# Patient Record
Sex: MALE | Race: WHITE | Employment: OTHER | URBAN - METROPOLITAN AREA
[De-identification: names, ages, dates, MRNs, and addresses within clinical notes are randomized per-mention and may not be internally consistent; named-entity substitution may affect disease eponyms.]

---

## 2017-01-01 ENCOUNTER — HOSPITAL ENCOUNTER (INPATIENT)
Age: 72
LOS: 1 days | DRG: 291 | End: 2017-05-01
Attending: INTERNAL MEDICINE | Admitting: INTERNAL MEDICINE
Payer: OTHER MISCELLANEOUS

## 2017-01-01 ENCOUNTER — OFFICE VISIT (OUTPATIENT)
Dept: INTERNAL MEDICINE CLINIC | Age: 72
End: 2017-01-01

## 2017-01-01 ENCOUNTER — APPOINTMENT (OUTPATIENT)
Dept: ULTRASOUND IMAGING | Age: 72
DRG: 189 | End: 2017-01-01
Attending: HOSPITALIST
Payer: COMMERCIAL

## 2017-01-01 ENCOUNTER — HOME CARE VISIT (OUTPATIENT)
Dept: HOSPICE | Facility: HOSPICE | Age: 72
End: 2017-01-01
Payer: COMMERCIAL

## 2017-01-01 ENCOUNTER — APPOINTMENT (OUTPATIENT)
Dept: GENERAL RADIOLOGY | Age: 72
DRG: 243 | End: 2017-01-01
Attending: EMERGENCY MEDICINE
Payer: COMMERCIAL

## 2017-01-01 ENCOUNTER — HOME CARE VISIT (OUTPATIENT)
Dept: SCHEDULING | Facility: HOME HEALTH | Age: 72
End: 2017-01-01
Payer: COMMERCIAL

## 2017-01-01 ENCOUNTER — HOME CARE VISIT (OUTPATIENT)
Dept: HOME HEALTH SERVICES | Facility: HOME HEALTH | Age: 72
End: 2017-01-01
Payer: COMMERCIAL

## 2017-01-01 ENCOUNTER — TELEPHONE (OUTPATIENT)
Dept: INTERNAL MEDICINE CLINIC | Age: 72
End: 2017-01-01

## 2017-01-01 ENCOUNTER — APPOINTMENT (OUTPATIENT)
Dept: ULTRASOUND IMAGING | Age: 72
DRG: 189 | End: 2017-01-01
Attending: INTERNAL MEDICINE
Payer: COMMERCIAL

## 2017-01-01 ENCOUNTER — PATIENT OUTREACH (OUTPATIENT)
Dept: INTERNAL MEDICINE CLINIC | Age: 72
End: 2017-01-01

## 2017-01-01 ENCOUNTER — HOME CARE VISIT (OUTPATIENT)
Dept: HOME HEALTH SERVICES | Facility: HOME HEALTH | Age: 72
End: 2017-01-01

## 2017-01-01 ENCOUNTER — APPOINTMENT (OUTPATIENT)
Dept: NUCLEAR MEDICINE | Age: 72
DRG: 191 | End: 2017-01-01
Attending: INTERNAL MEDICINE
Payer: COMMERCIAL

## 2017-01-01 ENCOUNTER — HOSPICE ADMISSION (OUTPATIENT)
Dept: HOSPICE | Facility: HOSPICE | Age: 72
End: 2017-01-01

## 2017-01-01 ENCOUNTER — HOME HEALTH ADMISSION (OUTPATIENT)
Dept: HOME HEALTH SERVICES | Facility: HOME HEALTH | Age: 72
End: 2017-01-01
Payer: COMMERCIAL

## 2017-01-01 ENCOUNTER — HOSPITAL ENCOUNTER (INPATIENT)
Age: 72
LOS: 3 days | Discharge: HOME HEALTH | DRG: 191 | End: 2017-03-31
Attending: EMERGENCY MEDICINE | Admitting: INTERNAL MEDICINE
Payer: COMMERCIAL

## 2017-01-01 ENCOUNTER — DOCUMENTATION ONLY (OUTPATIENT)
Dept: INTERNAL MEDICINE CLINIC | Age: 72
End: 2017-01-01

## 2017-01-01 ENCOUNTER — APPOINTMENT (OUTPATIENT)
Dept: GENERAL RADIOLOGY | Age: 72
DRG: 243 | End: 2017-01-01
Attending: INTERNAL MEDICINE
Payer: COMMERCIAL

## 2017-01-01 ENCOUNTER — HOSPITAL ENCOUNTER (INPATIENT)
Age: 72
LOS: 3 days | Discharge: HOME HEALTH | DRG: 191 | End: 2017-02-12
Attending: EMERGENCY MEDICINE | Admitting: HOSPITALIST
Payer: COMMERCIAL

## 2017-01-01 ENCOUNTER — APPOINTMENT (OUTPATIENT)
Dept: GENERAL RADIOLOGY | Age: 72
DRG: 191 | End: 2017-01-01
Attending: EMERGENCY MEDICINE
Payer: COMMERCIAL

## 2017-01-01 ENCOUNTER — HOSPICE ADMISSION (OUTPATIENT)
Dept: HOSPICE | Facility: HOSPICE | Age: 72
End: 2017-01-01
Payer: COMMERCIAL

## 2017-01-01 ENCOUNTER — APPOINTMENT (OUTPATIENT)
Dept: CT IMAGING | Age: 72
DRG: 191 | End: 2017-01-01
Attending: EMERGENCY MEDICINE
Payer: COMMERCIAL

## 2017-01-01 ENCOUNTER — APPOINTMENT (OUTPATIENT)
Dept: GENERAL RADIOLOGY | Age: 72
DRG: 189 | End: 2017-01-01
Attending: EMERGENCY MEDICINE
Payer: COMMERCIAL

## 2017-01-01 ENCOUNTER — HOSPITAL ENCOUNTER (INPATIENT)
Age: 72
LOS: 2 days | Discharge: HOSPICE | DRG: 189 | End: 2017-04-30
Attending: EMERGENCY MEDICINE | Admitting: HOSPITALIST
Payer: COMMERCIAL

## 2017-01-01 ENCOUNTER — HOSPITAL ENCOUNTER (INPATIENT)
Age: 72
LOS: 5 days | Discharge: NURSING, REHAB OR LONG-TERM CARE | DRG: 243 | End: 2017-04-22
Attending: EMERGENCY MEDICINE | Admitting: INTERNAL MEDICINE
Payer: COMMERCIAL

## 2017-01-01 ENCOUNTER — NURSE NAVIGATOR (OUTPATIENT)
Dept: INTERNAL MEDICINE CLINIC | Age: 72
End: 2017-01-01

## 2017-01-01 ENCOUNTER — PATIENT MESSAGE (OUTPATIENT)
Dept: INTERNAL MEDICINE CLINIC | Age: 72
End: 2017-01-01

## 2017-01-01 ENCOUNTER — APPOINTMENT (OUTPATIENT)
Dept: CT IMAGING | Age: 72
DRG: 189 | End: 2017-01-01
Attending: HOSPITALIST
Payer: COMMERCIAL

## 2017-01-01 VITALS
OXYGEN SATURATION: 97 % | TEMPERATURE: 98.1 F | DIASTOLIC BLOOD PRESSURE: 62 MMHG | SYSTOLIC BLOOD PRESSURE: 120 MMHG | HEART RATE: 80 BPM

## 2017-01-01 VITALS
HEART RATE: 89 BPM | SYSTOLIC BLOOD PRESSURE: 130 MMHG | TEMPERATURE: 98.6 F | OXYGEN SATURATION: 94 % | DIASTOLIC BLOOD PRESSURE: 70 MMHG | RESPIRATION RATE: 15 BRPM

## 2017-01-01 VITALS
BODY MASS INDEX: 19.04 KG/M2 | DIASTOLIC BLOOD PRESSURE: 62 MMHG | WEIGHT: 136 LBS | OXYGEN SATURATION: 97 % | SYSTOLIC BLOOD PRESSURE: 102 MMHG | RESPIRATION RATE: 18 BRPM | HEIGHT: 71 IN | TEMPERATURE: 97.6 F | HEART RATE: 81 BPM

## 2017-01-01 VITALS
TEMPERATURE: 97.6 F | DIASTOLIC BLOOD PRESSURE: 64 MMHG | SYSTOLIC BLOOD PRESSURE: 112 MMHG | HEART RATE: 73 BPM | WEIGHT: 132.5 LBS | BODY MASS INDEX: 18.49 KG/M2 | RESPIRATION RATE: 73 BRPM | OXYGEN SATURATION: 97 %

## 2017-01-01 VITALS
DIASTOLIC BLOOD PRESSURE: 90 MMHG | TEMPERATURE: 98.1 F | SYSTOLIC BLOOD PRESSURE: 178 MMHG | RESPIRATION RATE: 30 BRPM | OXYGEN SATURATION: 98 % | HEART RATE: 75 BPM | BODY MASS INDEX: 19.33 KG/M2 | HEIGHT: 70 IN | WEIGHT: 135 LBS

## 2017-01-01 VITALS
WEIGHT: 131.84 LBS | TEMPERATURE: 98.4 F | RESPIRATION RATE: 20 BRPM | HEIGHT: 71 IN | DIASTOLIC BLOOD PRESSURE: 61 MMHG | OXYGEN SATURATION: 96 % | SYSTOLIC BLOOD PRESSURE: 110 MMHG | HEART RATE: 67 BPM | BODY MASS INDEX: 18.46 KG/M2

## 2017-01-01 VITALS
WEIGHT: 135.2 LBS | SYSTOLIC BLOOD PRESSURE: 140 MMHG | OXYGEN SATURATION: 97 % | DIASTOLIC BLOOD PRESSURE: 71 MMHG | HEIGHT: 70 IN | RESPIRATION RATE: 18 BRPM | HEART RATE: 75 BPM | TEMPERATURE: 98.3 F | BODY MASS INDEX: 19.36 KG/M2

## 2017-01-01 VITALS
DIASTOLIC BLOOD PRESSURE: 64 MMHG | RESPIRATION RATE: 20 BRPM | SYSTOLIC BLOOD PRESSURE: 118 MMHG | OXYGEN SATURATION: 98 % | BODY MASS INDEX: 18.49 KG/M2 | WEIGHT: 132.5 LBS | HEART RATE: 82 BPM

## 2017-01-01 VITALS
TEMPERATURE: 98.7 F | BODY MASS INDEX: 19.17 KG/M2 | OXYGEN SATURATION: 95 % | HEART RATE: 86 BPM | WEIGHT: 137.4 LBS | DIASTOLIC BLOOD PRESSURE: 74 MMHG | RESPIRATION RATE: 16 BRPM | SYSTOLIC BLOOD PRESSURE: 118 MMHG

## 2017-01-01 VITALS
DIASTOLIC BLOOD PRESSURE: 68 MMHG | OXYGEN SATURATION: 98 % | HEART RATE: 79 BPM | RESPIRATION RATE: 20 BRPM | SYSTOLIC BLOOD PRESSURE: 130 MMHG | BODY MASS INDEX: 18.49 KG/M2 | WEIGHT: 132.5 LBS | TEMPERATURE: 97.9 F

## 2017-01-01 VITALS — DIASTOLIC BLOOD PRESSURE: 80 MMHG | SYSTOLIC BLOOD PRESSURE: 138 MMHG | OXYGEN SATURATION: 98 % | HEART RATE: 80 BPM

## 2017-01-01 VITALS
RESPIRATION RATE: 18 BRPM | HEART RATE: 76 BPM | OXYGEN SATURATION: 98 % | SYSTOLIC BLOOD PRESSURE: 140 MMHG | TEMPERATURE: 97.9 F | DIASTOLIC BLOOD PRESSURE: 60 MMHG

## 2017-01-01 VITALS
WEIGHT: 138 LBS | HEART RATE: 70 BPM | BODY MASS INDEX: 19.32 KG/M2 | DIASTOLIC BLOOD PRESSURE: 39 MMHG | OXYGEN SATURATION: 97 % | RESPIRATION RATE: 18 BRPM | SYSTOLIC BLOOD PRESSURE: 94 MMHG | HEIGHT: 71 IN | TEMPERATURE: 97.8 F

## 2017-01-01 VITALS
DIASTOLIC BLOOD PRESSURE: 78 MMHG | OXYGEN SATURATION: 96 % | TEMPERATURE: 97.9 F | HEART RATE: 88 BPM | SYSTOLIC BLOOD PRESSURE: 130 MMHG

## 2017-01-01 VITALS
OXYGEN SATURATION: 96 % | SYSTOLIC BLOOD PRESSURE: 140 MMHG | DIASTOLIC BLOOD PRESSURE: 82 MMHG | TEMPERATURE: 97.9 F | HEART RATE: 82 BPM

## 2017-01-01 VITALS
TEMPERATURE: 97.5 F | DIASTOLIC BLOOD PRESSURE: 62 MMHG | WEIGHT: 132 LBS | BODY MASS INDEX: 18.48 KG/M2 | RESPIRATION RATE: 16 BRPM | SYSTOLIC BLOOD PRESSURE: 120 MMHG | HEIGHT: 71 IN | HEART RATE: 80 BPM | OXYGEN SATURATION: 97 %

## 2017-01-01 VITALS
HEART RATE: 73 BPM | DIASTOLIC BLOOD PRESSURE: 66 MMHG | RESPIRATION RATE: 16 BRPM | SYSTOLIC BLOOD PRESSURE: 116 MMHG | TEMPERATURE: 97.8 F | OXYGEN SATURATION: 97 %

## 2017-01-01 VITALS
OXYGEN SATURATION: 96 % | HEART RATE: 77 BPM | TEMPERATURE: 97.9 F | WEIGHT: 132 LBS | SYSTOLIC BLOOD PRESSURE: 130 MMHG | BODY MASS INDEX: 18.42 KG/M2 | DIASTOLIC BLOOD PRESSURE: 70 MMHG | RESPIRATION RATE: 18 BRPM

## 2017-01-01 VITALS
RESPIRATION RATE: 16 BRPM | SYSTOLIC BLOOD PRESSURE: 126 MMHG | DIASTOLIC BLOOD PRESSURE: 62 MMHG | OXYGEN SATURATION: 96 % | HEART RATE: 85 BPM

## 2017-01-01 VITALS
RESPIRATION RATE: 24 BRPM | DIASTOLIC BLOOD PRESSURE: 78 MMHG | HEART RATE: 77 BPM | WEIGHT: 131 LBS | SYSTOLIC BLOOD PRESSURE: 144 MMHG | BODY MASS INDEX: 18.28 KG/M2 | OXYGEN SATURATION: 97 % | TEMPERATURE: 97.6 F

## 2017-01-01 DIAGNOSIS — J18.9 PNEUMONIA OF BOTH LUNGS DUE TO INFECTIOUS ORGANISM, UNSPECIFIED PART OF LUNG: ICD-10-CM

## 2017-01-01 DIAGNOSIS — I48.0 PAROXYSMAL ATRIAL FIBRILLATION (HCC): Primary | ICD-10-CM

## 2017-01-01 DIAGNOSIS — A15.0: Chronic | ICD-10-CM

## 2017-01-01 DIAGNOSIS — I25.9 ISCHEMIC HEART DISEASE, CHRONIC: Chronic | ICD-10-CM

## 2017-01-01 DIAGNOSIS — J96.21 ACUTE ON CHRONIC RESPIRATORY FAILURE WITH HYPOXIA (HCC): ICD-10-CM

## 2017-01-01 DIAGNOSIS — D64.9 CHRONIC ANEMIA: ICD-10-CM

## 2017-01-01 DIAGNOSIS — J96.01 ACUTE RESPIRATORY FAILURE WITH HYPOXIA (HCC): Primary | ICD-10-CM

## 2017-01-01 DIAGNOSIS — F51.01 PRIMARY INSOMNIA: Chronic | ICD-10-CM

## 2017-01-01 DIAGNOSIS — I10 ESSENTIAL HYPERTENSION: Chronic | ICD-10-CM

## 2017-01-01 DIAGNOSIS — F43.23 ADJUSTMENT DISORDER WITH MIXED ANXIETY AND DEPRESSED MOOD: ICD-10-CM

## 2017-01-01 DIAGNOSIS — I10 ESSENTIAL HYPERTENSION, BENIGN: Chronic | ICD-10-CM

## 2017-01-01 DIAGNOSIS — J84.10 PULMONARY EMPHYSEMA WITH FIBROSIS OF LUNG (HCC): ICD-10-CM

## 2017-01-01 DIAGNOSIS — K59.09 CHRONIC CONSTIPATION: ICD-10-CM

## 2017-01-01 DIAGNOSIS — J96.11 HYPOXEMIC RESPIRATORY FAILURE, CHRONIC (HCC): Primary | Chronic | ICD-10-CM

## 2017-01-01 DIAGNOSIS — J90 PLEURAL EFFUSION, LEFT: ICD-10-CM

## 2017-01-01 DIAGNOSIS — J44.1 COPD EXACERBATION (HCC): ICD-10-CM

## 2017-01-01 DIAGNOSIS — R07.9 ACUTE CHEST PAIN: Primary | ICD-10-CM

## 2017-01-01 DIAGNOSIS — J84.10 PULMONARY EMPHYSEMA WITH FIBROSIS OF LUNG (HCC): Chronic | ICD-10-CM

## 2017-01-01 DIAGNOSIS — J44.1 CHRONIC OBSTRUCTIVE PULMONARY DISEASE WITH ACUTE EXACERBATION (HCC): Primary | ICD-10-CM

## 2017-01-01 DIAGNOSIS — I50.22 CHRONIC SYSTOLIC CONGESTIVE HEART FAILURE (HCC): Chronic | ICD-10-CM

## 2017-01-01 DIAGNOSIS — J43.9 PULMONARY EMPHYSEMA WITH FIBROSIS OF LUNG (HCC): ICD-10-CM

## 2017-01-01 DIAGNOSIS — J43.9 PULMONARY EMPHYSEMA WITH FIBROSIS OF LUNG (HCC): Chronic | ICD-10-CM

## 2017-01-01 DIAGNOSIS — E03.4 HYPOTHYROIDISM DUE TO ACQUIRED ATROPHY OF THYROID: Chronic | ICD-10-CM

## 2017-01-01 LAB
ALBUMIN SERPL BCP-MCNC: 2.2 G/DL (ref 3.5–5)
ALBUMIN SERPL BCP-MCNC: 2.3 G/DL (ref 3.5–5)
ALBUMIN SERPL BCP-MCNC: 2.4 G/DL (ref 3.5–5)
ALBUMIN SERPL BCP-MCNC: 2.5 G/DL (ref 3.5–5)
ALBUMIN SERPL BCP-MCNC: 2.6 G/DL (ref 3.5–5)
ALBUMIN SERPL BCP-MCNC: 2.9 G/DL (ref 3.5–5)
ALBUMIN SERPL BCP-MCNC: 2.9 G/DL (ref 3.5–5)
ALBUMIN SERPL BCP-MCNC: 3 G/DL (ref 3.5–5)
ALBUMIN SERPL BCP-MCNC: 3.4 G/DL (ref 3.5–5)
ALBUMIN/GLOB SERPL: 0.6 {RATIO} (ref 1.1–2.2)
ALBUMIN/GLOB SERPL: 0.6 {RATIO} (ref 1.1–2.2)
ALBUMIN/GLOB SERPL: 0.7 {RATIO} (ref 1.1–2.2)
ALBUMIN/GLOB SERPL: 0.7 {RATIO} (ref 1.1–2.2)
ALBUMIN/GLOB SERPL: 0.8 {RATIO} (ref 1.1–2.2)
ALP SERPL-CCNC: 46 U/L (ref 45–117)
ALP SERPL-CCNC: 47 U/L (ref 45–117)
ALP SERPL-CCNC: 50 U/L (ref 45–117)
ALP SERPL-CCNC: 51 U/L (ref 45–117)
ALP SERPL-CCNC: 51 U/L (ref 45–117)
ALP SERPL-CCNC: 52 U/L (ref 45–117)
ALP SERPL-CCNC: 54 U/L (ref 45–117)
ALP SERPL-CCNC: 59 U/L (ref 45–117)
ALP SERPL-CCNC: 67 U/L (ref 45–117)
ALT SERPL-CCNC: 117 U/L (ref 12–78)
ALT SERPL-CCNC: 120 U/L (ref 12–78)
ALT SERPL-CCNC: 17 U/L (ref 12–78)
ALT SERPL-CCNC: 172 U/L (ref 12–78)
ALT SERPL-CCNC: 21 U/L (ref 12–78)
ALT SERPL-CCNC: 28 U/L (ref 12–78)
ALT SERPL-CCNC: 29 U/L (ref 12–78)
ALT SERPL-CCNC: 30 U/L (ref 12–78)
ALT SERPL-CCNC: 89 U/L (ref 12–78)
ANION GAP BLD CALC-SCNC: 4 MMOL/L (ref 5–15)
ANION GAP BLD CALC-SCNC: 5 MMOL/L (ref 5–15)
ANION GAP BLD CALC-SCNC: 6 MMOL/L (ref 5–15)
ANION GAP BLD CALC-SCNC: 7 MMOL/L (ref 5–15)
ANION GAP BLD CALC-SCNC: 8 MMOL/L (ref 5–15)
ANION GAP BLD CALC-SCNC: 9 MMOL/L (ref 5–15)
ANION GAP BLD CALC-SCNC: 9 MMOL/L (ref 5–15)
APPEARANCE UR: ABNORMAL
APPEARANCE UR: CLEAR
APPEARANCE UR: CLEAR
ARTERIAL PATENCY WRIST A: ABNORMAL
ARTERIAL PATENCY WRIST A: YES
ARTERIAL PATENCY WRIST A: YES
AST SERPL W P-5'-P-CCNC: 12 U/L (ref 15–37)
AST SERPL W P-5'-P-CCNC: 14 U/L (ref 15–37)
AST SERPL W P-5'-P-CCNC: 168 U/L (ref 15–37)
AST SERPL W P-5'-P-CCNC: 18 U/L (ref 15–37)
AST SERPL W P-5'-P-CCNC: 19 U/L (ref 15–37)
AST SERPL W P-5'-P-CCNC: 26 U/L (ref 15–37)
AST SERPL W P-5'-P-CCNC: 27 U/L (ref 15–37)
AST SERPL W P-5'-P-CCNC: 55 U/L (ref 15–37)
AST SERPL W P-5'-P-CCNC: 91 U/L (ref 15–37)
ATRIAL RATE: 102 BPM
ATRIAL RATE: 104 BPM
ATRIAL RATE: 187 BPM
ATRIAL RATE: 72 BPM
ATRIAL RATE: 79 BPM
ATRIAL RATE: 95 BPM
ATRIAL RATE: 96 BPM
ATTENDING PHYSICIAN, CST07: NORMAL
BACTERIA SPEC CULT: NORMAL
BACTERIA URNS QL MICRO: ABNORMAL /HPF
BACTERIA URNS QL MICRO: NEGATIVE /HPF
BACTERIA URNS QL MICRO: NEGATIVE /HPF
BASE EXCESS BLDA CALC-SCNC: 14.9 MMOL/L
BASE EXCESS BLDA CALC-SCNC: 8.3 MMOL/L
BASE EXCESS BLDA CALC-SCNC: 8.9 MMOL/L
BASOPHILS # BLD AUTO: 0 K/UL (ref 0–0.1)
BASOPHILS # BLD AUTO: 0.1 K/UL (ref 0–0.1)
BASOPHILS # BLD: 0 % (ref 0–1)
BASOPHILS # BLD: 1 % (ref 0–1)
BDY SITE: ABNORMAL
BILIRUB DIRECT SERPL-MCNC: 0.1 MG/DL (ref 0–0.2)
BILIRUB SERPL-MCNC: 0.3 MG/DL (ref 0.2–1)
BILIRUB SERPL-MCNC: 0.3 MG/DL (ref 0.2–1)
BILIRUB SERPL-MCNC: 0.4 MG/DL (ref 0.2–1)
BILIRUB SERPL-MCNC: 0.5 MG/DL (ref 0.2–1)
BILIRUB SERPL-MCNC: 0.5 MG/DL (ref 0.2–1)
BILIRUB SERPL-MCNC: 0.7 MG/DL (ref 0.2–1)
BILIRUB UR QL: NEGATIVE
BNP SERPL-MCNC: 2399 PG/ML (ref 0–125)
BNP SERPL-MCNC: 3971 PG/ML (ref 0–125)
BNP SERPL-MCNC: 822 PG/ML (ref 0–100)
BNP SERPL-MCNC: ABNORMAL PG/ML (ref 0–125)
BNP SERPL-MCNC: ABNORMAL PG/ML (ref 0–125)
BREATHS.SPONTANEOUS ON VENT: 18
BREATHS.SPONTANEOUS ON VENT: 25
BUN SERPL-MCNC: 15 MG/DL (ref 6–20)
BUN SERPL-MCNC: 16 MG/DL (ref 6–20)
BUN SERPL-MCNC: 17 MG/DL (ref 6–20)
BUN SERPL-MCNC: 18 MG/DL (ref 6–20)
BUN SERPL-MCNC: 19 MG/DL (ref 6–20)
BUN SERPL-MCNC: 20 MG/DL (ref 6–20)
BUN SERPL-MCNC: 20 MG/DL (ref 6–20)
BUN SERPL-MCNC: 22 MG/DL (ref 6–20)
BUN SERPL-MCNC: 22 MG/DL (ref 6–20)
BUN SERPL-MCNC: 24 MG/DL (ref 6–20)
BUN SERPL-MCNC: 25 MG/DL (ref 6–20)
BUN SERPL-MCNC: 28 MG/DL (ref 6–20)
BUN SERPL-MCNC: 29 MG/DL (ref 6–20)
BUN SERPL-MCNC: 30 MG/DL (ref 6–20)
BUN SERPL-MCNC: 35 MG/DL (ref 6–20)
BUN SERPL-MCNC: 37 MG/DL (ref 6–20)
BUN/CREAT SERPL: 19 (ref 12–20)
BUN/CREAT SERPL: 23 (ref 12–20)
BUN/CREAT SERPL: 23 (ref 12–20)
BUN/CREAT SERPL: 24 (ref 12–20)
BUN/CREAT SERPL: 25 (ref 12–20)
BUN/CREAT SERPL: 27 (ref 12–20)
BUN/CREAT SERPL: 28 (ref 12–20)
BUN/CREAT SERPL: 33 (ref 12–20)
BUN/CREAT SERPL: 39 (ref 12–20)
BUN/CREAT SERPL: 41 (ref 12–20)
CALCIUM SERPL-MCNC: 7.8 MG/DL (ref 8.5–10.1)
CALCIUM SERPL-MCNC: 7.8 MG/DL (ref 8.5–10.1)
CALCIUM SERPL-MCNC: 8 MG/DL (ref 8.5–10.1)
CALCIUM SERPL-MCNC: 8.1 MG/DL (ref 8.5–10.1)
CALCIUM SERPL-MCNC: 8.1 MG/DL (ref 8.5–10.1)
CALCIUM SERPL-MCNC: 8.2 MG/DL (ref 8.5–10.1)
CALCIUM SERPL-MCNC: 8.2 MG/DL (ref 8.5–10.1)
CALCIUM SERPL-MCNC: 8.3 MG/DL (ref 8.5–10.1)
CALCIUM SERPL-MCNC: 8.4 MG/DL (ref 8.5–10.1)
CALCIUM SERPL-MCNC: 8.4 MG/DL (ref 8.5–10.1)
CALCIUM SERPL-MCNC: 8.6 MG/DL (ref 8.5–10.1)
CALCULATED P AXIS, ECG09: 85 DEGREES
CALCULATED P AXIS, ECG09: 89 DEGREES
CALCULATED P AXIS, ECG09: 90 DEGREES
CALCULATED P AXIS, ECG09: 96 DEGREES
CALCULATED R AXIS, ECG10: -109 DEGREES
CALCULATED R AXIS, ECG10: 100 DEGREES
CALCULATED R AXIS, ECG10: 103 DEGREES
CALCULATED R AXIS, ECG10: 104 DEGREES
CALCULATED R AXIS, ECG10: 104 DEGREES
CALCULATED R AXIS, ECG10: 108 DEGREES
CALCULATED R AXIS, ECG10: 86 DEGREES
CALCULATED T AXIS, ECG11: 68 DEGREES
CALCULATED T AXIS, ECG11: 69 DEGREES
CALCULATED T AXIS, ECG11: 80 DEGREES
CALCULATED T AXIS, ECG11: 86 DEGREES
CALCULATED T AXIS, ECG11: 87 DEGREES
CALCULATED T AXIS, ECG11: 87 DEGREES
CALCULATED T AXIS, ECG11: 91 DEGREES
CC UR VC: NORMAL
CHLORIDE SERPL-SCNC: 100 MMOL/L (ref 97–108)
CHLORIDE SERPL-SCNC: 101 MMOL/L (ref 97–108)
CHLORIDE SERPL-SCNC: 102 MMOL/L (ref 97–108)
CHLORIDE SERPL-SCNC: 102 MMOL/L (ref 97–108)
CHLORIDE SERPL-SCNC: 104 MMOL/L (ref 97–108)
CHLORIDE SERPL-SCNC: 94 MMOL/L (ref 97–108)
CHLORIDE SERPL-SCNC: 94 MMOL/L (ref 97–108)
CHLORIDE SERPL-SCNC: 96 MMOL/L (ref 97–108)
CHLORIDE SERPL-SCNC: 96 MMOL/L (ref 97–108)
CHLORIDE SERPL-SCNC: 98 MMOL/L (ref 97–108)
CHLORIDE SERPL-SCNC: 99 MMOL/L (ref 97–108)
CK MB CFR SERPL CALC: NORMAL % (ref 0–2.5)
CK MB SERPL-MCNC: <1 NG/ML (ref 5–25)
CK SERPL-CCNC: 19 U/L (ref 39–308)
CK SERPL-CCNC: 61 U/L (ref 39–308)
CK SERPL-CCNC: 79 U/L (ref 39–308)
CK SERPL-CCNC: 89 U/L (ref 39–308)
CO2 SERPL-SCNC: 31 MMOL/L (ref 21–32)
CO2 SERPL-SCNC: 32 MMOL/L (ref 21–32)
CO2 SERPL-SCNC: 34 MMOL/L (ref 21–32)
CO2 SERPL-SCNC: 34 MMOL/L (ref 21–32)
CO2 SERPL-SCNC: 35 MMOL/L (ref 21–32)
CO2 SERPL-SCNC: 36 MMOL/L (ref 21–32)
CO2 SERPL-SCNC: 38 MMOL/L (ref 21–32)
CO2 SERPL-SCNC: 39 MMOL/L (ref 21–32)
CO2 SERPL-SCNC: 40 MMOL/L (ref 21–32)
CO2 SERPL-SCNC: 40 MMOL/L (ref 21–32)
COLOR UR: ABNORMAL
COLOR UR: ABNORMAL
COLOR UR: NORMAL
CREAT SERPL-MCNC: 0.7 MG/DL (ref 0.7–1.3)
CREAT SERPL-MCNC: 0.76 MG/DL (ref 0.7–1.3)
CREAT SERPL-MCNC: 0.79 MG/DL (ref 0.7–1.3)
CREAT SERPL-MCNC: 0.8 MG/DL (ref 0.7–1.3)
CREAT SERPL-MCNC: 0.83 MG/DL (ref 0.7–1.3)
CREAT SERPL-MCNC: 0.83 MG/DL (ref 0.7–1.3)
CREAT SERPL-MCNC: 0.85 MG/DL (ref 0.7–1.3)
CREAT SERPL-MCNC: 0.88 MG/DL (ref 0.7–1.3)
CREAT SERPL-MCNC: 0.88 MG/DL (ref 0.7–1.3)
CREAT SERPL-MCNC: 0.89 MG/DL (ref 0.7–1.3)
CREAT SERPL-MCNC: 0.9 MG/DL (ref 0.7–1.3)
CREAT SERPL-MCNC: 0.93 MG/DL (ref 0.7–1.3)
CREAT SERPL-MCNC: 0.96 MG/DL (ref 0.7–1.3)
CREAT SERPL-MCNC: 0.97 MG/DL (ref 0.7–1.3)
CREAT SERPL-MCNC: 1.08 MG/DL (ref 0.7–1.3)
CREAT SERPL-MCNC: 1.3 MG/DL (ref 0.7–1.3)
D DIMER PPP FEU-MCNC: 0.27 MG/L FEU (ref 0–0.65)
DIAGNOSIS, 93000: NORMAL
DIFFERENTIAL METHOD BLD: ABNORMAL
DUKE TM SCORE RESULT, CST14: NORMAL
DUKE TREADMILL SCORE, CST13: 5456
EBV VCA IGM SER-ACNC: <36 U/ML (ref 0–35.9)
ECG INTERP BEFORE EX, CST11: NORMAL
ECG INTERP DURING EX, CST12: NORMAL
EOSINOPHIL # BLD: 0 K/UL (ref 0–0.4)
EOSINOPHIL # BLD: 0.3 K/UL (ref 0–0.4)
EOSINOPHIL # BLD: 0.8 K/UL (ref 0–0.4)
EOSINOPHIL NFR BLD: 0 % (ref 0–7)
EOSINOPHIL NFR BLD: 10 % (ref 0–7)
EOSINOPHIL NFR BLD: 2 % (ref 0–7)
EPITH CASTS URNS QL MICRO: ABNORMAL /LPF
EPITH CASTS URNS QL MICRO: ABNORMAL /LPF
EPITH CASTS URNS QL MICRO: NORMAL /LPF
ERYTHROCYTE [DISTWIDTH] IN BLOOD BY AUTOMATED COUNT: 15.5 % (ref 11.5–14.5)
ERYTHROCYTE [DISTWIDTH] IN BLOOD BY AUTOMATED COUNT: 15.5 % (ref 11.5–14.5)
ERYTHROCYTE [DISTWIDTH] IN BLOOD BY AUTOMATED COUNT: 15.6 % (ref 11.5–14.5)
ERYTHROCYTE [DISTWIDTH] IN BLOOD BY AUTOMATED COUNT: 16.6 % (ref 11.5–14.5)
ERYTHROCYTE [DISTWIDTH] IN BLOOD BY AUTOMATED COUNT: 16.8 % (ref 11.5–14.5)
ERYTHROCYTE [DISTWIDTH] IN BLOOD BY AUTOMATED COUNT: 16.8 % (ref 11.5–14.5)
ERYTHROCYTE [DISTWIDTH] IN BLOOD BY AUTOMATED COUNT: 17.4 % (ref 11.5–14.5)
ERYTHROCYTE [DISTWIDTH] IN BLOOD BY AUTOMATED COUNT: 17.4 % (ref 11.5–14.5)
ERYTHROCYTE [DISTWIDTH] IN BLOOD BY AUTOMATED COUNT: 17.6 % (ref 11.5–14.5)
ERYTHROCYTE [DISTWIDTH] IN BLOOD BY AUTOMATED COUNT: 17.6 % (ref 11.5–14.5)
ERYTHROCYTE [DISTWIDTH] IN BLOOD BY AUTOMATED COUNT: 17.8 % (ref 11.5–14.5)
ERYTHROCYTE [DISTWIDTH] IN BLOOD BY AUTOMATED COUNT: 18 % (ref 11.5–14.5)
ERYTHROCYTE [DISTWIDTH] IN BLOOD BY AUTOMATED COUNT: 18.2 % (ref 11.5–14.5)
ERYTHROCYTE [DISTWIDTH] IN BLOOD BY AUTOMATED COUNT: 18.3 % (ref 11.5–14.5)
ERYTHROCYTE [DISTWIDTH] IN BLOOD BY AUTOMATED COUNT: 18.5 % (ref 11.5–14.5)
ERYTHROCYTE [DISTWIDTH] IN BLOOD BY AUTOMATED COUNT: 18.8 % (ref 11.5–14.5)
FERRITIN SERPL-MCNC: 6 NG/ML (ref 26–388)
FIO2 ON VENT: 100 %
FIO2 ON VENT: 36 %
FUNCTIONAL CAPACITY, CST17: NORMAL
GAS FLOW.O2 O2 DELIVERY SYS: 4 L/MIN
GAS FLOW.O2 O2 DELIVERY SYS: 4.5 L/MIN
GLOBULIN SER CALC-MCNC: 3.2 G/DL (ref 2–4)
GLOBULIN SER CALC-MCNC: 3.2 G/DL (ref 2–4)
GLOBULIN SER CALC-MCNC: 3.4 G/DL (ref 2–4)
GLOBULIN SER CALC-MCNC: 3.7 G/DL (ref 2–4)
GLOBULIN SER CALC-MCNC: 3.8 G/DL (ref 2–4)
GLOBULIN SER CALC-MCNC: 3.9 G/DL (ref 2–4)
GLOBULIN SER CALC-MCNC: 4 G/DL (ref 2–4)
GLOBULIN SER CALC-MCNC: 4.4 G/DL (ref 2–4)
GLOBULIN SER CALC-MCNC: 4.6 G/DL (ref 2–4)
GLUCOSE BLD STRIP.AUTO-MCNC: 107 MG/DL (ref 65–100)
GLUCOSE BLD STRIP.AUTO-MCNC: 113 MG/DL (ref 65–100)
GLUCOSE BLD STRIP.AUTO-MCNC: 129 MG/DL (ref 65–100)
GLUCOSE BLD STRIP.AUTO-MCNC: 134 MG/DL (ref 65–100)
GLUCOSE BLD STRIP.AUTO-MCNC: 135 MG/DL (ref 65–100)
GLUCOSE BLD STRIP.AUTO-MCNC: 137 MG/DL (ref 65–100)
GLUCOSE BLD STRIP.AUTO-MCNC: 148 MG/DL (ref 65–100)
GLUCOSE SERPL-MCNC: 101 MG/DL (ref 65–100)
GLUCOSE SERPL-MCNC: 102 MG/DL (ref 65–100)
GLUCOSE SERPL-MCNC: 103 MG/DL (ref 65–100)
GLUCOSE SERPL-MCNC: 103 MG/DL (ref 65–100)
GLUCOSE SERPL-MCNC: 104 MG/DL (ref 65–100)
GLUCOSE SERPL-MCNC: 112 MG/DL (ref 65–100)
GLUCOSE SERPL-MCNC: 113 MG/DL (ref 65–100)
GLUCOSE SERPL-MCNC: 116 MG/DL (ref 65–100)
GLUCOSE SERPL-MCNC: 118 MG/DL (ref 65–100)
GLUCOSE SERPL-MCNC: 127 MG/DL (ref 65–100)
GLUCOSE SERPL-MCNC: 144 MG/DL (ref 65–100)
GLUCOSE SERPL-MCNC: 167 MG/DL (ref 65–100)
GLUCOSE SERPL-MCNC: 79 MG/DL (ref 65–100)
GLUCOSE SERPL-MCNC: 95 MG/DL (ref 65–100)
GLUCOSE UR STRIP.AUTO-MCNC: NEGATIVE MG/DL
HAV IGM SERPL QL IA: NONREACTIVE
HBV CORE IGM SER QL: NONREACTIVE
HBV SURFACE AG SER QL: 0.3 INDEX
HBV SURFACE AG SER QL: NEGATIVE
HCO3 BLDA-SCNC: 34 MMOL/L (ref 22–26)
HCO3 BLDA-SCNC: 36 MMOL/L (ref 22–26)
HCO3 BLDA-SCNC: 43 MMOL/L (ref 22–26)
HCT VFR BLD AUTO: 27.3 % (ref 36.6–50.3)
HCT VFR BLD AUTO: 28.6 % (ref 36.6–50.3)
HCT VFR BLD AUTO: 28.8 % (ref 36.6–50.3)
HCT VFR BLD AUTO: 29.5 % (ref 36.6–50.3)
HCT VFR BLD AUTO: 30.4 % (ref 36.6–50.3)
HCT VFR BLD AUTO: 30.7 % (ref 36.6–50.3)
HCT VFR BLD AUTO: 30.7 % (ref 36.6–50.3)
HCT VFR BLD AUTO: 31.9 % (ref 36.6–50.3)
HCT VFR BLD AUTO: 32.1 % (ref 36.6–50.3)
HCT VFR BLD AUTO: 32.7 % (ref 36.6–50.3)
HCT VFR BLD AUTO: 32.7 % (ref 36.6–50.3)
HCT VFR BLD AUTO: 33 % (ref 36.6–50.3)
HCT VFR BLD AUTO: 33.3 % (ref 36.6–50.3)
HCT VFR BLD AUTO: 33.5 % (ref 36.6–50.3)
HCT VFR BLD AUTO: 35.2 % (ref 36.6–50.3)
HCT VFR BLD AUTO: 38.6 % (ref 36.6–50.3)
HCV AB SER IA-ACNC: >11.9 INDEX
HCV AB SERPL QL IA: REACTIVE
HCV COMMENT,HCGAC: ABNORMAL
HGB BLD-MCNC: 10 G/DL (ref 12.1–17)
HGB BLD-MCNC: 10 G/DL (ref 12.1–17)
HGB BLD-MCNC: 10.3 G/DL (ref 12.1–17)
HGB BLD-MCNC: 10.5 G/DL (ref 12.1–17)
HGB BLD-MCNC: 11.2 G/DL (ref 12.1–17)
HGB BLD-MCNC: 8.1 G/DL (ref 12.1–17)
HGB BLD-MCNC: 8.5 G/DL (ref 12.1–17)
HGB BLD-MCNC: 8.6 G/DL (ref 12.1–17)
HGB BLD-MCNC: 8.7 G/DL (ref 12.1–17)
HGB BLD-MCNC: 8.8 G/DL (ref 12.1–17)
HGB BLD-MCNC: 8.9 G/DL (ref 12.1–17)
HGB BLD-MCNC: 9 G/DL (ref 12.1–17)
HGB BLD-MCNC: 9.7 G/DL (ref 12.1–17)
HGB BLD-MCNC: 9.7 G/DL (ref 12.1–17)
HGB BLD-MCNC: 9.8 G/DL (ref 12.1–17)
HGB BLD-MCNC: 9.9 G/DL (ref 12.1–17)
HGB UR QL STRIP: ABNORMAL
HGB UR QL STRIP: ABNORMAL
HGB UR QL STRIP: NEGATIVE
HYALINE CASTS URNS QL MICRO: ABNORMAL /LPF (ref 0–5)
HYALINE CASTS URNS QL MICRO: NORMAL /LPF (ref 0–5)
INR BLD: 1.2 (ref 0.9–1.2)
IRON SATN MFR SERPL: 4 % (ref 20–50)
IRON SERPL-MCNC: 18 UG/DL (ref 35–150)
KETONES UR QL STRIP.AUTO: NEGATIVE MG/DL
KNOWN CARDIAC CONDITION, CST08: NORMAL
LACTATE SERPL-SCNC: 0.9 MMOL/L (ref 0.4–2)
LACTATE SERPL-SCNC: 0.9 MMOL/L (ref 0.4–2)
LACTATE SERPL-SCNC: 1.6 MMOL/L (ref 0.4–2)
LEUKOCYTE ESTERASE UR QL STRIP.AUTO: NEGATIVE
LYMPHOCYTES # BLD AUTO: 10 % (ref 12–49)
LYMPHOCYTES # BLD AUTO: 14 % (ref 12–49)
LYMPHOCYTES # BLD AUTO: 2 % (ref 12–49)
LYMPHOCYTES # BLD AUTO: 3 % (ref 12–49)
LYMPHOCYTES # BLD AUTO: 4 % (ref 12–49)
LYMPHOCYTES # BLD AUTO: 5 % (ref 12–49)
LYMPHOCYTES # BLD AUTO: 6 % (ref 12–49)
LYMPHOCYTES # BLD AUTO: 6 % (ref 12–49)
LYMPHOCYTES # BLD: 0.2 K/UL (ref 0.8–3.5)
LYMPHOCYTES # BLD: 0.3 K/UL (ref 0.8–3.5)
LYMPHOCYTES # BLD: 0.3 K/UL (ref 0.8–3.5)
LYMPHOCYTES # BLD: 0.8 K/UL (ref 0.8–3.5)
LYMPHOCYTES # BLD: 0.8 K/UL (ref 0.8–3.5)
LYMPHOCYTES # BLD: 1.1 K/UL (ref 0.8–3.5)
LYMPHOCYTES # BLD: 1.1 K/UL (ref 0.8–3.5)
LYMPHOCYTES # BLD: 1.2 K/UL (ref 0.8–3.5)
MAGNESIUM SERPL-MCNC: 1.9 MG/DL (ref 1.6–2.4)
MAGNESIUM SERPL-MCNC: 2.1 MG/DL (ref 1.6–2.4)
MAGNESIUM SERPL-MCNC: 2.4 MG/DL (ref 1.6–2.4)
MAGNESIUM SERPL-MCNC: 2.5 MG/DL (ref 1.6–2.4)
MAX. DIASTOLIC BP, CST04: 80 MMHG
MAX. HEART RATE, CST05: 139 BPM
MAX. SYSTOLIC BP, CST03: 200 MMHG
MCH RBC QN AUTO: 22.5 PG (ref 26–34)
MCH RBC QN AUTO: 22.7 PG (ref 26–34)
MCH RBC QN AUTO: 22.8 PG (ref 26–34)
MCH RBC QN AUTO: 25.3 PG (ref 26–34)
MCH RBC QN AUTO: 25.4 PG (ref 26–34)
MCH RBC QN AUTO: 25.5 PG (ref 26–34)
MCH RBC QN AUTO: 25.5 PG (ref 26–34)
MCH RBC QN AUTO: 25.7 PG (ref 26–34)
MCH RBC QN AUTO: 26 PG (ref 26–34)
MCH RBC QN AUTO: 26.1 PG (ref 26–34)
MCH RBC QN AUTO: 26.2 PG (ref 26–34)
MCH RBC QN AUTO: 26.5 PG (ref 26–34)
MCH RBC QN AUTO: 26.6 PG (ref 26–34)
MCH RBC QN AUTO: 26.7 PG (ref 26–34)
MCH RBC QN AUTO: 26.9 PG (ref 26–34)
MCH RBC QN AUTO: 27.2 PG (ref 26–34)
MCHC RBC AUTO-ENTMCNC: 28.7 G/DL (ref 30–36.5)
MCHC RBC AUTO-ENTMCNC: 29 G/DL (ref 30–36.5)
MCHC RBC AUTO-ENTMCNC: 29.3 G/DL (ref 30–36.5)
MCHC RBC AUTO-ENTMCNC: 29.4 G/DL (ref 30–36.5)
MCHC RBC AUTO-ENTMCNC: 29.4 G/DL (ref 30–36.5)
MCHC RBC AUTO-ENTMCNC: 29.5 G/DL (ref 30–36.5)
MCHC RBC AUTO-ENTMCNC: 29.7 G/DL (ref 30–36.5)
MCHC RBC AUTO-ENTMCNC: 29.9 G/DL (ref 30–36.5)
MCHC RBC AUTO-ENTMCNC: 30.3 G/DL (ref 30–36.5)
MCHC RBC AUTO-ENTMCNC: 31.2 G/DL (ref 30–36.5)
MCHC RBC AUTO-ENTMCNC: 31.3 G/DL (ref 30–36.5)
MCHC RBC AUTO-ENTMCNC: 31.3 G/DL (ref 30–36.5)
MCV RBC AUTO: 76.7 FL (ref 80–99)
MCV RBC AUTO: 76.8 FL (ref 80–99)
MCV RBC AUTO: 79.1 FL (ref 80–99)
MCV RBC AUTO: 84.8 FL (ref 80–99)
MCV RBC AUTO: 85.7 FL (ref 80–99)
MCV RBC AUTO: 85.8 FL (ref 80–99)
MCV RBC AUTO: 85.8 FL (ref 80–99)
MCV RBC AUTO: 86.1 FL (ref 80–99)
MCV RBC AUTO: 86.9 FL (ref 80–99)
MCV RBC AUTO: 87 FL (ref 80–99)
MCV RBC AUTO: 87.1 FL (ref 80–99)
MCV RBC AUTO: 87.2 FL (ref 80–99)
MCV RBC AUTO: 88.3 FL (ref 80–99)
MCV RBC AUTO: 88.5 FL (ref 80–99)
MCV RBC AUTO: 89.9 FL (ref 80–99)
MCV RBC AUTO: 91.1 FL (ref 80–99)
MONOCYTES # BLD: 0.4 K/UL (ref 0–1)
MONOCYTES # BLD: 0.5 K/UL (ref 0–1)
MONOCYTES # BLD: 1 K/UL (ref 0–1)
MONOCYTES # BLD: 1.1 K/UL (ref 0–1)
MONOCYTES # BLD: 1.4 K/UL (ref 0–1)
MONOCYTES # BLD: 1.5 K/UL (ref 0–1)
MONOCYTES # BLD: 1.5 K/UL (ref 0–1)
MONOCYTES # BLD: 2 K/UL (ref 0–1)
MONOCYTES NFR BLD AUTO: 11 % (ref 5–13)
MONOCYTES NFR BLD AUTO: 11 % (ref 5–13)
MONOCYTES NFR BLD AUTO: 12 % (ref 5–13)
MONOCYTES NFR BLD AUTO: 12 % (ref 5–13)
MONOCYTES NFR BLD AUTO: 19 % (ref 5–13)
MONOCYTES NFR BLD AUTO: 3 % (ref 5–13)
MONOCYTES NFR BLD AUTO: 6 % (ref 5–13)
MONOCYTES NFR BLD AUTO: 9 % (ref 5–13)
NEUTS SEG # BLD: 10.6 K/UL (ref 1.8–8)
NEUTS SEG # BLD: 12.2 K/UL (ref 1.8–8)
NEUTS SEG # BLD: 15.3 K/UL (ref 1.8–8)
NEUTS SEG # BLD: 23.2 K/UL (ref 1.8–8)
NEUTS SEG # BLD: 4.3 K/UL (ref 1.8–8)
NEUTS SEG # BLD: 5 K/UL (ref 1.8–8)
NEUTS SEG # BLD: 5 K/UL (ref 1.8–8)
NEUTS SEG # BLD: 9.7 K/UL (ref 1.8–8)
NEUTS SEG NFR BLD AUTO: 63 % (ref 32–75)
NEUTS SEG NFR BLD AUTO: 76 % (ref 32–75)
NEUTS SEG NFR BLD AUTO: 76 % (ref 32–75)
NEUTS SEG NFR BLD AUTO: 83 % (ref 32–75)
NEUTS SEG NFR BLD AUTO: 83 % (ref 32–75)
NEUTS SEG NFR BLD AUTO: 87 % (ref 32–75)
NEUTS SEG NFR BLD AUTO: 91 % (ref 32–75)
NEUTS SEG NFR BLD AUTO: 95 % (ref 32–75)
NITRITE UR QL STRIP.AUTO: NEGATIVE
OVERALL BP RESPONSE TO EXERCISE, CST16: NORMAL
OVERALL HR RESPONSE TO EXERCISE, CST15: NORMAL
P-R INTERVAL, ECG05: 168 MS
P-R INTERVAL, ECG05: 170 MS
P-R INTERVAL, ECG05: 172 MS
P-R INTERVAL, ECG05: 180 MS
P-R INTERVAL, ECG05: 180 MS
PCO2 BLDA: 50 MMHG (ref 35–45)
PCO2 BLDA: 57 MMHG (ref 35–45)
PCO2 BLDA: 68 MMHG (ref 35–45)
PEAK EX METS, CST10: 1 METS
PH BLDA: 7.42 [PH] (ref 7.35–7.45)
PH BLDA: 7.42 [PH] (ref 7.35–7.45)
PH BLDA: 7.45 [PH] (ref 7.35–7.45)
PH UR STRIP: 5.5 [PH] (ref 5–8)
PH UR STRIP: 5.5 [PH] (ref 5–8)
PH UR STRIP: 6 [PH] (ref 5–8)
PLATELET # BLD AUTO: 117 K/UL (ref 150–400)
PLATELET # BLD AUTO: 124 K/UL (ref 150–400)
PLATELET # BLD AUTO: 136 K/UL (ref 150–400)
PLATELET # BLD AUTO: 208 K/UL (ref 150–400)
PLATELET # BLD AUTO: 244 K/UL (ref 150–400)
PLATELET # BLD AUTO: 247 K/UL (ref 150–400)
PLATELET # BLD AUTO: 266 K/UL (ref 150–400)
PLATELET # BLD AUTO: 266 K/UL (ref 150–400)
PLATELET # BLD AUTO: 269 K/UL (ref 150–400)
PLATELET # BLD AUTO: 275 K/UL (ref 150–400)
PLATELET # BLD AUTO: 281 K/UL (ref 150–400)
PLATELET # BLD AUTO: 288 K/UL (ref 150–400)
PLATELET # BLD AUTO: 300 K/UL (ref 150–400)
PLATELET # BLD AUTO: 302 K/UL (ref 150–400)
PLATELET # BLD AUTO: 315 K/UL (ref 150–400)
PLATELET # BLD AUTO: 354 K/UL (ref 150–400)
PLATELET COMMENTS,PCOM: ABNORMAL
PO2 BLDA: 106 MMHG (ref 80–100)
PO2 BLDA: 64 MMHG (ref 80–100)
PO2 BLDA: 87 MMHG (ref 80–100)
POTASSIUM SERPL-SCNC: 3.3 MMOL/L (ref 3.5–5.1)
POTASSIUM SERPL-SCNC: 3.3 MMOL/L (ref 3.5–5.1)
POTASSIUM SERPL-SCNC: 3.6 MMOL/L (ref 3.5–5.1)
POTASSIUM SERPL-SCNC: 3.6 MMOL/L (ref 3.5–5.1)
POTASSIUM SERPL-SCNC: 3.7 MMOL/L (ref 3.5–5.1)
POTASSIUM SERPL-SCNC: 3.8 MMOL/L (ref 3.5–5.1)
POTASSIUM SERPL-SCNC: 3.8 MMOL/L (ref 3.5–5.1)
POTASSIUM SERPL-SCNC: 3.9 MMOL/L (ref 3.5–5.1)
POTASSIUM SERPL-SCNC: 3.9 MMOL/L (ref 3.5–5.1)
POTASSIUM SERPL-SCNC: 4 MMOL/L (ref 3.5–5.1)
POTASSIUM SERPL-SCNC: 4 MMOL/L (ref 3.5–5.1)
POTASSIUM SERPL-SCNC: 4.2 MMOL/L (ref 3.5–5.1)
POTASSIUM SERPL-SCNC: 4.3 MMOL/L (ref 3.5–5.1)
POTASSIUM SERPL-SCNC: 4.7 MMOL/L (ref 3.5–5.1)
PROT SERPL-MCNC: 5.6 G/DL (ref 6.4–8.2)
PROT SERPL-MCNC: 5.7 G/DL (ref 6.4–8.2)
PROT SERPL-MCNC: 6 G/DL (ref 6.4–8.2)
PROT SERPL-MCNC: 6.1 G/DL (ref 6.4–8.2)
PROT SERPL-MCNC: 6.2 G/DL (ref 6.4–8.2)
PROT SERPL-MCNC: 6.6 G/DL (ref 6.4–8.2)
PROT SERPL-MCNC: 6.9 G/DL (ref 6.4–8.2)
PROT SERPL-MCNC: 7.3 G/DL (ref 6.4–8.2)
PROT SERPL-MCNC: 8 G/DL (ref 6.4–8.2)
PROT UR STRIP-MCNC: 30 MG/DL
PROT UR STRIP-MCNC: NEGATIVE MG/DL
PROT UR STRIP-MCNC: NEGATIVE MG/DL
PROTOCOL NAME, CST01: NORMAL
Q-T INTERVAL, ECG07: 308 MS
Q-T INTERVAL, ECG07: 346 MS
Q-T INTERVAL, ECG07: 350 MS
Q-T INTERVAL, ECG07: 366 MS
Q-T INTERVAL, ECG07: 382 MS
Q-T INTERVAL, ECG07: 414 MS
Q-T INTERVAL, ECG07: 522 MS
QRS DURATION, ECG06: 100 MS
QRS DURATION, ECG06: 102 MS
QRS DURATION, ECG06: 108 MS
QRS DURATION, ECG06: 150 MS
QRS DURATION, ECG06: 88 MS
QRS DURATION, ECG06: 92 MS
QRS DURATION, ECG06: 98 MS
QTC CALCULATION (BEZET), ECG08: 450 MS
QTC CALCULATION (BEZET), ECG08: 459 MS
QTC CALCULATION (BEZET), ECG08: 460 MS
QTC CALCULATION (BEZET), ECG08: 471 MS
QTC CALCULATION (BEZET), ECG08: 482 MS
QTC CALCULATION (BEZET), ECG08: 517 MS
QTC CALCULATION (BEZET), ECG08: 582 MS
RBC # BLD AUTO: 3.16 M/UL (ref 4.1–5.7)
RBC # BLD AUTO: 3.24 M/UL (ref 4.1–5.7)
RBC # BLD AUTO: 3.28 M/UL (ref 4.1–5.7)
RBC # BLD AUTO: 3.53 M/UL (ref 4.1–5.7)
RBC # BLD AUTO: 3.56 M/UL (ref 4.1–5.7)
RBC # BLD AUTO: 3.73 M/UL (ref 4.1–5.7)
RBC # BLD AUTO: 3.74 M/UL (ref 4.1–5.7)
RBC # BLD AUTO: 3.76 M/UL (ref 4.1–5.7)
RBC # BLD AUTO: 3.8 M/UL (ref 4.1–5.7)
RBC # BLD AUTO: 3.81 M/UL (ref 4.1–5.7)
RBC # BLD AUTO: 3.82 M/UL (ref 4.1–5.7)
RBC # BLD AUTO: 3.88 M/UL (ref 4.1–5.7)
RBC # BLD AUTO: 3.91 M/UL (ref 4.1–5.7)
RBC # BLD AUTO: 3.96 M/UL (ref 4.1–5.7)
RBC # BLD AUTO: 4.05 M/UL (ref 4.1–5.7)
RBC # BLD AUTO: 4.43 M/UL (ref 4.1–5.7)
RBC #/AREA URNS HPF: ABNORMAL /HPF (ref 0–5)
RBC #/AREA URNS HPF: ABNORMAL /HPF (ref 0–5)
RBC #/AREA URNS HPF: NORMAL /HPF (ref 0–5)
RBC MORPH BLD: ABNORMAL
SAO2 % BLD: 93 % (ref 92–97)
SAO2 % BLD: 97 % (ref 92–97)
SAO2 % BLD: 98 % (ref 92–97)
SAO2% DEVICE SAO2% SENSOR NAME: ABNORMAL
SERVICE CMNT-IMP: ABNORMAL
SERVICE CMNT-IMP: NORMAL
SERVICE CMNT-IMP: NORMAL
SODIUM SERPL-SCNC: 138 MMOL/L (ref 136–145)
SODIUM SERPL-SCNC: 139 MMOL/L (ref 136–145)
SODIUM SERPL-SCNC: 140 MMOL/L (ref 136–145)
SODIUM SERPL-SCNC: 141 MMOL/L (ref 136–145)
SODIUM SERPL-SCNC: 142 MMOL/L (ref 136–145)
SODIUM SERPL-SCNC: 143 MMOL/L (ref 136–145)
SP GR UR REFRACTOMETRY: 1.01 (ref 1–1.03)
SP GR UR REFRACTOMETRY: 1.02 (ref 1–1.03)
SP GR UR REFRACTOMETRY: 1.02 (ref 1–1.03)
SP1: ABNORMAL
SP2: ABNORMAL
SP3: ABNORMAL
SPECIMEN SITE: ABNORMAL
T4 FREE SERPL-MCNC: 0.8 NG/DL (ref 0.8–1.5)
TEST INDICATION, CST09: NORMAL
TIBC SERPL-MCNC: 437 UG/DL (ref 250–450)
TROPONIN I SERPL-MCNC: 0.07 NG/ML
TROPONIN I SERPL-MCNC: 0.08 NG/ML
TROPONIN I SERPL-MCNC: <0.04 NG/ML
TSH SERPL DL<=0.05 MIU/L-ACNC: 1.25 UIU/ML (ref 0.36–3.74)
UA: UC IF INDICATED,UAUC: ABNORMAL
URATE CRY URNS QL MICRO: ABNORMAL
UROBILINOGEN UR QL STRIP.AUTO: 0.2 EU/DL (ref 0.2–1)
UROBILINOGEN UR QL STRIP.AUTO: 0.2 EU/DL (ref 0.2–1)
UROBILINOGEN UR QL STRIP.AUTO: 1 EU/DL (ref 0.2–1)
VENTRICULAR RATE, ECG03: 102 BPM
VENTRICULAR RATE, ECG03: 104 BPM
VENTRICULAR RATE, ECG03: 141 BPM
VENTRICULAR RATE, ECG03: 75 BPM
VENTRICULAR RATE, ECG03: 94 BPM
VENTRICULAR RATE, ECG03: 95 BPM
VENTRICULAR RATE, ECG03: 96 BPM
VIT B12 SERPL-MCNC: 467 PG/ML (ref 211–911)
WBC # BLD AUTO: 10.2 K/UL (ref 4.1–11.1)
WBC # BLD AUTO: 10.7 K/UL (ref 4.1–11.1)
WBC # BLD AUTO: 10.7 K/UL (ref 4.1–11.1)
WBC # BLD AUTO: 12.7 K/UL (ref 4.1–11.1)
WBC # BLD AUTO: 12.7 K/UL (ref 4.1–11.1)
WBC # BLD AUTO: 12.9 K/UL (ref 4.1–11.1)
WBC # BLD AUTO: 12.9 K/UL (ref 4.1–11.1)
WBC # BLD AUTO: 18.4 K/UL (ref 4.1–11.1)
WBC # BLD AUTO: 25.5 K/UL (ref 4.1–11.1)
WBC # BLD AUTO: 5.7 K/UL (ref 4.1–11.1)
WBC # BLD AUTO: 5.7 K/UL (ref 4.1–11.1)
WBC # BLD AUTO: 7.5 K/UL (ref 4.1–11.1)
WBC # BLD AUTO: 8 K/UL (ref 4.1–11.1)
WBC # BLD AUTO: 8.1 K/UL (ref 4.1–11.1)
WBC # BLD AUTO: 8.6 K/UL (ref 4.1–11.1)
WBC # BLD AUTO: 9 K/UL (ref 4.1–11.1)
WBC MORPH BLD: ABNORMAL
WBC URNS QL MICRO: ABNORMAL /HPF (ref 0–4)
WBC URNS QL MICRO: ABNORMAL /HPF (ref 0–4)
WBC URNS QL MICRO: NORMAL /HPF (ref 0–4)

## 2017-01-01 PROCEDURE — 74011250637 HC RX REV CODE- 250/637: Performed by: INTERNAL MEDICINE

## 2017-01-01 PROCEDURE — 94640 AIRWAY INHALATION TREATMENT: CPT

## 2017-01-01 PROCEDURE — G0299 HHS/HOSPICE OF RN EA 15 MIN: HCPCS

## 2017-01-01 PROCEDURE — 77030018729 HC ELECTRD DEFIB PAD CARD -B

## 2017-01-01 PROCEDURE — 74011636637 HC RX REV CODE- 636/637: Performed by: INTERNAL MEDICINE

## 2017-01-01 PROCEDURE — 74011250637 HC RX REV CODE- 250/637: Performed by: HOSPITALIST

## 2017-01-01 PROCEDURE — 82962 GLUCOSE BLOOD TEST: CPT

## 2017-01-01 PROCEDURE — 77010033678 HC OXYGEN DAILY

## 2017-01-01 PROCEDURE — 97165 OT EVAL LOW COMPLEX 30 MIN: CPT

## 2017-01-01 PROCEDURE — 85027 COMPLETE CBC AUTOMATED: CPT | Performed by: HOSPITALIST

## 2017-01-01 PROCEDURE — 76705 ECHO EXAM OF ABDOMEN: CPT

## 2017-01-01 PROCEDURE — 83605 ASSAY OF LACTIC ACID: CPT | Performed by: EMERGENCY MEDICINE

## 2017-01-01 PROCEDURE — 36415 COLL VENOUS BLD VENIPUNCTURE: CPT | Performed by: INTERNAL MEDICINE

## 2017-01-01 PROCEDURE — 83735 ASSAY OF MAGNESIUM: CPT | Performed by: INTERNAL MEDICINE

## 2017-01-01 PROCEDURE — 93005 ELECTROCARDIOGRAM TRACING: CPT

## 2017-01-01 PROCEDURE — 65660000000 HC RM CCU STEPDOWN

## 2017-01-01 PROCEDURE — G8987 SELF CARE CURRENT STATUS: HCPCS | Performed by: OCCUPATIONAL THERAPIST

## 2017-01-01 PROCEDURE — 3336500001 HSPC ELECTION

## 2017-01-01 PROCEDURE — 400013 HH SOC

## 2017-01-01 PROCEDURE — 65270000015 HC RM PRIVATE ONCOLOGY

## 2017-01-01 PROCEDURE — 74011250636 HC RX REV CODE- 250/636: Performed by: EMERGENCY MEDICINE

## 2017-01-01 PROCEDURE — G0151 HHCP-SERV OF PT,EA 15 MIN: HCPCS

## 2017-01-01 PROCEDURE — 74011250636 HC RX REV CODE- 250/636: Performed by: HOSPITALIST

## 2017-01-01 PROCEDURE — 02K83ZZ MAP CONDUCTION MECHANISM, PERCUTANEOUS APPROACH: ICD-10-PCS | Performed by: INTERNAL MEDICINE

## 2017-01-01 PROCEDURE — 96375 TX/PRO/DX INJ NEW DRUG ADDON: CPT

## 2017-01-01 PROCEDURE — 77030029684 HC NEB SM VOL KT MONA -A

## 2017-01-01 PROCEDURE — 71010 XR CHEST PORT: CPT

## 2017-01-01 PROCEDURE — 36415 COLL VENOUS BLD VENIPUNCTURE: CPT | Performed by: EMERGENCY MEDICINE

## 2017-01-01 PROCEDURE — 83550 IRON BINDING TEST: CPT | Performed by: HOSPITALIST

## 2017-01-01 PROCEDURE — 74174 CTA ABD&PLVS W/CONTRAST: CPT

## 2017-01-01 PROCEDURE — 74011000250 HC RX REV CODE- 250: Performed by: HOSPITALIST

## 2017-01-01 PROCEDURE — 80048 BASIC METABOLIC PNL TOTAL CA: CPT | Performed by: HOSPITALIST

## 2017-01-01 PROCEDURE — 99285 EMERGENCY DEPT VISIT HI MDM: CPT

## 2017-01-01 PROCEDURE — 83880 ASSAY OF NATRIURETIC PEPTIDE: CPT | Performed by: EMERGENCY MEDICINE

## 2017-01-01 PROCEDURE — 51798 US URINE CAPACITY MEASURE: CPT

## 2017-01-01 PROCEDURE — 80048 BASIC METABOLIC PNL TOTAL CA: CPT | Performed by: INTERNAL MEDICINE

## 2017-01-01 PROCEDURE — 74011250636 HC RX REV CODE- 250/636: Performed by: INTERNAL MEDICINE

## 2017-01-01 PROCEDURE — 97535 SELF CARE MNGMENT TRAINING: CPT

## 2017-01-01 PROCEDURE — 96374 THER/PROPH/DIAG INJ IV PUSH: CPT

## 2017-01-01 PROCEDURE — 80053 COMPREHEN METABOLIC PANEL: CPT | Performed by: INTERNAL MEDICINE

## 2017-01-01 PROCEDURE — 97530 THERAPEUTIC ACTIVITIES: CPT

## 2017-01-01 PROCEDURE — 74011636320 HC RX REV CODE- 636/320: Performed by: INTERNAL MEDICINE

## 2017-01-01 PROCEDURE — 97116 GAIT TRAINING THERAPY: CPT

## 2017-01-01 PROCEDURE — C1892 INTRO/SHEATH,FIXED,PEEL-AWAY: HCPCS

## 2017-01-01 PROCEDURE — 74011000258 HC RX REV CODE- 258: Performed by: INTERNAL MEDICINE

## 2017-01-01 PROCEDURE — G0157 HHC PT ASSISTANT EA 15: HCPCS

## 2017-01-01 PROCEDURE — 77030010545

## 2017-01-01 PROCEDURE — 85027 COMPLETE CBC AUTOMATED: CPT | Performed by: INTERNAL MEDICINE

## 2017-01-01 PROCEDURE — 82803 BLOOD GASES ANY COMBINATION: CPT | Performed by: EMERGENCY MEDICINE

## 2017-01-01 PROCEDURE — 84484 ASSAY OF TROPONIN QUANT: CPT | Performed by: EMERGENCY MEDICINE

## 2017-01-01 PROCEDURE — 77030011640 HC PAD GRND REM COVD -A

## 2017-01-01 PROCEDURE — 80053 COMPREHEN METABOLIC PANEL: CPT | Performed by: EMERGENCY MEDICINE

## 2017-01-01 PROCEDURE — 85379 FIBRIN DEGRADATION QUANT: CPT | Performed by: EMERGENCY MEDICINE

## 2017-01-01 PROCEDURE — 87077 CULTURE AEROBIC IDENTIFY: CPT | Performed by: INTERNAL MEDICINE

## 2017-01-01 PROCEDURE — 74011000250 HC RX REV CODE- 250: Performed by: INTERNAL MEDICINE

## 2017-01-01 PROCEDURE — 82728 ASSAY OF FERRITIN: CPT | Performed by: HOSPITALIST

## 2017-01-01 PROCEDURE — G0152 HHCP-SERV OF OT,EA 15 MIN: HCPCS

## 2017-01-01 PROCEDURE — 0651 HSPC ROUTINE HOME CARE

## 2017-01-01 PROCEDURE — 74011000250 HC RX REV CODE- 250: Performed by: EMERGENCY MEDICINE

## 2017-01-01 PROCEDURE — 97530 THERAPEUTIC ACTIVITIES: CPT | Performed by: OCCUPATIONAL THERAPIST

## 2017-01-01 PROCEDURE — 74011250636 HC RX REV CODE- 250/636: Performed by: NURSE PRACTITIONER

## 2017-01-01 PROCEDURE — 77030031139 HC SUT VCRL2 J&J -A

## 2017-01-01 PROCEDURE — 33207 INSERT HEART PM VENTRICULAR: CPT

## 2017-01-01 PROCEDURE — 74011250637 HC RX REV CODE- 250/637: Performed by: NURSE PRACTITIONER

## 2017-01-01 PROCEDURE — 96365 THER/PROPH/DIAG IV INF INIT: CPT

## 2017-01-01 PROCEDURE — 84439 ASSAY OF FREE THYROXINE: CPT | Performed by: INTERNAL MEDICINE

## 2017-01-01 PROCEDURE — 77030032490 HC SLV COMPR SCD KNE COVD -B

## 2017-01-01 PROCEDURE — 77030018836 HC SOL IRR NACL ICUM -A

## 2017-01-01 PROCEDURE — 36600 WITHDRAWAL OF ARTERIAL BLOOD: CPT | Performed by: HOSPITALIST

## 2017-01-01 PROCEDURE — 74011000250 HC RX REV CODE- 250: Performed by: NURSE PRACTITIONER

## 2017-01-01 PROCEDURE — 85610 PROTHROMBIN TIME: CPT

## 2017-01-01 PROCEDURE — 87186 SC STD MICRODIL/AGAR DIL: CPT | Performed by: INTERNAL MEDICINE

## 2017-01-01 PROCEDURE — 74011250637 HC RX REV CODE- 250/637: Performed by: EMERGENCY MEDICINE

## 2017-01-01 PROCEDURE — 93306 TTE W/DOPPLER COMPLETE: CPT

## 2017-01-01 PROCEDURE — A9500 TC99M SESTAMIBI: HCPCS

## 2017-01-01 PROCEDURE — 97530 THERAPEUTIC ACTIVITIES: CPT | Performed by: PHYSICAL THERAPIST

## 2017-01-01 PROCEDURE — 82550 ASSAY OF CK (CPK): CPT | Performed by: EMERGENCY MEDICINE

## 2017-01-01 PROCEDURE — 36415 COLL VENOUS BLD VENIPUNCTURE: CPT | Performed by: HOSPITALIST

## 2017-01-01 PROCEDURE — 85025 COMPLETE CBC W/AUTO DIFF WBC: CPT | Performed by: INTERNAL MEDICINE

## 2017-01-01 PROCEDURE — 97165 OT EVAL LOW COMPLEX 30 MIN: CPT | Performed by: OCCUPATIONAL THERAPIST

## 2017-01-01 PROCEDURE — 36600 WITHDRAWAL OF ARTERIAL BLOOD: CPT | Performed by: EMERGENCY MEDICINE

## 2017-01-01 PROCEDURE — 74011000258 HC RX REV CODE- 258: Performed by: EMERGENCY MEDICINE

## 2017-01-01 PROCEDURE — 85025 COMPLETE CBC W/AUTO DIFF WBC: CPT | Performed by: EMERGENCY MEDICINE

## 2017-01-01 PROCEDURE — 51702 INSERT TEMP BLADDER CATH: CPT

## 2017-01-01 PROCEDURE — C1894 INTRO/SHEATH, NON-LASER: HCPCS

## 2017-01-01 PROCEDURE — 87070 CULTURE OTHR SPECIMN AEROBIC: CPT | Performed by: INTERNAL MEDICINE

## 2017-01-01 PROCEDURE — 97535 SELF CARE MNGMENT TRAINING: CPT | Performed by: OCCUPATIONAL THERAPIST

## 2017-01-01 PROCEDURE — G8988 SELF CARE GOAL STATUS: HCPCS | Performed by: OCCUPATIONAL THERAPIST

## 2017-01-01 PROCEDURE — 87086 URINE CULTURE/COLONY COUNT: CPT | Performed by: INTERNAL MEDICINE

## 2017-01-01 PROCEDURE — 81001 URINALYSIS AUTO W/SCOPE: CPT | Performed by: EMERGENCY MEDICINE

## 2017-01-01 PROCEDURE — C1786 PMKR, SINGLE, RATE-RESP: HCPCS

## 2017-01-01 PROCEDURE — 83880 ASSAY OF NATRIURETIC PEPTIDE: CPT | Performed by: HOSPITALIST

## 2017-01-01 PROCEDURE — 02HK3JZ INSERTION OF PACEMAKER LEAD INTO RIGHT VENTRICLE, PERCUTANEOUS APPROACH: ICD-10-PCS | Performed by: INTERNAL MEDICINE

## 2017-01-01 PROCEDURE — 74011636320 HC RX REV CODE- 636/320: Performed by: EMERGENCY MEDICINE

## 2017-01-01 PROCEDURE — 71250 CT THORAX DX C-: CPT

## 2017-01-01 PROCEDURE — C1733 CATH, EP, OTHR THAN COOL-TIP: HCPCS

## 2017-01-01 PROCEDURE — 81001 URINALYSIS AUTO W/SCOPE: CPT | Performed by: INTERNAL MEDICINE

## 2017-01-01 PROCEDURE — 82803 BLOOD GASES ANY COMBINATION: CPT | Performed by: HOSPITALIST

## 2017-01-01 PROCEDURE — 83605 ASSAY OF LACTIC ACID: CPT | Performed by: INTERNAL MEDICINE

## 2017-01-01 PROCEDURE — C1769 GUIDE WIRE: HCPCS

## 2017-01-01 PROCEDURE — 84484 ASSAY OF TROPONIN QUANT: CPT | Performed by: INTERNAL MEDICINE

## 2017-01-01 PROCEDURE — 96376 TX/PRO/DX INJ SAME DRUG ADON: CPT

## 2017-01-01 PROCEDURE — A6213 FOAM DRG >16<=48 SQ IN W/BDR: HCPCS

## 2017-01-01 PROCEDURE — 83735 ASSAY OF MAGNESIUM: CPT | Performed by: EMERGENCY MEDICINE

## 2017-01-01 PROCEDURE — 84443 ASSAY THYROID STIM HORMONE: CPT | Performed by: EMERGENCY MEDICINE

## 2017-01-01 PROCEDURE — 02583ZZ DESTRUCTION OF CONDUCTION MECHANISM, PERCUTANEOUS APPROACH: ICD-10-PCS | Performed by: INTERNAL MEDICINE

## 2017-01-01 PROCEDURE — 82550 ASSAY OF CK (CPK): CPT | Performed by: INTERNAL MEDICINE

## 2017-01-01 PROCEDURE — C1898 LEAD, PMKR, OTHER THAN TRANS: HCPCS

## 2017-01-01 PROCEDURE — 97161 PT EVAL LOW COMPLEX 20 MIN: CPT

## 2017-01-01 PROCEDURE — 77030002996 HC SUT SLK J&J -A

## 2017-01-01 PROCEDURE — 82607 VITAMIN B-12: CPT | Performed by: HOSPITALIST

## 2017-01-01 PROCEDURE — 77030005514 HC CATH URETH FOL14 BARD -A

## 2017-01-01 PROCEDURE — 74011636637 HC RX REV CODE- 636/637: Performed by: HOSPITALIST

## 2017-01-01 PROCEDURE — 99284 EMERGENCY DEPT VISIT MOD MDM: CPT

## 2017-01-01 PROCEDURE — 93017 CV STRESS TEST TRACING ONLY: CPT

## 2017-01-01 PROCEDURE — 71275 CT ANGIOGRAPHY CHEST: CPT

## 2017-01-01 PROCEDURE — 71020 XR CHEST PA LAT: CPT

## 2017-01-01 PROCEDURE — 0JH604Z INSERTION OF PACEMAKER, SINGLE CHAMBER INTO CHEST SUBCUTANEOUS TISSUE AND FASCIA, OPEN APPROACH: ICD-10-PCS | Performed by: INTERNAL MEDICINE

## 2017-01-01 PROCEDURE — 86665 EPSTEIN-BARR CAPSID VCA: CPT | Performed by: INTERNAL MEDICINE

## 2017-01-01 PROCEDURE — 87040 BLOOD CULTURE FOR BACTERIA: CPT | Performed by: EMERGENCY MEDICINE

## 2017-01-01 PROCEDURE — 77030004964 HC CBL EP ABLAT BSC -C

## 2017-01-01 PROCEDURE — 80076 HEPATIC FUNCTION PANEL: CPT | Performed by: INTERNAL MEDICINE

## 2017-01-01 PROCEDURE — 76604 US EXAM CHEST: CPT

## 2017-01-01 PROCEDURE — 97162 PT EVAL MOD COMPLEX 30 MIN: CPT

## 2017-01-01 PROCEDURE — 77030016894 HC CBL EP DX CATH3 STJU -B

## 2017-01-01 PROCEDURE — 77030033263 HC DRSG MEPILEX 16-48IN BORD MOLN -B

## 2017-01-01 PROCEDURE — 80074 ACUTE HEPATITIS PANEL: CPT | Performed by: INTERNAL MEDICINE

## 2017-01-01 PROCEDURE — 97161 PT EVAL LOW COMPLEX 20 MIN: CPT | Performed by: PHYSICAL THERAPIST

## 2017-01-01 PROCEDURE — C1730 CATH, EP, 19 OR FEW ELECT: HCPCS

## 2017-01-01 RX ORDER — SODIUM CHLORIDE 0.9 % (FLUSH) 0.9 %
5-10 SYRINGE (ML) INJECTION AS NEEDED
Status: DISCONTINUED | OUTPATIENT
Start: 2017-01-01 | End: 2017-01-01 | Stop reason: HOSPADM

## 2017-01-01 RX ORDER — LISINOPRIL 20 MG/1
20 TABLET ORAL DAILY
Status: DISCONTINUED | OUTPATIENT
Start: 2017-01-01 | End: 2017-01-01 | Stop reason: HOSPADM

## 2017-01-01 RX ORDER — TRAZODONE HYDROCHLORIDE 100 MG/1
100 TABLET ORAL
Status: DISCONTINUED | OUTPATIENT
Start: 2017-01-01 | End: 2017-01-01 | Stop reason: HOSPADM

## 2017-01-01 RX ORDER — NITROGLYCERIN 0.4 MG/1
0.4 TABLET SUBLINGUAL AS NEEDED
Status: DISCONTINUED | OUTPATIENT
Start: 2017-01-01 | End: 2017-01-01 | Stop reason: HOSPADM

## 2017-01-01 RX ORDER — LEVALBUTEROL TARTRATE 45 UG/1
2 AEROSOL, METERED ORAL
Status: DISCONTINUED | OUTPATIENT
Start: 2017-01-01 | End: 2017-01-01 | Stop reason: HOSPADM

## 2017-01-01 RX ORDER — ACETYLCYSTEINE 200 MG/ML
600 SOLUTION ORAL; RESPIRATORY (INHALATION) EVERY 12 HOURS
Status: DISCONTINUED | OUTPATIENT
Start: 2017-01-01 | End: 2017-01-01 | Stop reason: HOSPADM

## 2017-01-01 RX ORDER — SERTRALINE HYDROCHLORIDE 50 MG/1
75 TABLET, FILM COATED ORAL DAILY
Status: DISCONTINUED | OUTPATIENT
Start: 2017-01-01 | End: 2017-01-01 | Stop reason: HOSPADM

## 2017-01-01 RX ORDER — POLYETHYLENE GLYCOL 3350 17 G/17G
17 POWDER, FOR SOLUTION ORAL
Status: DISCONTINUED | OUTPATIENT
Start: 2017-01-01 | End: 2017-01-01 | Stop reason: HOSPADM

## 2017-01-01 RX ORDER — LEVALBUTEROL INHALATION SOLUTION 1.25 MG/3ML
1.25 SOLUTION RESPIRATORY (INHALATION)
Status: DISCONTINUED | OUTPATIENT
Start: 2017-01-01 | End: 2017-01-01

## 2017-01-01 RX ORDER — DOCUSATE SODIUM 100 MG/1
100 CAPSULE, LIQUID FILLED ORAL 2 TIMES DAILY
Status: DISCONTINUED | OUTPATIENT
Start: 2017-01-01 | End: 2017-01-01 | Stop reason: HOSPADM

## 2017-01-01 RX ORDER — FUROSEMIDE 20 MG/1
20 TABLET ORAL
COMMUNITY
End: 2017-01-01 | Stop reason: SDUPTHER

## 2017-01-01 RX ORDER — HYDROMORPHONE HYDROCHLORIDE 1 MG/ML
0.2 INJECTION, SOLUTION INTRAMUSCULAR; INTRAVENOUS; SUBCUTANEOUS
Status: COMPLETED | OUTPATIENT
Start: 2017-01-01 | End: 2017-01-01

## 2017-01-01 RX ORDER — HALOPERIDOL 5 MG/ML
1 INJECTION INTRAMUSCULAR ONCE
Status: COMPLETED | OUTPATIENT
Start: 2017-01-01 | End: 2017-01-01

## 2017-01-01 RX ORDER — LEVOTHYROXINE SODIUM 50 UG/1
50 TABLET ORAL
Status: DISCONTINUED | OUTPATIENT
Start: 2017-01-01 | End: 2017-01-01

## 2017-01-01 RX ORDER — DOXYCYCLINE HYCLATE 100 MG
100 TABLET ORAL EVERY 12 HOURS
Status: DISCONTINUED | OUTPATIENT
Start: 2017-01-01 | End: 2017-01-01 | Stop reason: HOSPADM

## 2017-01-01 RX ORDER — FACIAL-BODY WIPES
10 EACH TOPICAL DAILY PRN
Status: DISCONTINUED | OUTPATIENT
Start: 2017-01-01 | End: 2017-01-01 | Stop reason: HOSPADM

## 2017-01-01 RX ORDER — LISINOPRIL 20 MG/1
TABLET ORAL
Qty: 90 TAB | Refills: 3 | Status: SHIPPED | OUTPATIENT
Start: 2017-01-01 | End: 2017-01-01

## 2017-01-01 RX ORDER — ATROPINE SULFATE 0.1 MG/ML
1 INJECTION INTRAVENOUS ONCE
Status: ACTIVE | OUTPATIENT
Start: 2017-01-01 | End: 2017-01-01

## 2017-01-01 RX ORDER — DOBUTAMINE HYDROCHLORIDE 200 MG/100ML
2.5-1 INJECTION INTRAVENOUS
Status: DISCONTINUED | OUTPATIENT
Start: 2017-01-01 | End: 2017-01-01

## 2017-01-01 RX ORDER — MORPHINE SULFATE 2 MG/ML
2 INJECTION, SOLUTION INTRAMUSCULAR; INTRAVENOUS
Status: COMPLETED | OUTPATIENT
Start: 2017-01-01 | End: 2017-01-01

## 2017-01-01 RX ORDER — FACIAL-BODY WIPES
10 EACH TOPICAL DAILY PRN
Status: DISCONTINUED | OUTPATIENT
Start: 2017-01-01 | End: 2017-05-02 | Stop reason: HOSPADM

## 2017-01-01 RX ORDER — LEVOFLOXACIN 5 MG/ML
750 INJECTION, SOLUTION INTRAVENOUS EVERY 24 HOURS
Status: DISCONTINUED | OUTPATIENT
Start: 2017-01-01 | End: 2017-01-01

## 2017-01-01 RX ORDER — FLUTICASONE FUROATE AND VILANTEROL 100; 25 UG/1; UG/1
1 POWDER RESPIRATORY (INHALATION) DAILY
Status: DISCONTINUED | OUTPATIENT
Start: 2017-01-01 | End: 2017-01-01 | Stop reason: HOSPADM

## 2017-01-01 RX ORDER — HALOPERIDOL 5 MG/ML
2 INJECTION INTRAMUSCULAR ONCE
Status: COMPLETED | OUTPATIENT
Start: 2017-01-01 | End: 2017-01-01

## 2017-01-01 RX ORDER — LEVALBUTEROL INHALATION SOLUTION 1.25 MG/3ML
1.25 SOLUTION RESPIRATORY (INHALATION)
Status: DISCONTINUED | OUTPATIENT
Start: 2017-01-01 | End: 2017-01-01 | Stop reason: HOSPADM

## 2017-01-01 RX ORDER — HYDROMORPHONE HYDROCHLORIDE 1 MG/ML
0.5 INJECTION, SOLUTION INTRAMUSCULAR; INTRAVENOUS; SUBCUTANEOUS
Status: COMPLETED | OUTPATIENT
Start: 2017-01-01 | End: 2017-01-01

## 2017-01-01 RX ORDER — HALOPERIDOL 5 MG/ML
1 INJECTION INTRAMUSCULAR EVERY 6 HOURS
Status: DISCONTINUED | OUTPATIENT
Start: 2017-01-01 | End: 2017-05-02 | Stop reason: HOSPADM

## 2017-01-01 RX ORDER — CODEINE PHOSPHATE AND GUAIFENESIN 10; 100 MG/5ML; MG/5ML
5 SOLUTION ORAL
Status: DISCONTINUED | OUTPATIENT
Start: 2017-01-01 | End: 2017-01-01

## 2017-01-01 RX ORDER — FUROSEMIDE 40 MG/1
40 TABLET ORAL DAILY
Status: DISCONTINUED | OUTPATIENT
Start: 2017-01-01 | End: 2017-01-01 | Stop reason: HOSPADM

## 2017-01-01 RX ORDER — IPRATROPIUM BROMIDE 0.5 MG/2.5ML
0.5 SOLUTION RESPIRATORY (INHALATION) 3 TIMES DAILY
Qty: 750 ML | Refills: 3
Start: 2017-01-01 | End: 2017-01-01 | Stop reason: SDUPTHER

## 2017-01-01 RX ORDER — ONDANSETRON 2 MG/ML
4 INJECTION INTRAMUSCULAR; INTRAVENOUS
Status: DISCONTINUED | OUTPATIENT
Start: 2017-01-01 | End: 2017-01-01 | Stop reason: HOSPADM

## 2017-01-01 RX ORDER — PREDNISONE 20 MG/1
40 TABLET ORAL
Status: DISCONTINUED | OUTPATIENT
Start: 2017-01-01 | End: 2017-01-01 | Stop reason: HOSPADM

## 2017-01-01 RX ORDER — SODIUM CHLORIDE 9 MG/ML
50 INJECTION, SOLUTION INTRAVENOUS
Status: COMPLETED | OUTPATIENT
Start: 2017-01-01 | End: 2017-01-01

## 2017-01-01 RX ORDER — SORBITOL SOLUTION 70 %
30 SOLUTION, ORAL MISCELLANEOUS
Qty: 454 ML | Refills: 0 | Status: SHIPPED
Start: 2017-01-01 | End: 2017-01-01

## 2017-01-01 RX ORDER — FUROSEMIDE 40 MG/1
40 TABLET ORAL
Status: DISCONTINUED | OUTPATIENT
Start: 2017-01-01 | End: 2017-01-01 | Stop reason: SDUPTHER

## 2017-01-01 RX ORDER — IPRATROPIUM BROMIDE AND ALBUTEROL SULFATE 2.5; .5 MG/3ML; MG/3ML
3 SOLUTION RESPIRATORY (INHALATION)
Status: COMPLETED | OUTPATIENT
Start: 2017-01-01 | End: 2017-01-01

## 2017-01-01 RX ORDER — DOBUTAMINE HYDROCHLORIDE 200 MG/100ML
INJECTION INTRAVENOUS
Status: DISCONTINUED
Start: 2017-01-01 | End: 2017-01-01 | Stop reason: ALTCHOICE

## 2017-01-01 RX ORDER — SODIUM CHLORIDE 0.9 % (FLUSH) 0.9 %
5-10 SYRINGE (ML) INJECTION EVERY 8 HOURS
Status: DISCONTINUED | OUTPATIENT
Start: 2017-01-01 | End: 2017-01-01 | Stop reason: HOSPADM

## 2017-01-01 RX ORDER — FUROSEMIDE 10 MG/ML
40 INJECTION INTRAMUSCULAR; INTRAVENOUS
Status: COMPLETED | OUTPATIENT
Start: 2017-01-01 | End: 2017-01-01

## 2017-01-01 RX ORDER — ZOLPIDEM TARTRATE 5 MG/1
5 TABLET ORAL
Status: DISCONTINUED | OUTPATIENT
Start: 2017-01-01 | End: 2017-01-01 | Stop reason: HOSPADM

## 2017-01-01 RX ORDER — NYSTATIN 100000 [USP'U]/ML
500000 SUSPENSION ORAL 4 TIMES DAILY
Status: DISCONTINUED | OUTPATIENT
Start: 2017-01-01 | End: 2017-01-01 | Stop reason: HOSPADM

## 2017-01-01 RX ORDER — POTASSIUM CHLORIDE 750 MG/1
40 TABLET, FILM COATED, EXTENDED RELEASE ORAL ONCE
Status: COMPLETED | OUTPATIENT
Start: 2017-01-01 | End: 2017-01-01

## 2017-01-01 RX ORDER — LORAZEPAM 0.5 MG/1
0.5 TABLET ORAL
Status: DISCONTINUED | OUTPATIENT
Start: 2017-01-01 | End: 2017-01-01 | Stop reason: HOSPADM

## 2017-01-01 RX ORDER — LEVOFLOXACIN 750 MG/1
750 TABLET ORAL
Status: COMPLETED | OUTPATIENT
Start: 2017-01-01 | End: 2017-01-01

## 2017-01-01 RX ORDER — LANOLIN ALCOHOL/MO/W.PET/CERES
9 CREAM (GRAM) TOPICAL
Status: DISCONTINUED | OUTPATIENT
Start: 2017-01-01 | End: 2017-01-01 | Stop reason: HOSPADM

## 2017-01-01 RX ORDER — SERTRALINE HYDROCHLORIDE 50 MG/1
75 TABLET, FILM COATED ORAL EVERY EVENING
Status: DISCONTINUED | OUTPATIENT
Start: 2017-01-01 | End: 2017-01-01 | Stop reason: HOSPADM

## 2017-01-01 RX ORDER — SOTALOL HYDROCHLORIDE 80 MG/1
80 TABLET ORAL EVERY 12 HOURS
Status: CANCELLED | OUTPATIENT
Start: 2017-01-01

## 2017-01-01 RX ORDER — POTASSIUM CHLORIDE 7.45 MG/ML
10 INJECTION INTRAVENOUS
Status: DISCONTINUED | OUTPATIENT
Start: 2017-01-01 | End: 2017-01-01

## 2017-01-01 RX ORDER — CODEINE PHOSPHATE AND GUAIFENESIN 10; 100 MG/5ML; MG/5ML
10 SOLUTION ORAL
Status: DISCONTINUED | OUTPATIENT
Start: 2017-01-01 | End: 2017-01-01 | Stop reason: HOSPADM

## 2017-01-01 RX ORDER — MORPHINE SULFATE 20 MG/ML
10 SOLUTION ORAL
Status: COMPLETED | OUTPATIENT
Start: 2017-01-01 | End: 2017-01-01

## 2017-01-01 RX ORDER — AZITHROMYCIN 250 MG/1
250 TABLET, FILM COATED ORAL
Qty: 39 TAB | Refills: 1 | Status: SHIPPED | OUTPATIENT
Start: 2017-01-01 | End: 2017-01-01

## 2017-01-01 RX ORDER — DILTIAZEM HYDROCHLORIDE 180 MG/1
180 CAPSULE, COATED, EXTENDED RELEASE ORAL DAILY
Status: DISCONTINUED | OUTPATIENT
Start: 2017-01-01 | End: 2017-01-01

## 2017-01-01 RX ORDER — IPRATROPIUM BROMIDE 0.5 MG/2.5ML
0.5 SOLUTION RESPIRATORY (INHALATION)
Status: DISCONTINUED | OUTPATIENT
Start: 2017-01-01 | End: 2017-01-01 | Stop reason: HOSPADM

## 2017-01-01 RX ORDER — NEBIVOLOL 2.5 MG/1
5 TABLET ORAL DAILY
Status: DISCONTINUED | OUTPATIENT
Start: 2017-01-01 | End: 2017-01-01

## 2017-01-01 RX ORDER — POTASSIUM CHLORIDE 750 MG/1
40 TABLET, FILM COATED, EXTENDED RELEASE ORAL EVERY 4 HOURS
Status: COMPLETED | OUTPATIENT
Start: 2017-01-01 | End: 2017-01-01

## 2017-01-01 RX ORDER — NEBIVOLOL 2.5 MG/1
5 TABLET ORAL 2 TIMES DAILY
Status: DISCONTINUED | OUTPATIENT
Start: 2017-01-01 | End: 2017-01-01 | Stop reason: HOSPADM

## 2017-01-01 RX ORDER — ZOLPIDEM TARTRATE 5 MG/1
TABLET ORAL
COMMUNITY
End: 2017-01-01

## 2017-01-01 RX ORDER — LORAZEPAM 2 MG/ML
2 INJECTION INTRAMUSCULAR
Status: DISCONTINUED | OUTPATIENT
Start: 2017-01-01 | End: 2017-01-01 | Stop reason: HOSPADM

## 2017-01-01 RX ORDER — MORPHINE SULFATE 4 MG/ML
3 INJECTION, SOLUTION INTRAMUSCULAR; INTRAVENOUS
Status: DISCONTINUED | OUTPATIENT
Start: 2017-01-01 | End: 2017-01-01 | Stop reason: HOSPADM

## 2017-01-01 RX ORDER — CODEINE PHOSPHATE AND GUAIFENESIN 10; 100 MG/5ML; MG/5ML
5 SOLUTION ORAL
Status: DISCONTINUED | OUTPATIENT
Start: 2017-01-01 | End: 2017-01-01 | Stop reason: HOSPADM

## 2017-01-01 RX ORDER — POTASSIUM CHLORIDE 750 MG/1
10 TABLET, FILM COATED, EXTENDED RELEASE ORAL
COMMUNITY
End: 2017-01-01 | Stop reason: SDUPTHER

## 2017-01-01 RX ORDER — SERTRALINE HYDROCHLORIDE 50 MG/1
75 TABLET, FILM COATED ORAL DAILY
Qty: 135 TAB | Refills: 3 | Status: SHIPPED | OUTPATIENT
Start: 2017-01-01 | End: 2017-01-01

## 2017-01-01 RX ORDER — POLYETHYLENE GLYCOL 3350 17 G/17G
17 POWDER, FOR SOLUTION ORAL DAILY
Status: DISCONTINUED | OUTPATIENT
Start: 2017-01-01 | End: 2017-01-01 | Stop reason: HOSPADM

## 2017-01-01 RX ORDER — LANOLIN ALCOHOL/MO/W.PET/CERES
325 CREAM (GRAM) TOPICAL
Qty: 30 TAB | Refills: 1 | Status: SHIPPED | OUTPATIENT
Start: 2017-01-01 | End: 2017-01-01 | Stop reason: SDUPTHER

## 2017-01-01 RX ORDER — LEVOTHYROXINE SODIUM 50 UG/1
50 TABLET ORAL
Status: DISCONTINUED | OUTPATIENT
Start: 2017-01-01 | End: 2017-01-01 | Stop reason: HOSPADM

## 2017-01-01 RX ORDER — LEVALBUTEROL INHALATION SOLUTION 1.25 MG/3ML
1.25 SOLUTION RESPIRATORY (INHALATION) 2 TIMES DAILY
Qty: 180 ML | Refills: 0 | Status: SHIPPED
Start: 2017-01-01 | End: 2017-01-01

## 2017-01-01 RX ORDER — SCOLOPAMINE TRANSDERMAL SYSTEM 1 MG/1
1.5 PATCH, EXTENDED RELEASE TRANSDERMAL
Status: DISCONTINUED | OUTPATIENT
Start: 2017-01-01 | End: 2017-01-01 | Stop reason: HOSPADM

## 2017-01-01 RX ORDER — VANCOMYCIN/0.9 % SOD CHLORIDE 1.5G/250ML
1500 PLASTIC BAG, INJECTION (ML) INTRAVENOUS ONCE
Status: COMPLETED | OUTPATIENT
Start: 2017-01-01 | End: 2017-01-01

## 2017-01-01 RX ORDER — DOBUTAMINE HYDROCHLORIDE 200 MG/100ML
2.5-1 INJECTION INTRAVENOUS
Status: DISCONTINUED | OUTPATIENT
Start: 2017-01-01 | End: 2017-01-01 | Stop reason: ALTCHOICE

## 2017-01-01 RX ORDER — MORPHINE SULFATE 20 MG/ML
10 SOLUTION ORAL EVERY 4 HOURS
Status: DISCONTINUED | OUTPATIENT
Start: 2017-01-01 | End: 2017-05-02 | Stop reason: HOSPADM

## 2017-01-01 RX ORDER — HEPARIN SODIUM 200 [USP'U]/100ML
1000 INJECTION, SOLUTION INTRAVENOUS ONCE
Status: DISCONTINUED | OUTPATIENT
Start: 2017-01-01 | End: 2017-01-01 | Stop reason: HOSPADM

## 2017-01-01 RX ORDER — SODIUM CHLORIDE 0.9 % (FLUSH) 0.9 %
5-10 SYRINGE (ML) INJECTION EVERY 8 HOURS
Status: DISCONTINUED | OUTPATIENT
Start: 2017-01-01 | End: 2017-01-01

## 2017-01-01 RX ORDER — LANOLIN ALCOHOL/MO/W.PET/CERES
325 CREAM (GRAM) TOPICAL
Status: DISCONTINUED | OUTPATIENT
Start: 2017-01-01 | End: 2017-01-01

## 2017-01-01 RX ORDER — LANOLIN ALCOHOL/MO/W.PET/CERES
325 CREAM (GRAM) TOPICAL
Qty: 30 TAB | Refills: 1 | Status: SHIPPED | OUTPATIENT
Start: 2017-01-01 | End: 2017-01-01

## 2017-01-01 RX ORDER — DILTIAZEM HYDROCHLORIDE 30 MG/1
60 TABLET, FILM COATED ORAL 2 TIMES DAILY
Status: COMPLETED | OUTPATIENT
Start: 2017-01-01 | End: 2017-01-01

## 2017-01-01 RX ORDER — ACETAMINOPHEN 325 MG/1
650 TABLET ORAL
Status: DISCONTINUED | OUTPATIENT
Start: 2017-01-01 | End: 2017-01-01 | Stop reason: HOSPADM

## 2017-01-01 RX ORDER — LORAZEPAM 2 MG/ML
1 CONCENTRATE ORAL
Status: DISCONTINUED | OUTPATIENT
Start: 2017-01-01 | End: 2017-05-02 | Stop reason: HOSPADM

## 2017-01-01 RX ORDER — OXYCODONE AND ACETAMINOPHEN 5; 325 MG/1; MG/1
1 TABLET ORAL
Status: DISCONTINUED | OUTPATIENT
Start: 2017-01-01 | End: 2017-01-01 | Stop reason: HOSPADM

## 2017-01-01 RX ORDER — AZITHROMYCIN 250 MG/1
250 TABLET, FILM COATED ORAL
Status: DISCONTINUED | OUTPATIENT
Start: 2017-01-01 | End: 2017-01-01 | Stop reason: HOSPADM

## 2017-01-01 RX ORDER — RANITIDINE 150 MG/1
150 TABLET, FILM COATED ORAL
COMMUNITY
End: 2017-01-01

## 2017-01-01 RX ORDER — FUROSEMIDE 10 MG/ML
40 INJECTION INTRAMUSCULAR; INTRAVENOUS EVERY 12 HOURS
Status: DISCONTINUED | OUTPATIENT
Start: 2017-01-01 | End: 2017-01-01

## 2017-01-01 RX ORDER — MIDAZOLAM HYDROCHLORIDE 1 MG/ML
.5-2 INJECTION, SOLUTION INTRAMUSCULAR; INTRAVENOUS
Status: DISCONTINUED | OUTPATIENT
Start: 2017-01-01 | End: 2017-01-01 | Stop reason: ALTCHOICE

## 2017-01-01 RX ORDER — SODIUM CHLORIDE 0.9 % (FLUSH) 0.9 %
10 SYRINGE (ML) INJECTION
Status: COMPLETED | OUTPATIENT
Start: 2017-01-01 | End: 2017-01-01

## 2017-01-01 RX ORDER — DIPHENHYDRAMINE HCL 25 MG
25 CAPSULE ORAL 2 TIMES DAILY
COMMUNITY
End: 2017-01-01

## 2017-01-01 RX ORDER — FAMOTIDINE 20 MG/1
20 TABLET, FILM COATED ORAL 2 TIMES DAILY
Status: DISCONTINUED | OUTPATIENT
Start: 2017-01-01 | End: 2017-01-01 | Stop reason: HOSPADM

## 2017-01-01 RX ORDER — LEVALBUTEROL INHALATION SOLUTION 1.25 MG/3ML
1.25 SOLUTION RESPIRATORY (INHALATION)
Qty: 792 ML | Refills: 6 | Status: ON HOLD | OUTPATIENT
Start: 2017-01-01 | End: 2017-01-01

## 2017-01-01 RX ORDER — CODEINE PHOSPHATE AND GUAIFENESIN 10; 100 MG/5ML; MG/5ML
5 SOLUTION ORAL
Qty: 463 ML | Refills: 0 | Status: SHIPPED | OUTPATIENT
Start: 2017-01-01 | End: 2017-01-01

## 2017-01-01 RX ORDER — NALOXONE HYDROCHLORIDE 0.4 MG/ML
0.4 INJECTION, SOLUTION INTRAMUSCULAR; INTRAVENOUS; SUBCUTANEOUS AS NEEDED
Status: DISCONTINUED | OUTPATIENT
Start: 2017-01-01 | End: 2017-01-01 | Stop reason: HOSPADM

## 2017-01-01 RX ORDER — METOPROLOL TARTRATE 5 MG/5ML
5 INJECTION INTRAVENOUS EVERY 6 HOURS
Status: DISCONTINUED | OUTPATIENT
Start: 2017-01-01 | End: 2017-01-01

## 2017-01-01 RX ORDER — ZOLPIDEM TARTRATE 5 MG/1
TABLET ORAL
Qty: 90 TAB | Refills: 0 | Status: SHIPPED | OUTPATIENT
Start: 2017-01-01 | End: 2017-01-01 | Stop reason: ALTCHOICE

## 2017-01-01 RX ORDER — LORAZEPAM 1 MG/1
1 TABLET ORAL
Status: DISCONTINUED | OUTPATIENT
Start: 2017-01-01 | End: 2017-01-01 | Stop reason: HOSPADM

## 2017-01-01 RX ORDER — ENOXAPARIN SODIUM 100 MG/ML
40 INJECTION SUBCUTANEOUS DAILY
Status: DISCONTINUED | OUTPATIENT
Start: 2017-01-01 | End: 2017-01-01

## 2017-01-01 RX ORDER — CODEINE PHOSPHATE AND GUAIFENESIN 10; 100 MG/5ML; MG/5ML
SOLUTION ORAL
Qty: 473 ML | Refills: 0 | Status: SHIPPED | OUTPATIENT
Start: 2017-01-01 | End: 2017-01-01 | Stop reason: SDUPTHER

## 2017-01-01 RX ORDER — ENOXAPARIN SODIUM 100 MG/ML
40 INJECTION SUBCUTANEOUS EVERY 24 HOURS
Status: DISCONTINUED | OUTPATIENT
Start: 2017-01-01 | End: 2017-01-01 | Stop reason: HOSPADM

## 2017-01-01 RX ORDER — LORAZEPAM 1 MG/1
1 TABLET ORAL 2 TIMES DAILY
Status: DISCONTINUED | OUTPATIENT
Start: 2017-01-01 | End: 2017-01-01 | Stop reason: HOSPADM

## 2017-01-01 RX ORDER — IPRATROPIUM BROMIDE 0.5 MG/2.5ML
0.5 SOLUTION RESPIRATORY (INHALATION)
Status: DISCONTINUED | OUTPATIENT
Start: 2017-01-01 | End: 2017-01-01

## 2017-01-01 RX ORDER — FAMOTIDINE 20 MG/1
20 TABLET, FILM COATED ORAL
Status: DISCONTINUED | OUTPATIENT
Start: 2017-01-01 | End: 2017-01-01 | Stop reason: HOSPADM

## 2017-01-01 RX ORDER — DOCUSATE SODIUM 100 MG/1
100 CAPSULE, LIQUID FILLED ORAL
Status: DISCONTINUED | OUTPATIENT
Start: 2017-01-01 | End: 2017-01-01 | Stop reason: HOSPADM

## 2017-01-01 RX ORDER — PREDNISONE 20 MG/1
TABLET ORAL
Qty: 20 TAB | Refills: 0 | Status: SHIPPED | OUTPATIENT
Start: 2017-01-01 | End: 2017-01-01

## 2017-01-01 RX ORDER — ACETAMINOPHEN 325 MG/1
650 TABLET ORAL
Status: DISCONTINUED | OUTPATIENT
Start: 2017-01-01 | End: 2017-05-02 | Stop reason: HOSPADM

## 2017-01-01 RX ORDER — GUAIFENESIN 100 MG/5ML
100 SOLUTION ORAL
Status: DISCONTINUED | OUTPATIENT
Start: 2017-01-01 | End: 2017-01-01 | Stop reason: HOSPADM

## 2017-01-01 RX ORDER — LIDOCAINE HYDROCHLORIDE 20 MG/ML
15 SOLUTION OROPHARYNGEAL ONCE
Status: DISCONTINUED | OUTPATIENT
Start: 2017-01-01 | End: 2017-01-01 | Stop reason: ALTCHOICE

## 2017-01-01 RX ORDER — MORPHINE SULFATE 20 MG/ML
15 SOLUTION ORAL
Status: DISCONTINUED | OUTPATIENT
Start: 2017-01-01 | End: 2017-05-02 | Stop reason: HOSPADM

## 2017-01-01 RX ORDER — SCOLOPAMINE TRANSDERMAL SYSTEM 1 MG/1
1.5 PATCH, EXTENDED RELEASE TRANSDERMAL
Status: DISCONTINUED | OUTPATIENT
Start: 2017-01-01 | End: 2017-05-02 | Stop reason: HOSPADM

## 2017-01-01 RX ORDER — MORPHINE SULFATE 20 MG/ML
10 SOLUTION ORAL
Status: DISCONTINUED | OUTPATIENT
Start: 2017-01-01 | End: 2017-05-02 | Stop reason: HOSPADM

## 2017-01-01 RX ORDER — SODIUM CHLORIDE 0.9 % (FLUSH) 0.9 %
SYRINGE (ML) INJECTION
Status: COMPLETED
Start: 2017-01-01 | End: 2017-01-01

## 2017-01-01 RX ORDER — DILTIAZEM HYDROCHLORIDE 30 MG/1
30 TABLET, FILM COATED ORAL EVERY 6 HOURS
Status: DISCONTINUED | OUTPATIENT
Start: 2017-01-01 | End: 2017-01-01

## 2017-01-01 RX ORDER — FUROSEMIDE 10 MG/ML
20 INJECTION INTRAMUSCULAR; INTRAVENOUS
Status: COMPLETED | OUTPATIENT
Start: 2017-01-01 | End: 2017-01-01

## 2017-01-01 RX ORDER — LIDOCAINE HYDROCHLORIDE 10 MG/ML
1-40 INJECTION INFILTRATION; PERINEURAL
Status: DISCONTINUED | OUTPATIENT
Start: 2017-01-01 | End: 2017-01-01 | Stop reason: HOSPADM

## 2017-01-01 RX ORDER — BACITRACIN 50000 [IU]/1
50000 INJECTION, POWDER, FOR SOLUTION INTRAMUSCULAR ONCE
Status: COMPLETED | OUTPATIENT
Start: 2017-01-01 | End: 2017-01-01

## 2017-01-01 RX ORDER — ASPIRIN 81 MG/1
81 TABLET ORAL DAILY
Status: DISCONTINUED | OUTPATIENT
Start: 2017-01-01 | End: 2017-01-01 | Stop reason: HOSPADM

## 2017-01-01 RX ORDER — IPRATROPIUM BROMIDE 0.5 MG/2.5ML
0.5 SOLUTION RESPIRATORY (INHALATION) 3 TIMES DAILY
Status: DISCONTINUED | OUTPATIENT
Start: 2017-01-01 | End: 2017-01-01 | Stop reason: HOSPADM

## 2017-01-01 RX ORDER — LEVOFLOXACIN 750 MG/1
750 TABLET ORAL EVERY 24 HOURS
Qty: 10 TAB | Refills: 0 | Status: SHIPPED | OUTPATIENT
Start: 2017-01-01 | End: 2017-01-01

## 2017-01-01 RX ORDER — PREDNISONE 10 MG/1
TABLET ORAL
Qty: 45 TAB | Refills: 0 | Status: SHIPPED
Start: 2017-01-01 | End: 2017-01-01

## 2017-01-01 RX ORDER — CODEINE PHOSPHATE AND GUAIFENESIN 10; 100 MG/5ML; MG/5ML
SOLUTION ORAL
Qty: 463 ML | Refills: 0 | Status: SHIPPED | OUTPATIENT
Start: 2017-01-01 | End: 2017-01-01 | Stop reason: SDUPTHER

## 2017-01-01 RX ORDER — ATROPINE SULFATE 0.1 MG/ML
INJECTION INTRAVENOUS
Status: DISCONTINUED
Start: 2017-01-01 | End: 2017-01-01 | Stop reason: ALTCHOICE

## 2017-01-01 RX ORDER — FUROSEMIDE 20 MG/1
40 TABLET ORAL DAILY
Qty: 60 TAB | Refills: 6 | Status: SHIPPED | OUTPATIENT
Start: 2017-01-01 | End: 2017-01-01

## 2017-01-01 RX ORDER — POTASSIUM CHLORIDE 7.45 MG/ML
10 INJECTION INTRAVENOUS ONCE
Status: COMPLETED | OUTPATIENT
Start: 2017-01-01 | End: 2017-01-01

## 2017-01-01 RX ORDER — ALBUTEROL SULFATE 0.83 MG/ML
5 SOLUTION RESPIRATORY (INHALATION)
Status: COMPLETED | OUTPATIENT
Start: 2017-01-01 | End: 2017-01-01

## 2017-01-01 RX ORDER — LORAZEPAM 1 MG/1
TABLET ORAL
COMMUNITY
End: 2017-01-01

## 2017-01-01 RX ORDER — SERTRALINE HYDROCHLORIDE 50 MG/1
75 TABLET, FILM COATED ORAL DAILY
Qty: 135 TAB | Refills: 3 | Status: CANCELLED | OUTPATIENT
Start: 2017-01-01

## 2017-01-01 RX ORDER — POLYETHYLENE GLYCOL 3350 17 G/17G
17 POWDER, FOR SOLUTION ORAL ONCE
Status: ACTIVE | OUTPATIENT
Start: 2017-01-01 | End: 2017-01-01

## 2017-01-01 RX ORDER — FENTANYL CITRATE 50 UG/ML
25-50 INJECTION, SOLUTION INTRAMUSCULAR; INTRAVENOUS
Status: DISCONTINUED | OUTPATIENT
Start: 2017-01-01 | End: 2017-01-01 | Stop reason: ALTCHOICE

## 2017-01-01 RX ORDER — NYSTATIN 100000 [USP'U]/ML
500000 SUSPENSION ORAL 2 TIMES DAILY
Qty: 300 ML | Refills: 0 | Status: SHIPPED
Start: 2017-01-01 | End: 2017-01-01

## 2017-01-01 RX ORDER — GLYCOPYRROLATE 0.2 MG/ML
0.2 INJECTION INTRAMUSCULAR; INTRAVENOUS
Status: DISCONTINUED | OUTPATIENT
Start: 2017-01-01 | End: 2017-05-02 | Stop reason: HOSPADM

## 2017-01-01 RX ORDER — CHOLECALCIFEROL (VITAMIN D3) 125 MCG
10 CAPSULE ORAL
COMMUNITY
End: 2017-01-01

## 2017-01-01 RX ORDER — AZITHROMYCIN 250 MG/1
250 TABLET, FILM COATED ORAL
Status: DISCONTINUED | OUTPATIENT
Start: 2017-01-01 | End: 2017-01-01

## 2017-01-01 RX ORDER — IPRATROPIUM BROMIDE 0.5 MG/2.5ML
0.5 SOLUTION RESPIRATORY (INHALATION) 3 TIMES DAILY
Status: DISCONTINUED | OUTPATIENT
Start: 2017-01-01 | End: 2017-01-01

## 2017-01-01 RX ORDER — DOXYCYCLINE HYCLATE 100 MG
100 TABLET ORAL EVERY 12 HOURS
Qty: 4 TAB | Refills: 0 | Status: SHIPPED | OUTPATIENT
Start: 2017-01-01 | End: 2017-01-01

## 2017-01-01 RX ORDER — LANOLIN ALCOHOL/MO/W.PET/CERES
1 CREAM (GRAM) TOPICAL
Status: DISCONTINUED | OUTPATIENT
Start: 2017-01-01 | End: 2017-01-01 | Stop reason: HOSPADM

## 2017-01-01 RX ORDER — LEVALBUTEROL INHALATION SOLUTION 1.25 MG/3ML
1.25 SOLUTION RESPIRATORY (INHALATION) 3 TIMES DAILY
Status: DISCONTINUED | OUTPATIENT
Start: 2017-01-01 | End: 2017-01-01 | Stop reason: HOSPADM

## 2017-01-01 RX ORDER — ACETAMINOPHEN 650 MG/1
650 SUPPOSITORY RECTAL
Status: DISCONTINUED | OUTPATIENT
Start: 2017-01-01 | End: 2017-05-02 | Stop reason: HOSPADM

## 2017-01-01 RX ORDER — OXYCODONE AND ACETAMINOPHEN 5; 325 MG/1; MG/1
1 TABLET ORAL
Qty: 10 TAB | Refills: 0 | Status: SHIPPED | OUTPATIENT
Start: 2017-01-01 | End: 2017-01-01

## 2017-01-01 RX ORDER — FUROSEMIDE 40 MG/1
40 TABLET ORAL DAILY
Status: DISCONTINUED | OUTPATIENT
Start: 2017-01-01 | End: 2017-01-01

## 2017-01-01 RX ORDER — LEVOFLOXACIN 750 MG/1
750 TABLET ORAL EVERY 24 HOURS
Status: DISCONTINUED | OUTPATIENT
Start: 2017-01-01 | End: 2017-01-01 | Stop reason: HOSPADM

## 2017-01-01 RX ORDER — NEBIVOLOL 5 MG/1
5 TABLET ORAL 2 TIMES DAILY
COMMUNITY
End: 2017-01-01

## 2017-01-01 RX ORDER — IPRATROPIUM BROMIDE 0.5 MG/2.5ML
0.5 SOLUTION RESPIRATORY (INHALATION) 2 TIMES DAILY
Qty: 7.5 ML | Refills: 3
Start: 2017-01-01 | End: 2017-01-01

## 2017-01-01 RX ORDER — DILTIAZEM HYDROCHLORIDE 240 MG/1
240 CAPSULE, COATED, EXTENDED RELEASE ORAL DAILY
Status: DISCONTINUED | OUTPATIENT
Start: 2017-01-01 | End: 2017-01-01

## 2017-01-01 RX ORDER — METOPROLOL TARTRATE 25 MG/1
12.5 TABLET, FILM COATED ORAL EVERY 12 HOURS
Status: DISCONTINUED | OUTPATIENT
Start: 2017-01-01 | End: 2017-01-01

## 2017-01-01 RX ORDER — DILTIAZEM HYDROCHLORIDE 240 MG/1
240 CAPSULE, COATED, EXTENDED RELEASE ORAL DAILY
Status: DISCONTINUED | OUTPATIENT
Start: 2017-01-01 | End: 2017-01-01 | Stop reason: HOSPADM

## 2017-01-01 RX ORDER — FAMOTIDINE 20 MG/1
20 TABLET, FILM COATED ORAL
Status: DISCONTINUED | OUTPATIENT
Start: 2017-01-01 | End: 2017-01-01

## 2017-01-01 RX ORDER — FLUTICASONE FUROATE AND VILANTEROL 100; 25 UG/1; UG/1
1 POWDER RESPIRATORY (INHALATION) DAILY
Status: DISCONTINUED | OUTPATIENT
Start: 2017-01-01 | End: 2017-01-01

## 2017-01-01 RX ORDER — ATORVASTATIN CALCIUM 10 MG/1
10 TABLET, FILM COATED ORAL DAILY
Status: DISCONTINUED | OUTPATIENT
Start: 2017-01-01 | End: 2017-01-01 | Stop reason: HOSPADM

## 2017-01-01 RX ORDER — SAME BUTANEDISULFONATE/BETAINE 400-600 MG
250 POWDER IN PACKET (EA) ORAL 2 TIMES DAILY
Status: DISCONTINUED | OUTPATIENT
Start: 2017-01-01 | End: 2017-01-01 | Stop reason: HOSPADM

## 2017-01-01 RX ORDER — AZITHROMYCIN 250 MG/1
250 TABLET, FILM COATED ORAL
Qty: 39 TAB | Refills: 1 | Status: SHIPPED | OUTPATIENT
Start: 2017-01-01 | End: 2017-01-01 | Stop reason: ALTCHOICE

## 2017-01-01 RX ORDER — POTASSIUM CHLORIDE 750 MG/1
10 TABLET, FILM COATED, EXTENDED RELEASE ORAL 2 TIMES DAILY WITH MEALS
Status: DISCONTINUED | OUTPATIENT
Start: 2017-01-01 | End: 2017-01-01 | Stop reason: HOSPADM

## 2017-01-01 RX ORDER — DILTIAZEM HYDROCHLORIDE 180 MG/1
180 CAPSULE, COATED, EXTENDED RELEASE ORAL DAILY
Qty: 30 CAP | Refills: 0 | Status: SHIPPED
Start: 2017-01-01 | End: 2017-01-01 | Stop reason: ALTCHOICE

## 2017-01-01 RX ORDER — FENTANYL CITRATE 50 UG/ML
12.5-5 INJECTION, SOLUTION INTRAMUSCULAR; INTRAVENOUS
Status: DISCONTINUED | OUTPATIENT
Start: 2017-01-01 | End: 2017-01-01 | Stop reason: HOSPADM

## 2017-01-01 RX ORDER — LORAZEPAM 0.5 MG/1
0.5 TABLET ORAL
Status: DISCONTINUED | OUTPATIENT
Start: 2017-01-01 | End: 2017-01-01

## 2017-01-01 RX ORDER — LANOLIN ALCOHOL/MO/W.PET/CERES
325 CREAM (GRAM) TOPICAL
Status: DISCONTINUED | OUTPATIENT
Start: 2017-01-01 | End: 2017-01-01 | Stop reason: HOSPADM

## 2017-01-01 RX ORDER — DOBUTAMINE HYDROCHLORIDE 200 MG/100ML
2.5-1 INJECTION INTRAVENOUS
Status: DISCONTINUED | OUTPATIENT
Start: 2017-01-01 | End: 2017-01-01 | Stop reason: HOSPADM

## 2017-01-01 RX ORDER — LORAZEPAM 1 MG/1
TABLET ORAL
Qty: 180 TAB | Refills: 0 | Status: SHIPPED | OUTPATIENT
Start: 2017-01-01 | End: 2017-01-01 | Stop reason: ALTCHOICE

## 2017-01-01 RX ORDER — CODEINE PHOSPHATE AND GUAIFENESIN 10; 100 MG/5ML; MG/5ML
5 SOLUTION ORAL
Qty: 463 ML | Refills: 0 | Status: ON HOLD | OUTPATIENT
Start: 2017-01-01 | End: 2017-01-01

## 2017-01-01 RX ORDER — SODIUM CHLORIDE 0.9 % (FLUSH) 0.9 %
10 SYRINGE (ML) INJECTION AS NEEDED
Status: DISCONTINUED | OUTPATIENT
Start: 2017-01-01 | End: 2017-05-02 | Stop reason: HOSPADM

## 2017-01-01 RX ORDER — HALOPERIDOL 5 MG/ML
1 INJECTION INTRAMUSCULAR EVERY 6 HOURS
Status: DISCONTINUED | OUTPATIENT
Start: 2017-01-01 | End: 2017-01-01

## 2017-01-01 RX ORDER — ESMOLOL HYDROCHLORIDE 10 MG/ML
10 INJECTION INTRAVENOUS ONCE
Status: CANCELLED | OUTPATIENT
Start: 2017-01-01 | End: 2017-01-01

## 2017-01-01 RX ORDER — LEVALBUTEROL INHALATION SOLUTION 1.25 MG/3ML
1.25 SOLUTION RESPIRATORY (INHALATION)
Qty: 30 NEBULE | Refills: 0 | Status: SHIPPED
Start: 2017-01-01 | End: 2017-01-01

## 2017-01-01 RX ORDER — ONDANSETRON 2 MG/ML
4 INJECTION INTRAMUSCULAR; INTRAVENOUS
Status: COMPLETED | OUTPATIENT
Start: 2017-01-01 | End: 2017-01-01

## 2017-01-01 RX ORDER — NEBIVOLOL 2.5 MG/1
2.5 TABLET ORAL 2 TIMES DAILY
Status: DISCONTINUED | OUTPATIENT
Start: 2017-01-01 | End: 2017-01-01

## 2017-01-01 RX ORDER — FUROSEMIDE 10 MG/ML
40 INJECTION INTRAMUSCULAR; INTRAVENOUS
Status: DISCONTINUED | OUTPATIENT
Start: 2017-01-01 | End: 2017-01-01

## 2017-01-01 RX ORDER — PREDNISONE 20 MG/1
TABLET ORAL
Qty: 14 TAB | Refills: 0 | Status: SHIPPED | OUTPATIENT
Start: 2017-01-01 | End: 2017-01-01

## 2017-01-01 RX ORDER — LANOLIN ALCOHOL/MO/W.PET/CERES
10 CREAM (GRAM) TOPICAL
Status: DISCONTINUED | OUTPATIENT
Start: 2017-01-01 | End: 2017-01-01 | Stop reason: HOSPADM

## 2017-01-01 RX ORDER — FUROSEMIDE 20 MG/1
20 TABLET ORAL
Status: DISCONTINUED | OUTPATIENT
Start: 2017-01-01 | End: 2017-01-01 | Stop reason: HOSPADM

## 2017-01-01 RX ORDER — DOCUSATE SODIUM 100 MG/1
100 CAPSULE, LIQUID FILLED ORAL
Qty: 60 CAP | Refills: 2 | Status: SHIPPED | OUTPATIENT
Start: 2017-01-01 | End: 2017-01-01

## 2017-01-01 RX ORDER — DOCUSATE SODIUM 100 MG/1
100 CAPSULE, LIQUID FILLED ORAL 2 TIMES DAILY
Qty: 180 CAP | Refills: 0 | Status: SHIPPED
Start: 2017-01-01 | End: 2017-01-01

## 2017-01-01 RX ORDER — FUROSEMIDE 40 MG/1
40 TABLET ORAL
Status: DISCONTINUED | OUTPATIENT
Start: 2017-01-01 | End: 2017-01-01

## 2017-01-01 RX ORDER — SERTRALINE HYDROCHLORIDE 50 MG/1
75 TABLET, FILM COATED ORAL DAILY
Status: DISCONTINUED | OUTPATIENT
Start: 2017-01-01 | End: 2017-01-01

## 2017-01-01 RX ORDER — LORAZEPAM 2 MG/ML
2 CONCENTRATE ORAL EVERY 4 HOURS
Status: DISCONTINUED | OUTPATIENT
Start: 2017-01-01 | End: 2017-05-02 | Stop reason: HOSPADM

## 2017-01-01 RX ADMIN — LORAZEPAM 1 MG: 1 TABLET ORAL at 08:21

## 2017-01-01 RX ADMIN — LORAZEPAM 0.5 MG: 0.5 TABLET ORAL at 02:24

## 2017-01-01 RX ADMIN — NYSTATIN 500000 UNITS: 100000 SUSPENSION ORAL at 08:12

## 2017-01-01 RX ADMIN — LEVOTHYROXINE SODIUM 50 MCG: 50 TABLET ORAL at 07:48

## 2017-01-01 RX ADMIN — HALOPERIDOL LACTATE 2 MG: 5 INJECTION, SOLUTION INTRAMUSCULAR at 23:08

## 2017-01-01 RX ADMIN — LISINOPRIL 20 MG: 20 TABLET ORAL at 08:26

## 2017-01-01 RX ADMIN — HALOPERIDOL LACTATE 1 MG: 5 INJECTION, SOLUTION INTRAMUSCULAR at 14:34

## 2017-01-01 RX ADMIN — ASPIRIN 81 MG: 81 TABLET, COATED ORAL at 09:34

## 2017-01-01 RX ADMIN — AZITHROMYCIN 250 MG: 250 TABLET, FILM COATED ORAL at 20:16

## 2017-01-01 RX ADMIN — Medication 10 ML: at 05:29

## 2017-01-01 RX ADMIN — LEVALBUTEROL HYDROCHLORIDE 1.25 MG: 1.25 SOLUTION RESPIRATORY (INHALATION) at 07:59

## 2017-01-01 RX ADMIN — LORAZEPAM 0.5 MG: 0.5 TABLET ORAL at 15:21

## 2017-01-01 RX ADMIN — FENTANYL CITRATE 50 MCG: 50 INJECTION, SOLUTION INTRAMUSCULAR; INTRAVENOUS at 11:55

## 2017-01-01 RX ADMIN — IPRATROPIUM BROMIDE 0.5 MG: 0.5 SOLUTION RESPIRATORY (INHALATION) at 07:13

## 2017-01-01 RX ADMIN — RDII 250 MG CAPSULE 250 MG: at 08:26

## 2017-01-01 RX ADMIN — Medication 10 ML: at 21:09

## 2017-01-01 RX ADMIN — IPRATROPIUM BROMIDE AND ALBUTEROL SULFATE 3 ML: .5; 3 SOLUTION RESPIRATORY (INHALATION) at 09:17

## 2017-01-01 RX ADMIN — Medication 10 ML: at 15:24

## 2017-01-01 RX ADMIN — LEVOTHYROXINE SODIUM 50 MCG: 50 TABLET ORAL at 08:11

## 2017-01-01 RX ADMIN — FUROSEMIDE 40 MG: 10 INJECTION, SOLUTION INTRAMUSCULAR; INTRAVENOUS at 21:03

## 2017-01-01 RX ADMIN — AMIODARONE HYDROCHLORIDE 1 MG/MIN: 50 INJECTION, SOLUTION INTRAVENOUS at 17:40

## 2017-01-01 RX ADMIN — GUAIFENESIN 100 MG: 100 SOLUTION ORAL at 08:25

## 2017-01-01 RX ADMIN — LORAZEPAM 2 MG: 2 SOLUTION, CONCENTRATE ORAL at 14:33

## 2017-01-01 RX ADMIN — IPRATROPIUM BROMIDE 0.5 MG: 0.5 SOLUTION RESPIRATORY (INHALATION) at 19:56

## 2017-01-01 RX ADMIN — POTASSIUM CHLORIDE 10 MEQ: 750 TABLET, FILM COATED, EXTENDED RELEASE ORAL at 16:52

## 2017-01-01 RX ADMIN — IPRATROPIUM BROMIDE 0.5 MG: 0.5 SOLUTION RESPIRATORY (INHALATION) at 08:40

## 2017-01-01 RX ADMIN — NEBIVOLOL HYDROCHLORIDE 5 MG: 2.5 TABLET ORAL at 08:25

## 2017-01-01 RX ADMIN — FLUTICASONE FUROATE AND VILANTEROL TRIFENATATE 1 PUFF: 100; 25 POWDER RESPIRATORY (INHALATION) at 08:42

## 2017-01-01 RX ADMIN — MELATONIN 3 MG ORAL TABLET 10.5 MG: 3 TABLET ORAL at 20:59

## 2017-01-01 RX ADMIN — METOPROLOL TARTRATE 5 MG: 5 INJECTION INTRAVENOUS at 05:24

## 2017-01-01 RX ADMIN — METHYLPREDNISOLONE SODIUM SUCCINATE 40 MG: 40 INJECTION, POWDER, FOR SOLUTION INTRAMUSCULAR; INTRAVENOUS at 23:38

## 2017-01-01 RX ADMIN — MORPHINE SULFATE 10 MG: 20 SOLUTION ORAL at 22:39

## 2017-01-01 RX ADMIN — FLUTICASONE FUROATE AND VILANTEROL TRIFENATATE 1 PUFF: 100; 25 POWDER RESPIRATORY (INHALATION) at 08:18

## 2017-01-01 RX ADMIN — FUROSEMIDE 20 MG: 10 INJECTION, SOLUTION INTRAMUSCULAR; INTRAVENOUS at 17:40

## 2017-01-01 RX ADMIN — OXYCODONE HYDROCHLORIDE AND ACETAMINOPHEN 1 TABLET: 5; 325 TABLET ORAL at 04:47

## 2017-01-01 RX ADMIN — PREDNISONE 30 MG: 10 TABLET ORAL at 09:44

## 2017-01-01 RX ADMIN — POTASSIUM CHLORIDE 40 MEQ: 750 TABLET, FILM COATED, EXTENDED RELEASE ORAL at 12:14

## 2017-01-01 RX ADMIN — MORPHINE SULFATE 10 MG: 20 SOLUTION ORAL at 14:00

## 2017-01-01 RX ADMIN — FUROSEMIDE 40 MG: 10 INJECTION, SOLUTION INTRAMUSCULAR; INTRAVENOUS at 16:44

## 2017-01-01 RX ADMIN — LEVALBUTEROL HYDROCHLORIDE 1.25 MG: 1.25 SOLUTION RESPIRATORY (INHALATION) at 08:40

## 2017-01-01 RX ADMIN — FLUTICASONE FUROATE AND VILANTEROL TRIFENATATE 1 PUFF: 100; 25 POWDER RESPIRATORY (INHALATION) at 17:18

## 2017-01-01 RX ADMIN — ENOXAPARIN SODIUM 40 MG: 40 INJECTION SUBCUTANEOUS at 08:35

## 2017-01-01 RX ADMIN — POTASSIUM CHLORIDE 10 MEQ: 750 TABLET, FILM COATED, EXTENDED RELEASE ORAL at 09:43

## 2017-01-01 RX ADMIN — METHYLPREDNISOLONE SODIUM SUCCINATE 40 MG: 40 INJECTION, POWDER, FOR SOLUTION INTRAMUSCULAR; INTRAVENOUS at 00:36

## 2017-01-01 RX ADMIN — NEBIVOLOL HYDROCHLORIDE 5 MG: 2.5 TABLET ORAL at 09:43

## 2017-01-01 RX ADMIN — DILTIAZEM HYDROCHLORIDE 30 MG: 30 TABLET, FILM COATED ORAL at 14:02

## 2017-01-01 RX ADMIN — IPRATROPIUM BROMIDE 0.5 MG: 0.5 SOLUTION RESPIRATORY (INHALATION) at 13:38

## 2017-01-01 RX ADMIN — Medication 10 ML: at 20:19

## 2017-01-01 RX ADMIN — LEVALBUTEROL HYDROCHLORIDE 1.25 MG: 1.25 SOLUTION RESPIRATORY (INHALATION) at 15:26

## 2017-01-01 RX ADMIN — FAMOTIDINE 20 MG: 20 TABLET ORAL at 14:02

## 2017-01-01 RX ADMIN — IPRATROPIUM BROMIDE 0.5 MG: 0.5 SOLUTION RESPIRATORY (INHALATION) at 13:29

## 2017-01-01 RX ADMIN — MORPHINE SULFATE 10 MG: 20 SOLUTION ORAL at 06:34

## 2017-01-01 RX ADMIN — FUROSEMIDE 20 MG: 20 TABLET ORAL at 08:21

## 2017-01-01 RX ADMIN — GUAIFENESIN AND CODEINE PHOSPHATE 10 ML: 10; 100 LIQUID ORAL at 20:47

## 2017-01-01 RX ADMIN — PREDNISONE 30 MG: 10 TABLET ORAL at 08:45

## 2017-01-01 RX ADMIN — DOCUSATE SODIUM 100 MG: 100 CAPSULE, LIQUID FILLED ORAL at 09:44

## 2017-01-01 RX ADMIN — FLUTICASONE FUROATE AND VILANTEROL TRIFENATATE 1 PUFF: 100; 25 POWDER RESPIRATORY (INHALATION) at 08:13

## 2017-01-01 RX ADMIN — PREDNISONE 30 MG: 10 TABLET ORAL at 09:34

## 2017-01-01 RX ADMIN — ZOLPIDEM TARTRATE 5 MG: 5 TABLET ORAL at 21:20

## 2017-01-01 RX ADMIN — LEVALBUTEROL HYDROCHLORIDE 1.25 MG: 1.25 SOLUTION RESPIRATORY (INHALATION) at 14:42

## 2017-01-01 RX ADMIN — NYSTATIN 500000 UNITS: 100000 SUSPENSION ORAL at 15:24

## 2017-01-01 RX ADMIN — Medication 10 ML: at 19:17

## 2017-01-01 RX ADMIN — Medication 10 ML: at 09:07

## 2017-01-01 RX ADMIN — IPRATROPIUM BROMIDE 0.5 MG: 0.5 SOLUTION RESPIRATORY (INHALATION) at 20:37

## 2017-01-01 RX ADMIN — TRAZODONE HYDROCHLORIDE 100 MG: 100 TABLET ORAL at 21:20

## 2017-01-01 RX ADMIN — DOCUSATE SODIUM 100 MG: 100 CAPSULE, LIQUID FILLED ORAL at 18:03

## 2017-01-01 RX ADMIN — UMECLIDINIUM 1 PUFF: 62.5 AEROSOL, POWDER ORAL at 08:59

## 2017-01-01 RX ADMIN — LORAZEPAM 1 MG: 1 TABLET ORAL at 22:55

## 2017-01-01 RX ADMIN — METHYLPREDNISOLONE SODIUM SUCCINATE 40 MG: 40 INJECTION, POWDER, FOR SOLUTION INTRAMUSCULAR; INTRAVENOUS at 17:18

## 2017-01-01 RX ADMIN — SERTRALINE 75 MG: 50 TABLET, FILM COATED ORAL at 17:18

## 2017-01-01 RX ADMIN — LEVOFLOXACIN 750 MG: 5 INJECTION, SOLUTION INTRAVENOUS at 19:36

## 2017-01-01 RX ADMIN — LEVOTHYROXINE SODIUM 50 MCG: 50 TABLET ORAL at 08:16

## 2017-01-01 RX ADMIN — ATORVASTATIN CALCIUM 10 MG: 10 TABLET, FILM COATED ORAL at 08:26

## 2017-01-01 RX ADMIN — AMIODARONE HYDROCHLORIDE 150 MG: 50 INJECTION, SOLUTION INTRAVENOUS at 14:58

## 2017-01-01 RX ADMIN — IPRATROPIUM BROMIDE 0.5 MG: 0.5 SOLUTION RESPIRATORY (INHALATION) at 20:46

## 2017-01-01 RX ADMIN — Medication 10 ML: at 15:52

## 2017-01-01 RX ADMIN — METHYLPREDNISOLONE SODIUM SUCCINATE 125 MG: 125 INJECTION, POWDER, FOR SOLUTION INTRAMUSCULAR; INTRAVENOUS at 17:38

## 2017-01-01 RX ADMIN — MIDAZOLAM HYDROCHLORIDE 1 MG: 1 INJECTION, SOLUTION INTRAMUSCULAR; INTRAVENOUS at 11:45

## 2017-01-01 RX ADMIN — GUAIFENESIN AND CODEINE PHOSPHATE 5 ML: 100; 10 SOLUTION ORAL at 21:19

## 2017-01-01 RX ADMIN — ZOLPIDEM TARTRATE 5 MG: 5 TABLET, COATED ORAL at 00:35

## 2017-01-01 RX ADMIN — METOPROLOL TARTRATE 5 MG: 5 INJECTION INTRAVENOUS at 20:17

## 2017-01-01 RX ADMIN — UMECLIDINIUM 1 PUFF: 62.5 AEROSOL, POWDER ORAL at 08:18

## 2017-01-01 RX ADMIN — MORPHINE SULFATE 10 MG: 20 SOLUTION ORAL at 14:33

## 2017-01-01 RX ADMIN — ENOXAPARIN SODIUM 40 MG: 40 INJECTION SUBCUTANEOUS at 13:29

## 2017-01-01 RX ADMIN — FUROSEMIDE 40 MG: 40 TABLET ORAL at 09:02

## 2017-01-01 RX ADMIN — MORPHINE SULFATE 10 MG: 20 SOLUTION ORAL at 17:50

## 2017-01-01 RX ADMIN — NYSTATIN 500000 UNITS: 100000 SUSPENSION ORAL at 20:16

## 2017-01-01 RX ADMIN — IPRATROPIUM BROMIDE 0.5 MG: 0.5 SOLUTION RESPIRATORY (INHALATION) at 13:54

## 2017-01-01 RX ADMIN — METHYLPREDNISOLONE SODIUM SUCCINATE 40 MG: 40 INJECTION, POWDER, FOR SOLUTION INTRAMUSCULAR; INTRAVENOUS at 17:23

## 2017-01-01 RX ADMIN — IPRATROPIUM BROMIDE 0.5 MG: 0.5 SOLUTION RESPIRATORY (INHALATION) at 20:14

## 2017-01-01 RX ADMIN — TRAZODONE HYDROCHLORIDE 100 MG: 100 TABLET ORAL at 21:14

## 2017-01-01 RX ADMIN — ZOLPIDEM TARTRATE 5 MG: 5 TABLET, COATED ORAL at 20:44

## 2017-01-01 RX ADMIN — IPRATROPIUM BROMIDE 0.5 MG: 0.5 SOLUTION RESPIRATORY (INHALATION) at 01:57

## 2017-01-01 RX ADMIN — LORAZEPAM 1 MG: 1 TABLET ORAL at 23:12

## 2017-01-01 RX ADMIN — NEBIVOLOL HYDROCHLORIDE 5 MG: 2.5 TABLET ORAL at 08:16

## 2017-01-01 RX ADMIN — HALOPERIDOL LACTATE 1 MG: 5 INJECTION, SOLUTION INTRAMUSCULAR at 21:12

## 2017-01-01 RX ADMIN — LEVALBUTEROL HYDROCHLORIDE 1.25 MG: 1.25 SOLUTION RESPIRATORY (INHALATION) at 07:16

## 2017-01-01 RX ADMIN — LORAZEPAM 0.5 MG: 0.5 TABLET ORAL at 20:44

## 2017-01-01 RX ADMIN — FUROSEMIDE 20 MG: 20 TABLET ORAL at 08:51

## 2017-01-01 RX ADMIN — IPRATROPIUM BROMIDE 0.5 MG: 0.5 SOLUTION RESPIRATORY (INHALATION) at 07:31

## 2017-01-01 RX ADMIN — LEVOFLOXACIN 750 MG: 750 TABLET, FILM COATED ORAL at 00:09

## 2017-01-01 RX ADMIN — LEVALBUTEROL HYDROCHLORIDE 1.25 MG: 1.25 SOLUTION RESPIRATORY (INHALATION) at 20:40

## 2017-01-01 RX ADMIN — POTASSIUM CHLORIDE 10 MEQ: 750 TABLET, FILM COATED, EXTENDED RELEASE ORAL at 17:21

## 2017-01-01 RX ADMIN — NYSTATIN 500000 UNITS: 100000 SUSPENSION ORAL at 21:14

## 2017-01-01 RX ADMIN — DOCUSATE SODIUM 100 MG: 100 CAPSULE, LIQUID FILLED ORAL at 18:25

## 2017-01-01 RX ADMIN — APIXABAN 5 MG: 5 TABLET, FILM COATED ORAL at 08:31

## 2017-01-01 RX ADMIN — MORPHINE SULFATE 10 MG: 20 SOLUTION ORAL at 09:58

## 2017-01-01 RX ADMIN — NITROGLYCERIN 0.5 INCH: 20 OINTMENT TOPICAL at 17:36

## 2017-01-01 RX ADMIN — ASPIRIN 81 MG: 81 TABLET, COATED ORAL at 08:26

## 2017-01-01 RX ADMIN — Medication 5 ML: at 21:03

## 2017-01-01 RX ADMIN — DILTIAZEM HYDROCHLORIDE 30 MG: 30 TABLET, FILM COATED ORAL at 23:12

## 2017-01-01 RX ADMIN — SERTRALINE 75 MG: 50 TABLET, FILM COATED ORAL at 18:25

## 2017-01-01 RX ADMIN — NEBIVOLOL HYDROCHLORIDE 5 MG: 2.5 TABLET ORAL at 14:01

## 2017-01-01 RX ADMIN — LEVOFLOXACIN 750 MG: 750 TABLET, FILM COATED ORAL at 01:14

## 2017-01-01 RX ADMIN — AZITHROMYCIN 250 MG: 250 TABLET, FILM COATED ORAL at 18:27

## 2017-01-01 RX ADMIN — UMECLIDINIUM 1 PUFF: 62.5 AEROSOL, POWDER ORAL at 09:47

## 2017-01-01 RX ADMIN — AMIODARONE HYDROCHLORIDE 0.5 MG/MIN: 50 INJECTION, SOLUTION INTRAVENOUS at 20:59

## 2017-01-01 RX ADMIN — IPRATROPIUM BROMIDE 0.5 MG: 0.5 SOLUTION RESPIRATORY (INHALATION) at 07:39

## 2017-01-01 RX ADMIN — Medication 1 CAPSULE: at 08:17

## 2017-01-01 RX ADMIN — PREDNISONE 30 MG: 20 TABLET ORAL at 08:24

## 2017-01-01 RX ADMIN — MORPHINE SULFATE 10 MG: 20 SOLUTION ORAL at 14:56

## 2017-01-01 RX ADMIN — FLUTICASONE FUROATE AND VILANTEROL TRIFENATATE 1 PUFF: 100; 25 POWDER RESPIRATORY (INHALATION) at 11:08

## 2017-01-01 RX ADMIN — MORPHINE SULFATE 10 MG: 20 SOLUTION ORAL at 18:09

## 2017-01-01 RX ADMIN — LORAZEPAM 2 MG: 2 SOLUTION, CONCENTRATE ORAL at 06:34

## 2017-01-01 RX ADMIN — LORAZEPAM 0.5 MG: 0.5 TABLET ORAL at 22:34

## 2017-01-01 RX ADMIN — TRAZODONE HYDROCHLORIDE 100 MG: 100 TABLET ORAL at 01:27

## 2017-01-01 RX ADMIN — OXYCODONE HYDROCHLORIDE AND ACETAMINOPHEN 1 TABLET: 5; 325 TABLET ORAL at 08:25

## 2017-01-01 RX ADMIN — POLYETHYLENE GLYCOL 3350 17 G: 17 POWDER, FOR SOLUTION ORAL at 08:17

## 2017-01-01 RX ADMIN — NYSTATIN 500000 UNITS: 100000 SUSPENSION ORAL at 18:27

## 2017-01-01 RX ADMIN — Medication 10 ML: at 23:00

## 2017-01-01 RX ADMIN — APIXABAN 5 MG: 5 TABLET, FILM COATED ORAL at 21:20

## 2017-01-01 RX ADMIN — FUROSEMIDE 20 MG: 20 TABLET ORAL at 18:27

## 2017-01-01 RX ADMIN — ACETYLCYSTEINE 600 MG: 200 SOLUTION ORAL; RESPIRATORY (INHALATION) at 15:00

## 2017-01-01 RX ADMIN — DILTIAZEM HYDROCHLORIDE 30 MG: 30 TABLET, FILM COATED ORAL at 18:24

## 2017-01-01 RX ADMIN — FUROSEMIDE 40 MG: 40 TABLET ORAL at 08:15

## 2017-01-01 RX ADMIN — Medication 10 ML: at 15:07

## 2017-01-01 RX ADMIN — LEVALBUTEROL HYDROCHLORIDE 1.25 MG: 1.25 SOLUTION RESPIRATORY (INHALATION) at 20:14

## 2017-01-01 RX ADMIN — Medication 10 ML: at 21:14

## 2017-01-01 RX ADMIN — DEXTROSE MONOHYDRATE 5 MG/HR: 50 INJECTION, SOLUTION INTRAVENOUS at 16:33

## 2017-01-01 RX ADMIN — SERTRALINE HYDROCHLORIDE 75 MG: 50 TABLET ORAL at 12:58

## 2017-01-01 RX ADMIN — LEVALBUTEROL HYDROCHLORIDE 1.25 MG: 1.25 SOLUTION RESPIRATORY (INHALATION) at 01:58

## 2017-01-01 RX ADMIN — Medication 10 ML: at 03:10

## 2017-01-01 RX ADMIN — NYSTATIN 500000 UNITS: 100000 SUSPENSION ORAL at 21:31

## 2017-01-01 RX ADMIN — FLUTICASONE FUROATE AND VILANTEROL TRIFENATATE 1 PUFF: 100; 25 POWDER RESPIRATORY (INHALATION) at 13:56

## 2017-01-01 RX ADMIN — POTASSIUM CHLORIDE 10 MEQ: 10 INJECTION, SOLUTION INTRAVENOUS at 04:38

## 2017-01-01 RX ADMIN — LEVOTHYROXINE SODIUM 50 MCG: 50 TABLET ORAL at 07:00

## 2017-01-01 RX ADMIN — IPRATROPIUM BROMIDE 0.5 MG: 0.5 SOLUTION RESPIRATORY (INHALATION) at 11:31

## 2017-01-01 RX ADMIN — TRAZODONE HYDROCHLORIDE 100 MG: 100 TABLET ORAL at 21:31

## 2017-01-01 RX ADMIN — METHYLPREDNISOLONE SODIUM SUCCINATE 40 MG: 40 INJECTION, POWDER, FOR SOLUTION INTRAMUSCULAR; INTRAVENOUS at 23:12

## 2017-01-01 RX ADMIN — FUROSEMIDE 20 MG: 20 TABLET ORAL at 17:37

## 2017-01-01 RX ADMIN — Medication 10 ML: at 06:53

## 2017-01-01 RX ADMIN — ENOXAPARIN SODIUM 40 MG: 40 INJECTION SUBCUTANEOUS at 11:08

## 2017-01-01 RX ADMIN — LORAZEPAM 1 MG: 1 TABLET ORAL at 17:00

## 2017-01-01 RX ADMIN — Medication 10 ML: at 21:35

## 2017-01-01 RX ADMIN — LIDOCAINE HYDROCHLORIDE 30 ML: 10 INJECTION, SOLUTION INFILTRATION; PERINEURAL at 11:50

## 2017-01-01 RX ADMIN — LEVALBUTEROL HYDROCHLORIDE 1.25 MG: 1.25 SOLUTION RESPIRATORY (INHALATION) at 20:37

## 2017-01-01 RX ADMIN — SERTRALINE 75 MG: 50 TABLET, FILM COATED ORAL at 18:03

## 2017-01-01 RX ADMIN — IPRATROPIUM BROMIDE 0.5 MG: 0.5 SOLUTION RESPIRATORY (INHALATION) at 07:18

## 2017-01-01 RX ADMIN — DOBUTAMINE IN DEXTROSE 10 MCG/KG/MIN: 200 INJECTION, SOLUTION INTRAVENOUS at 11:41

## 2017-01-01 RX ADMIN — DILTIAZEM HYDROCHLORIDE 240 MG: 240 CAPSULE, EXTENDED RELEASE ORAL at 07:53

## 2017-01-01 RX ADMIN — LORAZEPAM 0.5 MG: 0.5 TABLET ORAL at 21:08

## 2017-01-01 RX ADMIN — POTASSIUM CHLORIDE 40 MEQ: 750 TABLET, FILM COATED, EXTENDED RELEASE ORAL at 08:45

## 2017-01-01 RX ADMIN — METOPROLOL TARTRATE 12.5 MG: 25 TABLET ORAL at 10:18

## 2017-01-01 RX ADMIN — FERROUS SULFATE TAB 325 MG (65 MG ELEMENTAL FE) 325 MG: 325 (65 FE) TAB at 08:45

## 2017-01-01 RX ADMIN — DOCUSATE SODIUM 100 MG: 100 CAPSULE, LIQUID FILLED ORAL at 07:56

## 2017-01-01 RX ADMIN — Medication 10 ML: at 21:17

## 2017-01-01 RX ADMIN — Medication 10 ML: at 16:36

## 2017-01-01 RX ADMIN — LEVOFLOXACIN 750 MG: 5 INJECTION, SOLUTION INTRAVENOUS at 11:36

## 2017-01-01 RX ADMIN — METOPROLOL TARTRATE 12.5 MG: 25 TABLET ORAL at 08:23

## 2017-01-01 RX ADMIN — HALOPERIDOL LACTATE 1 MG: 5 INJECTION, SOLUTION INTRAMUSCULAR at 14:52

## 2017-01-01 RX ADMIN — ASPIRIN 81 MG: 81 TABLET, COATED ORAL at 12:59

## 2017-01-01 RX ADMIN — SERTRALINE HYDROCHLORIDE 75 MG: 50 TABLET ORAL at 21:02

## 2017-01-01 RX ADMIN — METHYLPREDNISOLONE SODIUM SUCCINATE 40 MG: 40 INJECTION, POWDER, FOR SOLUTION INTRAMUSCULAR; INTRAVENOUS at 08:16

## 2017-01-01 RX ADMIN — ONDANSETRON HYDROCHLORIDE 4 MG: 2 INJECTION, SOLUTION INTRAMUSCULAR; INTRAVENOUS at 18:20

## 2017-01-01 RX ADMIN — IPRATROPIUM BROMIDE 0.5 MG: 0.5 SOLUTION RESPIRATORY (INHALATION) at 16:06

## 2017-01-01 RX ADMIN — FAMOTIDINE 20 MG: 20 TABLET ORAL at 08:57

## 2017-01-01 RX ADMIN — RDII 250 MG CAPSULE 250 MG: at 17:50

## 2017-01-01 RX ADMIN — GUAIFENESIN AND CODEINE PHOSPHATE 10 ML: 10; 100 LIQUID ORAL at 13:29

## 2017-01-01 RX ADMIN — MELATONIN 3 MG ORAL TABLET 9 MG: 3 TABLET ORAL at 21:31

## 2017-01-01 RX ADMIN — ASPIRIN 81 MG: 81 TABLET, COATED ORAL at 08:45

## 2017-01-01 RX ADMIN — GUAIFENESIN AND CODEINE PHOSPHATE 10 ML: 10; 100 LIQUID ORAL at 01:49

## 2017-01-01 RX ADMIN — LORAZEPAM 0.5 MG: 0.5 TABLET ORAL at 14:25

## 2017-01-01 RX ADMIN — TRAZODONE HYDROCHLORIDE 100 MG: 100 TABLET ORAL at 23:12

## 2017-01-01 RX ADMIN — LEVALBUTEROL HYDROCHLORIDE 1.25 MG: 1.25 SOLUTION RESPIRATORY (INHALATION) at 17:13

## 2017-01-01 RX ADMIN — FLUTICASONE FUROATE AND VILANTEROL TRIFENATATE 1 PUFF: 100; 25 POWDER RESPIRATORY (INHALATION) at 11:01

## 2017-01-01 RX ADMIN — TRAZODONE HYDROCHLORIDE 100 MG: 100 TABLET ORAL at 21:02

## 2017-01-01 RX ADMIN — IPRATROPIUM BROMIDE 0.5 MG: 0.5 SOLUTION RESPIRATORY (INHALATION) at 08:54

## 2017-01-01 RX ADMIN — DILTIAZEM HYDROCHLORIDE 30 MG: 30 TABLET, FILM COATED ORAL at 05:20

## 2017-01-01 RX ADMIN — AMIODARONE HYDROCHLORIDE 0.5 MG/MIN: 50 INJECTION, SOLUTION INTRAVENOUS at 09:41

## 2017-01-01 RX ADMIN — APIXABAN 5 MG: 5 TABLET, FILM COATED ORAL at 21:14

## 2017-01-01 RX ADMIN — GUAIFENESIN AND CODEINE PHOSPHATE 10 ML: 10; 100 LIQUID ORAL at 20:44

## 2017-01-01 RX ADMIN — MELATONIN 3 MG ORAL TABLET 10.5 MG: 3 TABLET ORAL at 21:20

## 2017-01-01 RX ADMIN — POLYETHYLENE GLYCOL 3350 17 G: 17 POWDER, FOR SOLUTION ORAL at 08:58

## 2017-01-01 RX ADMIN — POTASSIUM CHLORIDE 10 MEQ: 750 TABLET, FILM COATED, EXTENDED RELEASE ORAL at 08:25

## 2017-01-01 RX ADMIN — VANCOMYCIN HYDROCHLORIDE 1500 MG: 10 INJECTION, POWDER, LYOPHILIZED, FOR SOLUTION INTRAVENOUS at 17:07

## 2017-01-01 RX ADMIN — FUROSEMIDE 40 MG: 40 TABLET ORAL at 12:58

## 2017-01-01 RX ADMIN — DOBUTAMINE HYDROCHLORIDE 5 MCG/KG/MIN: 200 INJECTION INTRAVENOUS at 12:30

## 2017-01-01 RX ADMIN — DOCUSATE SODIUM 100 MG: 100 CAPSULE, LIQUID FILLED ORAL at 18:27

## 2017-01-01 RX ADMIN — Medication 10 ML: at 13:50

## 2017-01-01 RX ADMIN — POTASSIUM CHLORIDE 10 MEQ: 750 TABLET, FILM COATED, EXTENDED RELEASE ORAL at 12:59

## 2017-01-01 RX ADMIN — TRAZODONE HYDROCHLORIDE 100 MG: 100 TABLET ORAL at 02:37

## 2017-01-01 RX ADMIN — Medication 10 ML: at 11:04

## 2017-01-01 RX ADMIN — DILTIAZEM HYDROCHLORIDE 180 MG: 180 CAPSULE, EXTENDED RELEASE ORAL at 18:03

## 2017-01-01 RX ADMIN — TRAZODONE HYDROCHLORIDE 100 MG: 100 TABLET ORAL at 21:00

## 2017-01-01 RX ADMIN — DILTIAZEM HYDROCHLORIDE 60 MG: 30 TABLET, FILM COATED ORAL at 17:37

## 2017-01-01 RX ADMIN — FUROSEMIDE 40 MG: 40 TABLET ORAL at 09:44

## 2017-01-01 RX ADMIN — FAMOTIDINE 20 MG: 20 TABLET ORAL at 13:22

## 2017-01-01 RX ADMIN — LORAZEPAM 0.5 MG: 0.5 TABLET ORAL at 08:31

## 2017-01-01 RX ADMIN — Medication 10 ML: at 23:38

## 2017-01-01 RX ADMIN — POLYETHYLENE GLYCOL 3350 17 G: 17 POWDER, FOR SOLUTION ORAL at 08:45

## 2017-01-01 RX ADMIN — POLYETHYLENE GLYCOL 3350 17 G: 17 POWDER, FOR SOLUTION ORAL at 08:11

## 2017-01-01 RX ADMIN — LEVOFLOXACIN 750 MG: 750 TABLET, FILM COATED ORAL at 21:00

## 2017-01-01 RX ADMIN — METOPROLOL TARTRATE 5 MG: 5 INJECTION INTRAVENOUS at 23:00

## 2017-01-01 RX ADMIN — LORAZEPAM 1 MG: 1 TABLET ORAL at 12:58

## 2017-01-01 RX ADMIN — LISINOPRIL 20 MG: 20 TABLET ORAL at 09:44

## 2017-01-01 RX ADMIN — LEVALBUTEROL HYDROCHLORIDE 1.25 MG: 1.25 SOLUTION RESPIRATORY (INHALATION) at 14:59

## 2017-01-01 RX ADMIN — ASPIRIN 81 MG: 81 TABLET, COATED ORAL at 07:56

## 2017-01-01 RX ADMIN — NEBIVOLOL HYDROCHLORIDE 5 MG: 2.5 TABLET ORAL at 17:23

## 2017-01-01 RX ADMIN — LORAZEPAM 2 MG: 2 SOLUTION, CONCENTRATE ORAL at 13:59

## 2017-01-01 RX ADMIN — IPRATROPIUM BROMIDE 0.5 MG: 0.5 SOLUTION RESPIRATORY (INHALATION) at 07:16

## 2017-01-01 RX ADMIN — LISINOPRIL 20 MG: 20 TABLET ORAL at 12:59

## 2017-01-01 RX ADMIN — FUROSEMIDE 40 MG: 10 INJECTION, SOLUTION INTRAMUSCULAR; INTRAVENOUS at 08:31

## 2017-01-01 RX ADMIN — ALBUTEROL SULFATE 5 MG: 2.5 SOLUTION RESPIRATORY (INHALATION) at 16:44

## 2017-01-01 RX ADMIN — VANCOMYCIN HYDROCHLORIDE 1000 MG: 1 INJECTION, POWDER, LYOPHILIZED, FOR SOLUTION INTRAVENOUS at 11:29

## 2017-01-01 RX ADMIN — FAMOTIDINE 20 MG: 20 TABLET ORAL at 15:51

## 2017-01-01 RX ADMIN — HALOPERIDOL LACTATE 1 MG: 5 INJECTION, SOLUTION INTRAMUSCULAR at 03:10

## 2017-01-01 RX ADMIN — FUROSEMIDE 40 MG: 40 TABLET ORAL at 16:44

## 2017-01-01 RX ADMIN — NYSTATIN 500000 UNITS: 100000 SUSPENSION ORAL at 13:22

## 2017-01-01 RX ADMIN — Medication 10 ML: at 16:52

## 2017-01-01 RX ADMIN — Medication 10 ML: at 05:34

## 2017-01-01 RX ADMIN — NEBIVOLOL HYDROCHLORIDE 5 MG: 2.5 TABLET ORAL at 12:58

## 2017-01-01 RX ADMIN — ZOLPIDEM TARTRATE 5 MG: 5 TABLET, COATED ORAL at 20:47

## 2017-01-01 RX ADMIN — FAMOTIDINE 20 MG: 20 TABLET ORAL at 18:29

## 2017-01-01 RX ADMIN — DILTIAZEM HYDROCHLORIDE 240 MG: 240 CAPSULE, EXTENDED RELEASE ORAL at 08:12

## 2017-01-01 RX ADMIN — FAMOTIDINE 20 MG: 20 TABLET ORAL at 12:04

## 2017-01-01 RX ADMIN — Medication 10 ML: at 13:04

## 2017-01-01 RX ADMIN — Medication 10 ML: at 06:06

## 2017-01-01 RX ADMIN — NEBIVOLOL HYDROCHLORIDE 5 MG: 2.5 TABLET ORAL at 09:34

## 2017-01-01 RX ADMIN — LISINOPRIL 20 MG: 20 TABLET ORAL at 08:55

## 2017-01-01 RX ADMIN — FLUTICASONE FUROATE AND VILANTEROL TRIFENATATE 1 PUFF: 100; 25 POWDER RESPIRATORY (INHALATION) at 09:46

## 2017-01-01 RX ADMIN — SERTRALINE HYDROCHLORIDE 75 MG: 50 TABLET ORAL at 08:25

## 2017-01-01 RX ADMIN — ASPIRIN 81 MG: 81 TABLET, COATED ORAL at 08:57

## 2017-01-01 RX ADMIN — GUAIFENESIN AND CODEINE PHOSPHATE 10 ML: 10; 100 LIQUID ORAL at 08:16

## 2017-01-01 RX ADMIN — FERROUS SULFATE TAB 325 MG (65 MG ELEMENTAL FE) 325 MG: 325 (65 FE) TAB at 09:34

## 2017-01-01 RX ADMIN — PREDNISONE 40 MG: 20 TABLET ORAL at 09:44

## 2017-01-01 RX ADMIN — DOCUSATE SODIUM 100 MG: 100 CAPSULE, LIQUID FILLED ORAL at 08:45

## 2017-01-01 RX ADMIN — IPRATROPIUM BROMIDE 0.5 MG: 0.5 SOLUTION RESPIRATORY (INHALATION) at 19:27

## 2017-01-01 RX ADMIN — LORAZEPAM 1 MG: 1 TABLET ORAL at 09:00

## 2017-01-01 RX ADMIN — Medication 1 CAPSULE: at 08:25

## 2017-01-01 RX ADMIN — ENOXAPARIN SODIUM 40 MG: 40 INJECTION SUBCUTANEOUS at 11:36

## 2017-01-01 RX ADMIN — NEBIVOLOL HYDROCHLORIDE 5 MG: 2.5 TABLET ORAL at 18:29

## 2017-01-01 RX ADMIN — APIXABAN 5 MG: 5 TABLET, FILM COATED ORAL at 08:11

## 2017-01-01 RX ADMIN — NEBIVOLOL HYDROCHLORIDE 5 MG: 2.5 TABLET ORAL at 08:57

## 2017-01-01 RX ADMIN — IPRATROPIUM BROMIDE 0.5 MG: 0.5 SOLUTION RESPIRATORY (INHALATION) at 17:13

## 2017-01-01 RX ADMIN — PREDNISONE 40 MG: 20 TABLET ORAL at 12:58

## 2017-01-01 RX ADMIN — METHYLPREDNISOLONE SODIUM SUCCINATE 125 MG: 125 INJECTION, POWDER, FOR SOLUTION INTRAMUSCULAR; INTRAVENOUS at 08:24

## 2017-01-01 RX ADMIN — POTASSIUM CHLORIDE 40 MEQ: 750 TABLET, FILM COATED, EXTENDED RELEASE ORAL at 04:57

## 2017-01-01 RX ADMIN — Medication 325 MG: at 08:58

## 2017-01-01 RX ADMIN — ZOLPIDEM TARTRATE 5 MG: 5 TABLET ORAL at 20:59

## 2017-01-01 RX ADMIN — LEVOTHYROXINE SODIUM 50 MCG: 50 TABLET ORAL at 08:45

## 2017-01-01 RX ADMIN — ENOXAPARIN SODIUM 70 MG: 40 INJECTION SUBCUTANEOUS at 16:00

## 2017-01-01 RX ADMIN — FERROUS SULFATE TAB 325 MG (65 MG ELEMENTAL FE) 325 MG: 325 (65 FE) TAB at 07:48

## 2017-01-01 RX ADMIN — LORAZEPAM 0.5 MG: 0.5 TABLET ORAL at 21:03

## 2017-01-01 RX ADMIN — POLYETHYLENE GLYCOL 3350 17 G: 17 POWDER, FOR SOLUTION ORAL at 09:34

## 2017-01-01 RX ADMIN — NITROGLYCERIN 0.4 MG: 0.4 TABLET SUBLINGUAL at 08:30

## 2017-01-01 RX ADMIN — FLUTICASONE FUROATE AND VILANTEROL TRIFENATATE 1 PUFF: 100; 25 POWDER RESPIRATORY (INHALATION) at 08:31

## 2017-01-01 RX ADMIN — LISINOPRIL 20 MG: 20 TABLET ORAL at 13:49

## 2017-01-01 RX ADMIN — METHYLPREDNISOLONE SODIUM SUCCINATE 40 MG: 40 INJECTION, POWDER, FOR SOLUTION INTRAMUSCULAR; INTRAVENOUS at 09:03

## 2017-01-01 RX ADMIN — FLUTICASONE FUROATE AND VILANTEROL TRIFENATATE 1 PUFF: 100; 25 POWDER RESPIRATORY (INHALATION) at 08:29

## 2017-01-01 RX ADMIN — DOCUSATE SODIUM 100 MG: 100 CAPSULE, LIQUID FILLED ORAL at 17:37

## 2017-01-01 RX ADMIN — LEVOTHYROXINE SODIUM 50 MCG: 50 TABLET ORAL at 08:57

## 2017-01-01 RX ADMIN — Medication 10 ML: at 14:05

## 2017-01-01 RX ADMIN — POLYETHYLENE GLYCOL 3350 17 G: 17 POWDER, FOR SOLUTION ORAL at 11:08

## 2017-01-01 RX ADMIN — PREDNISONE 30 MG: 10 TABLET ORAL at 07:48

## 2017-01-01 RX ADMIN — LORAZEPAM 0.5 MG: 0.5 TABLET ORAL at 08:25

## 2017-01-01 RX ADMIN — LEVALBUTEROL HYDROCHLORIDE 1.25 MG: 1.25 SOLUTION RESPIRATORY (INHALATION) at 01:26

## 2017-01-01 RX ADMIN — LORAZEPAM 2 MG: 2 SOLUTION, CONCENTRATE ORAL at 09:58

## 2017-01-01 RX ADMIN — LEVALBUTEROL HYDROCHLORIDE 1.25 MG: 1.25 SOLUTION RESPIRATORY (INHALATION) at 13:54

## 2017-01-01 RX ADMIN — LORAZEPAM 1 MG: 1 TABLET ORAL at 17:21

## 2017-01-01 RX ADMIN — Medication 10 ML: at 01:15

## 2017-01-01 RX ADMIN — ACETAMINOPHEN 650 MG: 325 TABLET, FILM COATED ORAL at 05:29

## 2017-01-01 RX ADMIN — LEVALBUTEROL HYDROCHLORIDE 1.25 MG: 1.25 SOLUTION RESPIRATORY (INHALATION) at 07:38

## 2017-01-01 RX ADMIN — GUAIFENESIN AND CODEINE PHOSPHATE 5 ML: 100; 10 SOLUTION ORAL at 16:44

## 2017-01-01 RX ADMIN — FLUTICASONE FUROATE AND VILANTEROL TRIFENATATE 1 PUFF: 100; 25 POWDER RESPIRATORY (INHALATION) at 08:59

## 2017-01-01 RX ADMIN — SERTRALINE 75 MG: 50 TABLET, FILM COATED ORAL at 08:56

## 2017-01-01 RX ADMIN — POLYETHYLENE GLYCOL 3350 17 G: 17 POWDER, FOR SOLUTION ORAL at 08:26

## 2017-01-01 RX ADMIN — SERTRALINE HYDROCHLORIDE 75 MG: 50 TABLET ORAL at 09:43

## 2017-01-01 RX ADMIN — FLUTICASONE FUROATE AND VILANTEROL TRIFENATATE 1 PUFF: 100; 25 POWDER RESPIRATORY (INHALATION) at 09:34

## 2017-01-01 RX ADMIN — SERTRALINE 75 MG: 50 TABLET, FILM COATED ORAL at 09:02

## 2017-01-01 RX ADMIN — NEBIVOLOL HYDROCHLORIDE 5 MG: 2.5 TABLET ORAL at 17:00

## 2017-01-01 RX ADMIN — UMECLIDINIUM 1 PUFF: 62.5 AEROSOL, POWDER ORAL at 08:42

## 2017-01-01 RX ADMIN — MIDAZOLAM HYDROCHLORIDE 2 MG: 1 INJECTION, SOLUTION INTRAMUSCULAR; INTRAVENOUS at 11:50

## 2017-01-01 RX ADMIN — LISINOPRIL 20 MG: 20 TABLET ORAL at 08:16

## 2017-01-01 RX ADMIN — OXYCODONE HYDROCHLORIDE AND ACETAMINOPHEN 1 TABLET: 5; 325 TABLET ORAL at 23:50

## 2017-01-01 RX ADMIN — Medication 2 MG: at 16:41

## 2017-01-01 RX ADMIN — HALOPERIDOL LACTATE 1 MG: 5 INJECTION, SOLUTION INTRAMUSCULAR at 09:54

## 2017-01-01 RX ADMIN — Medication 10 ML: at 15:14

## 2017-01-01 RX ADMIN — ASPIRIN 81 MG: 81 TABLET, COATED ORAL at 13:50

## 2017-01-01 RX ADMIN — MELATONIN 3 MG ORAL TABLET 9 MG: 3 TABLET ORAL at 21:14

## 2017-01-01 RX ADMIN — FAMOTIDINE 20 MG: 20 TABLET ORAL at 09:02

## 2017-01-01 RX ADMIN — DILTIAZEM HYDROCHLORIDE 180 MG: 180 CAPSULE, EXTENDED RELEASE ORAL at 09:34

## 2017-01-01 RX ADMIN — LEVOTHYROXINE SODIUM 50 MCG: 50 TABLET ORAL at 13:04

## 2017-01-01 RX ADMIN — IPRATROPIUM BROMIDE 0.5 MG: 0.5 SOLUTION RESPIRATORY (INHALATION) at 07:29

## 2017-01-01 RX ADMIN — UMECLIDINIUM 1 PUFF: 62.5 AEROSOL, POWDER ORAL at 08:29

## 2017-01-01 RX ADMIN — DILTIAZEM HYDROCHLORIDE 60 MG: 30 TABLET, FILM COATED ORAL at 10:35

## 2017-01-01 RX ADMIN — PIPERACILLIN SODIUM,TAZOBACTAM SODIUM 3.38 G: 3; .375 INJECTION, POWDER, FOR SOLUTION INTRAVENOUS at 16:05

## 2017-01-01 RX ADMIN — DOCUSATE SODIUM 100 MG: 100 CAPSULE, LIQUID FILLED ORAL at 08:12

## 2017-01-01 RX ADMIN — GUAIFENESIN AND CODEINE PHOSPHATE 5 ML: 100; 10 SOLUTION ORAL at 21:05

## 2017-01-01 RX ADMIN — APIXABAN 5 MG: 5 TABLET, FILM COATED ORAL at 21:31

## 2017-01-01 RX ADMIN — GUAIFENESIN 100 MG: 100 SOLUTION ORAL at 17:21

## 2017-01-01 RX ADMIN — FUROSEMIDE 20 MG: 20 TABLET ORAL at 08:12

## 2017-01-01 RX ADMIN — FLUTICASONE FUROATE AND VILANTEROL TRIFENATATE 1 PUFF: 100; 25 POWDER RESPIRATORY (INHALATION) at 09:00

## 2017-01-01 RX ADMIN — AZITHROMYCIN 250 MG: 250 TABLET, FILM COATED ORAL at 21:02

## 2017-01-01 RX ADMIN — FLUTICASONE FUROATE AND VILANTEROL TRIFENATATE 1 PUFF: 100; 25 POWDER RESPIRATORY (INHALATION) at 10:25

## 2017-01-01 RX ADMIN — NEBIVOLOL HYDROCHLORIDE 5 MG: 2.5 TABLET ORAL at 17:21

## 2017-01-01 RX ADMIN — AMIODARONE HYDROCHLORIDE 1 MG/MIN: 50 INJECTION, SOLUTION INTRAVENOUS at 15:11

## 2017-01-01 RX ADMIN — POLYETHYLENE GLYCOL 3350 17 G: 17 POWDER, FOR SOLUTION ORAL at 17:21

## 2017-01-01 RX ADMIN — Medication 10 ML: at 20:47

## 2017-01-01 RX ADMIN — NYSTATIN 500000 UNITS: 100000 SUSPENSION ORAL at 18:24

## 2017-01-01 RX ADMIN — IPRATROPIUM BROMIDE 0.5 MG: 0.5 SOLUTION RESPIRATORY (INHALATION) at 19:18

## 2017-01-01 RX ADMIN — DOCUSATE SODIUM 100 MG: 100 CAPSULE, LIQUID FILLED ORAL at 20:16

## 2017-01-01 RX ADMIN — SERTRALINE 75 MG: 50 TABLET, FILM COATED ORAL at 20:15

## 2017-01-01 RX ADMIN — POLYETHYLENE GLYCOL 3350 17 G: 17 POWDER, FOR SOLUTION ORAL at 11:04

## 2017-01-01 RX ADMIN — ENOXAPARIN SODIUM 40 MG: 40 INJECTION SUBCUTANEOUS at 11:03

## 2017-01-01 RX ADMIN — Medication 10 ML: at 13:22

## 2017-01-01 RX ADMIN — IPRATROPIUM BROMIDE 0.5 MG: 0.5 SOLUTION RESPIRATORY (INHALATION) at 20:56

## 2017-01-01 RX ADMIN — FUROSEMIDE 20 MG: 20 TABLET ORAL at 18:04

## 2017-01-01 RX ADMIN — METHYLPREDNISOLONE SODIUM SUCCINATE 40 MG: 40 INJECTION, POWDER, FOR SOLUTION INTRAMUSCULAR; INTRAVENOUS at 18:29

## 2017-01-01 RX ADMIN — LORAZEPAM 0.5 MG: 0.5 TABLET ORAL at 20:47

## 2017-01-01 RX ADMIN — Medication 1 CAPSULE: at 09:03

## 2017-01-01 RX ADMIN — DOCUSATE SODIUM 100 MG: 100 CAPSULE, LIQUID FILLED ORAL at 09:34

## 2017-01-01 RX ADMIN — ALBUTEROL SULFATE 5 MG: 2.5 SOLUTION RESPIRATORY (INHALATION) at 17:40

## 2017-01-01 RX ADMIN — Medication 1 CAPSULE: at 08:57

## 2017-01-01 RX ADMIN — DOXYCYCLINE HYCLATE 100 MG: 100 TABLET, COATED ORAL at 13:49

## 2017-01-01 RX ADMIN — PREDNISONE 30 MG: 10 TABLET ORAL at 08:12

## 2017-01-01 RX ADMIN — LEVOTHYROXINE SODIUM 50 MCG: 50 TABLET ORAL at 09:35

## 2017-01-01 RX ADMIN — Medication 325 MG: at 08:17

## 2017-01-01 RX ADMIN — IOPAMIDOL 15 ML: 755 INJECTION, SOLUTION INTRAVENOUS at 12:11

## 2017-01-01 RX ADMIN — HYDROMORPHONE HYDROCHLORIDE 0.2 MG: 1 INJECTION, SOLUTION INTRAMUSCULAR; INTRAVENOUS; SUBCUTANEOUS at 17:35

## 2017-01-01 RX ADMIN — DOXYCYCLINE HYCLATE 100 MG: 100 TABLET, COATED ORAL at 09:03

## 2017-01-01 RX ADMIN — Medication 10 ML: at 06:00

## 2017-01-01 RX ADMIN — FAMOTIDINE 20 MG: 20 TABLET ORAL at 17:23

## 2017-01-01 RX ADMIN — NYSTATIN 500000 UNITS: 100000 SUSPENSION ORAL at 07:56

## 2017-01-01 RX ADMIN — NYSTATIN 500000 UNITS: 100000 SUSPENSION ORAL at 14:03

## 2017-01-01 RX ADMIN — LEVOTHYROXINE SODIUM 50 MCG: 50 TABLET ORAL at 06:49

## 2017-01-01 RX ADMIN — PREDNISONE 40 MG: 20 TABLET ORAL at 08:25

## 2017-01-01 RX ADMIN — SODIUM CHLORIDE 50 ML/HR: 900 INJECTION, SOLUTION INTRAVENOUS at 19:17

## 2017-01-01 RX ADMIN — IPRATROPIUM BROMIDE 0.5 MG: 0.5 SOLUTION RESPIRATORY (INHALATION) at 20:40

## 2017-01-01 RX ADMIN — Medication 10 ML: at 21:20

## 2017-01-01 RX ADMIN — GUAIFENESIN AND CODEINE PHOSPHATE 5 ML: 100; 10 SOLUTION ORAL at 06:53

## 2017-01-01 RX ADMIN — HYDROMORPHONE HYDROCHLORIDE 0.5 MG: 1 INJECTION, SOLUTION INTRAMUSCULAR; INTRAVENOUS; SUBCUTANEOUS at 18:18

## 2017-01-01 RX ADMIN — GUAIFENESIN AND CODEINE PHOSPHATE 10 ML: 10; 100 LIQUID ORAL at 21:22

## 2017-01-01 RX ADMIN — Medication 10 ML: at 21:01

## 2017-01-01 RX ADMIN — BACITRACIN 50000 UNITS: 50000 INJECTION, POWDER, FOR SOLUTION INTRAMUSCULAR at 12:14

## 2017-01-01 RX ADMIN — Medication 10 ML: at 22:34

## 2017-01-01 RX ADMIN — Medication 1 CAPSULE: at 07:55

## 2017-01-01 RX ADMIN — TRAZODONE HYDROCHLORIDE 100 MG: 100 TABLET ORAL at 23:37

## 2017-01-01 RX ADMIN — ASPIRIN 81 MG: 81 TABLET, COATED ORAL at 09:45

## 2017-01-01 RX ADMIN — ENOXAPARIN SODIUM 70 MG: 40 INJECTION SUBCUTANEOUS at 05:20

## 2017-01-01 RX ADMIN — ALBUTEROL SULFATE 5 MG: 2.5 SOLUTION RESPIRATORY (INHALATION) at 17:14

## 2017-01-01 RX ADMIN — RDII 250 MG CAPSULE 250 MG: at 12:58

## 2017-01-01 RX ADMIN — NYSTATIN 500000 UNITS: 100000 SUSPENSION ORAL at 17:37

## 2017-01-01 RX ADMIN — DOXYCYCLINE HYCLATE 100 MG: 100 TABLET, COATED ORAL at 08:21

## 2017-01-01 RX ADMIN — NYSTATIN 500000 UNITS: 100000 SUSPENSION ORAL at 21:20

## 2017-01-01 RX ADMIN — ATORVASTATIN CALCIUM 10 MG: 10 TABLET, FILM COATED ORAL at 12:59

## 2017-01-01 RX ADMIN — Medication 10 ML: at 06:02

## 2017-01-01 RX ADMIN — ASPIRIN 81 MG: 81 TABLET, COATED ORAL at 08:12

## 2017-01-01 RX ADMIN — LORAZEPAM 2 MG: 2 SOLUTION, CONCENTRATE ORAL at 17:49

## 2017-01-01 RX ADMIN — AZITHROMYCIN 250 MG: 250 TABLET, FILM COATED ORAL at 18:03

## 2017-01-01 RX ADMIN — ASPIRIN 81 MG: 81 TABLET, COATED ORAL at 09:02

## 2017-01-01 RX ADMIN — LORAZEPAM 2 MG: 2 SOLUTION, CONCENTRATE ORAL at 18:08

## 2017-01-01 RX ADMIN — Medication 325 MG: at 09:02

## 2017-01-01 RX ADMIN — GUAIFENESIN AND CODEINE PHOSPHATE 10 ML: 10; 100 LIQUID ORAL at 17:18

## 2017-01-01 RX ADMIN — LEVOFLOXACIN 750 MG: 5 INJECTION, SOLUTION INTRAVENOUS at 20:53

## 2017-01-01 RX ADMIN — NEBIVOLOL HYDROCHLORIDE 5 MG: 2.5 TABLET ORAL at 08:45

## 2017-01-01 RX ADMIN — Medication 10 ML: at 21:31

## 2017-01-01 RX ADMIN — IPRATROPIUM BROMIDE 0.5 MG: 0.5 SOLUTION RESPIRATORY (INHALATION) at 07:22

## 2017-01-01 RX ADMIN — DEXTROSE MONOHYDRATE 5 MG/HR: 50 INJECTION, SOLUTION INTRAVENOUS at 20:41

## 2017-01-01 RX ADMIN — SERTRALINE 75 MG: 50 TABLET, FILM COATED ORAL at 08:15

## 2017-01-01 RX ADMIN — NEBIVOLOL HYDROCHLORIDE 5 MG: 2.5 TABLET ORAL at 07:55

## 2017-01-01 RX ADMIN — IPRATROPIUM BROMIDE 0.5 MG: 0.5 SOLUTION RESPIRATORY (INHALATION) at 14:59

## 2017-01-01 RX ADMIN — ACETAMINOPHEN 650 MG: 325 TABLET, FILM COATED ORAL at 07:36

## 2017-01-01 RX ADMIN — GLYCOPYRROLATE 0.2 MG: 0.2 INJECTION, SOLUTION INTRAMUSCULAR; INTRAVENOUS at 09:55

## 2017-01-01 RX ADMIN — Medication 10 ML: at 05:20

## 2017-01-01 RX ADMIN — NEBIVOLOL HYDROCHLORIDE 5 MG: 2.5 TABLET ORAL at 09:02

## 2017-01-01 RX ADMIN — GUAIFENESIN AND CODEINE PHOSPHATE 5 ML: 100; 10 SOLUTION ORAL at 08:21

## 2017-01-01 RX ADMIN — ENOXAPARIN SODIUM 40 MG: 40 INJECTION SUBCUTANEOUS at 08:26

## 2017-01-01 RX ADMIN — ASPIRIN 81 MG: 81 TABLET, COATED ORAL at 09:43

## 2017-01-01 RX ADMIN — NYSTATIN 500000 UNITS: 100000 SUSPENSION ORAL at 15:51

## 2017-01-01 RX ADMIN — NYSTATIN 500000 UNITS: 100000 SUSPENSION ORAL at 08:45

## 2017-01-01 RX ADMIN — DILTIAZEM HYDROCHLORIDE 30 MG: 30 TABLET, FILM COATED ORAL at 12:14

## 2017-01-01 RX ADMIN — IPRATROPIUM BROMIDE 0.5 MG: 0.5 SOLUTION RESPIRATORY (INHALATION) at 19:12

## 2017-01-01 RX ADMIN — FUROSEMIDE 40 MG: 40 TABLET ORAL at 08:58

## 2017-01-01 RX ADMIN — FERROUS SULFATE TAB 325 MG (65 MG ELEMENTAL FE) 325 MG: 325 (65 FE) TAB at 08:23

## 2017-01-01 RX ADMIN — LEVALBUTEROL HYDROCHLORIDE 1.25 MG: 1.25 SOLUTION RESPIRATORY (INHALATION) at 07:29

## 2017-01-01 RX ADMIN — NYSTATIN 500000 UNITS: 100000 SUSPENSION ORAL at 09:34

## 2017-01-01 RX ADMIN — FUROSEMIDE 40 MG: 10 INJECTION, SOLUTION INTRAMUSCULAR; INTRAVENOUS at 20:50

## 2017-01-01 RX ADMIN — Medication 1 CAPSULE: at 09:34

## 2017-01-01 RX ADMIN — LEVOTHYROXINE SODIUM 50 MCG: 50 TABLET ORAL at 09:44

## 2017-01-01 RX ADMIN — LORAZEPAM 2 MG: 2 SOLUTION, CONCENTRATE ORAL at 02:15

## 2017-01-01 RX ADMIN — IPRATROPIUM BROMIDE 0.5 MG: 0.5 SOLUTION RESPIRATORY (INHALATION) at 15:26

## 2017-01-01 RX ADMIN — IPRATROPIUM BROMIDE AND ALBUTEROL SULFATE 3 ML: .5; 3 SOLUTION RESPIRATORY (INHALATION) at 09:38

## 2017-01-01 RX ADMIN — IPRATROPIUM BROMIDE 0.5 MG: 0.5 SOLUTION RESPIRATORY (INHALATION) at 14:42

## 2017-01-01 RX ADMIN — LEVALBUTEROL HYDROCHLORIDE 1.25 MG: 1.25 SOLUTION RESPIRATORY (INHALATION) at 16:06

## 2017-01-01 RX ADMIN — LEVOTHYROXINE SODIUM 50 MCG: 50 TABLET ORAL at 08:31

## 2017-01-01 RX ADMIN — NEBIVOLOL HYDROCHLORIDE 5 MG: 2.5 TABLET ORAL at 18:50

## 2017-01-01 RX ADMIN — NYSTATIN 500000 UNITS: 100000 SUSPENSION ORAL at 18:03

## 2017-01-01 RX ADMIN — UMECLIDINIUM 1 PUFF: 62.5 AEROSOL, POWDER ORAL at 10:25

## 2017-01-01 RX ADMIN — GUAIFENESIN AND CODEINE PHOSPHATE 10 ML: 10; 100 LIQUID ORAL at 13:17

## 2017-01-01 RX ADMIN — METHYLPREDNISOLONE SODIUM SUCCINATE 40 MG: 40 INJECTION, POWDER, FOR SOLUTION INTRAMUSCULAR; INTRAVENOUS at 08:58

## 2017-01-01 RX ADMIN — Medication 1 CAPSULE: at 08:31

## 2017-01-01 RX ADMIN — IOPAMIDOL 100 ML: 755 INJECTION, SOLUTION INTRAVENOUS at 19:17

## 2017-01-01 RX ADMIN — NYSTATIN 500000 UNITS: 100000 SUSPENSION ORAL at 09:45

## 2017-01-01 RX ADMIN — MELATONIN 3 MG ORAL TABLET 9 MG: 3 TABLET ORAL at 21:20

## 2017-01-01 RX ADMIN — ACETAMINOPHEN 650 MG: 325 TABLET, FILM COATED ORAL at 17:41

## 2017-01-01 RX ADMIN — MELATONIN 3 MG ORAL TABLET 9 MG: 3 TABLET ORAL at 21:02

## 2017-01-01 RX ADMIN — Medication 1 CAPSULE: at 08:45

## 2017-01-01 RX ADMIN — SERTRALINE 75 MG: 50 TABLET, FILM COATED ORAL at 17:37

## 2017-01-01 RX ADMIN — IPRATROPIUM BROMIDE AND ALBUTEROL SULFATE 3 ML: .5; 3 SOLUTION RESPIRATORY (INHALATION) at 07:59

## 2017-01-01 RX ADMIN — RDII 250 MG CAPSULE 250 MG: at 17:00

## 2017-01-01 RX ADMIN — IPRATROPIUM BROMIDE 0.5 MG: 0.5 SOLUTION RESPIRATORY (INHALATION) at 07:33

## 2017-01-01 RX ADMIN — LORAZEPAM 1 MG: 1 TABLET ORAL at 09:44

## 2017-01-01 RX ADMIN — SERTRALINE 75 MG: 50 TABLET, FILM COATED ORAL at 18:27

## 2017-01-01 RX ADMIN — RDII 250 MG CAPSULE 250 MG: at 09:48

## 2017-01-01 RX ADMIN — Medication 10 ML: at 04:48

## 2017-01-01 RX ADMIN — ENOXAPARIN SODIUM 40 MG: 40 INJECTION SUBCUTANEOUS at 09:42

## 2017-01-01 RX ADMIN — POLYETHYLENE GLYCOL 3350 17 G: 17 POWDER, FOR SOLUTION ORAL at 07:56

## 2017-01-01 RX ADMIN — GUAIFENESIN AND CODEINE PHOSPHATE 5 ML: 100; 10 SOLUTION ORAL at 21:36

## 2017-01-01 RX ADMIN — PIPERACILLIN SODIUM,TAZOBACTAM SODIUM 3.38 G: 3; .375 INJECTION, POWDER, FOR SOLUTION INTRAVENOUS at 23:51

## 2017-01-01 RX ADMIN — FERROUS SULFATE TAB 325 MG (65 MG ELEMENTAL FE) 325 MG: 325 (65 FE) TAB at 08:12

## 2017-01-01 RX ADMIN — DOXYCYCLINE HYCLATE 100 MG: 100 TABLET, COATED ORAL at 08:57

## 2017-01-01 RX ADMIN — FUROSEMIDE 20 MG: 20 TABLET ORAL at 09:34

## 2017-01-01 RX ADMIN — LISINOPRIL 20 MG: 20 TABLET ORAL at 09:02

## 2017-01-01 RX ADMIN — Medication 1 CAPSULE: at 09:44

## 2017-01-01 RX ADMIN — VANCOMYCIN HYDROCHLORIDE 1000 MG: 1 INJECTION, POWDER, LYOPHILIZED, FOR SOLUTION INTRAVENOUS at 07:04

## 2017-01-01 RX ADMIN — FAMOTIDINE 20 MG: 20 TABLET ORAL at 12:14

## 2017-01-01 RX ADMIN — DOXYCYCLINE HYCLATE 100 MG: 100 TABLET, COATED ORAL at 20:47

## 2017-01-01 RX ADMIN — GUAIFENESIN AND CODEINE PHOSPHATE 10 ML: 10; 100 LIQUID ORAL at 06:49

## 2017-01-01 RX ADMIN — Medication 10 ML: at 05:25

## 2017-01-01 RX ADMIN — MORPHINE SULFATE 10 MG: 20 SOLUTION ORAL at 02:16

## 2017-01-01 RX ADMIN — UMECLIDINIUM 1 PUFF: 62.5 AEROSOL, POWDER ORAL at 15:07

## 2017-01-01 RX ADMIN — Medication 1 CAPSULE: at 08:12

## 2017-01-01 RX ADMIN — Medication 10 ML: at 20:45

## 2017-01-01 RX ADMIN — PREDNISONE 30 MG: 20 TABLET ORAL at 08:31

## 2017-01-01 RX ADMIN — POLYETHYLENE GLYCOL 3350 17 G: 17 POWDER, FOR SOLUTION ORAL at 09:44

## 2017-01-01 RX ADMIN — FERROUS SULFATE TAB 325 MG (65 MG ELEMENTAL FE) 325 MG: 325 (65 FE) TAB at 08:31

## 2017-01-01 RX ADMIN — ASPIRIN 81 MG: 81 TABLET, COATED ORAL at 08:16

## 2017-01-01 RX ADMIN — DOXYCYCLINE HYCLATE 100 MG: 100 TABLET, COATED ORAL at 21:08

## 2017-01-01 RX ADMIN — GUAIFENESIN AND CODEINE PHOSPHATE 10 ML: 10; 100 LIQUID ORAL at 17:40

## 2017-01-01 RX ADMIN — METOPROLOL TARTRATE 12.5 MG: 25 TABLET ORAL at 21:03

## 2017-01-01 RX ADMIN — IPRATROPIUM BROMIDE 0.5 MG: 0.5 SOLUTION RESPIRATORY (INHALATION) at 07:59

## 2017-01-01 RX ADMIN — FUROSEMIDE 40 MG: 40 TABLET ORAL at 08:26

## 2017-01-01 RX ADMIN — APIXABAN 5 MG: 5 TABLET, FILM COATED ORAL at 07:55

## 2017-01-01 RX ADMIN — LORAZEPAM 2 MG: 2 SOLUTION, CONCENTRATE ORAL at 22:36

## 2017-01-01 RX ADMIN — FAMOTIDINE 20 MG: 20 TABLET ORAL at 08:16

## 2017-01-01 RX ADMIN — ALBUTEROL SULFATE 5 MG: 2.5 SOLUTION RESPIRATORY (INHALATION) at 17:01

## 2017-01-01 RX ADMIN — DOXYCYCLINE HYCLATE 100 MG: 100 TABLET, COATED ORAL at 20:44

## 2017-01-01 RX ADMIN — FERROUS SULFATE TAB 325 MG (65 MG ELEMENTAL FE) 325 MG: 325 (65 FE) TAB at 09:44

## 2017-01-01 RX ADMIN — FUROSEMIDE 40 MG: 40 TABLET ORAL at 09:43

## 2017-01-23 NOTE — TELEPHONE ENCOUNTER
I called called and spoke with patient. He wanted to know if he can use Stiolto two times daily. He was made aware that this medication is only for once daily usage. He verbalized understanding.

## 2017-02-09 PROBLEM — J96.90 RESPIRATORY FAILURE (HCC): Status: ACTIVE | Noted: 2017-01-01

## 2017-02-09 NOTE — ED PROVIDER NOTES
HPI Comments: Cecilio Matt is a 66 y.o. male who presents via EMS to [facility] ED with CC of SOB with exertion x \"a few days,\" becoming significantly worse this morning (2/9/17). Pt also reports orthopnea since onset of his symptoms, as well as cough producing a mild amount of sputum. He reports his SpO2 was ~95-97% while at rest on his baseline 4.5 L O2 at home today, but dropped to ~80% while ambulating around his house. Pt reports he is compliant with his prescribed diuretics, and denies leg swelling increased from baseline. He also reports taking symbicort BID, and states he uses his albuterol inhaler as needed. Pt notes hx of PNA. He specifically denies fever, CP, nausea, vomiting, difficulty urinating, and constipation. He states he is a former smoker. PCP: Hayder Mcgowan III, DO    There are no other complaints, changes, or physical findings at this time. The history is provided by the patient. Past Medical History:   Diagnosis Date    Chronic obstructive pulmonary disease ([address]     Diastolic CHF, chronic ([address] 11/4/2015    Emphysema/COPD ([address] 9/20/2009    Essential hypertension, benign 9/20/2009    GERD (gastroesophageal reflux disease) 9/20/2009    Hypertension     Migraine headache 9/20/2009    Post-tuberculous bronchiectasis 10/17/2013    Thyroid disease      hypothyroidism       No past surgical history on file. Family History:   Problem Relation Age of Onset    Heart Disease Mother     Stroke Mother     Cancer Father      throat       Social History     Social History    Marital status:      Spouse name: N/A    Number of children: N/A    Years of education: N/A     Occupational History    Not on file.      Social History Main Topics    Smoking status: Former Smoker     Packs/day: 1.50     Years: 25.00     Types: Cigarettes     Quit date: 4/26/1979    Smokeless tobacco: Never Used    Alcohol use No    Drug use: No    Sexual activity: Not Currently Other Topics Concern    Not on file     Social History Narrative         ALLERGIES: Cardizem [diltiazem hcl]    Review of Systems   Constitutional: Negative for chills, fatigue and fever. HENT: Negative for congestion and rhinorrhea. Eyes: Negative for visual disturbance. Respiratory: Positive for cough (productive) and shortness of breath. Negative for wheezing. Cardiovascular: Negative for chest pain, palpitations and leg swelling (increased from baseline). Gastrointestinal: Negative for abdominal distention, abdominal pain, constipation, diarrhea, nausea and vomiting. Endocrine: Negative. Genitourinary: Negative for decreased urine volume, difficulty urinating, dysuria, enuresis, frequency, hematuria and urgency. Musculoskeletal: Negative. Skin: Negative for rash. Neurological: Negative for dizziness, weakness and light-headedness. Psychiatric/Behavioral: Negative for suicidal ideas. Patient Vitals for the past 12 hrs:   Temp Pulse Resp BP SpO2   02/09/17 0930 - 83 19 129/90 92 %   02/09/17 0845 - 79 22 144/75 94 %   02/09/17 0748 98.1 °F (36.7 °C) 78 16 175/82 96 %            Physical Exam   Constitutional: He is oriented to person, place, and time. He appears well-developed and well-nourished. No distress. Thin; speaking in full sentences   HENT:   Head: Normocephalic and atraumatic. Mouth/Throat: Oropharynx is clear and moist.   Eyes: Conjunctivae and EOM are normal.   Neck: Neck supple. No JVD present. No tracheal deviation present. Cardiovascular: Normal rate, regular rhythm and intact distal pulses. Exam reveals no gallop and no friction rub. No murmur heard. Pulmonary/Chest: Effort normal. No stridor. No respiratory distress. 4.5 L O2 NC; B/L expiratory wheezing   Abdominal: Soft. Bowel sounds are normal. He exhibits no distension and no mass. There is no tenderness. There is no guarding. Musculoskeletal: Normal range of motion. He exhibits no tenderness. No deformity; +1 pitting edema to B/L lower extremities   Neurological: He is alert and oriented to person, place, and time. He has normal strength. No focal deficits   Skin: Skin is warm, dry and intact. No rash noted. Scattered ecchymotic lesions to B/L forearms   Psychiatric: He has a normal mood and affect. His behavior is normal. Judgment and thought content normal.   Nursing note and vitals reviewed. MDM  Number of Diagnoses or Management Options  Chronic obstructive pulmonary disease with acute exacerbation [facility]):   Diagnosis management comments: Ddx: COPD exacerbation, CHF exacerbation, acs, pneumonia. Will check labs, cardiac enzymes, CXR, ekg. Will treat with duonebs, steroids, then reassess. Amount and/or Complexity of Data Reviewed  Clinical lab tests: ordered and reviewed  Tests in the radiology section of CPT®: ordered and reviewed  Tests in the medicine section of CPT®: ordered and reviewed  Review and summarize past medical records: yes  Discuss the patient with other providers: yes (Hospitalist)  Independent visualization of images, tracings, or specimens: yes      ED Course       Procedures    EKG interpretation: (Preliminary)  9041  Rhythm: normal sinus rhythm with frequent and consecutive premature ventricular complexes; and regular . Rate (approx.): 94 bpm; Axis: normal; IL interval: normal; QRS interval: normal ; ST/T wave: non-specific changes; Other findings: abnormal ekg, prolonged QT interval, significant artifact. CONSULT NOTE:   9:55 AM  Kavita Calvert MD spoke with Dr. Catina Oleary,   Specialty: Hospitalist  Discussed pt's hx, disposition, and available diagnostic and imaging results. Reviewed care plans. Consultant will evaluate pt for admission.     LABORATORY TESTS:  Recent Results (from the past 12 hour(s))   EKG, 12 LEAD, INITIAL    Collection Time: 02/09/17  7:38 AM   Result Value Ref Range    Ventricular Rate 94 BPM    Atrial Rate 79 BPM    P-R Interval 180 ms QRS Duration 108 ms    Q-T Interval 414 ms    QTC Calculation (Bezet) 517 ms    Calculated R Axis 86 degrees    Calculated T Axis 69 degrees    Diagnosis       Sinus rhythm with frequent and consecutive premature ventricular complexes  Prolonged QT  Abnormal ECG  When compared with ECG of 07-JUL-2016 14:01,  premature ventricular complexes are now present  premature atrial complexes are no longer present     CBC WITH AUTOMATED DIFF    Collection Time: 02/09/17  7:52 AM   Result Value Ref Range    WBC 8.0 4.1 - 11.1 K/uL    RBC 3.88 (L) 4.10 - 5.70 M/uL    HGB 8.8 (L) 12.1 - 17.0 g/dL    HCT 30.7 (L) 36.6 - 50.3 %    MCV 79.1 (L) 80.0 - 99.0 FL    MCH 22.7 (L) 26.0 - 34.0 PG    MCHC 28.7 (L) 30.0 - 36.5 g/dL    RDW 15.6 (H) 11.5 - 14.5 %    PLATELET 574 230 - 399 K/uL    NEUTROPHILS 63 32 - 75 %    LYMPHOCYTES 14 12 - 49 %    MONOCYTES 12 5 - 13 %    EOSINOPHILS 10 (H) 0 - 7 %    BASOPHILS 1 0 - 1 %    ABS. NEUTROPHILS 5.0 1.8 - 8.0 K/UL    ABS. LYMPHOCYTES 1.1 0.8 - 3.5 K/UL    ABS. MONOCYTES 1.0 0.0 - 1.0 K/UL    ABS. EOSINOPHILS 0.8 (H) 0.0 - 0.4 K/UL    ABS. BASOPHILS 0.1 0.0 - 0.1 K/UL    RBC COMMENTS HYPOCHROMIA  2+        RBC COMMENTS MICROCYTOSIS  PRESENT        RBC COMMENTS POIKILOCYTOSIS  PRESENT        RBC COMMENTS POLYCHROMASIA  PRESENT        WBC COMMENTS REACTIVE LYMPHS     METABOLIC PANEL, COMPREHENSIVE    Collection Time: 02/09/17  7:52 AM   Result Value Ref Range    Sodium 140 136 - 145 mmol/L    Potassium 3.9 3.5 - 5.1 mmol/L    Chloride 99 97 - 108 mmol/L    CO2 36 (H) 21 - 32 mmol/L    Anion gap 5 5 - 15 mmol/L    Glucose 102 (H) 65 - 100 mg/dL    BUN 15 6 - 20 MG/DL    Creatinine 0.80 0.70 - 1.30 MG/DL    BUN/Creatinine ratio 19 12 - 20      GFR est AA >60 >60 ml/min/1.73m2    GFR est non-AA >60 >60 ml/min/1.73m2    Calcium 8.2 (L) 8.5 - 10.1 MG/DL    Bilirubin, total 0.3 0.2 - 1.0 MG/DL    ALT (SGPT) 28 12 - 78 U/L    AST (SGOT) 27 15 - 37 U/L    Alk.  phosphatase 67 45 - 117 U/L    Protein, total 8.0 6.4 - 8.2 g/dL    Albumin 3.4 (L) 3.5 - 5.0 g/dL    Globulin 4.6 (H) 2.0 - 4.0 g/dL    A-G Ratio 0.7 (L) 1.1 - 2.2     TROPONIN I    Collection Time: 02/09/17  7:52 AM   Result Value Ref Range    Troponin-I, Qt. <0.04 <0.05 ng/mL   CK W/ REFLX CKMB    Collection Time: 02/09/17  7:52 AM   Result Value Ref Range    CK 89 39 - 308 U/L   PRO-BNP    Collection Time: 02/09/17  7:52 AM   Result Value Ref Range    NT pro-BNP 3971 (H) 0 - 125 PG/ML   BLOOD GAS, ARTERIAL    Collection Time: 02/09/17 10:04 AM   Result Value Ref Range    pH 7.42 7.35 - 7.45      PCO2 57 (H) 35.0 - 45.0 mmHg    PO2 87 80 - 100 mmHg    O2 SAT 97 92 - 97 %    BICARBONATE 36 (H) 22 - 26 mmol/L    BASE EXCESS 8.9 mmol/L    O2 METHOD NASAL O2      O2 FLOW RATE 4.50 L/min    SPONTANEOUS RATE 18.0      Sample source ARTERIAL      SITE RIGHT RADIAL      SHAW'S TEST YES         IMAGING RESULTS:  CXR Results  (Last 48 hours)               02/09/17 0911  XR CHEST PA LAT Final result    Impression:  Impression: No acute process or change compared to the prior exam.           Narrative:  Exam:  2 view chest       Indication: Shortness of breath       Comparison to 7/7/2016. PA and lateral views demonstrate normal heart size. Bilateral apical fibrosis   with superior traction of the jon is stable compared to the prior examination. Fullness of the left hilum is unchanged compared to the prior examination. There   is no acute process in the lung fields. The osseous structures are unremarkable.                  MEDICATIONS GIVEN:  Medications   levoFLOXacin (LEVAQUIN) 750 mg in D5W IVPB (not administered)   methylPREDNISolone (PF) (SOLU-MEDROL) injection 40 mg (not administered)   albuterol-ipratropium (DUO-NEB) 2.5 MG-0.5 MG/3 ML (3 mL Nebulization Given 2/9/17 3007)   methylPREDNISolone (PF) (SOLU-MEDROL) injection 125 mg (125 mg IntraVENous Given 2/9/17 9339)   albuterol-ipratropium (DUO-NEB) 2.5 MG-0.5 MG/3 ML (3 mL Nebulization Given 2/9/17 0917)   albuterol-ipratropium (DUO-NEB) 2.5 MG-0.5 MG/3 ML (3 mL Nebulization Given 2/9/17 0938)       IMPRESSION:  1. Chronic obstructive pulmonary disease with acute exacerbation (HCC)        PLAN:  1. Admit to Hospitalist    ADMIT NOTE:  9:55 PM  The patient is being admitted to the hospital by Dr. Cristhian Floyd. The results of their tests and reasons for their admission have been discussed with the patient and/or available family. They convey agreement and understanding for the need to be admitted and for their admission diagnosis. This note is prepared by Kurt Bergman, acting as Scribe for Billy Preciado DO. Billy Preciado DO: The scribe's documentation has been prepared under my direction and personally reviewed by me in its entirety. I confirm that the note above accurately reflects all work, treatment, procedures, and medical decision making performed by me. Attending Attestation:    I personally performed a history and physical examination of the patient and discussed the management with the resident. I have found the following on physical exam:    General: NAD  HEENT: EOMI, non-icteric sclera  Chest: RRR, no m/r/g  Lungs: diffuse expiratory wheezes bilaterally, on 4.5L NC. Speaking in full sentences  Abd: nt, nd, soft, +bs  Ext: +1 peripheral edema, no cyanosis, +2 peripheral pulses  Skin: scattered ecchymotic lesions in bilateral forearms  Neuro: no focal deficits      I have reviewed the resident's note and agree with the resident's findings, including all diagnostic interpretations, treatment and plan of care, except as documented below. I was present during the key portions of separately billed procedures.     Billy Preciado DO

## 2017-02-09 NOTE — PROGRESS NOTES
Pharmacy Medication Reconciliation     The patient was interviewed regarding current PTA medication list, use and drug allergies;  wife present in room and obtained permission from patient to discuss drug regimen with visitor(s) present. The patient was questioned regarding use of any other inhalers, topical products, over the counter medications, herbal medications, vitamin products or ophthalmic/nasal/otic medication use. Allergy Update: patient and wife could not expand upon Cardizem allergy as they stated they were told about it during an admission-will leave reaction type as is    Recommendations/Findings: The following amendments were made to the patient's active medication list on file at [facility]:   1) Additions:    PRN furosemide 20 mg order for weight gain >2 lb   PRN KCl 10 mEq order for weight gain >2 lb   Ranitidine   Diphenhydramine    Sumatriptan    Lactobacillus/bifid probiotic    2) Deletions:    Prednisone   Tiotropium   Bacillus probiotic    3) Changes:    Ipratropium neb: patient states he takes it scheduled as twice daily (not tid prn)   Levalbuterol neb: patient states he takes it scheduled as twice daily (not tid prn)   Lorazepam: changed to 1 tablet scheduled orally twice daily from 0.5-1 tab orally tid prn anxiety   Naproxen: added prn indication    4) Pertinent Pharmacy Findings:   Patient has been using diphenhydramine scheduled daily, recommended that he try using a newer generation antihistamine that would be associated with less anticholinergic effects.  Patient stated he has recently taken himself off of his tiotropium as he felt \"once a daily dosing doesn't work\"- educated the patient about the way the medication works and the reasoning for once daily dosing. He stated that he \"put himself back on the symbicort. \" I clarified that these decisions were not per discussion with a physician.    Both he and his family endorsed not currently being regularly followed by a pulmonologist due to his previous provider retiring.  Patient is currently receiving azithromycin 250 mg orally TIW (mon-wed-fri) but has not taken it for >1 week due to awaiting delivery from mail order pharmacy. They stated the patient was taken off this agent \"for some time\" for a hx of cdiff infection, but has been back on agent for \"weeks. \"       -Clarified PTA med list with patient and wife. PTA medication list was corrected to the following:     Prior to Admission Medications   Prescriptions Last Dose Informant Patient Reported? Taking? L. gasseri-B. bifidum-B longum 1.5 billion cell cap 2/8/2017 at Unknown time  Yes Yes   Sig: Take 2 Caps by mouth daily. LORazepam (ATIVAN) 1 mg tablet   No No   Sig: TAKE 1/2 TO 1 BY MOUTH UP TO 3 TIMES DAILY AS NEEDED FOR ANXIETY   Patient taking differently: TAKE  1 tablet BY MOUTH 2 times daily   SUMATRIPTAN SUCCINATE (IMITREX PO)   Yes Yes   Sig: Take  by mouth as needed (migraine). XOPENEX HFA 45 mcg/actuation inhaler 2/9/2017 at Unknown time  No Yes   Sig: USE 2 INHALATIONS BY MOUTH EVERY 4 HOURS AS NEEDED FOR WHEEZING   aspirin delayed-release 81 mg tablet 2/8/2017 at Unknown time  Yes Yes   Sig: Take 81 mg by mouth daily. azithromycin (ZITHROMAX) 250 mg tablet 2/2/2017 at Unknown time  No Yes   Sig: Take 1 Tab by mouth every Monday, Wednesday, Friday. budesonide-formoterol (SYMBICORT) 160-4.5 mcg/actuation HFA inhaler 2/9/2017 at Unknown time  No Yes   Sig: Take 2 Puffs by inhalation two (2) times a day. diphenhydrAMINE (BENADRYL) 25 mg capsule   Yes Yes   Sig: Take 25 mg by mouth two (2) times a day. Indications: COUGH   furosemide (LASIX) 20 mg tablet   No No   Sig: Take 2 Tabs by mouth daily. Indications: EDEMA   Patient taking differently: Take 40 mg by mouth daily. Administration Instructions: Patient is to take furosemide 20 mg tab, two tablets by month once daily.  Patient is to weight himself daily and if there is a weight gain >2 lb, he is to take an additional 20 mg tablet for a maximum daily dose of 60 mg. Indications: Edema   furosemide (LASIX) 20 mg tablet 2017 at Unknown time  Yes Yes   Sig: Take 20 mg by mouth daily as needed (>2 lb weight gain). Administration Instructions: Patient is to take furosemide 20 mg tab, two tablets by month once daily. Patient is to weight himself daily and if there is a weight gain >2 lb, he is to take an additional 20 mg tablet for a maximum daily dose of 60 mg.   guaiFENesin-codeine (ROBITUSSIN AC) 100-10 mg/5 mL solution 2017 at Unknown time  No Yes   Sig: TAKE 5ML BY MOUTH 4 TIMES A DAY AS NEEDED MAXIMUM 20ML PER DAY (GENERIC FOR ROBITUSSIN AC)   ipratropium (ATROVENT) 0.02 % nebulizer solution   No No   Si.5 mL by Nebulization route three (3) times daily. Patient taking differently: 0.5 mg by Nebulization route two (2) times a day. levalbuterol (XOPENEX) 1.25 mg/3 mL nebu 2017 at Unknown time  No Yes   Sig: INHALE 1 VIAL VIA NEBULIZER 3 TIMES DAILY   Patient taking differently: INHALE 1 VIAL VIA NEBULIZER 2 TIMES DAILY   levothyroxine (SYNTHROID) 50 mcg tablet 2017 at Unknown time  No Yes   Sig: TAKE 1 TABLET DAILY BEFORE BREAKFAST   lisinopril (PRINIVIL, ZESTRIL) 20 mg tablet 2017 at Unknown time  No Yes   Sig: TAKE 1 BY MOUTH DAILY   naproxen sodium (ALEVE) 220 mg tablet 2017 at Unknown time  Yes Yes   Sig: Take 220-440 mg by mouth daily as needed for Pain. nebivolol (BYSTOLIC) 5 mg tablet  at Unknown time  Yes Yes   Sig: Take 5 mg by mouth two (2) times a day. polyethylene glycol (MIRALAX) 17 gram packet 2017 at Unknown time  Yes Yes   Sig: Take 17 g by mouth daily as needed. potassium chloride SR (KLOR-CON 10) 10 mEq tablet   No No   Sig: Take 1 Tab by mouth two (2) times daily (with meals). Take 2nd dose when you take an extra Furosemide. Indications: HYPOKALEMIA   Patient taking differently: Take 20 mEq by mouth daily. Administration Instructions:   Take two tabs by mouth daily, take a third dose of 10 mEq when a third dose of furosemide 20 mg is administered for weight gain of 2 lb  Indications: HYPOKALEMIA   potassium chloride SR (KLOR-CON 10) 10 mEq tablet 1/9/2017 at Unknown time  Yes Yes   Sig: Take 10 mEq by mouth daily as needed (when an extra furosemide dose is taken for weight gain of >2 lb). Administration Instructions: Take two tabs by mouth daily, take a third dose of 10 mEq when a third dose of furosemide 20 mg is administered for weight gain of 2 lb   raNITIdine (ZANTAC) 150 mg tablet 2/8/2017 at Unknown time  Yes Yes   Sig: Take 150 mg by mouth daily (with lunch). sertraline (ZOLOFT) 50 mg tablet 2/8/2017 at Unknown time  No Yes   Sig: Take 1.5 Tabs by mouth daily. traZODone (DESYREL) 50 mg tablet 2/8/2017 at Unknown time  No Yes   Sig: Take 2 Tabs by mouth nightly. zolpidem (AMBIEN) 5 mg tablet 2/8/2017 at Unknown time  No Yes   Sig: Take 1-2 Tabs by mouth nightly as needed for Sleep. Max Daily Amount: 10 mg.       Facility-Administered Medications: None          Thank you,  Himanshu Landaverde, PharmD, BCPS  Clinical Pharmacy Specialist

## 2017-02-09 NOTE — IP AVS SNAPSHOT
[address] [facility] 
[phone] Patient: Chepe Ziegler MRN: [id number] You are allergic to the following Allergen Reactions Cardizem (Diltiazem Hcl) Other (comments) Headache and constipation Recent Documentation Height Weight BMI Smoking Status 1.803 m 59.6 kg 18.33 kg/m2 Former Smoker Emergency Contacts Name Discharge Info Relation Home Work Mobile [address] CAREGIVER [3] Spouse [3] [phone] Janusz(Step [employer]  Other Relative [6]  [phone] About your hospitalization You were admitted on:  February 9, 2017 You last received care in the:  [facility] PROGRESSIVE CARE You were discharged on:  February 12, 2017 Unit phone number:  [phone] Why you were hospitalized Your primary diagnosis was:  Not on File Your diagnoses also included:  Respiratory Failure (Hcc), Ischemic Heart Disease, Chronic, Hypothyroidism Due To Acquired Atrophy Of Thyroid, Gerd (Gastroesophageal Reflux Disease), Dnr No Code (Do Not Resuscitate) Providers Seen During Your Hospitalizations Provider Role Specialty Primary office phone Glenn Aviles DO Attending Provider Emergency Medicine [phone] Luis Hebert MD Attending Provider Internal Medicine [phone] Your Primary Care Physician (PCP) Primary Care Physician Office Phone Office Fax Vu MCCRAY [phone] Follow-up Information Follow up With Details Comments Contact Info [address] [place] [address] 
[phone] [address] [place] [address] 
[phone] Current Discharge Medication List  
  
 START taking these medications Dose & Instructions Dispensing Information Comments Morning Noon Evening Bedtime  
 docusate sodium 100 mg capsule Commonly known as:  Smiley Tuttle Your next dose is: Today, Tomorrow Other:  _________ Dose:  100 mg Take 1 Cap by mouth daily as needed for Constipation for up to 90 days. Quantity:  60 Cap Refills:  2  
     
   
   
   
  
 doxycycline 100 mg tablet Commonly known as:  VIBRA-TABS Your next dose is: Today, Tomorrow Other:  _________ Dose:  100 mg Take 1 Tab by mouth every twelve (12) hours for 2 days. Quantity:  4 Tab Refills:  0  
     
   
   
   
  
 ferrous sulfate 325 mg (65 mg iron) tablet Your next dose is: Today, Tomorrow Other:  _________ Dose:  325 mg Take 1 Tab by mouth daily (with breakfast). Quantity:  30 Tab Refills:  1  
     
   
   
   
  
 predniSONE 20 mg tablet Commonly known as:  Маирна Avni Your next dose is: Today, Tomorrow Other:  _________  
   
   
 2 tabs po q daily * 3 days, then 11/2 tab po q daily * 3 days, then 1 tab po q daily * 3 days, then 1/2 tab po q daily * 3 days Quantity:  20 Tab Refills:  0  
     
   
   
   
  
 tiotropium bromide 2.5 mcg/actuation inhaler Commonly known as:  Melford Kehr Your next dose is: Today, Tomorrow Other:  _________ Dose:  2 Puff Take 2 Puffs by inhalation daily. Quantity:  1 Inhaler Refills:  1 CONTINUE these medications which have CHANGED Dose & Instructions Dispensing Information Comments Morning Noon Evening Bedtime  
 azithromycin 250 mg tablet Commonly known as:  Albert Vernon Start taking on:  2/13/2017 What changed:  additional instructions Your next dose is: Today, Tomorrow Other:  _________ Dose:  250 mg Take 1 Tab by mouth every Monday, Wednesday, Friday. Start on wednesday Quantity:  39 Tab Refills:  1  
     
   
   
   
  
 * furosemide 20 mg tablet Commonly known as:  LASIX What changed:  Another medication with the same name was changed. Make sure you understand how and when to take each. Your next dose is: Today, Tomorrow Other:  _________ Dose:  20 mg Take 20 mg by mouth daily as needed (>2 lb weight gain). Administration Instructions: Patient is to take furosemide 20 mg tab, two tablets by month once daily. Patient is to weight himself daily and if there is a weight gain >2 lb, he is to take an additional 20 mg tablet for a maximum daily dose of 60 mg. Refills:  0  
     
   
   
   
  
 * furosemide 20 mg tablet Commonly known as:  LASIX What changed:  additional instructions Your next dose is: Today, Tomorrow Other:  _________ Dose:  40 mg Take 2 Tabs by mouth daily. Indications: EDEMA Quantity:  180 Tab Refills:  3  
     
   
   
   
  
 ipratropium 0.02 % nebulizer solution Commonly known as:  ATROVENT What changed:  when to take this Your next dose is: Today, Tomorrow Other:  _________ Dose:  0.5 mg  
2.5 mL by Nebulization route three (3) times daily. Quantity:  750 mL Refills:  3 Dispense 300 vials. levalbuterol 1.25 mg/3 mL Nebu Commonly known as:  Jo Satchel What changed:  See the new instructions. Your next dose is: Today, Tomorrow Other:  _________ INHALE 1 VIAL VIA NEBULIZER 3 TIMES DAILY Quantity:  792 mL Refills:  6 LORazepam 1 mg tablet Commonly known as:  ATIVAN What changed:  See the new instructions. Your next dose is: Today, Tomorrow Other:  _________ TAKE 1/2 TO 1 BY MOUTH UP TO 3 TIMES DAILY AS NEEDED FOR ANXIETY Quantity:  180 Tab Refills:  0  
     
   
   
   
  
 nebivolol 5 mg tablet Commonly known as:  BYSTOLIC  
 What changed:  Another medication with the same name was removed. Continue taking this medication, and follow the directions you see here. Your next dose is: Today, Tomorrow Other:  _________ Dose:  5 mg Take 5 mg by mouth two (2) times a day. Refills:  0  
     
   
   
   
  
 * potassium chloride SR 10 mEq tablet Commonly known as:  KLOR-CON 10 What changed:  Another medication with the same name was changed. Make sure you understand how and when to take each. Your next dose is: Today, Tomorrow Other:  _________ Dose:  10 mEq Take 10 mEq by mouth daily as needed (when an extra furosemide dose is taken for weight gain of >2 lb). Administration Instructions: Take two tabs by mouth daily, take a third dose of 10 mEq when a third dose of furosemide 20 mg is administered for weight gain of 2 lb Refills:  0  
     
   
   
   
  
 * potassium chloride SR 10 mEq tablet Commonly known as:  KLOR-CON 10 What changed:   
- how much to take - when to take this 
- additional instructions Your next dose is: Today, Tomorrow Other:  _________ Dose:  10 mEq Take 1 Tab by mouth two (2) times daily (with meals). Take 2nd dose when you take an extra Furosemide. Indications: HYPOKALEMIA Quantity:  180 Tab Refills:  1  
     
   
   
   
  
 * Notice: This list has 4 medication(s) that are the same as other medications prescribed for you. Read the directions carefully, and ask your doctor or other care provider to review them with you. CONTINUE these medications which have NOT CHANGED Dose & Instructions Dispensing Information Comments Morning Noon Evening Bedtime  
 aspirin delayed-release 81 mg tablet Your next dose is: Today, Tomorrow Other:  _________ Dose:  81 mg Take 81 mg by mouth daily. Refills:  0  
     
   
   
   
  
 budesonide-formoterol 160-4.5 mcg/actuation HFA inhaler Commonly known as:  SYMBICORT Your next dose is: Today, Tomorrow Other:  _________ Dose:  2 Puff Take 2 Puffs by inhalation two (2) times a day. Quantity:  3 Inhaler Refills:  4 diphenhydrAMINE 25 mg capsule Commonly known as:  BENADRYL Your next dose is: Today, Tomorrow Other:  _________ Dose:  25 mg Take 25 mg by mouth two (2) times a day. Indications: COUGH Refills:  0  
     
   
   
   
  
 guaiFENesin-codeine 100-10 mg/5 mL solution Commonly known as:  ROBITUSSIN AC Your next dose is: Today, Tomorrow Other:  _________ TAKE 5ML BY MOUTH 4 TIMES A DAY AS NEEDED MAXIMUM 20ML PER DAY (GENERIC FOR ROBITUSSIN AC) Quantity:  473 mL Refills:  0 IMITREX PO Your next dose is: Today, Tomorrow Other:  _________ Take  by mouth as needed (migraine). Refills:  0  
     
   
   
   
  
 L. gasseri-B. bifidum-B longum 1.5 billion cell Cap Your next dose is: Today, Tomorrow Other:  _________ Dose:  2 Cap Take 2 Caps by mouth daily. Refills:  0  
     
   
   
   
  
 levothyroxine 50 mcg tablet Commonly known as:  SYNTHROID Your next dose is: Today, Tomorrow Other:  _________ TAKE 1 TABLET DAILY BEFORE BREAKFAST Quantity:  90 Tab Refills:  3  
     
   
   
   
  
 lisinopril 20 mg tablet Commonly known as:  Lyn Cockayne Your next dose is: Today, Tomorrow Other:  _________ TAKE 1 BY MOUTH DAILY Quantity:  90 Tab Refills:  3 MIRALAX 17 gram packet Generic drug:  polyethylene glycol Your next dose is: Today, Tomorrow Other:  _________ Dose:  17 g Take 17 g by mouth daily as needed. Refills:  0  
     
   
   
   
  
 raNITIdine 150 mg tablet Commonly known as:  ZANTAC Your next dose is: Today, Tomorrow Other:  _________ Dose:  150 mg Take 150 mg by mouth daily (with lunch). Refills:  0  
     
   
   
   
  
 sertraline 50 mg tablet Commonly known as:  ZOLOFT Your next dose is: Today, Tomorrow Other:  _________ Dose:  75 mg Take 1.5 Tabs by mouth daily. Quantity:  135 Tab Refills:  3  
     
   
   
   
  
 traZODone 50 mg tablet Commonly known as:  Luiza Prieto Your next dose is: Today, Tomorrow Other:  _________ Dose:  100 mg Take 2 Tabs by mouth nightly. Quantity:  180 Tab Refills:  3 XOPENEX HFA 45 mcg/actuation inhaler Generic drug:  levalbuterol tartrate Your next dose is: Today, Tomorrow Other:  _________ USE 2 INHALATIONS BY MOUTH EVERY 4 HOURS AS NEEDED FOR WHEEZING Quantity:  45 Inhaler Refills:  6  
     
   
   
   
  
 zolpidem 5 mg tablet Commonly known as:  AMBIEN Your next dose is: Today, Tomorrow Other:  _________ Dose:  5-10 mg Take 1-2 Tabs by mouth nightly as needed for Sleep. Max Daily Amount: 10 mg.  
 Quantity:  90 Tab Refills:  0 STOP taking these medications ALEVE 220 mg tablet Generic drug:  naproxen sodium Where to Get Your Medications Information on where to get these meds will be given to you by the nurse or doctor. ! Ask your nurse or doctor about these medications  
  azithromycin 250 mg tablet  
 docusate sodium 100 mg capsule  
 doxycycline 100 mg tablet  
 ferrous sulfate 325 mg (65 mg iron) tablet  
 predniSONE 20 mg tablet  
 tiotropium bromide 2.5 mcg/actuation inhaler Discharge Instructions None Discharge Instructions Attachments/References HEART FAILURE: LIMITING SODIUM AND FLUIDS (ENGLISH) HEART FAILURE ZONES: GENERAL INFO (ENGLISH) Discharge Orders None MyChart Announcement We are excited to announce that we are making your provider's discharge notes available to you in [employer]. You will see these notes when they are completed and signed by the physician that discharged you from your recent hospital stay. If you have any questions or concerns about any information you see in [employer], please call the Health Information Department where you were seen or reach out to your Primary Care Provider for more information about your plan of care. Introducing [facility]! Dear Juinor Rivera: Thank you for requesting a [employer] account. Our records indicate that you already have an active [employer] account. You can access your account anytime at [email or web address] Did you know that you can access your hospital and ER discharge instructions at any time in [employer]? You can also review all of your test results from your hospital stay or ER visit. Additional Information If you have questions, please visit the Frequently Asked Questions section of the [employer] website at [email or web address] Remember, [employer] is NOT to be used for urgent needs. For medical emergencies, dial 911. Now available from your iPhone and Android! General Information Please provide this summary of care documentation to your next provider. Patient Signature:  ____________________________________________________________ Date:  ____________________________________________________________  
  
[place] Current Provider Signature:  ____________________________________________________________ Date:  ____________________________________________________________ More Information Limiting Sodium and Fluids With Heart Failure: Care Instructions Your Care Instructions Sodium causes your body to keep extra water, making it harder for your heart to pump.  By limiting sodium, you will feel better and lower your risk of having to go to the hospital. 
People get most of their sodium from processed foods. Fast food and restaurant meals also tend to be very high in sodium. Your doctor may suggest that you limit sodium to 2,000 milligrams (mg) a day or less. That is less than 1 teaspoon of salt a day, including all the salt you eat in cooked or packaged foods. Usually, you have to limit the amount of liquids you drink only if your heart failure is severe. Limiting sodium alone often is enough to help your body get rid of extra fluids. However, your doctor may tell you to limit your fluid intake to a set amount each day. Follow-up care is a key part of your treatment and safety. Be sure to make and go to all appointments, and call your doctor if you are having problems. It's also a good idea to know your test results and keep a list of the medicines you take. How can you care for yourself at home? Read food labels · Read food labels on cans and food packages. The labels tell you how much sodium is in each serving. Make sure that you look at the serving size. If you eat more than the serving size, you have eaten more sodium than is listed for one serving. · Food labels also tell you the Percent Daily Value. If the Percent Daily Value says 50%, it means that you will get at least 50% of all the sodium you need for the entire day in one serving. Choose products with low Percent Daily Values for sodium. · Be aware that sodium can come in forms other than salt, including monosodium glutamate (MSG), sodium citrate, and sodium bicarbonate (baking soda). MSG is often added to Asian food. You can sometimes ask for food without MSG or salt. Buy low-sodium foods · Buy foods that are labeled \"unsalted\" (no salt added), \"sodium-free\" (less than 5 mg of sodium per serving), or \"low-sodium\" (less than 140 mg of sodium per serving).  A food labeled \"light sodium\" has less than half of the full-sodium version of that food. Foods labeled \"reduced-sodium\" may still have too much sodium. · Buy fresh vegetables or plain, frozen vegetables. Buy low-sodium versions of canned vegetables, soups, and other canned goods. Prepare low-sodium meals · Use less salt each day when cooking. Reducing salt in this way will help you adjust to the taste. Do not add salt after cooking. Take the salt shaker off the table. · Flavor your food with garlic, lemon juice, onion, vinegar, herbs, and spices instead of salt. Do not use soy sauce, steak sauce, onion salt, garlic salt, mustard, or ketchup on your food. · Make your own salad dressings, sauces, and ketchup without adding salt. · Use less salt (or none) when recipes call for it. You can often use half the salt a recipe calls for without losing flavor. Other dishes like rice, pasta, and grains do not need added salt. · Rinse canned vegetables. This removes somebut not allof the salt. · Avoid water that has a naturally high sodium content or that has been treated with water softeners, which add sodium. Call your local water company to find out the sodium content of your water supply. If you buy bottled water, read the label and choose a sodium-free brand. Avoid high-sodium foods, such as: 
· Smoked, cured, salted, and canned meat, fish, and poultry. · Ham, thompson, hot dogs, and luncheon meats. · Regular, hard, and processed cheese and regular peanut butter. · Crackers with salted tops. · Frozen prepared meals. · Canned and dried soups, broths, and bouillon, unless labeled sodium-free or low-sodium. · Canned vegetables, unless labeled sodium-free or low-sodium. · Salted snack foods such as chips and pretzels. · Western Magali fries, pizza, tacos, and other fast foods. · Pickles, olives, ketchup, and other condiments, especially soy sauce, unless labeled sodium-free or low-sodium. If you cannot cook for yourself · Have family members or friends help you, or have someone cook low-sodium meals. · Check with your local senior nutrition program to find out where meals are served and whether they offer a low-sodium option. You can often find these programs through your local health department or hospital. 
· Have meals delivered to your home. Most [facility] have a Meals on GLORIA Tomlin. These programs provide one hot meal a day for older adults, delivered to their homes. Ask whether these meals are low-sodium. Let them know that you are on a low-sodium diet. Limiting fluid intake · Find a method that works for you. You might simply write down how much you drink every time you do. Some people keep a container filled with the amount of fluid allowed for that day. If they drink from a source other than the container, then they pour out that amount. · Measure your regular drinking glasses to find out how much fluid each one holds. Once you know this, you will not have to measure every time. · Besides water, milk, juices, and other drinks, some foods have a lot of fluid. Count any foods that will melt (such as ice cream or gelatin dessert) or liquid foods (such as soup) as part of your fluid intake for the day. Where can you learn more? Go to http://garfield-pamela.info/. Enter A166 in the search box to learn more about \"Limiting Sodium and Fluids With Heart Failure: Care Instructions. \" Current as of: January 27, 2016 Content Version: 11.1 © 9090-6039 [employer]. Care instructions adapted under license by [employer] (which disclaims liability or warranty for this information). If you have questions about a medical condition or this instruction, always ask your healthcare professional. [place] [address] any warranty or liability for your use of this information. Learning About Heart Failure Zones What are heart failure zones? Heart failure zones give you an easy way to see changes in your heart failure symptoms. They also tell you when you need to get help. Check every day to see which zone you are in. Green zone. You are doing well. This is where you want to be. · Your weight is stable. This means it is not going up or down. · You breathe easily. · You are sleeping well. You are able to lie flat without shortness of breath. · You can do your usual activities. Yellow zone. Be careful. Your symptoms are changing. Call your doctor. · You have new or increased shortness of breath. · You are dizzy or lightheaded, or you feel like you may faint. · You have sudden weight gain, such as 3 pounds or more in 2 to 3 days. · You have increased swelling in your legs, ankles, or feet. · You are so tired or weak that you cannot do your usual activities. · You are not sleeping well. Shortness of breath wakes you up at night. You need extra pillows. Your doctor's name: ____________________________________________________________ Your doctor's contact information: _________________________________________________ Red zone. This is an emergency. Call 911. You have symptoms of sudden heart failure, such as: 
· You have severe trouble breathing. · You cough up pink, foamy mucus. · You have a new irregular or fast heartbeat. You have symptoms of a heart attack. These may include: · Chest pain or pressure, or a strange feeling in the chest. 
· Sweating. · Shortness of breath. · Nausea or vomiting. · Pain, pressure, or a strange feeling in the back, neck, jaw, or upper belly or in one or both shoulders or arms. · Lightheadedness or sudden weakness. · A fast or irregular heartbeat. If you have symptoms of a heart attack: After you call 911, the  may tell you to chew 1 adult-strength or 2 to 4 low-dose aspirin. Wait for an ambulance. Do not try to drive yourself. Follow-up care is a key part of your treatment and safety. Be sure to make and go to all appointments, and call your doctor if you are having problems. It's also a good idea to know your test results and keep a list of the medicines you take. Where can you learn more? Go to http://garfield-pamela.info/. Enter T174 in the search box to learn more about \"Learning About Heart Failure Zones. \" Current as of: January 27, 2016 Content Version: 11.1 © 8889-1669 [employer]. Care instructions adapted under license by [employer] (which disclaims liability or warranty for this information). If you have questions about a medical condition or this instruction, always ask your healthcare professional. [address] any warranty or liability for your use of this information.

## 2017-02-09 NOTE — IP AVS SNAPSHOT
Current Discharge Medication List  
  
Take these medications at their scheduled times Dose & Instructions Dispensing Information Comments Morning Noon Evening Bedtime  
 aspirin delayed-release 81 mg tablet Your next dose is: Today, Tomorrow Other:  ____________ Dose:  81 mg Take 81 mg by mouth daily. Refills:  0  
     
   
   
   
  
 azithromycin 250 mg tablet Commonly known as:  Kavon Valle Start taking on:  2/13/2017 Your next dose is: Today, Tomorrow Other:  ____________ Dose:  250 mg Take 1 Tab by mouth every Monday, Wednesday, Friday. Start on wednesday Quantity:  39 Tab Refills:  1  
     
   
   
   
  
 budesonide-formoterol 160-4.5 mcg/actuation HFA inhaler Commonly known as:  SYMBICORT Your next dose is: Today, Tomorrow Other:  ____________ Dose:  2 Puff Take 2 Puffs by inhalation two (2) times a day. Quantity:  3 Inhaler Refills:  4 diphenhydrAMINE 25 mg capsule Commonly known as:  BENADRYL Your next dose is: Today, Tomorrow Other:  ____________ Dose:  25 mg Take 25 mg by mouth two (2) times a day. Indications: COUGH Refills:  0  
     
   
   
   
  
 doxycycline 100 mg tablet Commonly known as:  VIBRA-TABS Your next dose is: Today, Tomorrow Other:  ____________ Dose:  100 mg Take 1 Tab by mouth every twelve (12) hours for 2 days. Quantity:  4 Tab Refills:  0  
     
   
   
   
  
 ferrous sulfate 325 mg (65 mg iron) tablet Your next dose is: Today, Tomorrow Other:  ____________ Dose:  325 mg Take 1 Tab by mouth daily (with breakfast). Quantity:  30 Tab Refills:  1  
     
   
   
   
  
 * furosemide 20 mg tablet Commonly known as:  LASIX Your next dose is: Today, Tomorrow Other:  ____________  Dose:  40 mg  
 Take 2 Tabs by mouth daily. Indications: EDEMA Quantity:  180 Tab Refills:  3  
     
   
   
   
  
 ipratropium 0.02 % nebulizer solution Commonly known as:  ATROVENT Your next dose is: Today, Tomorrow Other:  ____________ Dose:  0.5 mg  
2.5 mL by Nebulization route three (3) times daily. Quantity:  750 mL Refills:  3 Dispense 300 vials. L. gasseri-B. bifidum-B longum 1.5 billion cell Cap Your next dose is: Today, Tomorrow Other:  ____________ Dose:  2 Cap Take 2 Caps by mouth daily. Refills:  0  
     
   
   
   
  
 nebivolol 5 mg tablet Commonly known as:  BYSTOLIC Your next dose is: Today, Tomorrow Other:  ____________ Dose:  5 mg Take 5 mg by mouth two (2) times a day. Refills:  0  
     
   
   
   
  
 * potassium chloride SR 10 mEq tablet Commonly known as:  KLOR-CON 10 Your next dose is: Today, Tomorrow Other:  ____________ Dose:  10 mEq Take 1 Tab by mouth two (2) times daily (with meals). Take 2nd dose when you take an extra Furosemide. Indications: HYPOKALEMIA Quantity:  180 Tab Refills:  1  
     
   
   
   
  
 raNITIdine 150 mg tablet Commonly known as:  ZANTAC Your next dose is: Today, Tomorrow Other:  ____________ Dose:  150 mg Take 150 mg by mouth daily (with lunch). Refills:  0  
     
   
   
   
  
 sertraline 50 mg tablet Commonly known as:  ZOLOFT Your next dose is: Today, Tomorrow Other:  ____________ Dose:  75 mg Take 1.5 Tabs by mouth daily. Quantity:  135 Tab Refills:  3  
     
   
   
   
  
 tiotropium bromide 2.5 mcg/actuation inhaler Commonly known as:  Cherrie Gunter Your next dose is: Today, Tomorrow Other:  ____________ Dose:  2 Puff Take 2 Puffs by inhalation daily. Quantity:  1 Inhaler Refills:  1 traZODone 50 mg tablet Commonly known as:  Shorty Frances Your next dose is: Today, Tomorrow Other:  ____________ Dose:  100 mg Take 2 Tabs by mouth nightly. Quantity:  180 Tab Refills:  3  
     
   
   
   
  
 * Notice: This list has 2 medication(s) that are the same as other medications prescribed for you. Read the directions carefully, and ask your doctor or other care provider to review them with you. Take these medications as needed Dose & Instructions Dispensing Information Comments Morning Noon Evening Bedtime  
 docusate sodium 100 mg capsule Commonly known as:  Shari Galea Your next dose is: Today, Tomorrow Other:  ____________ Dose:  100 mg Take 1 Cap by mouth daily as needed for Constipation for up to 90 days. Quantity:  60 Cap Refills:  2  
     
   
   
   
  
 * furosemide 20 mg tablet Commonly known as:  LASIX Your next dose is: Today, Tomorrow Other:  ____________ Dose:  20 mg Take 20 mg by mouth daily as needed (>2 lb weight gain). Administration Instructions: Patient is to take furosemide 20 mg tab, two tablets by month once daily. Patient is to weight himself daily and if there is a weight gain >2 lb, he is to take an additional 20 mg tablet for a maximum daily dose of 60 mg. Refills:  0 IMITREX PO Your next dose is: Today, Tomorrow Other:  ____________ Take  by mouth as needed (migraine). Refills:  0 MIRALAX 17 gram packet Generic drug:  polyethylene glycol Your next dose is: Today, Tomorrow Other:  ____________ Dose:  17 g Take 17 g by mouth daily as needed. Refills:  0  
     
   
   
   
  
 * potassium chloride SR 10 mEq tablet Commonly known as:  KLOR-CON 10 Your next dose is: Today, Tomorrow Other:  ____________ Dose:  10 mEq Take 10 mEq by mouth daily as needed (when an extra furosemide dose is taken for weight gain of >2 lb). Administration Instructions: Take two tabs by mouth daily, take a third dose of 10 mEq when a third dose of furosemide 20 mg is administered for weight gain of 2 lb Refills:  0  
     
   
   
   
  
 zolpidem 5 mg tablet Commonly known as:  AMBIEN Your next dose is: Today, Tomorrow Other:  ____________ Dose:  5-10 mg Take 1-2 Tabs by mouth nightly as needed for Sleep. Max Daily Amount: 10 mg.  
 Quantity:  90 Tab Refills:  0  
     
   
   
   
  
 * Notice: This list has 2 medication(s) that are the same as other medications prescribed for you. Read the directions carefully, and ask your doctor or other care provider to review them with you. Take these medications as directed Dose & Instructions Dispensing Information Comments Morning Noon Evening Bedtime  
 guaiFENesin-codeine 100-10 mg/5 mL solution Commonly known as:  ROBITUSSIN AC Your next dose is: Today, Tomorrow Other:  ____________ TAKE 5ML BY MOUTH 4 TIMES A DAY AS NEEDED MAXIMUM 20ML PER DAY (GENERIC FOR ROBITUSSIN AC) Quantity:  473 mL Refills:  0  
     
   
   
   
  
 levalbuterol 1.25 mg/3 mL Nebu Commonly known as:  Whit Damien Your next dose is: Today, Tomorrow Other:  ____________ INHALE 1 VIAL VIA NEBULIZER 3 TIMES DAILY Quantity:  792 mL Refills:  6  
     
   
   
   
  
 levothyroxine 50 mcg tablet Commonly known as:  SYNTHROID Your next dose is: Today, Tomorrow Other:  ____________ TAKE 1 TABLET DAILY BEFORE BREAKFAST Quantity:  90 Tab Refills:  3  
     
   
   
   
  
 lisinopril 20 mg tablet Commonly known as:  Birdena Ildefonso Your next dose is: Today, Tomorrow Other:  ____________ TAKE 1 BY MOUTH DAILY Quantity:  90 Tab Refills:  3 LORazepam 1 mg tablet Commonly known as:  ATIVAN Your next dose is: Today, Tomorrow Other:  ____________ TAKE 1/2 TO 1 BY MOUTH UP TO 3 TIMES DAILY AS NEEDED FOR ANXIETY Quantity:  180 Tab Refills:  0  
     
   
   
   
  
 predniSONE 20 mg tablet Commonly known as:  Esther Merritt Your next dose is: Today, Tomorrow Other:  ____________  
   
   
 2 tabs po q daily * 3 days, then 11/2 tab po q daily * 3 days, then 1 tab po q daily * 3 days, then 1/2 tab po q daily * 3 days Quantity:  20 Tab Refills:  0  
     
   
   
   
  
 XOPENEX HFA 45 mcg/actuation inhaler Generic drug:  levalbuterol tartrate Your next dose is: Today, Tomorrow Other:  ____________ USE 2 INHALATIONS BY MOUTH EVERY 4 HOURS AS NEEDED FOR WHEEZING Quantity:  45 Inhaler Refills:  6 Where to Get Your Medications Information about where to get these medications is not yet available ! Ask your nurse or doctor about these medications  
  azithromycin 250 mg tablet  
 docusate sodium 100 mg capsule  
 doxycycline 100 mg tablet  
 ferrous sulfate 325 mg (65 mg iron) tablet  
 predniSONE 20 mg tablet  
 tiotropium bromide 2.5 mcg/actuation inhaler

## 2017-02-09 NOTE — ED NOTES
Assumed care of this patient from EMS. Pt arrived with c/o SOB with exertion this morning when walking to the restroom. Pt reported his sats were dropping into the 80's when walking but would return to the 90's when he sat down. Pt wears 4L of O2 at home for COPD. Pt is A&Ox4. Placed on monitor x3. Patient was abdominal breathing during EKG so there is a lot of artifact. .  Will repeat once patients breathing is better. Dr. Shubham Bell aware.

## 2017-02-09 NOTE — PROGRESS NOTES
TRANSFER - IN REPORT:    Verbal report received from Lucrecia Cloud RN(name) on Tracey Valera  being received from ED(unit) for routine progression of care      Report consisted of patients Situation, Background, Assessment and   Recommendations(SBAR). Information from the following report(s) SBAR, Kardex, [facility] and Recent Results was reviewed with the receiving nurse. Opportunity for questions and clarification was provided. Assessment completed upon patients arrival to unit and care assumed. Pt A&O, denies pain, states breathing is slightly better. Pt orient to room and surroundings. Call bell within reach, bed alarm on, wife at bedside. Primary Nurse Guadalupe Venegas RN and Naila Bartholomew RN performed a dual skin assessment on this patient Impairment noted- Left heel dry with small pinpoint scab, sacrum redness blanchable.   Azael score is 18

## 2017-02-09 NOTE — IP AVS SNAPSHOT
Summary of Care Report The Summary of Care report has been created to help improve care coordination. Users with access to [employer] or [employer] (Web-based application) may access additional patient information including the Discharge Summary. If you are not currently a [address] user and need more information, please call the number listed below in the [place] [address] section and ask to be connected with Medical Records. Facility Information Name Address Phone 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 
ICD-9-CM: 401.1   2009 - 2016 GERD (gastroesophageal reflux disease) ICD-10-CM: K21.9 ICD-9-CM: 530.81   2009 - Present Migraine headache ICD-10-CM: E31.622 ICD-9-CM: 346.90   2009 - Present ED (erectile dysfunction) ICD-10-CM: N52.9 ICD-9-CM: 607.84   2009 - Present RESOLVED: Acute bronchitis ICD-10-CM: J20.9 ICD-9-CM: 466.0   2011 - 2011 RESOLVED: SIRS (systemic inflammatory response syndrome) (HCC) ICD-10-CM: R65.10 ICD-9-CM: 995.90   1/24/2013 - 2/4/2013 RESOLVED: Acute and chronic respiratory failure ICD-10-CM: J96.20 ICD-9-CM: 518.84   1/24/2013 - 2/25/2013 Hypokalemia ICD-10-CM: E87.6 ICD-9-CM: 276.8   1/24/2013 - Present RESOLVED: Chronic constipation ICD-10-CM: K59.09 
ICD-9-CM: 564.00   5/15/2013 - 2/9/2015 Post-tuberculous bronchiectasis (Chronic) ICD-10-CM: A15.0 ICD-9-CM: 011.50   10/17/2013 - Present Hypoxemic respiratory failure, chronic (HCC) (Chronic) ICD-10-CM: J96.11 
ICD-9-CM: 518.83, 799.02   10/17/2013 - Present RESOLVED: Unintentional weight loss ICD-10-CM: R63.4 ICD-9-CM: 783.21   5/18/2014 - 5/4/2015 RESOLVED: SIRS (systemic inflammatory response syndrome) (HCC) ICD-10-CM: R65.10 ICD-9-CM: 995.90  Acute 6/20/2014 - 7/23/2014 RESOLVED: Hypotension ICD-10-CM: I95.9 ICD-9-CM: 458.9   6/21/2014 - 7/23/2014 Adjustment disorder with mixed anxiety and depressed mood ICD-10-CM: Q51.12 
ICD-9-CM: 309.28   8/11/2014 - Present RESOLVED: Iron deficiency anemia ICD-10-CM: D50.9 ICD-9-CM: 280.9   8/11/2014 - 2/9/2015 Overview Signed 2/1/2015  2:17 PM by Leo Gonzales MD  
  Developed during summer 2014 hospitalization at [facility]. Ischemic heart disease, chronic (Chronic) ICD-10-CM: I25.9 ICD-9-CM: 414.9   8/5/2014 - Present Overview Signed 2/11/2015  6:17 AM by Leo Gonzales MD  
  [facility] admit revealed anterior and septal ischemia without reversibility on MPI study. [facility] admit with Echo showing EF 40-45% Vasomotor rhinitis ICD-10-CM: J30.0 ICD-9-CM: 477.9   2/11/2015 - Present [facility] admit with Echo showing EF 40-45% Persistent disorder of initiating or maintaining sleep ICD-10-CM: G47.00 ICD-9-CM: 307.42   4/19/2015 - Present Hypothyroidism due to acquired atrophy of thyroid (Chronic) ICD-10-CM: K78.8 ICD-9-CM: 244.8, 246.8   5/4/2015 - Present RESOLVED: COPD with acute exacerbation ([facility] [address] ICD-10-CM: J44.1 ICD-9-CM: 491.21   6/3/2015 - 8/19/2015 Overview Signed 6/3/2015  3:45 PM by Veronique Harman MD  
  At [facility] 5/25/15 - 6/2/15 Chronic constipation ICD-10-CM: K59.09 
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  
  2014 [facility] admit with Echo showing EF 40-45% Chronic systolic congestive heart failure (HCC) (Chronic) ICD-10-CM: C21.26 ICD-9-CM: 428.22, 428.0   2/20/2016 - Present DNR no code (do not resuscitate) (Chronic) ICD-10-CM: T36 ICD-9-CM: V49.86   5/10/2016 - Present Diarrhea ICD-10-CM: R19.7 ICD-9-CM: 787.91   7/7/2016 - Present Essential hypertension (Chronic) ICD-10-CM: I10 
ICD-9-CM: 401.9   12/5/2016 - Present COPD exacerbation ([facility] [address] ICD-10-CM: J44.1 ICD-9-CM: 491.21   12/5/2016 - Present Respiratory failure ([facility] ICD-10-CM: J96.90 ICD-9-CM: 518.81   2/9/2017 - Present You are allergic to the following Allergen Reactions Cardizem (Diltiazem Hcl) Other (comments) Headache and constipation Current Discharge Medication List  
  
START taking these medications Dose & Instructions Dispensing Information Comments  
 docusate sodium 100 mg capsule Commonly known as:  [place] Dose:  100 mg Take 1 Cap by mouth daily as needed for Constipation for up to 90 days. Quantity:  60 Cap Refills:  2  
   
 doxycycline 100 mg tablet Commonly known as:  VIBRA-TABS Dose:  100 mg Take 1 Tab by mouth every twelve (12) hours for 2 days. Quantity:  4 Tab Refills:  0  
   
 ferrous sulfate 325 mg (65 mg iron) tablet Dose:  325 mg Take 1 Tab by mouth daily (with breakfast). Quantity:  30 Tab Refills:  1  
   
 predniSONE 20 mg tablet Commonly known as:  Natasha Stover  
 2 tabs po q daily * 3 days, then 11/2 tab po q daily * 3 days, then 1 tab po q daily * 3 days, then 1/2 tab po q daily * 3 days Quantity:  20 Tab Refills:  0  
   
 tiotropium bromide 2.5 mcg/actuation inhaler Commonly known as:  Dave Tinsley Dose:  2 Puff Take 2 Puffs by inhalation daily. Quantity:  1 Inhaler Refills:  1 CONTINUE these medications which have CHANGED Dose & Instructions Dispensing Information Comments  
 azithromycin 250 mg tablet Commonly known as:  Richa Aramis Start taking on:  2/13/2017 What changed:  additional instructions Dose:  250 mg Take 1 Tab by mouth every Monday, Wednesday, Friday. Start on wednesday Quantity:  39 Tab Refills:  1  
   
 * furosemide 20 mg tablet Commonly known as:  LASIX What changed:  Another medication with the same name was changed. Make sure you understand how and when to take each. Dose:  20 mg Take 20 mg by mouth daily as needed (>2 lb weight gain). Administration Instructions: Patient is to take furosemide 20 mg tab, two tablets by month once daily. Patient is to weight himself daily and if there is a weight gain >2 lb, he is to take an additional 20 mg tablet for a maximum daily dose of 60 mg. Refills:  0  
   
 * furosemide 20 mg tablet Commonly known as:  LASIX What changed:  additional instructions Dose:  40 mg Take 2 Tabs by mouth daily. Indications: EDEMA Quantity:  180 Tab Refills:  3  
   
 ipratropium 0.02 % nebulizer solution Commonly known as:  ATROVENT What changed:  when to take this Dose:  0.5 mg  
2.5 mL by Nebulization route three (3) times daily. Quantity:  750 mL Refills:  3 Dispense 300 vials. levalbuterol 1.25 mg/3 mL Nebu Commonly known as:  Rk Kramer What changed:  See the new instructions. INHALE 1 VIAL VIA NEBULIZER 3 TIMES DAILY Quantity:  792 mL Refills:  6 LORazepam 1 mg tablet Commonly known as:  ATIVAN  
 What changed:  See the new instructions. TAKE 1/2 TO 1 BY MOUTH UP TO 3 TIMES DAILY AS NEEDED FOR ANXIETY Quantity:  180 Tab Refills:  0  
   
 nebivolol 5 mg tablet Commonly known as:  BYSTOLIC What changed:  Another medication with the same name was removed. Continue taking this medication, and follow the directions you see here. Dose:  5 mg Take 5 mg by mouth two (2) times a day. Refills:  0  
   
 * potassium chloride SR 10 mEq tablet Commonly known as:  KLOR-CON 10 What changed:  Another medication with the same name was changed. Make sure you understand how and when to take each. Dose:  10 mEq Take 10 mEq by mouth daily as needed (when an extra furosemide dose is taken for weight gain of >2 lb). Administration Instructions: Take two tabs by mouth daily, take a third dose of 10 mEq when a third dose of furosemide 20 mg is administered for weight gain of 2 lb Refills:  0  
   
 * potassium chloride SR 10 mEq tablet Commonly known as:  KLOR-CON 10 What changed:   
- how much to take - when to take this 
- additional instructions Dose:  10 mEq Take 1 Tab by mouth two (2) times daily (with meals). Take 2nd dose when you take an extra Furosemide. Indications: HYPOKALEMIA Quantity:  180 Tab Refills:  1  
   
 * Notice: This list has 4 medication(s) that are the same as other medications prescribed for you. Read the directions carefully, and ask your doctor or other care provider to review them with you. CONTINUE these medications which have NOT CHANGED Dose & Instructions Dispensing Information Comments  
 aspirin delayed-release 81 mg tablet Dose:  81 mg Take 81 mg by mouth daily. Refills:  0  
   
 budesonide-formoterol 160-4.5 mcg/actuation HFA inhaler Commonly known as:  SYMBICORT Dose:  2 Puff Take 2 Puffs by inhalation two (2) times a day. Quantity:  3 Inhaler Refills:  4 diphenhydrAMINE 25 mg capsule Commonly known as:  BENADRYL Dose:  25 mg Take 25 mg by mouth two (2) times a day. Indications: COUGH Refills:  0  
   
 guaiFENesin-codeine 100-10 mg/5 mL solution Commonly known as:  ROBITUSSIN AC  
 TAKE 5ML BY MOUTH 4 TIMES A DAY AS NEEDED MAXIMUM 20ML PER DAY (GENERIC FOR ROBITUSSIN AC) Quantity:  473 mL Refills:  0 IMITREX PO Take  by mouth as needed (migraine). Refills:  0  
   
 L. gasseri-B. bifidum-B longum 1.5 billion cell Cap Dose:  2 Cap Take 2 Caps by mouth daily. Refills:  0  
   
 levothyroxine 50 mcg tablet Commonly known as:  SYNTHROID  
 TAKE 1 TABLET DAILY BEFORE BREAKFAST Quantity:  90 Tab Refills:  3  
   
 lisinopril 20 mg tablet Commonly known as:  PRINIVIL, ZESTRIL  
 TAKE 1 BY MOUTH DAILY Quantity:  90 Tab Refills:  3 MIRALAX 17 gram packet Generic drug:  polyethylene glycol Dose:  17 g Take 17 g by mouth daily as needed. Refills:  0  
   
 raNITIdine 150 mg tablet Commonly known as:  ZANTAC Dose:  150 mg Take 150 mg by mouth daily (with lunch). Refills:  0  
   
 sertraline 50 mg tablet Commonly known as:  ZOLOFT Dose:  75 mg Take 1.5 Tabs by mouth daily. Quantity:  135 Tab Refills:  3  
   
 traZODone 50 mg tablet Commonly known as:  Mile Colon Dose:  100 mg Take 2 Tabs by mouth nightly. Quantity:  180 Tab Refills:  3 XOPENEX HFA 45 mcg/actuation inhaler Generic drug:  levalbuterol tartrate USE 2 INHALATIONS BY MOUTH EVERY 4 HOURS AS NEEDED FOR WHEEZING Quantity:  45 Inhaler Refills:  6  
   
 zolpidem 5 mg tablet Commonly known as:  AMBIEN Dose:  5-10 mg Take 1-2 Tabs by mouth nightly as needed for Sleep. Max Daily Amount: 10 mg.  
 Quantity:  90 Tab Refills:  0 STOP taking these medications Comments ALEVE 220 mg tablet Generic drug:  naproxen sodium Current Immunizations Name Date Influenza High Dose Vaccine PF 10/15/2015 Influenza Vaccine 10/23/2014, 9/12/2013 Influenza Vaccine Split 11/12/2012, 11/14/2011, 10/20/2010 Pneumococcal Conjugate (PCV-13) 10/8/2015 Pneumococcal Polysaccharide (PPSV-23) 1/27/2014 Pneumococcal Vaccine (Unspecified Type) 4/1/2008, 11/1/1997 Tdap 8/11/2016 Follow-up Information Follow up With Details Comments Contact Info [address] [place] [address] [address] [facility] 
[phone] [address] [place] [address] 
[phone] Discharge Instructions None Chart Review Routing History Recipient Method Report Sent By Liz Bueno Phone: [phone] In Basket IP Auto Routed Notes Varghese Lugo MD [[id number]] 5/17/2012  5:14 AM 05/17/2012 Cyndy Dutta MD  
Fax: [phone] Phone: [phone] Fax Provider Comm Report Finesse Zamorano [[id number]] 6/29/2012  2:42 PM 05/17/2012 Chastity Recinos LPN Phone: [phone] In Basket IP Auto Routed Notes Varghese Lugo MD [[id number]] 5/18/2013  5:05 PM 05/18/2013 Chastity Recinos LPN Phone: [phone] In Basket IP Auto Routed Notes Varghese Lugo MD [[id number]] 1/31/2014  4:51 AM 01/31/2014 Brianna Bueno Phone: [phone] In Basket IP Auto Routed Notes Varghese Lugo MD [[id number]] 5/19/2014  9:19 PM 05/19/2014 Varghese Lugo MD  
Phone: [phone] In Basket IP Auto Routed Notes Hilda Tran MD [[id number]] 6/20/2014 10:57 PM 06/20/2014 Varghese Lugo MD  
Phone: [phone] In Basket IP Auto Routed Notes Kurt Dias MD [[id number]] 7/7/2014  1:37 PM 07/07/2014 Cyndy Dutta MD  
Fax: [phone] Phone: [phone] Fax Note Review Finesse Josephriki [[id number]] 7/28/2014  8:56 AM 07/28/2014 Cyndy Dutta MD  
Fax: [phone] Phone: [phone] Fax Note Review Ritu Constantino [[id number]] 10/31/2014  9:07 AM 10/28/2014 Manfred Ordaz MD  
Fax: [phone] Phone: [phone] Fax Note Review Ritu Constantino [[id number]] 7/6/2015  4:13 PM 07/03/2015 Diya Bose III, DO Phone: [phone] [employer] Routed Notes Daniel Cam, [address] [[id number]] 7/7/2016  4:41 PM 07/07/2016 Diya Bose III, DO Phone: [phone] In [employer] Routed Notes Daniel Cam, [address] [[id number]] 7/7/2016  4:43 PM 07/07/2016 Diya Bose III, DO Phone: [phone] In Basket IP Auto Routed Notes MD Lindsey Willingham 7/10/2016 12:05 PM 07/10/2016 Diyamine Bose III, DO Phone: [phone] [employer] Routed Notes Daniel Cam, [address] [[id number]] 2/9/2017 10:57 AM 02/09/2017 Diyamine Bose III, DO Phone: [phone] [employer] Routed Notes Daniel Cam, [address] [place] [[id number]] 2/9/2017 10:58 AM 02/09/2017

## 2017-02-09 NOTE — PROGRESS NOTES
Problem: Mobility Impaired (Adult and Pediatric)  Goal: *Acute Goals and Plan of Care (Insert Text)  Physical Therapy Goals  Initiated 2/9/2017  1. Patient will move from supine to sit and sit to supine in bed with independence within 7 day(s). 2. Patient will transfer from bed to chair and chair to bed with modified independence using the least restrictive device within 7 day(s). 3. Patient will perform sit to stand with modified independence within 7 day(s). 4. Patient will ambulate with modified independence for 75 feet with the least restrictive device within 7 day(s). 5. Patient will ascend/descend 2 stairs with 1 handrail(s) with modified independence within 7 day(s). PHYSICAL THERAPY EVALUATION  Patient: Rivas Hickman (52 y.o. male)  Date: 2/9/2017  Primary Diagnosis: Respiratory failure (HCC)        Precautions: fall         ASSESSMENT :  Based on the objective data described below, the patient presents with poor cardiopulmonary status with KOO with very slight activity. Noted small strength, ROM, and balance deficits but he is grossly limited by SOB. He is on 4L O2 via nasal canula at baseline and he reports sating ~97%. Today he dropped to 87% on 4.5L while sitting and then stand pivoting to a bedside chair. Prior to admission he was a ambulatory within the home but did not use DME except for a wheelchair for community mobility. Will follow to further assess for discharge needs. Patient will benefit from skilled intervention to address the above impairments.   Patients rehabilitation potential is considered to be Fair  Factors which may influence rehabilitation potential include:   [ ]         None noted  [ ]         Mental ability/status  [X]         Medical condition  [ ]         Home/family situation and support systems  [ ]         Safety awareness  [ ]         Pain tolerance/management  [ ]         Other:        PLAN :  Recommendations and Planned Interventions:  [X]           Bed Mobility Training             [ ]    Neuromuscular Re-Education  [X]           Transfer Training                   [ ]    Orthotic/Prosthetic Training  [X]           Gait Training                         [ ]    Modalities  [X]           Therapeutic Exercises           [ ]    Edema Management/Control  [X]           Therapeutic Activities            [ ]    Patient and Family Training/Education  [ ]           Other (comment):     Frequency/Duration: Patient will be followed by physical therapy  4 times a week to address goals. Discharge Recommendations: Home Health and To Be Determined  Further Equipment Recommendations for Discharge: None       SUBJECTIVE:   Patient stated This morning I dropped into the 80's just standing up.       OBJECTIVE DATA SUMMARY:   HISTORY:    Past Medical History   Diagnosis Date    Chronic obstructive pulmonary disease ([facility]      Diastolic CHF, chronic ([facility] 11/4/2015    Emphysema/COPD ([facility] 9/20/2009    Essential hypertension, benign 9/20/2009    GERD (gastroesophageal reflux disease) 9/20/2009    Hypertension      Migraine headache 9/20/2009    Post-tuberculous bronchiectasis 10/17/2013    Thyroid disease         hypothyroidism   History reviewed. No pertinent past surgical history.   Prior Level of Function/Home Situation: independent for short distances  Personal factors and/or comorbidities impacting plan of care:      Home Situation  Home Environment: Private residence  # Steps to Enter: 2  Rails to Enter: Yes  One/Two Story Residence: One story  Living Alone: No  Support Systems: Spouse/Significant Other/Partner  Patient Expects to be Discharged to[de-identified] Private residence  Current DME Used/Available at Home: [employer], straight, Oxygen, portable, Walker, Wheelchair     EXAMINATION/PRESENTATION/DECISION MAKING:   Critical Behavior:  Neurologic State: Alert  Orientation Level: Oriented X4        Skin:  Thin but intact  Strength:    Strength: Generally decreased, functional Tone & Sensation:   Tone: Normal              Sensation: Intact               Range Of Motion:  AROM: Generally decreased, functional                       Coordination:  Coordination: Generally decreased, functional     Functional Mobility:  Bed Mobility:  Rolling: Minimum assistance  Supine to Sit: Minimum assistance;Contact guard assistance     Scooting: Contact guard assistance  Transfers:  Sit to Stand: Minimum assistance     Stand Pivot Transfers: Minimum assistance (high guard, flexed posture)                    Balance:   Sitting: Impaired  Sitting - Static: Good (unsupported)  Sitting - Dynamic: Fair (occasional)  Standing: Impaired  Standing - Static: Fair  Standing - Dynamic : Fair  Ambulation/Gait Training:              Gait Description (WDL): Exceptions to WDL                                                                Stairs: Therapeutic Exercises:         Functional Measure:  Tinetti test:      Sitting Balance: 1  Arises: 0  Attempts to Rise: 0  Immediate Standing Balance: 1  Standing Balance: 1  Nudged: 0  Eyes Closed: 0  Turn 360 Degrees - Continuous/Discontinuous: 0  Turn 360 Degrees - Steady/Unsteady: 0  Sitting Down: 1  Balance Score: 4  Indication of Gait: 0  R Step Length/Height: 0  L Step Length/Height: 0  R Foot Clearance: 0  L Foot Clearance: 0  Step Symmetry: 0  Step Continuity: 0  Path: 0  Trunk: 0  Walking Time: 0  Gait Score: 0  Total Score: 4         Tinetti Test and G-code impairment scale:  Percentage of Impairment CH     0%    CI     1-19% CJ     20-39% CK     40-59% CL     60-79% CM     80-99% CN      100%   Tinetti  Score 0-28 28 23-27 17-22 12-16 6-11 1-5 0          Tinetti Tool Score Risk of Falls  <19 = High Fall Risk  19-24 = Moderate Fall Risk  25-28 = Low Fall Risk  Tinetti ME. Performance-Oriented Assessment of Mobility Problems in Elderly Patients. [address]ela 64; [id number].  (Scoring Description: PT Bulletin Feb. 10, 1993)     Older adults: Pillo Leigh et al, 2009; n = 1000 [facility] elderly evaluated with ABC, MARK, ADL, and IADL)  · Mean MARK score for males aged 69-68 years = 26.21(3.40)  · Mean MARK score for females age 69-68 years = 25.16(4.30)  · Mean MARK score for males over 80 years = 23.29(6.02)  · Mean MARK score for females over 80 years = 17.20(8.32)            G codes: In compliance with CMSs Claims Based Outcome Reporting, the following G-code set was chosen for this patient based on their primary functional limitation being treated: The outcome measure chosen to determine the severity of the functional limitation was the Tinetti with a score of 4/28 which was correlated with the impairment scale. · Changing and Maintaining Body Position:               - CURRENT STATUS:    CM - 80%-99% impaired, limited or restricted               - GOAL STATUS:           CL - 60%-79% impaired, limited or restricted               - D/C STATUS:                       ---------------To be determined---------------      Physical Therapy Evaluation Charge Determination   History Examination Presentation Decision-Making   MEDIUM  Complexity : 1-2 comorbidities / personal factors will impact the outcome/ POC  MEDIUM Complexity : 3 Standardized tests and measures addressing body structure, function, activity limitation and / or participation in recreation  MEDIUM Complexity : Evolving with changing characteristics  MEDIUM Complexity : FOTO score of 26-74      Based on the above components, the patient evaluation is determined to be of the following complexity level: MEDIUM     Pain:  Pain Scale 1: Numeric (0 - 10)  Pain Intensity 1: 0              Activity Tolerance:      Please refer to the flowsheet for vital signs taken during this treatment.   After treatment:   [X]         Patient left in no apparent distress sitting up in chair  [ ]         Patient left in no apparent distress in bed  [X]         Call bell left within reach  [X]         Nursing notified  [ ]         Caregiver present  [ ]         Bed alarm activated      COMMUNICATION/EDUCATION:   The patients plan of care was discussed with: Registered Nurse.  [X]         Fall prevention education was provided and the patient/caregiver indicated understanding. [X]         Patient/family have participated as able in goal setting and plan of care. [X]         Patient/family agree to work toward stated goals and plan of care. [ ]         Patient understands intent and goals of therapy, but is neutral about his/her participation. [ ]         Patient is unable to participate in goal setting and plan of care.      Thank you for this referral.  Juan Ellis, PT, DPT   Time Calculation: 30 mins

## 2017-02-09 NOTE — ED NOTES
Patient updated on plan of care to be admitted to the hospital.  Pt requesting breakfast.  Dr. Marilu Samuels gave the ok for patient to eat. Meal tray ordered from dietary services.

## 2017-02-10 NOTE — PROGRESS NOTES
Bedside shift change report given to [address] (oncoming nurse) by Raphael Graham (offgoing nurse). Report included the following information SBAR, Kardex, MAR and Recent Results.

## 2017-02-10 NOTE — PROGRESS NOTES
Bedside shift change report given to Desiree Ji RN (oncoming nurse) by Navdeep Lizarraga RN (offgoing nurse). Report included the following information SBAR, Kardex, MAR and Recent Results. 00:05  Pt wide awake in bed. BUE tremerous. Pt stated he feels very jittery, and is concerned that he cannot seem to fall asleep. Pt stated he did not get his Ambien and Trazadone which he takes for sleep regularly. Spoke with Dr. Debora Maciel about pt concerns. Normal respiratory rate with no signs of distress. Oxygen levels upper 90s-100. Orders received to resume Ambien and Trazadone, but to wait one hour after giving Ambien, and if pt still awake and not sleepy to give Trazadone at that point. Desiree Ji RN    Pt oxygen saturations dropped to lower-mid 80's after nebulizer treatment. Also with little exertion. Desiree Ji RN    07:00  Bedside shift change report given to Liza Johansen RN (oncoming nurse) by Desiree Ji RN (offgoing nurse). Report included the following information SBAR, Kardex, MAR and Recent Results.

## 2017-02-10 NOTE — PROGRESS NOTES
Pt with coughing spell after breathing treatment. Pt having a hard time breathing sats 73%. Pt very anxious, encouraging pt to breathe slowly. PRN ativan given. After 15 min pt more calm resting, sats 95%. Updated wife.  Tori Singh RN

## 2017-02-10 NOTE — PROGRESS NOTES
Bedside shift change report given to [employer], RN (oncoming nurse) by Jany Eason RN (offgoing nurse). Report included the following information SBAR.

## 2017-02-10 NOTE — PROGRESS NOTES
Hospitalist Progress Note    NAME: Shannan Pride   :  1945   MRN:  [id number]       Interim Hospital Summary: 66 y.o. male whom presented on 2017 with      Assessment / Plan:  Acute on chronic respiratory failure ( baseline 4 to 4.5 litres)  With  pulmonary fibrosis/ copd exacerbation  Compensatory respiratory acidosis  Underlying chronic systolic chf     Bronchodilators, levo and steroids  ( on chronic zithromax on alternate day at home, would resume on discharge)  Will be on symbicort and spiriva respimat on discharge    Per wife pt would get more confused in the hospital-- may need sitter  On trazadone and ambien as needed     Chest xray neg        Anemia of chronic disease: monitor  Check for work up   No signs of bleeding     Chronic systolic chf  Last known ef 45%  Cont asa, bystolic and lisinopril        Very high risk of further deterioration     Code Status:dnr  Surrogate Decision Maker:     DVT Prophylaxis: lovenox  GI Prophylaxis: not indicated     Baseline: fair functional status         Subjective:     Chief Complaint / Reason for Physician Visit  i need to get my sleep medicines  . Discussed with RN events overnight. Review of Systems:  Symptom Y/N Comments  Symptom Y/N Comments   Fever/Chills n   Chest Pain n    Poor Appetite    Edema     Cough y   Abdominal Pain n    Sputum    Joint Pain     SOB/KOO n   Pruritis/Rash     Nausea/vomit    Tolerating PT/OT     Diarrhea n   Tolerating Diet     Constipation    Other       Could NOT obtain due to:      Objective:     VITALS:   Last 24hrs VS reviewed since prior progress note.  Most recent are:  Patient Vitals for the past 24 hrs:   Temp Pulse Resp BP SpO2   02/10/17 0729 - - - - 97 %   02/10/17 0712 98.3 °F (36.8 °C) 92 22 149/83 (!) 89 %   02/10/17 0126 - - - - 97 %   17 2307 98.1 °F (36.7 °C) 92 22 136/79 97 %   17 2040 - - - - 96 %   17 1852 98.3 °F (36.8 °C) 92 20 134/66 97 %   17 1500 - 98 - 126/53 96 % 02/09/17 1442 - - - - 96 %   02/09/17 1328 97.8 °F (36.6 °C) 99 22 125/64 99 %   02/09/17 1215 - (!) 103 21 122/65 95 %   02/09/17 1145 - (!) 106 23 120/67 95 %   02/09/17 1115 - (!) 106 23 119/62 91 %       Intake/Output Summary (Last 24 hours) at 02/10/17 1032  Last data filed at 02/10/17 0418   Gross per 24 hour   Intake                0 ml   Output              375 ml   Net             -375 ml        PHYSICAL EXAM:  General: WD, WN. Alert, cooperative, no acute distress    EENT:  EOMI. Anicteric sclerae. MMM  Resp:  B/l decreased air entry, + dry crackles. No accessory muscle use  CV:  Regular  rhythm,  No edema  GI:  Soft, Non distended, Non tender.  +Bowel sounds  Neurologic:  Alert and oriented X 3, normal speech,   Psych:   Good insight. Not anxious nor agitated  Skin:  No rashes. No jaundice    Reviewed most current lab test results and cultures  YES  Reviewed most current radiology test results   YES  Review and summation of old records today    NO  Reviewed patient's current orders and MAR    YES  PMH/SH reviewed - no change compared to H&P  ________________________________________________________________________  Care Plan discussed with:    Comments   Patient y    Family      RN y    Care Manager     Consultant                        Multidiciplinary team rounds were held today with , nursing, pharmacist and clinical coordinator. Patient's plan of care was discussed; medications were reviewed and discharge planning was addressed.      ________________________________________________________________________  Total NON critical care TIME:  35  Minutes    Total CRITICAL CARE TIME Spent:   Minutes non procedure based      Comments   >50% of visit spent in counseling and coordination of care     ________________________________________________________________________  Manda Sinclair MD     Procedures: see electronic medical records for all procedures/Xrays and details which were not copied into this note but were reviewed prior to creation of Plan. LABS:  I reviewed today's most current labs and imaging studies.   Pertinent labs include:  Recent Labs      02/10/17   0133  02/09/17   0752   WBC  8.1  8.0   HGB  8.1*  8.8*   HCT  27.3*  30.7*   PLT  288  269     Recent Labs      02/10/17   0133  02/09/17   0752   NA  139  140   K  4.2  3.9   CL  96*  99   CO2  34*  36*   GLU  116*  102*   BUN  22*  15   CREA  0.93  0.80   CA  8.3*  8.2*   ALB   --   3.4*   TBILI   --   0.3   SGOT   --   27   ALT   --   28       Signed: Karlynn Jeans, MD

## 2017-02-10 NOTE — PROGRESS NOTES
PT note:    Chart reviewed and cleared by nursing. Attempted to see patient for PT treatment session. Patient just worked with OT and returned to bed after sitting up for an hour. He reports he did not get sleep last night and pleasantly requesting to defer PT session until later. Will follow up as able for PT treatment session.  Encouraged patient and wife to have him sit up in the chair for all meals over the weekend with RN assist.     Jasper Santamaria, PT, DPT

## 2017-02-10 NOTE — PROGRESS NOTES
Problem: Self Care Deficits Care Plan (Adult)  Goal: *Acute Goals and Plan of Care (Insert Text)  Occupational Therapy Goals  Initiated 2/10/2017  1. Patient will perform grooming while standing at the sink with supervision/standby assist within 7 day(s). 2. Patient will perform bathing with minimal assistance/contact guard assist within 7 day(s). 3. Patient will perform lower body dressing with minimal assistance within 7 day(s). 4. Patient will perform toilet transfers with modified independence within 7 day(s). 5. Patient will perform all aspects of toileting with contact guard assist within 7 day(s). 6. Patient will independently utilize energy conservation techniques during functional activities within 7 day(s). OCCUPATIONAL THERAPY EVALUATION  Patient: Augustine Garibay (65 y.o. male)  Date: 2/10/2017  Primary Diagnosis: Respiratory failure (HCC)        Precautions:          ASSESSMENT :  Based on the objective data described below, the patient presents with deficits in self-care, primarily due to shortness of breath/decreased activity tolerance, general weakness, and mild decreased dynamic balance. His wife admits to helping him with self-care at home due to shortness of breath. Today, his O2 saturation dropped to the mid 80's with minimal activity. Provided education for pursed lip breathing, which helped, but it took him about 5 minutes to recover to 90%. Patient's wife works during the day, so he would have to be alone. Depending on progress, he may need to go to a SNF for short term rehab to maximize his safety and independence in basic self-care tasks. Patient will benefit from skilled intervention to address the above impairments.   Patients rehabilitation potential is considered to be Good  Factors which may influence rehabilitation potential include:   [ ]             None noted  [ ]             Mental ability/status  [X]             Medical condition  [ ]             Home/family situation and support systems  [ ]             Safety awareness  [ ]             Pain tolerance/management  [ ]             Other:        PLAN :  Recommendations and Planned Interventions:  [X]               Self Care Training                  [X]        Therapeutic Activities  [X]               Functional Mobility Training    [ ]        Cognitive Retraining  [X]               Therapeutic Exercises           [X]        Endurance Activities  [X]               Balance Training                   [ ]        Neuromuscular Re-Education  [ ]               Visual/Perceptual Training     [X]   Home Safety Training  [X]               Patient Education                 [X]        Family Training/Education  [ ]               Other (comment):     Frequency/Duration: Patient will be followed by occupational therapy 3 times a week to address goals. Discharge Recommendations: Home Health vs [facility]  Further Equipment Recommendations for Discharge: None anticipated, but continue to monitor needs       SUBJECTIVE:   Patient stated Sable Keven we gonna walk into the bathroom?   (recommend BSC for now due to decreased O2 sats)      OBJECTIVE DATA SUMMARY:   HISTORY:   Past Medical History   Diagnosis Date    [facility] ([facility]      Diastolic CHF, chronic ([facility] 11/4/2015    Emphysema/COPD ([facility] [facility] 9/20/2009    Essential hypertension, benign 9/20/2009    GERD (gastroesophageal reflux disease) 9/20/2009    Hypertension      Migraine headache 9/20/2009    Post-tuberculous bronchiectasis 10/17/2013    Thyroid disease         hypothyroidism   History reviewed. No pertinent past surgical history. Prior Level of Function/Home Situation: Wife assists with ADL when he is SOB, especially lower body self-care.   Wife works during the day  Expanded or extensive additional review of patient history:      Home Situation  Home Environment: Private residence  # Steps to Enter: 2  Rails to Enter: Yes  One/Two Story Residence: One story  Living Alone: No  Support Systems: Spouse/Significant Other/Partner  Patient Expects to be Discharged to[de-identified] Private residence  Current DME Used/Available at Home: Merary [place], straight, Oxygen, portable, Walker, Wheelchair  [X]  Right hand dominant             [ ]  Left hand dominant     EXAMINATION OF PERFORMANCE DEFICITS:  Cognitive/Behavioral Status:  Neurologic State: Alert  Orientation Level: Oriented X4  Cognition: Appropriate for age attention/concentration; Follows commands  Perception: Appears intact  Perseveration: No perseveration noted  Safety/Judgement: Awareness of environment; Insight into deficits;Good awareness of safety precautions     Range of Motion:  AROM: Within functional limits                       Strength:  Strength: Generally decreased, functional              Coordination:     Fine Motor Skills-Upper: Left Impaired Kimani Sofy" possible due to meds per patient)    Gross Motor Skills-Upper: Left Intact; Right Intact  Tone & Sensation:  Tone: Normal  Sensation: Intact                       Balance:  Sitting: Intact  Sitting - Static: Good (unsupported)  Sitting - Dynamic: Good (unsupported)  Standing: Impaired  Standing - Static: Good  Standing - Dynamic : Fair     Functional Mobility and Transfers for ADLs:  Bed Mobility:  Rolling: Contact guard assistance  Scooting: Contact guard assistance     Transfers:  Sit to Stand: Contact guard assistance  Stand to Sit: Contact guard assistance  Toilet Transfer : Contact guard assistance     ADL Assessment:  Feeding: Setup     Oral Facial Hygiene/Grooming: Setup (seated)     Bathing: Minimum assistance; Additional time     Upper Body Dressing: Supervision     Lower Body Dressing: Maximum assistance     Toileting: Moderate assistance                 ADL Intervention and task modifications:  Educated on pursed lip breathing; he requires demonstration and cues throughout to properly utilize. Initiated ADL training.      Functional Measure:  Barthel Index:      Bathin  Bladder: 10  Bowels: 10  Groomin  Dressin  Feedin  Mobility: 0  Stairs: 0  Toilet Use: 5  Transfer (Bed to Chair and Back): 10  Total: 50         Barthel and G-code impairment scale:  Percentage of impairment CH  0% CI  1-19% CJ  20-39% CK  40-59% CL  60-79% CM  80-99% CN  100%   Barthel Score 0-100 100 99-80 79-60 59-40 20-39 1-19    0   Barthel Score 0-20 20 17-19 13-16 9-12 5-8 1-4 0      The Barthel ADL Index: Guidelines  1. The index should be used as a record of what a patient does, not as a record of what a patient could do. 2. The main aim is to establish degree of independence from any help, physical or verbal, however minor and for whatever reason. 3. The need for supervision renders the patient not independent. 4. A patient's performance should be established using the best available evidence. Asking the patient, friends/relatives and nurses are the usual sources, but direct observation and common sense are also important. However direct testing is not needed. 5. Usually the patient's performance over the preceding 24-48 hours is important, but occasionally longer periods will be relevant. 6. Middle categories imply that the patient supplies over 50 per cent of the effort. 7. Use of aids to be independent is allowed. Veronica Richardson., Barthel, DMarcW. (7200). Functional evaluation: the Barthel Index. [facility] (14)2. Norval Low moses [place], J.J.M.F, Coby Barrow., Emi Hilliard., [place], [address] [place] [address] (). Measuring the change indisability after inpatient rehabilitation; comparison of the responsiveness of the Barthel Index and Functional [place] Measure. Journal of Neurology, Neurosurgery, and Psychiatry, 66(4), 187-885. Kayleigh Jade, N.J.A, NASIM Simon.JASKARAN, & Juan Macias M.A. (2004.) Assessment of post-stroke quality of life in cost-effectiveness studies: The usefulness of the Barthel Index and the EuroQoL-5D.  Quality of Life Research, 13, 427-43         G codes: In compliance with CMSs Claims Based Outcome Reporting, the following G-code set was chosen for this patient based on their primary functional limitation being treated: The outcome measure chosen to determine the severity of the functional limitation was the Barthel Index with a score of 50/100 which was correlated with the impairment scale. · Self Care:               - CURRENT STATUS:    CK - 40%-59% impaired, limited or restricted               - GOAL STATUS:           CJ - 20%-39% impaired, limited or restricted               - D/C STATUS:                       ---------------To be determined---------------      Occupational Therapy Evaluation Charge Determination   History Examination Decision-Making   LOW Complexity : Brief history review  LOW Complexity : 1-3 performance deficits relating to physical, cognitive , or psychosocial skils that result in activity limitations and / or participation restrictions  MEDIUM Complexity : Patient may present with comorbidities that affect occupational performnce. Miniml to moderate modification of tasks or assistance (eg, physical or verbal ) with assesment(s) is necessary to enable patient to complete evaluation       Based on the above components, the patient evaluation is determined to be of the following complexity level: LOW   Pain:  Pain Scale 1: Numeric (0 - 10)  Pain Intensity 1: 0              Activity Tolerance:   Poor  After treatment:   [X] Patient left in no apparent distress sitting up in chair  [ ] Patient left in no apparent distress in bed  [X] Call bell left within reach  [X] Nursing notified  [X] Caregiver present  [X] Bed alarm activated      COMMUNICATION/EDUCATION:   The patients plan of care was discussed with: Physical Therapist and Registered Nurse.  [X] Home safety education was provided and the patient/caregiver indicated understanding.   [ ] Patient/family have participated as able in goal setting and plan of care. [X] Patient/family agree to work toward stated goals and plan of care. [ ] Patient understands intent and goals of therapy, but is neutral about his/her participation. [ ] Patient is unable to participate in goal setting and plan of care. This patients plan of care is appropriate for delegation to [facility].      Thank you for this referral.  Butch Barahona, OTR/L  Time Calculation: 26 mins

## 2017-02-10 NOTE — PROGRESS NOTES
Bedside shift change report given to Tyesha Khan (oncoming nurse) by Maria Elena Hess (offgoing nurse). Report included the following information SBAR, Kardex, MAR and Recent Results.

## 2017-02-10 NOTE — CONSULTS
[facility]  Pulmonary, Critical Care, and Sleep Medicine    Initial Patient Consult    Name: Sarina Shaw MRN: [id number]   : 1945 Hospital: [facility]   Date: 2/10/2017        IMPRESSION:   · Chronic respiratory failure  · COPD exacerbation  · ILD/pulmonary fibrosis      RECOMMENDATIONS:   · O2  · Jet nebs  · Empiric abx  · Steroid taper, needs 12 days taper on discharge  · Needs LABA/ICS/LAMA for maint rx. At home should continue symbicort 2 puffs BID and add spiriva respimat 2 inhalations Q day  · Has duoneb and xopenex inhaler at home for rescue rx  · Will make an appt to see me in office next week  · DNR     Subjective: This patient has been seen and evaluated at the request of Dr. Nataliia Avina for copd exacerbation. Patient is a 66 y.o. male seen by me 20 years ago, then followed by Dr Shavon Simpson at [facility], then seen by Dr Priay Milian 1 year ago  Followed by Dr Jenny Laguerre his primary care MD  Presented to [address] [place] [address] with progressive KOO  Today in no significant respiratory distress      Past Medical History   Diagnosis Date    Chronic obstructive pulmonary disease ([address]     Diastolic [facility]F, chronic ([facility] 2015    Emphysema/COPD ([facility] 2009    Essential hypertension, benign 2009    GERD (gastroesophageal reflux disease) 2009    Hypertension     Migraine headache 2009    Post-tuberculous bronchiectasis 10/17/2013    Thyroid disease      hypothyroidism      History reviewed. No pertinent past surgical history. Prior to Admission medications    Medication Sig Start Date End Date Taking? Authorizing Provider   L. gasseri-B. bifidum-B longum 1.5 billion cell cap Take 2 Caps by mouth daily. Yes Historical Provider   furosemide (LASIX) 20 mg tablet Take 20 mg by mouth daily as needed (>2 lb weight gain). Administration Instructions: Patient is to take furosemide 20 mg tab, two tablets by month once daily.  Patient is to weight himself daily and if there is a weight gain >2 lb, he is to take an additional 20 mg tablet for a maximum daily dose of 60 mg. Yes Historical Provider   nebivolol (BYSTOLIC) 5 mg tablet Take 5 mg by mouth two (2) times a day. Yes Historical Provider   potassium chloride SR (KLOR-CON 10) 10 mEq tablet Take 10 mEq by mouth daily as needed (when an extra furosemide dose is taken for weight gain of >2 lb). Administration Instructions: Take two tabs by mouth daily, take a third dose of 10 mEq when a third dose of furosemide 20 mg is administered for weight gain of 2 lb   Yes Historical Provider   SUMATRIPTAN SUCCINATE (IMITREX PO) Take  by mouth as needed (migraine). Yes Historical Provider   raNITIdine (ZANTAC) 150 mg tablet Take 150 mg by mouth daily (with lunch). Yes Historical Provider   diphenhydrAMINE (BENADRYL) 25 mg capsule Take 25 mg by mouth two (2) times a day. Indications: COUGH   Yes Historical Provider   guaiFENesin-codeine (ROBITUSSIN AC) 100-10 mg/5 mL solution TAKE 5ML BY MOUTH 4 TIMES A DAY AS NEEDED MAXIMUM 20ML PER DAY (GENERIC FOR ROBITUSSIN AC) 2/6/17  Yes Theodore Guerrero III, DO   zolpidem (AMBIEN) 5 mg tablet Take 1-2 Tabs by mouth nightly as needed for Sleep. Max Daily Amount: 10 mg. 12/5/16  Yes Theodore Guerrero III, DO   budesonide-formoterol (SYMBICORT) 160-4.5 mcg/actuation HFA inhaler Take 2 Puffs by inhalation two (2) times a day. 10/21/16  Yes Lilliancarmen Kumari III, DO   azithromycin (ZITHROMAX) 250 mg tablet Take 1 Tab by mouth every Monday, Wednesday, Friday. 9/2/16  Yes Theodore Guerrero III, DO   levalbuterol (XOPENEX) 1.25 mg/3 mL nebu INHALE 1 VIAL VIA NEBULIZER 3 TIMES DAILY  Patient taking differently: INHALE 1 VIAL VIA NEBULIZER 2 TIMES DAILY 8/1/16  Yes Theodore Guerrero III, DO   XOPENEX HFA 45 mcg/actuation inhaler USE 2 INHALATIONS BY MOUTH EVERY 4 HOURS AS NEEDED FOR WHEEZING 7/6/16  Yes Theodore Guerrero III, DO   traZODone (DESYREL) 50 mg tablet Take 2 Tabs by mouth nightly. 5/10/16  Yes Theodore Guerrero III, DO   lisinopril (PRINIVIL, ZESTRIL) 20 mg tablet TAKE 1 BY MOUTH DAILY 4/4/16  Yes Lelia Carrera MD   sertraline (ZOLOFT) 50 mg tablet Take 1.5 Tabs by mouth daily. 2/17/16  Yes Lelia Carrera MD   levothyroxine (SYNTHROID) 50 mcg tablet TAKE 1 TABLET DAILY BEFORE BREAKFAST 2/17/16  Yes Lelia Carrera MD   aspirin delayed-release 81 mg tablet Take 81 mg by mouth daily. Yes Historical Provider   polyethylene glycol (MIRALAX) 17 gram packet Take 17 g by mouth daily as needed. Yes Historical Provider   naproxen sodium (ALEVE) 220 mg tablet Take 220-440 mg by mouth daily as needed for Pain. Yes Historical Provider   LORazepam (ATIVAN) 1 mg tablet TAKE 1/2 TO 1 BY MOUTH UP TO 3 TIMES DAILY AS NEEDED FOR ANXIETY  Patient taking differently: TAKE  1 tablet BY MOUTH 2 times daily 1/23/17   Theodore Guerrero III DO   potassium chloride SR (KLOR-CON 10) 10 mEq tablet Take 1 Tab by mouth two (2) times daily (with meals). Take 2nd dose when you take an extra Furosemide. Indications: HYPOKALEMIA  Patient taking differently: Take 20 mEq by mouth daily. Administration Instructions: Take two tabs by mouth daily, take a third dose of 10 mEq when a third dose of furosemide 20 mg is administered for weight gain of 2 lb  Indications: HYPOKALEMIA 5/10/16   Theodore Guerrero III DO   ipratropium (ATROVENT) 0.02 % nebulizer solution 2.5 mL by Nebulization route three (3) times daily. Patient taking differently: 0.5 mg by Nebulization route two (2) times a day. 3/24/16   Lelia Carrera MD   furosemide (LASIX) 20 mg tablet Take 2 Tabs by mouth daily. Indications: EDEMA  Patient taking differently: Take 40 mg by mouth daily. Administration Instructions: Patient is to take furosemide 20 mg tab, two tablets by month once daily. Patient is to weight himself daily and if there is a weight gain >2 lb, he is to take an additional 20 mg tablet for a maximum daily dose of 60 mg.   Indications: Edema 2/17/16   Khoi Mcgrath MD     Allergies   Allergen Reactions    Cardizem [Diltiazem Hcl] Other (comments)     Headache and constipation      Social History   Substance Use Topics    Smoking status: Former Smoker     Packs/day: 1.50     Years: 25.00     Types: Cigarettes     Quit date: 4/26/1979    Smokeless tobacco: Never Used    Alcohol use No      Family History   Problem Relation Age of Onset    Heart Disease Mother     Stroke Mother     Cancer Father      throat        Current Facility-Administered Medications   Medication Dose Route Frequency    ferrous sulfate tablet 325 mg  1 Tab Oral DAILY WITH BREAKFAST    famotidine (PEPCID) tablet 20 mg  20 mg Oral BID    polyethylene glycol (MIRALAX) packet 17 g  17 g Oral DAILY    aspirin delayed-release tablet 81 mg  81 mg Oral DAILY    lactobac ac& pc-s.therm-b.anim (CY Q/RISAQUAD)  1 Cap Oral DAILY    fluticasone-vilanterol (BREO ELLIPTA) 100mcg-25mcg/puff  1 Puff Inhalation DAILY    levalbuterol (XOPENEX) nebulizer soln 1.25 mg/3 mL  1.25 mg Nebulization Q6H RT    ipratropium (ATROVENT) 0.02 % nebulizer solution 0.5 mg  0.5 mg Nebulization TID    levothyroxine (SYNTHROID) tablet 50 mcg  50 mcg Oral 7am    lisinopril (PRINIVIL, ZESTRIL) tablet 20 mg  20 mg Oral DAILY    sertraline (ZOLOFT) tablet 75 mg  75 mg Oral DAILY    umeclidinium (INCRUSE ELLIPTA) 62.5 mcg/actuation  1 Puff Inhalation DAILY    sodium chloride (NS) flush 5-10 mL  5-10 mL IntraVENous Q8H    enoxaparin (LOVENOX) injection 40 mg  40 mg SubCUTAneous Q24H    methylPREDNISolone (PF) (SOLU-MEDROL) injection 40 mg  40 mg IntraVENous Q8H    nebivolol (BYSTOLIC) tablet 5 mg  5 mg Oral BID    doxycycline (VIBRA-TABS) tablet 100 mg  100 mg Oral Q12H    furosemide (LASIX) tablet 40 mg  40 mg Oral DAILY       Review of Systems:  A comprehensive review of systems was negative except for: Respiratory: positive for cough or dyspnea on exertion    Objective:   Vital Signs: Visit Vitals    /83 (BP 1 Location: Left arm)    Pulse 92    Temp 98.3 °F (36.8 °C)    Resp 22    Ht 5' 11\" (1.803 m)    Wt 61.1 kg (134 lb 11.2 oz)    SpO2 97%    BMI 18.79 kg/m2       O2 Device: Nasal cannula   O2 Flow Rate (L/min): 4.5 l/min   Temp (24hrs), Av.1 °F (36.7 °C), Min:97.8 °F (36.6 °C), Max:98.3 °F (36.8 °C)       Intake/Output:   Last shift:         Last 3 shifts:  1901 - 02/10 0700  In: -   Out: 375 [Urine:375]    Intake/Output Summary (Last 24 hours) at 02/10/17 1013  Last data filed at 02/10/17 0418   Gross per 24 hour   Intake                0 ml   Output              375 ml   Net             -375 ml      Physical Exam:   General:  Alert, cooperative, no distress, appears stated age. Head:  Normocephalic, without obvious abnormality, atraumatic. Eyes:  Conjunctivae/corneas clear. PERRL, EOMs intact. Nose: Nares normal. Septum midline. Mucosa normal. No drainage or sinus tenderness. Throat: Lips, mucosa, and tongue normal. Teeth and gums normal.   Neck: Supple, symmetrical, trachea midline, no adenopathy, thyroid: no enlargment/tenderness/nodules, no carotid bruit and no JVD. Back:   Symmetric, no curvature. ROM normal.   Lungs:   Poor air movement. Chest wall:  No tenderness or deformity. Heart:  Regular rate and rhythm, S1, S2 normal, no murmur, click, rub or gallop. Abdomen:   Soft, non-tender. Bowel sounds normal. No masses,  No organomegaly. Extremities: Extremities normal, atraumatic, no cyanosis or edema. Pulses: 2+ and symmetric all extremities.    Skin: Skin color, texture, turgor normal. No rashes or lesions   Lymph nodes: Cervical, supraclavicular, and axillary nodes normal.   Neurologic: Grossly nonfocal     Data review:     Recent Results (from the past 24 hour(s))   GLUCOSE, POC    Collection Time: 17 11:18 PM   Result Value Ref Range    Glucose (POC) 135 (H) 65 - 100 mg/dL    Performed by [address] [facility], BASIC Collection Time: 02/10/17  1:33 AM   Result Value Ref Range    Sodium 139 136 - 145 mmol/L    Potassium 4.2 3.5 - 5.1 mmol/L    Chloride 96 (L) 97 - 108 mmol/L    CO2 34 (H) 21 - 32 mmol/L    Anion gap 9 5 - 15 mmol/L    Glucose 116 (H) 65 - 100 mg/dL    BUN 22 (H) 6 - 20 MG/DL    Creatinine 0.93 0.70 - 1.30 MG/DL    BUN/Creatinine ratio 24 (H) 12 - 20      GFR est AA >60 >60 ml/min/1.73m2    GFR est non-AA >60 >60 ml/min/1.73m2    Calcium 8.3 (L) 8.5 - 10.1 MG/DL   CBC W/O DIFF    Collection Time: 02/10/17  1:33 AM   Result Value Ref Range    WBC 8.1 4.1 - 11.1 K/uL    RBC 3.56 (L) 4.10 - 5.70 M/uL    HGB 8.1 (L) 12.1 - 17.0 g/dL    HCT 27.3 (L) 36.6 - 50.3 %    MCV 76.7 (L) 80.0 - 99.0 FL    MCH 22.8 (L) 26.0 - 34.0 PG    MCHC 29.7 (L) 30.0 - 36.5 g/dL    RDW 15.5 (H) 11.5 - 14.5 %    PLATELET 171 364 - 349 K/uL   VITAMIN B12    Collection Time: 02/10/17  1:33 AM   Result Value Ref Range    Vitamin B12 467 211 - 911 pg/mL   FERRITIN    Collection Time: 02/10/17  1:33 AM   Result Value Ref Range    Ferritin 6 (L) 26 - 388 NG/ML   IRON PROFILE    Collection Time: 02/10/17  1:33 AM   Result Value Ref Range    Iron 18 (L) 35 - 150 ug/dL    TIBC 437 250 - 450 ug/dL    Iron % saturation 4 (L) 20 - 50 %   GLUCOSE, POC    Collection Time: 02/10/17  7:40 AM   Result Value Ref Range    Glucose (POC) 135 (H) 65 - 100 mg/dL    Performed by Yossi Phelps (PCT)        Imaging:  I have personally reviewed the patients radiographs and have reviewed the reports:  CXR: chronic ILD, no acute changes        William Araiza MD

## 2017-02-10 NOTE — CARDIO/PULMONARY
Cardiopulmonary Rehab:    Chart reviewed. Pt is a 66 y.o. male admitted with Acute on chronic respiratory failure ( baseline 4 to 4.5 litres)  With pulmonary fibrosis/ copd exacerbation, Compensatory respiratory acidosis, Underlying chronic systolic CHF. LVEF 45%. Pt last seen for CHF teaching by cardiac rehab on  7/8/16. Pt visited. Pt received the CHF teaching packet at a prior admission. The admission medication recon sheet was provided. Also provided printed teaching materials re: \"Heart Failure: Self Care; Avoiding Triggers with Heart Failure and Limiting Sodium and Fluids with Heart Failure. \" Reviewed the low sodium diet, checking labels for sodium in canned, packaged and processed foods, CHF S&Ss, when to call the doctor and the benefits of daily weights. Wife well versed in CHF management. Reports pt's normal weight is 135 lbs. Correctly identified name and purpose of his diuretic. Wife familiar with s&s of fluid overload and has been instructed by MD to increase lasix and potassium as needed. Wife verbalized understanding. Will follow.

## 2017-02-11 NOTE — PROGRESS NOTES
Hospitalist Progress Note    NAME: Evelyn Singer   :  1945   MRN:  [id number]       Interim Hospital Summary: 66 y.o. male whom presented on 2017 with      Assessment / Plan:  Acute on chronic respiratory failure ( baseline 4 to 4.5 litres)  With  pulmonary fibrosis/ copd exacerbation  Compensatory respiratory acidosis  Underlying chronic systolic chf     Bronchodilators, doxy and steroids  ( on chronic zithromax on alternate day at home, would resume on discharge)  Will be on symbicort and spiriva respimat on discharge    Per wife pt would get more confused in the hospital-- may need sitter  On trazadone and ambien as needed     Chest xray neg        Anemia of chronic disease: monitor  More consistent with fe def anemia  Needs out patient work up colonoscopy when able     Chronic systolic chf  Last known ef 45%  Cont asa, bystolic and lisinopril    Concern for overflow incontinence  Bladder scan post void    If concerning would need english     Very high risk of further deterioration     Code Status:dnr  Surrogate Decision Maker:     DVT Prophylaxis: lovenox  GI Prophylaxis: not indicated     Baseline: fair functional status         Subjective:     Chief Complaint / Reason for Physician Visit  Little better  . Discussed with RN events overnight. Review of Systems:  Symptom Y/N Comments  Symptom Y/N Comments   Fever/Chills n   Chest Pain n    Poor Appetite    Edema     Cough y   Abdominal Pain n    Sputum    Joint Pain     SOB/KOO n   Pruritis/Rash     Nausea/vomit    Tolerating PT/OT     Diarrhea n   Tolerating Diet     Constipation    Other       Could NOT obtain due to:      Objective:     VITALS:   Last 24hrs VS reviewed since prior progress note.  Most recent are:  Patient Vitals for the past 24 hrs:   Temp Pulse Resp BP SpO2   17 0815 - 63 - 128/57 -   17 0724 - - - - 100 %   17 0231 97.9 °F (36.6 °C) 62 22 122/58 100 %   02/10/17 2259 97.7 °F (36.5 °C) 74 22 131/65 96 % 02/10/17 2015 97.9 °F (36.6 °C) 85 20 153/71 96 %   02/10/17 2000 - 88 - - 95 %   02/10/17 1600 - - - - 92 %   02/10/17 1505 97.8 °F (36.6 °C) 96 22 155/82 91 %   02/10/17 1354 - - - - 96 %       Intake/Output Summary (Last 24 hours) at 02/11/17 1221  Last data filed at 02/11/17 0231   Gross per 24 hour   Intake              400 ml   Output              800 ml   Net             -400 ml        PHYSICAL EXAM:  General: WD, WN. Alert, cooperative,  no acute distress    EENT:  EOMI. Anicteric sclerae. MMM  Resp:  B/l decreased air entry, no crackles. No accessory muscle use  CV:  Regular  rhythm,  No edema  GI:  Soft, Non distended, Non tender.  +Bowel sounds  Neurologic:  Alert and oriented X 3, normal speech,   Psych:   Good insight. Not anxious nor agitated  Skin:  No rashes. No jaundice    Reviewed most current lab test results and cultures  YES  Reviewed most current radiology test results   YES  Review and summation of old records today    NO  Reviewed patient's current orders and MAR    YES  PMH/ reviewed - no change compared to H&P  ________________________________________________________________________  Care Plan discussed with:    Comments   Patient y    Family      RN y    Care Manager     Consultant                        Multidiciplinary team rounds were held today with , nursing, pharmacist and clinical coordinator. Patient's plan of care was discussed; medications were reviewed and discharge planning was addressed.      ________________________________________________________________________  Total NON critical care TIME: 25  Minutes    Total CRITICAL CARE TIME Spent:   Minutes non procedure based      Comments   >50% of visit spent in counseling and coordination of care     ________________________________________________________________________  Dmitriy Franco MD     Procedures: see electronic medical records for all procedures/Xrays and details which were not copied into this note but were reviewed prior to creation of Plan. LABS:  I reviewed today's most current labs and imaging studies.   Pertinent labs include:  Recent Labs      02/10/17   0133  02/09/17   0752   WBC  8.1  8.0   HGB  8.1*  8.8*   HCT  27.3*  30.7*   PLT  288  269     Recent Labs      02/10/17   0133  02/09/17   0752   NA  139  140   K  4.2  3.9   CL  96*  99   CO2  34*  36*   GLU  116*  102*   BUN  22*  15   CREA  0.93  0.80   CA  8.3*  8.2*   ALB   --   3.4*   TBILI   --   0.3   SGOT   --   27   ALT   --   28       Signed: Daniel Najera MD

## 2017-02-11 NOTE — PROGRESS NOTES
Bedside shift change report given to Jm Purcell (oncoming nurse) by Sultana Goddard RN (offgoing nurse). Report included the following information SBAR, Kardex, Intake/Output and Recent Results. Pt refusing breathing tx overnight, resting quietly in bed at this time with wife at bedside. 2050: External catheter placed per pt request r/t frequent voiding. Wife left at this time, bed alarm on and tele-sitter at bedside. 2145: Checked on pt 1 hr after giving Ambien to assess need for trazodone, pt sleeping at this time. Will continue to monitor.

## 2017-02-12 NOTE — PROGRESS NOTES
Bedside shift change report given to Alison Iqbal RN (oncoming nurse) by Naomi Tubbs RN (offgoing nurse). Report included the following information SBAR, Kardex, Intake/Output and Recent Results.

## 2017-02-12 NOTE — PROGRESS NOTES
Care Management Discharge Note    **CM Consult for home health:  [place], referral sent via CC to [address] updated AVS**    Discharge orders written, medically stable for discharge. Care Management Interventions  PCP Verified by CM: Yes (Dr. Ed Bumpers)  Last Visit to PCP: 12/05/16  Transition of Care Consult (CM Consult): Home Health (Received order for home health:  [facility] for PT and OT. Referral sent via CC to [address]  [address] Yes  MyChart Signup: No  Discharge Durable Medical Equipment: No  Health Maintenance Reviewed: Yes  Physical Therapy Consult: Yes  Occupational Therapy Consult: Yes  Speech Therapy Consult: No  Current Support Network: Lives with Spouse (Lives with spouse, she works outside of the [employer]. Oxygen at home.  )  Plan discussed with Pt/Family/Caregiver: Yes (Met with patient and spouse at bedside.   They are agreeable with discharge plans and home health.  )  Freedom of Choice Offered: Yes  Discharge Location  Discharge Placement: Home with home health    Jaci Jimenez, RN, BSN, [facility]  ED Care Management  [phone]

## 2017-02-12 NOTE — DISCHARGE SUMMARY
Hospitalist Discharge Summary     Patient ID:  Negra Renteria  [id number]  43 y.o.  1945    PCP on record: Maryann Mireles DO    Admit date: 2/9/2017  Discharge date and time: 2/12/2017      DISCHARGE DIAGNOSIS:  Acute on chronic respiratory failure  Copd exacerbation   Pulmonary fibrosis  chronci systolic chf        CONSULTATIONS:  IP CONSULT TO INTENSIVIST    Excerpted HPI from H&P of Karlynn Jeans, MD:  Pieter Britton is a 66 y.o. male who with history of pulmonary fibrosis/ copd and chf ( last known ef 40%)  Was following with pulmonary at u retired, also sees cardiology at [facility] once in 6 months  Baseline on 4 litres, recently increased to 4.5litres per the family  Has been steady decline more so in the last few months, with coughing spells, and having been confused on and off,  Also wife noted that he increased weight, and she would give extra dose of lasix and potassium with the weight gain  And yesterday, just walking few steps sats down to loow 80s  Came to the er  Denies any chest pain or abdominal pain  No urinary or bowel complaints       ______________________________________________________________________  DISCHARGE SUMMARY/HOSPITAL COURSE:  for full details see H&P, daily progress notes, labs, consult notes.          Acute on chronic respiratory failure ( baseline 4 to 4.5 litres)  With pulmonary fibrosis/ copd exacerbation  Compensatory respiratory acidosis  Underlying chronic systolic chf      Bronchodilators, doxy and steroids  ( on chronic zithromax on alternate day at home, would resume on discharge)  Will be on symbicort and spiriva respimat on discharge     Per wife pt would get more confused in the hospital-- may need sitter  On trazadone and ambien as needed      Chest xray neg  Plan to discharge on doxy to complete 2 more days, and then resume his home zithromax  Cont symbicort and spiriva respimat  PT/ot recommended rehab, pt wants to go home with [facility],    has appointment with callum Rose next friday      Anemia of chronic disease: monitor  More consistent with fe def anemia  Needs out patient work up colonoscopy when able      Chronic systolic chf  Last known ef 45%  Cont asa, bystolic and lisinopril    DNR  _______________________________________________________________________  Patient seen and examined by me on discharge day. Pertinent Findings:  Gen:    Not in distress  Chest: Clear lungs  CVS:   Regular rhythm. No edema  Abd:  Soft, not distended, not tender  Neuro:  Alert, and oriented  _______________________________________________________________________  DISCHARGE MEDICATIONS:   Current Discharge Medication List      START taking these medications    Details   doxycycline (VIBRA-TABS) 100 mg tablet Take 1 Tab by mouth every twelve (12) hours for 2 days. Qty: 4 Tab, Refills: 0      ferrous sulfate 325 mg (65 mg iron) tablet Take 1 Tab by mouth daily (with breakfast). Qty: 30 Tab, Refills: 1      tiotropium bromide (SPIRIVA RESPIMAT) 2.5 mcg/actuation inhaler Take 2 Puffs by inhalation daily. Qty: 1 Inhaler, Refills: 1      predniSONE (DELTASONE) 20 mg tablet 2 tabs po q daily * 3 days, then 11/2 tab po q daily * 3 days, then 1 tab po q daily * 3 days, then 1/2 tab po q daily * 3 days  Qty: 20 Tab, Refills: 0         CONTINUE these medications which have CHANGED    Details   azithromycin (ZITHROMAX) 250 mg tablet Take 1 Tab by mouth every Monday, Wednesday, Friday. Start on wednesday  Qty: 39 Tab, Refills: 1         CONTINUE these medications which have NOT CHANGED    Details   L. gasseri-B. bifidum-B longum 1.5 billion cell cap Take 2 Caps by mouth daily. !! furosemide (LASIX) 20 mg tablet Take 20 mg by mouth daily as needed (>2 lb weight gain). Administration Instructions: Patient is to take furosemide 20 mg tab, two tablets by month once daily.  Patient is to weight himself daily and if there is a weight gain >2 lb, he is to take an additional 20 mg tablet for a maximum daily dose of 60 mg.      nebivolol (BYSTOLIC) 5 mg tablet Take 5 mg by mouth two (2) times a day. !! potassium chloride SR (KLOR-CON 10) 10 mEq tablet Take 10 mEq by mouth daily as needed (when an extra furosemide dose is taken for weight gain of >2 lb). Administration Instructions: Take two tabs by mouth daily, take a third dose of 10 mEq when a third dose of furosemide 20 mg is administered for weight gain of 2 lb      SUMATRIPTAN SUCCINATE (IMITREX PO) Take  by mouth as needed (migraine). raNITIdine (ZANTAC) 150 mg tablet Take 150 mg by mouth daily (with lunch). diphenhydrAMINE (BENADRYL) 25 mg capsule Take 25 mg by mouth two (2) times a day. Indications: COUGH      guaiFENesin-codeine (ROBITUSSIN AC) 100-10 mg/5 mL solution TAKE 5ML BY MOUTH 4 TIMES A DAY AS NEEDED MAXIMUM 20ML PER DAY (GENERIC FOR ROBITUSSIN AC)  Qty: 473 mL, Refills: 0      zolpidem (AMBIEN) 5 mg tablet Take 1-2 Tabs by mouth nightly as needed for Sleep. Max Daily Amount: 10 mg.  Qty: 90 Tab, Refills: 0      budesonide-formoterol (SYMBICORT) 160-4.5 mcg/actuation HFA inhaler Take 2 Puffs by inhalation two (2) times a day. Qty: 3 Inhaler, Refills: 4      levalbuterol (XOPENEX) 1.25 mg/3 mL nebu INHALE 1 VIAL VIA NEBULIZER 3 TIMES DAILY  Qty: 792 mL, Refills: 6      XOPENEX HFA 45 mcg/actuation inhaler USE 2 INHALATIONS BY MOUTH EVERY 4 HOURS AS NEEDED FOR WHEEZING  Qty: 45 Inhaler, Refills: 6      traZODone (DESYREL) 50 mg tablet Take 2 Tabs by mouth nightly. Qty: 180 Tab, Refills: 3      lisinopril (PRINIVIL, ZESTRIL) 20 mg tablet TAKE 1 BY MOUTH DAILY  Qty: 90 Tab, Refills: 3    Associated Diagnoses: Essential hypertension, benign      sertraline (ZOLOFT) 50 mg tablet Take 1.5 Tabs by mouth daily.   Qty: 135 Tab, Refills: 3    Associated Diagnoses: Adjustment disorder with mixed anxiety and depressed mood      levothyroxine (SYNTHROID) 50 mcg tablet TAKE 1 TABLET DAILY BEFORE BREAKFAST  Qty: 90 Tab, Refills: 3    Associated Diagnoses: Other specified hypothyroidism      aspirin delayed-release 81 mg tablet Take 81 mg by mouth daily. Associated Diagnoses: Ischemic heart disease, chronic      polyethylene glycol (MIRALAX) 17 gram packet Take 17 g by mouth daily as needed. Associated Diagnoses: Chronic constipation      LORazepam (ATIVAN) 1 mg tablet TAKE 1/2 TO 1 BY MOUTH UP TO 3 TIMES DAILY AS NEEDED FOR ANXIETY  Qty: 180 Tab, Refills: 0      !! potassium chloride SR (KLOR-CON 10) 10 mEq tablet Take 1 Tab by mouth two (2) times daily (with meals). Take 2nd dose when you take an extra Furosemide. Indications: HYPOKALEMIA  Qty: 180 Tab, Refills: 1      ipratropium (ATROVENT) 0.02 % nebulizer solution 2.5 mL by Nebulization route three (3) times daily. Qty: 750 mL, Refills: 3    Comments: Dispense 300 vials. Associated Diagnoses: Pulmonary emphysema with fibrosis of lung ([address]      ! ! furosemide (LASIX) 20 mg tablet Take 2 Tabs by mouth daily. Indications: EDEMA  Qty: 180 Tab, Refills: 3    Associated Diagnoses: Chronic systolic congestive heart failure ([address]       ! ! - Potential duplicate medications found. Please discuss with provider. STOP taking these medications       naproxen sodium (ALEVE) 220 mg tablet Comments:   Reason for Stopping:         tiotropium-olodaterol (STIOLTO RESPIMAT) 2.5-2.5 mcg/actuation mist Comments:   Reason for Stopping:         Bacillus coagulans (PROBIOTIC, B. COAGULANS,) 10 billion cell cpDR Comments:   Reason for Stopping:               My Recommended Diet, Activity, Wound Care, and follow-up labs are listed in the patient's Discharge Insturctions which I have personally completed and reviewed.     _______________________________________________________________________  DISPOSITION:    Home with Family:    Home with HH/PT/OT/RN: y   SNF/LTC:    SOTO:    OTHER:        Condition at Discharge: Stable  _______________________________________________________________________  Follow up with:   PCP : Jos Hart III, DO  Follow-up Information     Follow up With Details Comments [address] III, DO   [address]  [phone]                Total time in minutes spent coordinating this discharge (includes going over instructions, follow-up, prescriptions, and preparing report for sign off to her PCP) : 40 minutes    Signed:  Kennedy Kaur MD

## 2017-02-12 NOTE — PROGRESS NOTES
PCU SHIFT NURSING NOTE      Bedside shift change report given to Serena Rivera (oncoming nurse) by Freda Gutierrez (offgoing nurse). Report included the following information SBAR, Kardex, MAR and Recent Results. Shift Summary:       Admission Date 2/9/2017   Admission Diagnosis Respiratory failure ([address]   Consults IP CONSULT TO INTENSIVIST        Consults   [x]PT   [x]OT   []Speech   []Case Management      [] Palliative      Cardiac Monitoring Order   [x]Yes   []No     IV drips   []Yes    Drip:                            Dose:  Drip:                            Dose:  Drip:                            Dose:   [x]No     GI Prophylaxis   [x]Yes   []No         DVT Prophylaxis   SCDs:             Jm stockings:         [x] Medication   []Contraindicated   []None      Activity Level Activity Level: Up with Assistance     Activity Assistance: Partial (one person)   Purposeful Rounding every 1-2 hour? [x]Yes   Pelaez Score  Total Score: 4   Bed Alarm (If score 3 or >)   [x]Yes   [] Refused (See signed refusal form in chart)   Azael Score  Azael Score: 18   Azael Score (if score 14 or less)   [x]PMT consult   []Wound Care consult      []Specialty bed   [] Nutrition consult          Needs prior to discharge:   Home O2 required:    [x]Yes   []No    If yes, how much O2 required?  4L    Other:    Last Bowel Movement: Last Bowel Movement Date: 02/09/17      Influenza Vaccine Received Flu Vaccine for Current Season (usually Sept-March): Yes        Pneumonia Vaccine           Diet Active Orders   Diet    DIET CARDIAC Regular      LDAs               Peripheral IV 02/09/17 Right Antecubital (Active)   Site Assessment Clean, dry, & intact 2/11/2017  8:03 PM   Phlebitis Assessment 0 2/11/2017  8:03 PM   Infiltration Assessment 0 2/11/2017  8:03 PM   Dressing Status Old drainage 2/11/2017  8:03 PM   Dressing Type Tape;Transparent 2/11/2017  8:03 PM   Hub Color/Line Status Pink;Flushed 2/11/2017  8:03 PM   Action Taken Dressing changed 2/10/2017  6:53 AM   Alcohol Cap Used Yes 2/9/2017  8:00 PM          Condom Catheter 02/10/17 (Active)   Criteria for Appropriate Use Strict I/Os 2/11/2017  8:13 AM   Status Draining 2/11/2017  8:13 AM   Site Condition No abnormalities 2/11/2017  8:13 AM   Drainage Tube Clipped to Bed Yes 2/11/2017  8:13 AM   Catheter Secured to Thigh No 2/11/2017  8:13 AM   Tamper Seal Intact No 2/11/2017  8:13 AM   Bag Below Bladder/Not on Floor Yes 2/11/2017  8:13 AM   Lack of Dependent Loop in Tubing Yes 2/11/2017  8:13 AM   Drainage Bag Less Than Half Full Yes 2/11/2017  8:13 AM   Sterile Solution Used for  Irrigation N/A 2/11/2017  8:13 AM                Urinary Catheter Condom Catheter 02/10/17-Criteria for Appropriate Use: Strict I/Os    Intake & Output   Date 02/10/17 1900 - 02/11/17 0659 02/11/17 0700 - 02/12/17 0659   Shift 3787-1354 24 Hour Total 1617-5962 8823-8455 24 Hour Total   I  N  T  A  K  E   P.O. 400 740         P. O. 400 740       Shift Total  (mL/kg) 400  (6.5) 740  (12.1)      O  U  T  P  U  T   Urine  (mL/kg/hr)   (2)  1400      Urine Voided   1400      Urine Occurrence(s) 1 x 1 x       Shift Total  (mL/kg) 350  (5.7) 800  (13.1) 1400  (23.5)  1400  (23.5)   NET 50 -60 -1400  -1400   Weight (kg) 61.1 61.1 59.6 59.6 59.6         Readmission Risk Assessment Tool Score High Risk            22       Total Score        3 Relationship with PCP    2 Patient Living Status    4 More than 1 Admission in calendar year    5 Patient Insurance is Medicare, Medicaid or Self Pay    8 Charlson Comorbidity Score        Criteria that do not apply:    Patient Length of Stay > 5       Expected Length of Stay 3d 19h   Actual Length of Stay 2

## 2017-02-12 NOTE — HOME CARE
[facility] Discharge Planning: [facility]  Face to Face Encounter      NAME: Lazara Robert   :  1945   MRN:  [id number]     Primary Diagnosis: copd exacerbation  Date of Face to Face:  2017 9:15 AM                                  Face to Face Encounter findings are related to primary reason for home care:   YES    1. I certify that the patient needs intermittent skilled nursing care, physical therapy and/or speech therapy. I will not be following this patient in the Community and Dr. Kimber Pitts DO will be responsible for signing the [address] 2. Initial Orders for Care:HH, home PT and OT    3. I certify that this patient is homebound because of illness or injury, need the aid of supportive devices such as crutches, canes, wheelchairs, and walkers; the use of special transportation; or the assistance of another person in order to leave their place of residence. There exists a normal inability to leave home and leaving home requires a considerable and taxing effort. 4. I certify that this patient is under my care and that I had a Face-to-Face Encounter that meets the physician Face-to-Face Encounter requirements.   Document the physical findings from the Face-to-Face Encounter that support the need for skilled services: gait training    Andres Ritter MD  Discharging Physician                 Office:  [phone]  Fax:    [phone]

## 2017-02-14 NOTE — PROGRESS NOTES
NNTOCIP Post Hospitalization             To the best of this NN's knowledge, this patient had no additional ED visits or Hospital Admissions during 30 day AMBER period following admission to [facility] 7/7/16-7/10/16. AMBER period has ended. Post-Hospitalization Episode Resolved. Patient has NN contact information if any questions/concerns arise in the future.

## 2017-02-14 NOTE — PROGRESS NOTES
[address] [facility] Hospitalization           Referral from [facility]. Patient admitted to [facility] on 2/9/17 and discharged on 2/12/17 with diagnosis of acute on chronic respiratory failure (baseline 4 - 4.5 liters). - Per EMR, most recent Echocardiogram was completed on 7/31/14 and LVEF 40-45%.           Significant Lab/Diagnostic Findings:  Lab Results  Component Value Date/Time   WBC 8.6 02/12/2017 03:55 AM   Hemoglobin (POC) 11.9 06/20/2014 12:44 PM   HGB 8.9 02/12/2017 03:55 AM   Hematocrit (POC) 35 06/20/2014 12:44 PM   HCT 30.4 02/12/2017 03:55 AM   PLATELET 945 70/52/1835 03:55 AM   MCV 76.8 02/12/2017 03:55 AM       Lab Results   Component Value Date/Time     07/07/2016 01:54 PM    CK - MB 1.2 07/07/2016 01:54 PM    CK-MB Index 1.0 07/07/2016 01:54 PM    Troponin-I, Qt. <0.04 02/09/2017 07:52 AM      Lab Results   Component Value Date/Time    NT pro-BNP 3971 02/09/2017 07:52 AM    NT pro-BNP 3434 05/06/2016 10:30 PM      Lab Results   Component Value Date/Time    Sodium 140 02/12/2017 03:55 AM    Potassium 3.9 02/12/2017 03:55 AM    Chloride 99 02/12/2017 03:55 AM    CO2 35 02/12/2017 03:55 AM    Anion gap 6 02/12/2017 03:55 AM    Glucose 118 02/12/2017 03:55 AM    Glucose 89 11/09/2009 07:45 AM    BUN 28 02/12/2017 03:55 AM    Creatinine 0.85 02/12/2017 03:55 AM    BUN/Creatinine ratio 33 02/12/2017 03:55 AM    GFR est AA >60 02/12/2017 03:55 AM    GFR est non-AA >60 02/12/2017 03:55 AM    Calcium 8.4 02/12/2017 03:55 AM      Lab Results   Component Value Date/Time    Sodium 140 02/12/2017 03:55 AM    Potassium 3.9 02/12/2017 03:55 AM    Chloride 99 02/12/2017 03:55 AM    CO2 35 02/12/2017 03:55 AM    Anion gap 6 02/12/2017 03:55 AM    Glucose 118 02/12/2017 03:55 AM    Glucose 89 11/09/2009 07:45 AM    BUN 28 02/12/2017 03:55 AM    Creatinine 0.85 02/12/2017 03:55 AM    BUN/Creatinine ratio 33 02/12/2017 03:55 AM    GFR est AA >60 02/12/2017 03:55 AM    GFR est non-AA >60 02/12/2017 03:55 AM Calcium 8.4 02/12/2017 03:55 AM    Bilirubin, total 0.3 02/09/2017 07:52 AM    ALT (SGPT) 28 02/09/2017 07:52 AM    AST (SGOT) 27 02/09/2017 07:52 AM    Alk. phosphatase 67 02/09/2017 07:52 AM    Protein, total 8.0 02/09/2017 07:52 AM    Albumin 3.4 02/09/2017 07:52 AM    Globulin 4.6 02/09/2017 07:52 AM    A-G Ratio 0.7 02/09/2017 07:52 AM      Component      Latest Ref Rng & Units 2/10/2017 2/10/2017 2/10/2017 2/9/2017           1:33 AM  1:33 AM  1:33 AM 10:04 AM   pH      7.35 - 7.45      7.42   PCO2      35.0 - 45.0 mmHg    57 (H)   PO2      80 - 100 mmHg    87   O2 SAT      92 - 97 %    97   BICARBONATE      22 - 26 mmol/L    36 (H)   BASE EXCESS      mmol/L    8.9   O2 METHOD          NASAL O2   O2 FLOW RATE      L/min    4.50   SPONTANEOUS RATE          18.0   Sample source          ARTERIAL   SITE          RIGHT RADIAL   SHAW'S TEST          YES   Iron      35 - 150 ug/dL  18 (L)     TIBC      250 - 450 ug/dL  437     Iron % saturation      20 - 50 %  4 (L)     NT pro-BNP      0 - 125 PG/ML       Vitamin B12      211 - 911 pg/mL 467      Ferritin      26 - 388 NG/ML   6 (L)      Component      Latest Ref Rng & Units 2/9/2017           7:52 AM   pH      7.35 - 7.45      PCO2      35.0 - 45.0 mmHg    PO2      80 - 100 mmHg    O2 SAT      92 - 97 %    BICARBONATE      22 - 26 mmol/L    BASE EXCESS      mmol/L    O2 METHOD          O2 FLOW RATE      L/min    SPONTANEOUS RATE          Sample source          SITE          SHAW'S TEST          Iron      35 - 150 ug/dL    TIBC      250 - 450 ug/dL    Iron % saturation      20 - 50 %    NT pro-BNP      0 - 125 PG/ML 3971 (H)   Vitamin B12      211 - 911 pg/mL    Ferritin      26 - 388 NG/ML              NN has updated care team members in the patient's chart.           RRAT score:   Medium Risk            17       Total Score        3 Relationship with PCP    2 Patient Living Status    4 More than 1 Admission in calendar year    8 Charlson Comorbidity Score Criteria that do not apply:    Patient Length of Stay > 5    Patient Insurance is Medicare, Medicaid or Self Pay                  Advance Medical Directive on file in EMR? Yes; DDNR AND has an advanced directive - a copy has been provided. Patient was evaluated by care management during hospitalization. Per notes, Discharge Disposition from [id number] [place] [address]: Home with [address] Recommended Post-Hospital Discharge follow-up (see below as listed on Hospital Discharge AVS/Instructions). Follow-up Information     Follow up With Details Comments Contact Info     Josi Teran DO     [address]  [address] [facility]  [phone]      [place] [address]  [phone]                 Most recent HIPAA form in the patient's chart (dated 2/17/16) was reviewed; authorized individual(s) listed on HIPAA form include Alejandra Razo. NN follow-up phone call  NN attempted to reach the patient today in order to follow-up, complete post-hospital discharge functional assessment and medication reconciliation and to schedule the patient for a AMBER appointment with Dr. Janine Cooper. NN was unable to reach the patient today. LVM requesting a return phone call to this NN; NN direct contact information provided. New medications at discharge include: Docusate, doxycycline, iron, prednisone, spiriva respimat  Medication(s) changed at hospital discharge include: azithromycin (start taking on 2/13/17), furosemide, atrovent nebulizer solution, xopenex nebulizer solution, lorazepam, bystolic, potassium chloride  Medication(s) discontinued at hospital discharge include: Aleve  Patient able to fill/pickup new medications without difficulty: *Unable to assess at this time.   Any Questions/Concerns regarding new Medication(s) and/or Medication Change(s): *Unable to assess at this time. NN will route this encounter to Dr. Debra Danielle for notification/review. This note will not be viewable in [address]

## 2017-02-16 NOTE — TELEPHONE ENCOUNTER
Azeb-spouse on [employer] called and states that she is needing a call back in regards to getting a hospital f/u appt for the pt as he was discharged on 2/12/17. Please call Bladimir Bone.

## 2017-02-16 NOTE — PROGRESS NOTES
NNTOCIP Post Hospitalization           NN follow-up phone call - Second Attempt  NN contacted the patient today to complete NN post-hospital discharge assessment. Two patient identifiers verified. NN introduced self to the patient, including NN role and purpose of NN follow-up phone call; patient verbalizes understanding. NN inquired about the patient's current condition and how the patient is feeling. Patient denies fever, chills, dizziness, chest pain, nausea, vomiting, lower extremity swelling at present/since hospital discharge. Patient reports that he is on continuous oxygen at 4.5 LPM; per patient, his oxygen saturation is currently 97%. Patient reports that shortness of breath has improved; denies shortness of breath at rest and increased shortness of breath with activity/ ambulation since hospital discharge. See details of Functional Assessment below as reported by the Patient. Living Situation/Support System: Lives with his Wife. ADLs: Requires minimal assistance with bathing and dressing. Mobility: Dorn Olszewski; denies history of falls within the past 6 months. Transportation: Patient's Wife provides him with transportation; denies any concerns regarding transportation. Medication Management: Patient's Wife manages his medications. Financial Status: Denies any financial concerns regarding his medications.       Barriers to care?  no           NN attempted to complete/update the patient's medication reconciliation, however patient reports that home health physical therapist is currently visiting; med rec deferred at this time to avoid interfering with home health visit.           New medications at discharge include: Docusate, doxycycline, iron, prednisone, spiriva respimat  Medication(s) changed at hospital discharge include: azithromycin (start taking on 2/13/17), furosemide, atrovent nebulizer solution, xopenex nebulizer solution, lorazepam, bystolic, potassium chloride  Medication(s) discontinued at hospital discharge include: Aleve  Patient able to fill/pickup new medications without difficulty: *Unable to assess at this time. Any Questions/Concerns regarding new Medication(s) and/or Medication Change(s): *Unable to assess at this time.           Patient verbalizes understanding of discharge instructions that were provided to him upon discharge from [facility]. Patient states that he has a follow-up appointment scheduled with Dr. Aishwarya Chavez on tomorrow, 2/17/17. Patient has been scheduled for a AMBER appointment with Dr. Jose Arnold; patient verbalizes understanding of appointment information. Opportunity for patient to ask NN questions was provided. NN contact information provided to patient and patient advised to contact NN as needed. PLAN    -Patient to attend Transitions Of Care Appointment with Dr. Jose Arnold on 2/23/17.  -Patient to attend follow-up appointment(s) with Surgeon(if applicable) and/or Speciality Provider(s): Dr. Aishwarya Chavez (Pulmonary Disease) on Friday, 2/17/17.  -Patient to contact this NN and/or PCP office with any questions/concerns.  -Notified of availability of PCP on-call physician after-hours and on the weekends for non-emergent/non-life threatening medical questions/concerns; patient verbalizes understanding. Patient's currently scheduled future appointments are listed below.   Future Appointments      Provider Calvin Cardenas   2/20/2017 1:45 PM [facility]   2/22/2017 To Be Determined [facility] [address]   [facility]2017 [facility]   2/23/2017 To Be Determined [facility]   2/23/2017 2:30 PM Dorothea Benitezsttea Sin   2/28/2017 To Be Determined JAYLEN Canales [facility] SCHEDULING [place] [address]   3/2/2017 To Be Determined Dayana Murcia [facility]   3/7/2017 To Be Determined Dayana Murcia [facility]   3/9/2017 To Be Determined Romario Jackson, PT BON [address] [facility] [facility]                 Patient expressed no questions, concerns or needs for this NN at this time. Patient verbalized understanding of all information discussed. NN contact information provided and patient advised to contact NN as needed. NN will route this encounter to Dr. Julio Ron for notification/review. This note will not be viewable in [address]

## 2017-02-20 NOTE — TELEPHONE ENCOUNTER
I called and spoke with patient. Pt was made aware rx has been sent to pharmacy. He verbalized understanding.

## 2017-02-21 NOTE — TELEPHONE ENCOUNTER
Robitussin AC RX faxed on 02/14/17 to [facility]. Patient's spouse came by office and picked up original RX because they said the pharmacy did not receive RX.

## 2017-02-22 NOTE — TELEPHONE ENCOUNTER
Azeb-wife called and states that she is needing a call back in regards to having the pt's 2/23/27 AMBER appt rescheduled. Please call Elena Arnett.

## 2017-02-23 NOTE — TELEPHONE ENCOUNTER
Appointment r/s for February 28, 2017 at 02:30 PM   [facility]-MAIN OFFICE-[address], 30min. Unable to reach patient/wife. Left a detailed message with appointment details.

## 2017-02-28 PROBLEM — F51.01 PRIMARY INSOMNIA: Chronic | Status: ACTIVE | Noted: 2017-01-01

## 2017-02-28 NOTE — PATIENT INSTRUCTIONS
Insomnia: Care Instructions  Your Care Instructions  Insomnia is the inability to sleep well. It is a common problem for most people at some time. Insomnia may make it hard for you to get to sleep, stay asleep, or sleep as long as you need to. This can make you tired and grouchy during the day. It can also make you forgetful, less effective at work, and unhappy. Insomnia can be caused by conditions such as depression or anxiety. Pain can also affect your ability to sleep. When these problems are solved, the insomnia usually clears up. But sometimes bad sleep habits can cause insomnia. If insomnia is affecting your work or your enjoyment of life, you can take steps to improve your sleep. Follow-up care is a key part of your treatment and safety. Be sure to make and go to all appointments, and call your doctor if you are having problems. It's also a good idea to know your test results and keep a list of the medicines you take. How can you care for yourself at home? What to avoid  · Do not have drinks with caffeine, such as coffee or black tea, for 8 hours before bed. · Do not smoke or use other types of tobacco near bedtime. Nicotine is a stimulant and can keep you awake. · Avoid drinking alcohol late in the evening, because it can cause you to wake in the middle of the night. · Do not eat a big meal close to bedtime. If you are hungry, eat a light snack. · Do not drink a lot of water close to bedtime, because the need to urinate may wake you up during the night. · Do not read or watch TV in bed. Use the bed only for sleeping and sexual activity. What to try  · Go to bed at the same time every night, and wake up at the same time every morning. Do not take naps during the day. · Keep your bedroom quiet, dark, and cool. · Sleep on a comfortable pillow and mattress. · If watching the clock makes you anxious, turn it facing away from you so you cannot see the time.   · If you worry when you lie down, start a worry book. Well before bedtime, write down your worries, and then set the book and your concerns aside. · Try meditation or other relaxation techniques before you go to bed. · If you cannot fall asleep, get up and go to another room until you feel sleepy. Do something relaxing. Repeat your bedtime routine before you go to bed again. · Make your house quiet and calm about an hour before bedtime. Turn down the lights, turn off the TV, log off the computer, and turn down the volume on music. This can help you relax after a busy day. When should you call for help? Watch closely for changes in your health, and be sure to contact your doctor if:  · Your efforts to improve your sleep do not work. · Your insomnia gets worse. · You have been feeling down, depressed, or hopeless or have lost interest in things that you usually enjoy. Where can you learn more? Go to http://garfieldBucketFeetpamela.info/. Enter P513 in the search box to learn more about \"Insomnia: Care Instructions. \"  Current as of: July 26, 2016  Content Version: 11.1  © 3304-4766 [employer]. Care instructions adapted under license by [employer] (which disclaims liability or warranty for this information). If you have questions about a medical condition or this instruction, always ask your healthcare professional. [address] any warranty or liability for your use of this information. Learning About Sleeping Well  What does sleeping well mean? Sleeping well means getting enough sleep. How much sleep is enough varies among people. The number of hours you sleep is not as important as how you feel when you wake up. If you do not feel refreshed, you probably need more sleep. Another sign of not getting enough sleep is feeling tired during the day. The average total nightly sleep time is 7½ to 8 hours. Healthy adults may need a little more or a little less than this.   Why is getting enough sleep important? Getting enough quality sleep is a basic part of good health. When your sleep suffers, your mood and your thoughts can suffer too. You may find yourself feeling more grumpy or stressed. Not getting enough sleep also can lead to serious problems, including injury, accidents, anxiety, and depression. What might cause poor sleeping? Many things can cause sleep problems, including:  · Stress. Stress can be caused by fear about a single event, such as giving a speech. Or you may have ongoing stress, such as worry about work or school. · Depression, anxiety, and other mental or emotional conditions. · Changes in your sleep habits or surroundings. This includes changes that happen where you sleep, such as noise, light, or sleeping in a different bed. It also includes changes in your sleep pattern, such as having jet lag or working a late shift. · Health problems, such as pain, breathing problems, and restless legs syndrome. · Lack of regular exercise. How can you help yourself? Here are some tips that may help you sleep more soundly and wake up feeling more refreshed. Your sleeping area  · Use your bedroom only for sleeping and sex. A bit of light reading may help you fall asleep. But if it doesn't, do your reading elsewhere in the house. Don't watch TV in bed. · Be sure your bed is big enough to stretch out comfortably, especially if you have a sleep partner. · Keep your bedroom quiet, dark, and cool. Use curtains, blinds, or a sleep mask to block out light. To block out noise, use earplugs, soothing music, or a \"white noise\" machine. Your evening and bedtime routine  · Create a relaxing bedtime routine. You might want to take a warm shower or bath, listen to soothing music, or drink a cup of noncaffeinated tea. · Go to bed at the same time every night. And get up at the same time every morning, even if you feel tired.   What to avoid  · Limit caffeine (coffee, tea, caffeinated sodas) during the day, and don't have any for at least 4 to 6 hours before bedtime. · Don't drink alcohol before bedtime. Alcohol can cause you to wake up more often during the night. · Don't smoke or use tobacco, especially in the evening. Nicotine can keep you awake. · Don't take naps during the day, especially close to bedtime. · Don't lie in bed awake for too long. If you can't fall asleep, or if you wake up in the middle of the night and can't get back to sleep within 15 minutes or so, get out of bed and go to another room until you feel sleepy. · Don't take medicine right before bed that may keep you awake or make you feel hyper or energized. Your doctor can tell you if your medicine may do this and if you can take it earlier in the day. If you can't sleep  · Imagine yourself in a peaceful, pleasant scene. Focus on the details and feelings of being in a place that is relaxing. · Get up and do a quiet or boring activity until you feel sleepy. · Don't drink any liquids after 6 p.m. if you wake up often because you have to go to the bathroom. Where can you learn more? Go to http://garfield-pamela.info/. Enter M300 in the search box to learn more about \"Learning About Sleeping Well. \"  Current as of: July 26, 2016  Content Version: 11.1  © 4567-2167 [employer]. Care instructions adapted under license by [employer] (which disclaims liability or warranty for this information). If you have questions about a medical condition or this instruction, always ask your healthcare professional. [place] [address] any warranty or liability for your use of this information. Try melatonin 5 mg at bedtime. May increase to 10 mg if needed. Do NOT take ambien with belsomra.

## 2017-02-28 NOTE — PROGRESS NOTES
Jim Keys is a 66 y.o. male who presents for evaluation of transition of care. Was inpt [facility] from feb 9-12 with copd exac, chronic hypoxic resp failure. Doing better, just finished pred taper and doxycycline. Also was started on spiriva. Saw pulm in office last week, leona hicks, works with [employer]. Main complaint today is insomnia. ROS:  Constitutional: negative for fevers, chills, anorexia and weight loss  Eyes:   negative for visual disturbance and irritation  ENT:   negative for tinnitus,sore throat,nasal congestion,ear pain,hoarseness  Respiratory:  negative for cough, hemoptysis, dyspnea,wheezing  CV:   negative for chest pain, palpitations, lower extremity edema  GI:   negative for nausea, vomiting, diarrhea, abdominal pain,melena  Genitourinary: negative for frequency, dysuria and hematuria  Musculoskel: negative for myalgias, arthralgias, back pain, muscle weakness, joint pain  Neurological:  negative for headaches, dizziness, focal weakness, numbness  Psychiatric:     Negative for depression or anxiety      Past Medical History:   Diagnosis Date    Chronic obstructive pulmonary disease ([address]     Diastolic CHF, chronic ([address] 11/4/2015    Emphysema/COPD ([facility] 9/20/2009    Essential hypertension, benign 9/20/2009    GERD (gastroesophageal reflux disease) 9/20/2009    Hypertension     Migraine headache 9/20/2009    Post-tuberculous bronchiectasis 10/17/2013    Thyroid disease     hypothyroidism       History reviewed. No pertinent surgical history. Family History   Problem Relation Age of Onset    Heart Disease Mother     Stroke Mother     Cancer Father      throat       Social History     Social History    Marital status:      Spouse name: N/A    Number of children: N/A    Years of education: N/A     Occupational History    Not on file.      Social History Main Topics    Smoking status: Former Smoker     Packs/day: 1.50     Years: 25.00     Types: Cigarettes Quit date: 4/26/1979    Smokeless tobacco: Never Used    Alcohol use No    Drug use: No    Sexual activity: Not Currently     Other Topics Concern    Not on file     Social History Narrative            Visit Vitals    /62 (BP 1 Location: Left arm, BP Patient Position: Sitting)    Pulse 81    Temp 97.6 °F (36.4 °C) (Oral)    Resp 18    Ht 5' 10.98\" (1.803 m)    Wt 136 lb (61.7 kg)    SpO2 97%    BMI 18.98 kg/m2       Physical Examination:   General - Well appearing male  HEENT - PERRL, TM no erythema/opacification, normal nasal turbinates, no oropharyngeal erythema or exudate, MMM  Neck - supple, no bruits, no thyroidomegaly, no lymphadenopathy  Pulm - clear to auscultation bilaterally  Cardio - RRR, normal S1 S2, no murmur  Abd - soft, nontender, no masses, no HSM  Extrem - no edema, +2 distal pulses  Neuro-  No focal deficits, CN intact     Assessment/Plan:    1. Insomnia--rx given for belsomra, also can try melatonin. Discussed needing 7 day drug holiday each month from [employer] (he has been unable to do that)  2. Chronic hypoxic resp failure--on o2  3. Copd--on spiriva, symbicort, atroven/xopenex nebs  4.   Anxiety and depression--trazodone, prn ativan  5.  dnr code status    rtc 2 months        Kehinde Fernández III, DO

## 2017-02-28 NOTE — MR AVS SNAPSHOT
Visit Information Date & Time Provider Department Dept. Phone Encounter #  
 2/28/2017  2:30 PM Florin Puga, [address] [id number] Follow-up Instructions Return in about 2 months (around 4/28/2017). Upcoming Health Maintenance Date Due ZOSTER VACCINE AGE 60> 5/5/2003 GLAUCOMA SCREENING Q2Y 5/5/2008 MEDICARE YEARLY EXAM 5/5/2008 COLONOSCOPY 6/3/2023 DTaP/Tdap/Td series (2 - Td) 8/11/2026 Allergies as of 2/28/2017  Review Complete On: 2/28/2017 By: Kalen Damon III, DO Severity Noted Reaction Type Reactions Cardizem [Diltiazem Hcl]  06/17/2009    Other (comments) Headache and constipation Current Immunizations  Reviewed on 10/20/2015 Name Date Influenza High Dose Vaccine PF 10/15/2015 Influenza Vaccine 10/1/2016, 10/23/2014, 9/12/2013 Influenza Vaccine Split 11/12/2012  9:22 AM, 11/14/2011, 10/20/2010 Pneumococcal Conjugate (PCV-13) 10/8/2015 Pneumococcal Polysaccharide (PPSV-23) 1/27/2014 Pneumococcal Vaccine (Unspecified Type) 4/1/2008, 11/1/1997 Tdap 8/11/2016  4:24 PM  
  
 Not reviewed this visit You Were Diagnosed With   
  
 Codes Comments Hypoxemic respiratory failure, chronic (HCC)    -  Primary ICD-10-CM: J96.11 
ICD-9-CM: 518.83, 799.02   
 Pulmonary emphysema with fibrosis of lung (HCC)     ICD-10-CM: J43.9, J84.10 ICD-9-CM: 492.8, 515 Essential hypertension     ICD-10-CM: I10 
ICD-9-CM: 401.9 Chronic systolic congestive heart failure (HCC)     ICD-10-CM: I50.22 ICD-9-CM: 428.22, 428.0 Primary insomnia     ICD-10-CM: F51.01 
ICD-9-CM: 307.42 Vitals BP  
  
  
  
  
  
 102/62 (BP 1 Location: Left arm, BP Patient Position: Sitting) Vitals History BMI and BSA Data Body Mass Index Body Surface Area 18.98 kg/m 2 1.76 m 2 Preferred Pharmacy Pharmacy Name Phone [address] [place] [address]. [address] [phone] Your Updated Medication List  
  
   
This list is accurate as of: 2/28/17  3:21 PM.  Always use your most recent med list.  
  
  
  
  
 aspirin delayed-release 81 mg tablet Take 81 mg by mouth daily. azithromycin 250 mg tablet Commonly known as:  Tonya Ax Take 1 Tab by mouth every Monday, Wednesday, Friday. Start on wednesday  
  
 budesonide-formoterol 160-4.5 mcg/actuation HFA inhaler Commonly known as:  SYMBICORT Take 2 Puffs by inhalation two (2) times a day. diphenhydrAMINE 25 mg capsule Commonly known as:  BENADRYL Take 25 mg by mouth two (2) times a day. Indications: COUGH  
  
 docusate sodium 100 mg capsule Commonly known as:  Lucetta Jones Take 1 Cap by mouth daily as needed for Constipation for up to 90 days. ferrous sulfate 325 mg (65 mg iron) tablet Take 1 Tab by mouth daily (with breakfast). furosemide 20 mg tablet Commonly known as:  LASIX Take 2 Tabs by mouth daily. Indications: EDEMA  
  
 guaiFENesin-codeine 100-10 mg/5 mL solution Commonly known as:  ROBITUSSIN AC  
TAKE 5ML BY MOUTH FOUR TIMES A DAY IMITREX PO Take  by mouth as needed (migraine). ipratropium 0.02 % nebulizer solution Commonly known as:  ATROVENT  
2.5 mL by Nebulization route three (3) times daily. L. gasseri-B. bifidum-B longum 1.5 billion cell Cap Take 2 Caps by mouth daily. levalbuterol 1.25 mg/3 mL Nebu Commonly known as:  XOPENEX  
INHALE 1 VIAL VIA NEBULIZER 3 TIMES DAILY  
  
 levothyroxine 50 mcg tablet Commonly known as:  SYNTHROID  
TAKE 1 TABLET DAILY BEFORE BREAKFAST  
  
 lisinopril 20 mg tablet Commonly known as:  PRINIVIL, ZESTRIL  
TAKE 1 BY MOUTH DAILY LORazepam 1 mg tablet Commonly known as:  ATIVAN  
TAKE 1/2 TO 1 BY MOUTH UP TO 3 TIMES DAILY AS NEEDED FOR ANXIETY MIRALAX 17 gram packet Generic drug:  polyethylene glycol Take 17 g by mouth daily as needed. nebivolol 5 mg tablet Commonly known as:  BYSTOLIC Take 5 mg by mouth two (2) times a day. OXYGEN-AIR DELIVERY SYSTEMS  
4.5 L/min by Nasal route continuous. potassium chloride SR 10 mEq tablet Commonly known as:  KLOR-CON 10 Take 1 Tab by mouth two (2) times daily (with meals). Take 2nd dose when you take an extra Furosemide. Indications: HYPOKALEMIA  
  
 predniSONE 20 mg tablet Commonly known as:  DELTASONE  
2 tabs po q daily * 3 days, then 11/2 tab po q daily * 3 days, then 1 tab po q daily * 3 days, then 1/2 tab po q daily * 3 days  
  
 raNITIdine 150 mg tablet Commonly known as:  ZANTAC Take 150 mg by mouth daily (with lunch). sertraline 50 mg tablet Commonly known as:  ZOLOFT Take 1.5 Tabs by mouth daily. suvorexant 10 mg tablet Commonly known as:  Zada Mayans Take 1 Tab by mouth nightly as needed for Insomnia. Max Daily Amount: 10 mg.  
  
 tiotropium bromide 2.5 mcg/actuation inhaler Commonly known as:  Audi Issa Take 2 Puffs by inhalation daily. traZODone 50 mg tablet Commonly known as:  Tc Handy Take 2 Tabs by mouth nightly. XOPENEX HFA 45 mcg/actuation inhaler Generic drug:  levalbuterol tartrate USE 2 INHALATIONS BY MOUTH EVERY 4 HOURS AS NEEDED FOR WHEEZING  
  
 zolpidem 5 mg tablet Commonly known as:  AMBIEN Take 1-2 Tabs by mouth nightly as needed for Sleep. Max Daily Amount: 10 mg.  
  
  
  
  
Prescriptions Printed Refills  
 suvorexant (BELSOMRA) 10 mg tablet 4 Sig: Take 1 Tab by mouth nightly as needed for Insomnia. Max Daily Amount: 10 mg.  
 Class: Print Route: Oral  
  
Follow-up Instructions Return in about 2 months (around 4/28/2017). To-Do List   
 03/01/2017 11:30 AM  
  [facility] [address] [facility]  
  
 03/07/2017 To Be Determined Appointment with Yudith Wasserman PTA at [place] [address]  
  
 03/09/2017 To Be Determined Appointment with Dontrell Roblero PT at [place] [address] Patient Instructions Insomnia: Care Instructions Your Care Instructions Insomnia is the inability to sleep well. It is a common problem for most people at some time. Insomnia may make it hard for you to get to sleep, stay asleep, or sleep as long as you need to. This can make you tired and grouchy during the day. It can also make you forgetful, less effective at work, and unhappy. Insomnia can be caused by conditions such as depression or anxiety. Pain can also affect your ability to sleep. When these problems are solved, the insomnia usually clears up. But sometimes bad sleep habits can cause insomnia. If insomnia is affecting your work or your enjoyment of life, you can take steps to improve your sleep. Follow-up care is a key part of your treatment and safety. Be sure to make and go to all appointments, and call your doctor if you are having problems. It's also a good idea to know your test results and keep a list of the medicines you take. How can you care for yourself at home? What to avoid · Do not have drinks with caffeine, such as coffee or black tea, for 8 hours before bed. · Do not smoke or use other types of tobacco near bedtime. Nicotine is a stimulant and can keep you awake. · Avoid drinking alcohol late in the evening, because it can cause you to wake in the middle of the night. · Do not eat a big meal close to bedtime. If you are hungry, eat a light snack. · Do not drink a lot of water close to bedtime, because the need to urinate may wake you up during the night. · Do not read or watch TV in bed. Use the bed only for sleeping and sexual activity. What to try · Go to bed at the same time every night, and wake up at the same time every morning. Do not take naps during the day. · Keep your bedroom quiet, dark, and cool. · Sleep on a comfortable pillow and mattress. · If watching the clock makes you anxious, turn it facing away from you so you cannot see the time. · If you worry when you lie down, start a worry book. Well before bedtime, write down your worries, and then set the book and your concerns aside. · Try meditation or other relaxation techniques before you go to bed. · If you cannot fall asleep, get up and go to another room until you feel sleepy. Do something relaxing. Repeat your bedtime routine before you go to bed again. · Make your house quiet and calm about an hour before bedtime. Turn down the lights, turn off the TV, log off the computer, and turn down the volume on music. This can help you relax after a busy day. When should you call for help? Watch closely for changes in your health, and be sure to contact your doctor if: 
· Your efforts to improve your sleep do not work. · Your insomnia gets worse. · You have been feeling down, depressed, or hopeless or have lost interest in things that you usually enjoy. Where can you learn more? Go to http://garfeild-pamela.info/. Enter P513 in the search box to learn more about \"Insomnia: Care Instructions. \" Current as of: July 26, 2016 Content Version: 11.1 © 5747-6964 [employer]. Care instructions adapted under license by [employer] (which disclaims liability or warranty for this information). If you have questions about a medical condition or this instruction, always ask your healthcare professional. [address] any warranty or liability for your use of this information. Learning About Sleeping Well What does sleeping well mean? Sleeping well means getting enough sleep. How much sleep is enough varies among people.  
The number of hours you sleep is not as important as how you feel when you wake up. If you do not feel refreshed, you probably need more sleep. Another sign of not getting enough sleep is feeling tired during the day. The average total nightly sleep time is 7½ to 8 hours. Healthy adults may need a little more or a little less than this. Why is getting enough sleep important? Getting enough quality sleep is a basic part of good health. When your sleep suffers, your mood and your thoughts can suffer too. You may find yourself feeling more grumpy or stressed. Not getting enough sleep also can lead to serious problems, including injury, accidents, anxiety, and depression. What might cause poor sleeping? Many things can cause sleep problems, including: · Stress. Stress can be caused by fear about a single event, such as giving a speech. Or you may have ongoing stress, such as worry about work or school. · Depression, anxiety, and other mental or emotional conditions. · Changes in your sleep habits or surroundings. This includes changes that happen where you sleep, such as noise, light, or sleeping in a different bed. It also includes changes in your sleep pattern, such as having jet lag or working a late shift. · Health problems, such as pain, breathing problems, and restless legs syndrome. · Lack of regular exercise. How can you help yourself? Here are some tips that may help you sleep more soundly and wake up feeling more refreshed. Your sleeping area · Use your bedroom only for sleeping and sex. A bit of light reading may help you fall asleep. But if it doesn't, do your reading elsewhere in the house. Don't watch TV in bed. · Be sure your bed is big enough to stretch out comfortably, especially if you have a sleep partner. · Keep your bedroom quiet, dark, and cool. Use curtains, blinds, or a sleep mask to block out light. To block out noise, use earplugs, soothing music, or a \"white noise\" machine. Your evening and bedtime routine · Create a relaxing bedtime routine. You might want to take a warm shower or bath, listen to soothing music, or drink a cup of noncaffeinated tea. · Go to bed at the same time every night. And get up at the same time every morning, even if you feel tired. What to avoid · Limit caffeine (coffee, tea, caffeinated sodas) during the day, and don't have any for at least 4 to 6 hours before bedtime. · Don't drink alcohol before bedtime. Alcohol can cause you to wake up more often during the night. · Don't smoke or use tobacco, especially in the evening. Nicotine can keep you awake. · Don't take naps during the day, especially close to bedtime. · Don't lie in bed awake for too long. If you can't fall asleep, or if you wake up in the middle of the night and can't get back to sleep within 15 minutes or so, get out of bed and go to another room until you feel sleepy. · Don't take medicine right before bed that may keep you awake or make you feel hyper or energized. Your doctor can tell you if your medicine may do this and if you can take it earlier in the day. If you can't sleep · Imagine yourself in a peaceful, pleasant scene. Focus on the details and feelings of being in a place that is relaxing. · Get up and do a quiet or boring activity until you feel sleepy. · Don't drink any liquids after 6 p.m. if you wake up often because you have to go to the bathroom. Where can you learn more? Go to http://garfield-pamela.info/. Enter G269 in the search box to learn more about \"Learning About Sleeping Well. \" Current as of: July 26, 2016 Content Version: 11.1 © 0201-6866 [employer]onto. Care instructions adapted under license by [employer] (which disclaims liability or warranty for this information).  If you have questions about a medical condition or this instruction, always ask your healthcare professional. [employer] disclaims any warranty or liability for your use of this information. Try melatonin 5 mg at bedtime. May increase to 10 mg if needed. Do NOT take ambien with belsomra. Introducing [facility]! Dear Nadia Ohs: Thank you for requesting a Tink account. Our records indicate that you already have an active Tink account. You can access your account anytime at [email or web address] Did you know that you can access your hospital and ER discharge instructions at any time in Tink? You can also review all of your test results from your hospital stay or ER visit. Additional Information If you have questions, please visit the Frequently Asked Questions section of the Tink website at [email or web address] Remember, Tink is NOT to be used for urgent needs. For medical emergencies, dial 911. Now available from your iPhone and Android! Please provide this summary of care documentation to your next provider. Your primary care clinician is listed as Rohini Mazariegos If you have any questions after today's visit, please call [phone].

## 2017-02-28 NOTE — PROGRESS NOTES
Reviewed record in preparation for visit and have obtained necessary documentation. Identified pt with two pt identifiers(name and ). Chief Complaint   Patient presents with   [facility] Follow Up       Health Maintenance Due   Topic Date Due    ZOSTER VACCINE AGE 60>  2003    GLAUCOMA SCREENING Q2Y  2008    MEDICARE YEARLY EXAM  2008       Mr. Eduard Pinzon has a reminder for a \"due or due soon\" health maintenance. I have asked that he discuss health maintenance topic(s) due with His  primary care provider. Coordination of Care Questionnaire:  :     1) Have you been to an emergency room, urgent care clinic since your last visit? yes   Hospitalized since your last visit? yes             2) Have you seen or consulted any other health care providers outside of [facility] since your last visit? no  (Include any pap smears or colon screenings in this section.)      Patient is accompanied by self I have received verbal consent from Nikhil Edouard to discuss any/all medical information while they are present in the room.

## 2017-03-08 NOTE — TELEPHONE ENCOUNTER
Requested Prescriptions     Pending Prescriptions Disp Refills    levalbuterol (XOPENEX) 1.25 mg/3 mL nebu 792 mL 6     Last OV-2/28/17  Next OV-4/28/17  Med refilled-8/1/16

## 2017-03-08 NOTE — TELEPHONE ENCOUNTER
From: Patric Santos  To:  Roxanne Vicente III, DO  Sent: 3/8/2017 1:29 PM EST  Subject: Medication Renewal Request    Original authorizing provider: DO Patric De La Cruz III would like a refill of the following medications:  levalbuterol (XOPENEX) 1.25 mg/3 mL jamey Lisha eH DO]    Preferred pharmacy: [address] [place] [address] [address]      Medication renewals requested in this message routed to other providers:  ipratropium (ATROVENT) 0.02 % nebulizer solution Kelly Mcdermott MD]

## 2017-03-08 NOTE — TELEPHONE ENCOUNTER
From: Lawrence Dakins  To: Herberth Wong MD  Sent: 3/8/2017 1:29 PM EST  Subject: Medication Renewal Request    Original authorizing provider: Rice Hollering, MD Lawrence Dakins would like a refill of the following medications:  ipratropium (ATROVENT) 0.02 % nebulizer solution Herberth Wong MD]    Preferred pharmacy: [address] [place] [address], [address]    Comment:      Medication renewals requested in this message routed to other providers:  levalbuterol (XOPENEX) 1.25 mg/3 mL nebu [Theodore Guerrero III, DO]

## 2017-03-09 NOTE — PROGRESS NOTES
[facility] Hospitalization       - Patient attended [place] appointment with Dr. Ginette Jackson on 2/28/17; was advised to follow-up in 3 months. Patient cancelled previously scheduled AMBER appointment with Dr. Ginette Jackson on 2/23/17. Future Appointments:  Future Appointments      Provider Calvin Cardenas   4/28/2017 2:45 PM [facility]             This note will not be viewable in [address]

## 2017-03-16 NOTE — PROGRESS NOTES
[facility]IP Post Hospitalization             To the best of this NN's knowledge, this patient had no additional ED visits or Hospital Admissions during 30 day AMBER period following admission to [address] [place] [address] 2/9/17-2/12/17. AMBER period has ended. Post-Hospitalization Episode Resolved. Patient has NN contact information if any questions/concerns arise in the future. Future Appointments:  Future Appointments      Provider Calvin Cardenas   4/28/2017 2:45 PM [address]             NN will route this encounter to Dr. Lakshmi Dyson for notification/review. NN will route this encounter to Ambulatory CM NN for notification/review and to evaluate the patient for CCM; co-morbidities. This note will not be viewable in [address]

## 2017-03-28 PROBLEM — J44.9 COPD (CHRONIC OBSTRUCTIVE PULMONARY DISEASE) (HCC): Status: ACTIVE | Noted: 2017-01-01

## 2017-03-28 NOTE — TELEPHONE ENCOUNTER
From: Roque Has  To:  Salvador Gallo III, DO  Sent: 3/27/2017 3:56 PM EDT  Subject: Medication Renewal Request    Original authorizing provider: DO Roque Majano III Has would like a refill of the following medications:  guaiFENesin-codeine (ROBITUSSIN AC) 100-10 mg/5 mL solution [Theodore Guerrero III, DO]  ipratropium (ATROVENT) 0.02 % nebulizer solution Jaime Triana DO]    Preferred pharmacy: [address] [place] [address]. [facility] [address]    Comment:

## 2017-03-28 NOTE — TELEPHONE ENCOUNTER
Requested Prescriptions     Pending Prescriptions Disp Refills    guaiFENesin-codeine (ROBITUSSIN AC) 100-10 mg/5 mL solution 463 mL 0    ipratropium (ATROVENT) 0.02 % nebulizer solution 750 mL 3     Si.5 mL by Nebulization route three (3) times daily.      Last OV-17  Next OV-17  Med refilled-17,3/8/17

## 2017-03-28 NOTE — IP AVS SNAPSHOT
[address] [facility] 
[phone] Patient: Sarina Shaw MRN: [id number] You are allergic to the following Allergen Reactions Cardizem (Diltiazem Hcl) Other (comments) Headache and constipation Recent Documentation Weight BMI Smoking Status 62.3 kg 19.17 kg/m2 Former Smoker Emergency Contacts Name Discharge Info Relation Home Work Mobile [address] CAREGIVER [3] Spouse [3] [phone] Janusz,Jere [employer]  Other Relative [6]  [phone] Melva Hagen  Spouse [3] [phone] About your hospitalization You were admitted on:  March 28, 2017 You last received care in the:  [facility] RENAL MEDICINE You were discharged on:  March 31, 2017 Unit phone number:  [phone] Why you were hospitalized Your primary diagnosis was:  Not on File Your diagnoses also included:  Copd (Chronic Obstructive Pulmonary Disease) (Hcc), Atypical Chest Pain, Respiratory Failure (Hcc), 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), Post-Tuberculous Bronchiectasis, Hypoxemic Respiratory Failure, Chronic (Hcc), Dnr No Code (Do Not Resuscitate), Chronic Systolic Congestive Heart Failure (Hcc), Cardiomyopathy (Hcc) Providers Seen During Your Hospitalizations Provider Role Specialty Primary office phone Narcisa Bruce MD Attending Provider Emergency Medicine [phone] Luis Calvin MD Attending Provider Hospitalist [phone] Garry Lopez MD Attending Provider Internal Medicine [phone] Your Primary Care Physician (PCP) Primary Care Physician Office Phone Office Fax Rajiv MCCRAY [phone] Follow-up Information Follow up With Details Comments Contact Info Noy Serrano MD Schedule an appointment as soon as possible for a visit in 2 weeks  [address] [facility] 
[phone] [address] [facility] 
[phone] Your Appointments Friday April 28, 2017  2:45 PM EDT ROUTINE CARE with Erica Ruano CARISA,  [facility] [address] [facility]) [address] [facility]  
[phone] Current Discharge Medication List  
  
START taking these medications Dose & Instructions Dispensing Information Comments Morning Noon Evening Bedtime  
 levoFLOXacin 750 mg tablet Commonly known as:  Surry Code Your last dose was: Your next dose is:    
   
   
 Dose:  750 mg Take 1 Tab by mouth every twenty-four (24) hours for 10 days. Quantity:  10 Tab Refills:  0 CONTINUE these medications which have CHANGED Dose & Instructions Dispensing Information Comments Morning Noon Evening Bedtime  
 furosemide 20 mg tablet Commonly known as:  LASIX What changed:  additional instructions Your last dose was: Your next dose is:    
   
   
 Dose:  40 mg Take 2 Tabs by mouth daily. Indications: EDEMA Quantity:  180 Tab Refills:  3 LORazepam 1 mg tablet Commonly known as:  ATIVAN What changed:  See the new instructions. Your last dose was: Your next dose is: TAKE 1/2 TO 1 BY MOUTH UP TO 3 TIMES DAILY AS NEEDED FOR ANXIETY Quantity:  180 Tab Refills:  0  
     
   
   
   
  
 potassium chloride SR 10 mEq tablet Commonly known as:  KLOR-CON 10 What changed:   
- how much to take - when to take this 
- additional instructions Your last dose was: Your next dose is:    
   
   
 Dose:  10 mEq Take 1 Tab by mouth two (2) times daily (with meals). Take 2nd dose when you take an extra Furosemide. Indications: HYPOKALEMIA Quantity:  180 Tab Refills:  1  
     
   
   
   
  
 predniSONE 20 mg tablet Commonly known as:  Cheryl Griffith What changed:  additional instructions Your last dose was: Your next dose is: Take 2 tablets daily for 4 days, then 1 tablet daily for 4 days, then 1/2 a tablet daily for 4 days, then stop Quantity:  14 Tab Refills:  0 CONTINUE these medications which have NOT CHANGED Dose & Instructions Dispensing Information Comments Morning Noon Evening Bedtime  
 aspirin delayed-release 81 mg tablet Your last dose was: Your next dose is:    
   
   
 Dose:  81 mg Take 81 mg by mouth daily. Refills:  0  
     
   
   
   
  
 budesonide-formoterol 160-4.5 mcg/actuation HFA inhaler Commonly known as:  SYMBICORT Your last dose was: Your next dose is:    
   
   
 Dose:  2 Puff Take 2 Puffs by inhalation two (2) times a day. Quantity:  3 Inhaler Refills:  4 diphenhydrAMINE 25 mg capsule Commonly known as:  BENADRYL Your last dose was: Your next dose is:    
   
   
 Dose:  25 mg Take 25 mg by mouth two (2) times a day. Indications: COUGH Refills:  0  
     
   
   
   
  
 docusate sodium 100 mg capsule Commonly known as:  Patria Alvarado Your last dose was: Your next dose is:    
   
   
 Dose:  100 mg Take 1 Cap by mouth daily as needed for Constipation for up to 90 days. Quantity:  60 Cap Refills:  2  
     
   
   
   
  
 ferrous sulfate 325 mg (65 mg iron) tablet Your last dose was: Your next dose is:    
   
   
 Dose:  325 mg Take 1 Tab by mouth daily (with breakfast). Quantity:  30 Tab Refills:  1  
     
   
   
   
  
 guaiFENesin-codeine 100-10 mg/5 mL solution Commonly known as:  ROBITUSSIN AC Your last dose was: Your next dose is:    
   
   
 Dose:  5 mL Take 5 mL by mouth two (2) times daily as needed for Cough. Max Daily Amount: 10 mL. Quantity:  463 mL Refills:  0 IMITREX PO Your last dose was: Your next dose is: Take  by mouth as needed (migraine). Refills:  0  
     
   
   
   
  
 ipratropium 0.02 % nebulizer solution Commonly known as:  ATROVENT Your last dose was: Your next dose is:    
   
   
 Dose:  0.5 mg  
2.5 mL by Nebulization route three (3) times daily. Quantity:  750 mL Refills:  3 Dispense 300 vials. L. gasseri-B. bifidum-B longum 1.5 billion cell Cap Your last dose was: Your next dose is:    
   
   
 Dose:  2 Cap Take 2 Caps by mouth daily. Refills:  0  
     
   
   
   
  
 levalbuterol 1.25 mg/3 mL Nebu Commonly known as:  Terry Gutierrez Your last dose was: Your next dose is:    
   
   
 Dose:  1.25 mg  
3 mL by Nebulization route every six (6) hours as needed. Quantity:  792 mL Refills:  6  
     
   
   
   
  
 levothyroxine 50 mcg tablet Commonly known as:  SYNTHROID Your last dose was: Your next dose is: TAKE 1 TABLET DAILY BEFORE BREAKFAST Quantity:  90 Tab Refills:  3  
     
   
   
   
  
 lisinopril 20 mg tablet Commonly known as:  Carletta Cockayne Your last dose was: Your next dose is: TAKE 1 BY MOUTH DAILY Quantity:  90 Tab Refills:  3 MIRALAX 17 gram packet Generic drug:  polyethylene glycol Your last dose was: Your next dose is:    
   
   
 Dose:  17 g Take 17 g by mouth daily as needed. Refills:  0  
     
   
   
   
  
 nebivolol 5 mg tablet Commonly known as:  BYSTOLIC Your last dose was: Your next dose is:    
   
   
 Dose:  5 mg Take 5 mg by mouth two (2) times a day. Refills:  0 OXYGEN-AIR DELIVERY SYSTEMS Your last dose was: Your next dose is:    
   
   
 Dose:  4.5 L/min 4.5 L/min by Nasal route continuous. Refills:  0  
     
   
   
   
  
 raNITIdine 150 mg tablet Commonly known as:  ZANTAC Your last dose was: Your next dose is:    
   
   
 Dose:  150 mg Take 150 mg by mouth daily (with lunch). Refills:  0  
     
   
   
   
  
 sertraline 50 mg tablet Commonly known as:  ZOLOFT Your last dose was: Your next dose is:    
   
   
 Dose:  75 mg Take 1.5 Tabs by mouth daily. Quantity:  135 Tab Refills:  3  
     
   
   
   
  
 suvorexant 10 mg tablet Commonly known as:  Raisa Fracisco Your last dose was: Your next dose is:    
   
   
 Dose:  10 mg Take 1 Tab by mouth nightly as needed for Insomnia. Max Daily Amount: 10 mg.  
 Quantity:  30 Tab Refills:  4  
     
   
   
   
  
 tiotropium bromide 2.5 mcg/actuation inhaler Commonly known as:  Michelle Badillo Your last dose was: Your next dose is:    
   
   
 Dose:  2 Puff Take 2 Puffs by inhalation daily. Quantity:  1 Inhaler Refills:  1  
     
   
   
   
  
 traZODone 50 mg tablet Commonly known as:  Chon Parra Your last dose was: Your next dose is:    
   
   
 Dose:  100 mg Take 2 Tabs by mouth nightly. Quantity:  180 Tab Refills:  3 XOPENEX HFA 45 mcg/actuation inhaler Generic drug:  levalbuterol tartrate Your last dose was: Your next dose is:    
   
   
 USE 2 INHALATIONS BY MOUTH EVERY 4 HOURS AS NEEDED FOR WHEEZING Quantity:  45 Inhaler Refills:  6  
     
   
   
   
  
 zolpidem 5 mg tablet Commonly known as:  AMBIEN Your last dose was: Your next dose is: TAKE ONE TO TWO TABLETS BY MOUTH EVERY NIGHTLY AS NEEDED FOR SLEEP (MAX 2 TABLETS DAILY) Quantity:  90 Tab Refills:  0 STOP taking these medications   
 azithromycin 250 mg tablet Commonly known as:  Devante López Where to Get Your Medications Information on where to get these meds will be given to you by the nurse or doctor. ! Ask your nurse or doctor about these medications  
  levoFLOXacin 750 mg tablet  
 predniSONE 20 mg tablet Discharge Instructions None Discharge Orders None Introducing [facility] SERVICES! Dear Matthias Cerda: Thank you for requesting a [employer] account. Our records indicate that you already have an active [employer] account. You can access your account anytime at [email or web address] Did you know that you can access your hospital and ER discharge instructions at any time in [employer]? You can also review all of your test results from your hospital stay or ER visit. Additional Information If you have questions, please visit the Frequently Asked Questions section of the [employer] website at [email or web address] Remember, [employer] is NOT to be used for urgent needs. For medical emergencies, dial 911. Now available from your iPhone and Android! General Information Please provide this summary of care documentation to your next provider. Patient Signature:  ____________________________________________________________ Date:  ____________________________________________________________  
  
Devante Cruz Provider Signature:  ____________________________________________________________ Date:  ____________________________________________________________

## 2017-03-28 NOTE — ED PROVIDER NOTES
HPI Comments: María Candelaria is a 66 y.o. male with PMhx significant for HTN, GERD, COPD, and diastolic CHF who presents via EMS to the ED with cc of 9/10 non-radiating sternal CP which is exacerbated with exertion since 11 AM this morning. He also c/o associated subjective fever. He states that his current CP is 6/10 in the ED. The pt reports that he was laying down when he felt the onset of symptoms and called his PCP who referred him the ED. Per EMS, the pt is on 4 L oxygen via nasal cannula on baseline. They report that the pt's SpO2 was ~80% upon initial evaluation, but it increased to ~90% on 4 L oxygen via nasal cannula. EMS states that the pt had ASA and NTG prior to arrival. They also note that the pt's pain is not consistent with his previous MI. He denies any family h/o heart disease, HTN, or DM; he specifically denies diaphoresis, nausea, cough, chills, leg pain, neck pain, and arm pain at this time. SHx: -tobacco, -EtOH, -illicit drug use    PCP: eSlam Alejo DO  Cardiology: Jocelyne Castillo MD    There are no other complaints, changes or physical findings at this time. The history is provided by the patient and the EMS personnel. No  was used. Past Medical History:   Diagnosis Date    Chronic obstructive pulmonary disease ([address]     Diastolic CHF, chronic ([address] 11/4/2015    Emphysema/COPD ([address] 9/20/2009    Essential hypertension, benign 9/20/2009    GERD (gastroesophageal reflux disease) 9/20/2009    Hypertension     Migraine headache 9/20/2009    Post-tuberculous bronchiectasis 10/17/2013    Thyroid disease     hypothyroidism       History reviewed. No pertinent surgical history.       Family History:   Problem Relation Age of Onset    Heart Disease Mother     Stroke Mother     Cancer Father      throat       Social History     Social History    Marital status:      Spouse name: N/A    Number of children: N/A    Years of education: N/A     Occupational History    Not on file. Social History Main Topics    Smoking status: Former Smoker     Packs/day: 1.50     Years: 25.00     Types: Cigarettes     Quit date: 4/26/1979    Smokeless tobacco: Never Used    Alcohol use No    Drug use: No    Sexual activity: Not Currently     Other Topics Concern    Not on file     Social History Narrative         ALLERGIES: Cardizem [diltiazem hcl]    Review of Systems   Constitutional: Positive for fever (subjective). Negative for chills and diaphoresis. HENT: Negative for congestion. Eyes: Negative. Respiratory: Negative for cough. Cardiovascular: Positive for chest pain (sternal). Gastrointestinal: Negative for nausea. Endocrine: Negative for heat intolerance. Genitourinary: Negative. Musculoskeletal: Negative for myalgias (leg, arm) and neck pain. Skin: Negative for rash. Allergic/Immunologic: Negative for immunocompromised state. Hematological: Does not bruise/bleed easily. Psychiatric/Behavioral: Negative. All other systems reviewed and are negative. Patient Vitals for the past 12 hrs:   Temp Pulse Resp BP SpO2   03/28/17 1945 - (!) 106 24 145/81 91 %   03/28/17 1930 - (!) 105 23 156/80 92 %   03/28/17 1915 - (!) 109 21 159/62 (!) 87 %   03/28/17 1630 - - - 132/67 98 %   03/28/17 1600 - - - 133/72 96 %   03/28/17 1539 99.2 °F (37.3 °C) 94 22 137/65 93 %            Physical Exam   Constitutional: He is oriented to person, place, and time. He appears well-developed and well-nourished. No distress. HENT:   Head: Normocephalic and atraumatic. Eyes: EOM are normal.   Neck: Normal range of motion. Neck supple. Cardiovascular: Normal rate, regular rhythm and normal heart sounds. Pulmonary/Chest: He is in respiratory distress (mild to moderate). He has wheezes (slight, left lung). He exhibits no tenderness (chest wall). Abdominal: Soft. Bowel sounds are normal. There is no tenderness.    Musculoskeletal: He exhibits no tenderness (legs). Slight retraction. Trace edema in bilateral legs. Neurological: He is alert and oriented to person, place, and time. Coordination normal.   Skin: Skin is warm and dry. Psychiatric: He has a normal mood and affect. Nursing note and vitals reviewed. MDM  Number of Diagnoses or Management Options  Acute chest pain:   Acute on chronic respiratory failure with hypoxia ([address]):   COPD exacerbation [facility]):   Diagnosis management comments:   DDx: CAD, COPD, respiratory failure, CHF, pleurisy, PE. Amount and/or Complexity of Data Reviewed  Clinical lab tests: ordered and reviewed  Tests in the radiology section of CPT®: ordered and reviewed  Tests in the medicine section of CPT®: ordered and reviewed  Obtain history from someone other than the patient: yes (EMS)  Review and summarize past medical records: yes  Discuss the patient with other providers: yes (Hospitalist)  Independent visualization of images, tracings, or specimens: yes    Critical Care  Total time providing critical care: 30-74 minutes    Patient Progress  Patient progress: stable    ED Course       Procedures      EKG interpretation: (Preliminary) 1537  Rhythm: normal sinus rhythm; and regular . Rate (approx.): 95; Axis: rightward axis; KS interval: normal; QRS interval: normal ; ST/T wave: normal; Other findings: right atrial enlargement, pulmonary disease pattern. Improved from previous EKG. Written by SULMA Washington, as dictated by Feng Zamora MD.      PROGRESS NOTE:  5:09 PM  Pt was reevaluated; he reports that his pain has not improved with morphine. Will continue pain management. Written by SULMA Washington, as dictated by Feng Zamora MD.      EKG interpretation: (Repeat) 1743  Rhythm: sinus tachycardia; and regular . Rate (approx.): 102;  Axis: normal; KS interval: normal; QRS interval: normal ; ST/T wave: normal; Other findings: lateral infarct, unchanged from previous ekg.  Written by SULMA Anaya, as dictated by Sheba Dudley MD.      PROGRESS NOTE:  5:48 PM  The pt was reevaluated; he states that his CP is now 10/10. Will repeat EKG. Written by SULMA Anaya, as dictated by Sheba Dudley MD.    6:29 PM  The pt was reevaluated; he states that his pain is almost gone at this time. Written by SULMA Anaya, as dictated by Sheba Dudley MD.      EKG interpretation: (Repeat)   Rhythm: sinus tachycardia; and regular . Rate (approx.): 104; Axis: rightward axis; TN interval: normal; QRS interval: normal ; ST/T wave: normal; Other findings: right atrial enlargement, pulmonary disease pattern, unchanged from previous ekg. Written by SULMA Anaya, as dictated by Sheba Dudley MD.      CONSULT NOTE:   8:02 PM  Sheba Dudley MD spoke with Dr. Andrea Reyes,   Specialty: Hospitalist  Discussed pt's hx, disposition, and available diagnostic and imaging results. Reviewed care plans. Consultant will evaluate pt for admission. Written by SULMA Anaya, as dictated by Sheba Dudley MD.      CRITICAL CARE NOTE:  10:39 PM    IMPENDING DETERIORATION -Respiratory, Cardiovascular and Metabolic    ASSOCIATED RISK FACTORS - Hypoxia, Dysrhythmia, Metabolic changes and Dehydration    MANAGEMENT- Bedside Assessment and Supervision of Care    INTERPRETATION -  Xrays, CT Scan, ECG and Blood Pressure    INTERVENTIONS - hemodynamic mngmt and Metobolic interventions    CASE REVIEW - Hospitalist, Nursing and Family    TREATMENT RESPONSE -Improved    PERFORMED BY - Self    NOTES:    I have spent 60 minutes of critical care time involved in lab review, consultations with specialist, family decision- making, bedside attention and documentation. During this entire length of time I was immediately available to the patient .     Sheba Dudley MD        LABORATORY TESTS:  Recent Results (from the past 12 hour(s))   EKG, 12 LEAD, INITIAL    Collection Time: 03/28/17  3:37 PM   Result Value Ref Range    Ventricular Rate 95 BPM    Atrial Rate 95 BPM    P-R Interval 168 ms    QRS Duration 100 ms    Q-T Interval 366 ms    QTC Calculation (Bezet) 459 ms    Calculated P Axis 96 degrees    Calculated R Axis 103 degrees    Calculated T Axis 87 degrees    Diagnosis       ** Suspect arm lead reversal, interpretation assumes no reversal  Normal sinus rhythm  Right atrial enlargement  Rightward axis  Pulmonary disease pattern  Abnormal ECG  When compared with ECG of 09-FEB-2017 07:38,  premature ventricular complexes are no longer present  QT has shortened     TROPONIN I    Collection Time: 03/28/17  4:20 PM   Result Value Ref Range    Troponin-I, Qt. <0.04 <0.05 ng/mL   CK W/ REFLX CKMB    Collection Time: 03/28/17  4:20 PM   Result Value Ref Range    CK 61 39 - 308 U/L   CBC WITH AUTOMATED DIFF    Collection Time: 03/28/17  4:20 PM   Result Value Ref Range    WBC 12.7 (H) 4.1 - 11.1 K/uL    RBC 3.53 (L) 4.10 - 5.70 M/uL    HGB 9.0 (L) 12.1 - 17.0 g/dL    HCT 30.7 (L) 36.6 - 50.3 %    MCV 87.0 80.0 - 99.0 FL    MCH 25.5 (L) 26.0 - 34.0 PG    MCHC 29.3 (L) 30.0 - 36.5 g/dL    RDW 18.8 (H) 11.5 - 14.5 %    PLATELET 766 726 - 733 K/uL    NEUTROPHILS 76 (H) 32 - 75 %    LYMPHOCYTES 10 (L) 12 - 49 %    MONOCYTES 12 5 - 13 %    EOSINOPHILS 2 0 - 7 %    BASOPHILS 0 0 - 1 %    ABS. NEUTROPHILS 9.7 (H) 1.8 - 8.0 K/UL    ABS. LYMPHOCYTES 1.2 0.8 - 3.5 K/UL    ABS. MONOCYTES 1.5 (H) 0.0 - 1.0 K/UL    ABS. EOSINOPHILS 0.3 0.0 - 0.4 K/UL    ABS.  BASOPHILS 0.0 0.0 - 0.1 K/UL   METABOLIC PANEL, COMPREHENSIVE    Collection Time: 03/28/17  4:20 PM   Result Value Ref Range    Sodium 140 136 - 145 mmol/L    Potassium 3.7 3.5 - 5.1 mmol/L    Chloride 100 97 - 108 mmol/L    CO2 34 (H) 21 - 32 mmol/L    Anion gap 6 5 - 15 mmol/L    Glucose 101 (H) 65 - 100 mg/dL    BUN 16 6 - 20 MG/DL    Creatinine 0.83 0.70 - 1.30 MG/DL    BUN/Creatinine ratio 19 12 - 20      GFR est AA >60 >60 ml/min/1.73m2    GFR est non-AA >60 >60 ml/min/1.73m2    Calcium 8.1 (L) 8.5 - 10.1 MG/DL    Bilirubin, total 0.3 0.2 - 1.0 MG/DL    ALT (SGPT) 29 12 - 78 U/L    AST (SGOT) 18 15 - 37 U/L    Alk. phosphatase 52 45 - 117 U/L    Protein, total 6.6 6.4 - 8.2 g/dL    Albumin 2.9 (L) 3.5 - 5.0 g/dL    Globulin 3.7 2.0 - 4.0 g/dL    A-G Ratio 0.8 (L) 1.1 - 2.2     D DIMER    Collection Time: 03/28/17  4:20 PM   Result Value Ref Range    D-dimer 0.27 0.00 - 0.65 mg/L FEU   PRO-BNP    Collection Time: 03/28/17  4:20 PM   Result Value Ref Range    NT pro-BNP 2399 (H) 0 - 125 PG/ML   EKG, 12 LEAD, SUBSEQUENT    Collection Time: 03/28/17  5:43 PM   Result Value Ref Range    Ventricular Rate 102 BPM    Atrial Rate 102 BPM    P-R Interval 172 ms    QRS Duration 88 ms    Q-T Interval 346 ms    QTC Calculation (Bezet) 450 ms    Calculated P Axis 85 degrees    Calculated R Axis 104 degrees    Calculated T Axis 80 degrees    Diagnosis       Sinus tachycardia  Lateral infarct , age undetermined  When compared with ECG of 28-MAR-2017 15:37,  MANUAL COMPARISON REQUIRED, DATA IS UNCONFIRMED     BLOOD GAS, ARTERIAL    Collection Time: 03/28/17  5:47 PM   Result Value Ref Range    pH 7.45 7.35 - 7.45      PCO2 50 (H) 35.0 - 45.0 mmHg    PO2 64 (L) 80 - 100 mmHg    O2 SAT 93 92 - 97 %    BICARBONATE 34 (H) 22 - 26 mmol/L    BASE EXCESS 8.3 mmol/L    O2 METHOD NASAL O2      O2 FLOW RATE 4.00 L/min    FIO2 36 %    Sample source ARTERIAL      SITE RIGHT RADIAL      SHAW'S TEST YES         IMAGING RESULTS:  CTA CHEST W OR W WO CONT   Final Result   EXAM: CTA CHEST ABD PELVIS W CON      INDICATION: Emphysema. Posttuberculosis bronchiectasis. Severe chest pain. Shortness of breath. .     COMPARISON: 6/20/2014.     CONTRAST: 100 mL of Isovue-370.     TECHNIQUE:   Multislice helical CT arteriography was performed from the thoracic inlet to the  symphysis pubis during uneventful rapid bolus intravenous contrast  administration. No oral contrast was administered. Delayed images of the abdomen  were acquired. Contiguous 2.5 mm axial images were reconstructed and lung and  soft tissue windows were generated. Coronal and sagittal reformations and 3-D  MIP renderings were generated. CT dose reduction was achieved through use of a  standardized protocol tailored for this examination and automatic exposure  control for dose modulation.     FINDINGS:  CT angiography:     Ascending aorta is normal in caliber. Right vessels opacify normally. Minimal  atherosclerotic change of the posterior thoracic aortic arch and descending  aorta noted. There is no evidence of aneurysm or dissection.     The abdominal aorta demonstrates mild atherosclerotic change without evidence of  aneurysm. No dissection of the abdominal aorta noted. Single renal arteries  noted without significant stenosis. The celiac artery and its branches as well  as superior mesenteric artery and its branches are unremarkable. Inferior  mesenteric artery fills normally. Iliac arteries demonstrate atherosclerotic  change without aneurysm. Internal and external iliac arteries opacify normally.     Also incidentally noted is opacification of the pulmonary arteries. No filling  defects noted.     Thorax:     Lungs again demonstrate severe fibrotic changes bilaterally more so on the left  than on the right. Bronchiectasis again is noted similar to the previous study. There is pleural thickening at both apices right less than left. These findings  are unchanged, however. No new superimposed acute process is identified in the  lungs.     There is no mediastinal or hilar adenopathy. Moderate hiatal hernia noted.     Osseous structures of the thorax, abdomen, and pelvis demonstrate no sclerotic  or lytic lesions.     Abdomen pelvis:     Arterial phase imaging only is normal. Liver is homogeneous in this single  phase. Spleen is normal in size. Pancreas demonstrates no inflammatory changes.   Adrenal glands normal. Previous described cyst of the right kidney is again  noted.     There is no retroperitoneal adenopathy.     There is no bowel obstruction. There is large amount of fecal material  throughout the colon.     No ascites or free intraperitoneal air.     Study of the pelvis demonstrates distended urinary bladder without  calcification.        IMPRESSION  IMPRESSION:     1. No evidence of aortic aneurysm or dissection. 2. No evidence of pulmonary embolus. 3. Chronic findings in both lungs are again seen with bronchiectasis and  fibrosis and pleural thickening. No new acute findings seen. 4. No acute finding noted in the abdomen or pelvis. CTA ABDOMEN PELV W CONT   Final Result   EXAM: CTA CHEST ABD PELVIS W CON      INDICATION: Emphysema. Posttuberculosis bronchiectasis. Severe chest pain. Shortness of breath. .     COMPARISON: 6/20/2014.     CONTRAST: 100 mL of Isovue-370.     TECHNIQUE:   Multislice helical CT arteriography was performed from the thoracic inlet to the  symphysis pubis during uneventful rapid bolus intravenous contrast  administration. No oral contrast was administered. Delayed images of the abdomen  were acquired. Contiguous 2.5 mm axial images were reconstructed and lung and  soft tissue windows were generated. Coronal and sagittal reformations and 3-D  MIP renderings were generated. CT dose reduction was achieved through use of a  standardized protocol tailored for this examination and automatic exposure  control for dose modulation.     FINDINGS:  CT angiography:     Ascending aorta is normal in caliber. Right vessels opacify normally. Minimal  atherosclerotic change of the posterior thoracic aortic arch and descending  aorta noted. There is no evidence of aneurysm or dissection.     The abdominal aorta demonstrates mild atherosclerotic change without evidence of  aneurysm. No dissection of the abdominal aorta noted. Single renal arteries  noted without significant stenosis.  The celiac artery and its branches as well  as superior mesenteric artery and its branches are unremarkable. Inferior  mesenteric artery fills normally. Iliac arteries demonstrate atherosclerotic  change without aneurysm. Internal and external iliac arteries opacify normally.     Also incidentally noted is opacification of the pulmonary arteries. No filling  defects noted.     Thorax:     Lungs again demonstrate severe fibrotic changes bilaterally more so on the left  than on the right. Bronchiectasis again is noted similar to the previous study. There is pleural thickening at both apices right less than left. These findings  are unchanged, however. No new superimposed acute process is identified in the  lungs.     There is no mediastinal or hilar adenopathy. Moderate hiatal hernia noted.     Osseous structures of the thorax, abdomen, and pelvis demonstrate no sclerotic  or lytic lesions.     Abdomen pelvis:     Arterial phase imaging only is normal. Liver is homogeneous in this single  phase. Spleen is normal in size. Pancreas demonstrates no inflammatory changes. Adrenal glands normal. Previous described cyst of the right kidney is again  noted.     There is no retroperitoneal adenopathy.     There is no bowel obstruction. There is large amount of fecal material  throughout the colon.     No ascites or free intraperitoneal air.     Study of the pelvis demonstrates distended urinary bladder without  calcification.        IMPRESSION  IMPRESSION:     1. No evidence of aortic aneurysm or dissection. 2. No evidence of pulmonary embolus. 3. Chronic findings in both lungs are again seen with bronchiectasis and  fibrosis and pleural thickening. No new acute findings seen. 4. No acute finding noted in the abdomen or pelvis. XR CHEST PORT   Final Result   INDICATION: Chest pain and shortness of breath. Posttuberculosis bronchiectasis. Emphysema.     COMPARISON: 2/9/2017     A single frontal view was obtained.  The time of this study is 5 hours. There  is loss of volume in both upper lobes with calcifications consistent with the  history of previous tuberculosis. There is also overall loss of volume in the  left hemithorax. Pleural thickening is seen at both lung apices. All these  findings are similar to the previous exam. There is no new acute finding in  either lung. Mediastinum is shifted to the left consistent with volume loss on  the left. This is similar to the previous study as well. Heart and mediastinal  shadows are similar to the previous study as well. Visualized osseous structures  unremarkable. .            IMPRESSION  IMPRESSION:  No significant change since the previous exam. No definite acute finding. MEDICATIONS GIVEN:  Medications   morphine injection 2 mg (2 mg IntraVENous Given 3/28/17 1641)   furosemide (LASIX) injection 20 mg (20 mg IntraVENous Given 3/28/17 1740)   nitroglycerin (NITROBID) 2 % ointment 0.5 Inch (0.5 Inches Topical Given 3/28/17 1736)   methylPREDNISolone (PF) (SOLU-MEDROL) injection 125 mg (125 mg IntraVENous Given 3/28/17 1738)   albuterol (PROVENTIL VENTOLIN) nebulizer solution 5 mg (5 mg Nebulization Given 3/28/17 1740)   HYDROmorphone (PF) (DILAUDID) injection 0.2 mg (0.2 mg IntraVENous Given 3/28/17 1735)   HYDROmorphone (PF) (DILAUDID) injection 0.5 mg (0.5 mg IntraVENous Given 3/28/17 1818)   ondansetron (ZOFRAN) injection 4 mg (4 mg IntraVENous Given 3/28/17 1820)   0.9% sodium chloride infusion (0 mL/hr IntraVENous IV Completed 3/28/17 1930)   iopamidol (ISOVUE-370) 76 % injection 100 mL (100 mL IntraVENous Given 3/28/17 1917)   sodium chloride (NS) flush 10 mL (10 mL IntraVENous Given 3/28/17 1917)       IMPRESSION:  1. Acute chest pain    2. Acute on chronic respiratory failure with hypoxia (HCC)    3. COPD exacerbation ([address]        PLAN:  1. Admit to Hospitalist.      ADMIT NOTE:  8:02 PM  Patient is being admitted to the hospital by Dr. Ligia Holland.  The results of their tests and reasons for their admission have been discussed with them and/or available family. They convey agreement and understanding for the need to be admitted and for their admission diagnosis. Consultation has been made with the inpatient physician specialist for hospitalization. This note is prepared by Manolo Qureshi, acting as Scribe for Sheba Dudley MD.    Sheba Dudley MD: The scribe's documentation has been prepared under my direction and personally reviewed by me in its entirety. I confirm that the note above accurately reflects all work, treatment, procedures, and medical decision making performed by me.

## 2017-03-28 NOTE — ED NOTES
Wife and patient awaiting results of CT and repeat troponin at this time. Patient denies chest pain at this time. History of sundowner's. Wife remains at bedside.

## 2017-03-28 NOTE — ED NOTES
Bedside/verbal shift change report received from [address] [place] [address] (offgoing nurse). Report included the following information SBAR, Kardex, ED Summary, [facility] and Recent Results. Used AIDET to introduce self to patient as member of primary care team, reviewed care plan with patient and discussed patient's concerns and needs with patient and his family. Opportunities for questions and clarifications given, call bell available to patient, toileting offered, pain assessed pt is not requesting pain medication at this time.

## 2017-03-29 PROBLEM — R07.89 ATYPICAL CHEST PAIN: Status: ACTIVE | Noted: 2017-01-01

## 2017-03-29 PROBLEM — I42.9 CARDIOMYOPATHY (HCC): Status: ACTIVE | Noted: 2017-01-01

## 2017-03-29 NOTE — CONSULTS
CARDIOLOGY CONSULT       Date of  Admission: 3/28/2017  3:29 PM     Admission type:Emergency    Consult for: Substernal chest discomfort  Consulted by: Dr. Nichole Villagomez:     Kaden Dewitt is a 66 y.o. male admitted for COPD (chronic obstructive pulmonary disease) ([facility] with pleuritic chest pain, fever, and intermittent worsening of his baseline hypoxia. Patient complains of an episode of rather severe substernal chest discomfort yesterday that occurred while at rest watching the television. Today chest discomfort is very mild and constant. EKG was nonischemic and cardiac markers were normal.  He has a history of a cardiomyopathy followed by Dr. Conrado Fuller at [facility] which has been medically managed. He had a stress test negative for significant ischemia in 2014.     Patient Active Problem List    Diagnosis Date Noted    Atypical chest pain 03/29/2017    Cardiomyopathy ([address] 03/29/2017    COPD (chronic obstructive pulmonary disease) ([facility] [address] 03/28/2017    Primary insomnia 02/28/2017    Respiratory failure ([address] 02/09/2017    Essential hypertension 12/05/2016    COPD exacerbation ([address] 12/05/2016    Diarrhea 07/07/2016    DNR no code (do not resuscitate) 05/10/2016    Chronic systolic congestive heart failure ([address] 02/20/2016    Chronic constipation 06/06/2015    Hypothyroidism due to acquired atrophy of thyroid 05/04/2015    Persistent disorder of initiating or maintaining sleep 04/19/2015    Vasomotor rhinitis 02/11/2015    Adjustment disorder with mixed anxiety and depressed mood 08/11/2014    Ischemic heart disease, chronic 08/05/2014    Sepsis ([address] 06/20/2014     Class: Acute    Post-tuberculous bronchiectasis 10/17/2013    Hypoxemic respiratory failure, chronic (HCC) 10/17/2013    Hypokalemia 01/24/2013    Pulmonary emphysema with fibrosis of lung ([address] 09/20/2009    GERD (gastroesophageal reflux disease) 09/20/2009    Migraine headache 09/20/2009    ED (erectile dysfunction) 09/20/2009      Dia Yeager III, DO  Past Medical History:   Diagnosis Date    Chronic obstructive pulmonary disease ([facility]     Diastolic CHF, chronic ([facility] 11/4/2015    Emphysema/COPD ([facility] 9/20/2009    Essential hypertension, benign 9/20/2009    GERD (gastroesophageal reflux disease) 9/20/2009    Hypertension     Migraine headache 9/20/2009    Post-tuberculous bronchiectasis 10/17/2013    Thyroid disease     hypothyroidism      Social History     Social History    Marital status:      Spouse name: N/A    Number of children: N/A    Years of education: N/A     Social History Main Topics    Smoking status: Former Smoker     Packs/day: 1.50     Years: 25.00     Types: Cigarettes     Quit date: 4/26/1979    Smokeless tobacco: Never Used    Alcohol use No    Drug use: No    Sexual activity: Not Currently     Other Topics Concern    None     Social History Narrative     Allergies   Allergen Reactions    Cardizem [Diltiazem Hcl] Other (comments)     Headache and constipation      Family History   Problem Relation Age of Onset    Heart Disease Mother     Stroke Mother     Cancer Father      throat      Current Facility-Administered Medications   Medication Dose Route Frequency    sodium chloride (NS) flush 5-10 mL  5-10 mL IntraVENous Q8H    sodium chloride (NS) flush 5-10 mL  5-10 mL IntraVENous PRN    acetaminophen (TYLENOL) tablet 650 mg  650 mg Oral Q6H PRN    oxyCODONE-acetaminophen (PERCOCET) 5-325 mg per tablet 1 Tab  1 Tab Oral Q6H PRN    naloxone (NARCAN) injection 0.4 mg  0.4 mg IntraVENous PRN    ondansetron (ZOFRAN) injection 4 mg  4 mg IntraVENous Q4H PRN    bisacodyl (DULCOLAX) suppository 10 mg  10 mg Rectal DAILY PRN    enoxaparin (LOVENOX) injection 40 mg  40 mg SubCUTAneous Q24H    ipratropium (ATROVENT) 0.02 % nebulizer solution 0.5 mg  0.5 mg Nebulization TID    levalbuterol (XOPENEX) nebulizer soln 1.25 mg/3 mL  1.25 mg Nebulization TID    docusate sodium (COLACE) capsule 100 mg  100 mg Oral DAILY PRN    Saccharomyces boulardii (FLORASTOR) capsule 250 mg  250 mg Oral BID    nebivolol (BYSTOLIC) tablet 5 mg  5 mg Oral BID    predniSONE (DELTASONE) tablet 40 mg  40 mg Oral DAILY WITH BREAKFAST    umeclidinium (INCRUSE ELLIPTA) 62.5 mcg/actuation  1 Puff Inhalation DAILY    LORazepam (ATIVAN) tablet 1 mg  1 mg Oral BID    fluticasone-vilanterol (BREO ELLIPTA) 100mcg-25mcg/puff  1 Puff Inhalation DAILY    potassium chloride SR (KLOR-CON 10) tablet 10 mEq  10 mEq Oral BID WITH MEALS    traZODone (DESYREL) tablet 100 mg  100 mg Oral QHS    lisinopril (PRINIVIL, ZESTRIL) tablet 20 mg  20 mg Oral DAILY    furosemide (LASIX) tablet 40 mg  40 mg Oral DAILY    levothyroxine (SYNTHROID) tablet 50 mcg  50 mcg Oral 7am    sertraline (ZOLOFT) tablet 75 mg  75 mg Oral DAILY    aspirin delayed-release tablet 81 mg  81 mg Oral DAILY    polyethylene glycol (MIRALAX) packet 17 g  17 g Oral DAILY    [START ON 3/30/2017] levoFLOXacin (LEVAQUIN) tablet 750 mg  750 mg Oral Q24H         Review of Symptoms:  11 systems reviewed, negative other than as stated in HPI     Subjective:      Visit Vitals    /68 (BP 1 Location: Left arm, BP Patient Position: At rest)    Pulse 80    Temp 97.4 °F (36.3 °C)    Resp 16    Wt 135 lb 14.4 oz (61.6 kg)    SpO2 96%    BMI 18.96 kg/m2       Physical:  Gen:  NAD very thin, chronically malnourished  Heart: regular rhythm, no murmur, gallop or rub  Lungs: Fine crackles bilaterally  Neck: supple, symmetrical, trachea midline, no adenopathy, thyroid: not enlarged, symmetric, no tenderness/mass/nodules, no carotid bruit and no JVD  Abdomen: soft, non-tender.  Bowel sounds normal. No masses,  no organomegaly  Extremities: extremities normal, atraumatic, no cyanosis or edema, positive femorals without bruits, normal dp pulses  Neurologic: CN, motor and speech grossly normal    Data Review:   Recent Labs      03/29/17   0223  03/28/17 1620   WBC  25.5*  12.7*   HGB  9.8*  9.0*   HCT  33.3*  30.7*   PLT  275  266     Recent Labs      03/29/17 0223 03/28/17   1620   NA  141  140   K  4.2  3.7   CL  102  100   CO2  31  34*   GLU  144*  101*   BUN  18  16   CREA  0.97  0.83   CA  8.6  8.1*   ALB  2.9*  2.9*   TBILI  0.4  0.3   SGOT  19  18   ALT  30  29       Recent Labs      03/29/17 0223 03/28/17   1945 03/28/17   1620   TROIQ  <0.04  <0.04  <0.04   CPK  79   --    --    CKMB  <1.0   --    --        @BRIEFLAB(CHOL,CHOLPOCT,356948,[id number],CHOLX,CHOLP,CHLST,CHOLV,017770,HDL,HDLPOCT,HDLPOC,162044,NHDLCT,[id number],HDLC,HDLP,LDL,LDLPOCT,LDLCPOC,126542,NLDLCT,DLDL,LDLC,DLDLP,556507,VLDLC,VLDL,TGL,TGLX,TRIGL,[id number],TRIGP,TGLPOCT,603530,684214,CHHD,CHHDX)@      Intake/Output Summary (Last 24 hours) at 03/29/17 1438  Last data filed at 03/29/17 0612   Gross per 24 hour   Intake                0 ml   Output             1200 ml   Net            -1200 ml        Cardiographics    Telemetry: normal sinus rhythm    ECG: normal EKG, normal sinus rhythm    Echocardiogram:  SUMMARY:  Left ventricle: Systolic function was normal. Ejection fraction was  estimated in the range of 55 % to 60 %. There were no regional wall motion  abnormalities. Assessment:         Active Problems:    Pulmonary emphysema with fibrosis of lung ([facility] (9/20/2009)      Post-tuberculous bronchiectasis (10/17/2013)      Hypoxemic respiratory failure, chronic (HCC) (10/17/2013)      Chronic systolic congestive heart failure ([facility] (2/20/2016)      DNR no code (do not resuscitate) (5/10/2016)      Respiratory failure ([address] (2/9/2017)      COPD (chronic obstructive pulmonary disease) ([facility] (3/28/2017)      Atypical chest pain (3/29/2017)      Cardiomyopathy ([facility] (3/29/2017)         Plan:     Atypical chest pain:  Chest discomfort was initially severe, with a significant pleuritic component, and in the setting of fever and greater than usual hypoxia.   Echocardiogram today shows apparent resolution of a previously diagnosed cardiomyopathy. We will proceed with dobutamine stress Myoview tomorrow. Continue aspirin, beta-blocker. Not sure why he is not on a statin medication. LDL was 93 and December 2016. We will need to discuss with him. History of cardiomyopathy followed by Dr. Cici Rodriguez at [facility]:  Ejection fraction has have normalized by echocardiogram today. Continue by systolic, lisinopril, Lasix. Fever/chronic interstitial fibrosis/hypoxia:  As per Dr. Gi Hughes and the hospitalists    Thanks for the consult. I appreciate the opportunity to participate in this patient's care. I will continue to follow along with you.

## 2017-03-29 NOTE — CDMP QUERY
Dr. Ruddy Lopes MD :    Patient is noted to have a BMI of 18.96. Please clarify if this patient is:     =>Underweight  =>Cachexia  =>Failure to Thrive  =>Other explanation of clinical findings  =>Unable to determine (no explanation for clinical findings)    Presentation: 66 WF w/hx: COPD, chronic diastolic CHF, HTN, GERD, post TB bronchiectasis, 135 lbs = BMI 18.96    Please clarify and document your clinical opinion in the progress notes and discharge summary, including the definitive and or presumptive diagnosis, (suspected or probable), related to the above clinical findings. Please include clinical findings supporting your diagnosis.      Thank You,   [address]

## 2017-03-29 NOTE — PROGRESS NOTES
Bedside and Verbal shift change report given to Tsering (oncoming nurse) by April Vera RN (offgoing nurse). Report included the following information Kardex, MAR and Recent Results.     Zone Phone for oncoming shift: [phone]    Shift Summary:Resting quietly at this time  LDAs               Peripheral IV 03/28/17 Right Antecubital (Active)   Site Assessment Clean, dry, & intact 3/28/2017 11:04 PM   Phlebitis Assessment 0 3/28/2017 11:04 PM   Infiltration Assessment 0 3/28/2017 11:04 PM   Dressing Status Clean, dry, & intact 3/28/2017 11:04 PM   Dressing Type Tape;Transparent 3/28/2017 11:04 PM   Hub Color/Line Status Pink;Flushed;Patent 3/28/2017 11:04 PM                    Urinary Catheter 03/28/17 One[place] - Temperature-Criteria for Appropriate Use: Obstruction/retention   Intake & Output   Date 03/28/17 0700 - 03/29/17 0659 03/29/17 0700 - 03/30/17 0659   Shift 7411-4021 8055-2716 24 Hour Total 5455-1746 1358-5886 24 Hour Total   I  N  T  A  K  E   Shift Total  (mL/kg)         O  U  T  P  U  T   Urine  400 400         Urine Output (mL) (Urinary Catheter 03/28/17 Oneal - Temperature)  400 400       Shift Total  (mL/kg)  400  (6.5) 400  (6.5)      NET  -400 -400      Weight (kg)  61.6 61.6 61.6 61.6 61.6      Last Bowel Movement     Glucose Checks [] N/A  [] AC/HS  [] Q6  Concerns:   Nutrition Active Orders   Diet    DIET CARDIAC Regular       Consults []PT  []OT  []Speech  []Case Management   Cardiac Monitoring []N/A [x]Yes Expires:

## 2017-03-29 NOTE — PROGRESS NOTES
Patient is scheduled for a Nuclear Medicine cardiac study tomorrow. Please keep npo after midnight except for medications per Dr. Stacia Cochran.

## 2017-03-29 NOTE — PROGRESS NOTES
Occupational Therapy Goals  Initiated 3/29/2017  1. Patient will perform grooming standing at sink for 5 minutes with modified independence within 7 day(s). 2.  Patient will perform upper body dressing with modified independence within 7 day(s). 3.  Patient will perform lower body dressing with modified independence within 7 day(s). 4.  Patient will perform toilet transfers with modified independence within 7 day(s). 5.  Patient will perform all aspects of toileting with modified independence within 7 day(s). 6.  Patient will incorporate use of energy conservation techniques and pursed lip breathing during ADLs, with minimal cueing, within 7 day(s). Occupational Therapy EVALUATION  Patient: Lor Collazo (3 y.o. male)  Date: 3/29/2017  Primary Diagnosis: COPD (chronic obstructive pulmonary disease) ([facility])        Precautions: Falls       ASSESSMENT :  Based on the objective data described below, the patient presents with GW, decreased activity tolerance, decreased safety awareness and decreased balance which is impairing his functional independence. Patient desaturating during activity on 5L with activity, but recovering with pursed lip breathing. He is below his independent baseline, now CGA to min A for ADLs and is CGA for functional mobility. Patient will benefit from skilled intervention to address the above impairments, and would benefit from rehab with a pulmonary focus.    Patients rehabilitation potential is considered to be Good  Factors which may influence rehabilitation potential include:   []             None noted  []             Mental ability/status  [x]             Medical condition  []             Home/family situation and support systems  []             Safety awareness  []             Pain tolerance/management  []             Other:      PLAN :  Recommendations and Planned Interventions:  [x]               Self Care Training                  []        Therapeutic Activities  [x] Functional Mobility Training    []        Cognitive Retraining  []               Therapeutic Exercises           [x]        Endurance Activities  [x]               Balance Training                   []        Neuromuscular Re-Education  []               Visual/Perceptual Training     [x]   Home Safety Training  [x]               Patient Education                 [x]        Family Training/Education  []               Other (comment):    Frequency/Duration: Patient will be followed by occupational therapy 4 times a week to address goals. Discharge Recommendations: Inpatient Pulmonary Rehab, or [facility] program if patient unable to go to Inpatient. Further Equipment Recommendations for Discharge: TBD         OBJECTIVE DATA SUMMARY:     Past Medical History:   Diagnosis Date    Chronic obstructive pulmonary disease ([facility]     Diastolic CHF, chronic ([facility] 11/4/2015    Emphysema/COPD ([facility] 9/20/2009    Essential hypertension, benign 9/20/2009    GERD (gastroesophageal reflux disease) 9/20/2009    Hypertension     Migraine headache 9/20/2009    Post-tuberculous bronchiectasis 10/17/2013    Thyroid disease     hypothyroidism   History reviewed. No pertinent surgical history. Prior Level of Function/Home Situation: Independent normally, on 4.5 L NC and only recently using RW.  Wife performs IADLs  Expanded or extensive additional review of patient history:     Home Situation  Home Environment: Private residence  # Steps to Enter: 3  Rails to Enter: Yes  Hand Rails : Bilateral  One/Two Story Residence: One story  Living Alone: No  Support Systems: Family member(s)  Patient Expects to be Discharged to[de-identified] Private residence  Current DME Used/Available at Home: Cane, straight, Grab bars, Oxygen, portable, Raised toilet seat, Walker, rolling, Wheelchair  [x]  Right hand dominant   []  Left hand dominant  Cognitive/Behavioral Status:  Neurologic State: Alert  Orientation Level: Oriented X4  Cognition: Follows commands Safety/Judgement: Decreased awareness of need for safety    Vision/Perceptual:                           Acuity: Within Defined Limits    Corrective Lenses: Glasses  Range of Motion:  AROM: Generally decreased, functional     Strength:  Strength: Generally decreased, functional  Coordination: Tone & Sensation:  Tone: Normal  Balance:  Sitting: Intact  Standing: Impaired  Standing - Static: Good;Constant support  Standing - Dynamic : Fair (using RW)  Functional Mobility and Transfers for ADLs:  Bed Mobility:     Supine to Sit: Supervision     Scooting: Supervision  Transfers:  Sit to Stand: Contact guard assistance     Bed to Chair: Contact guard assistance          Toilet Transfer : Contact guard assistance              ADL Assessment:  Feeding: Independent    Oral Facial Hygiene/Grooming: Contact guard assistance    Bathing: Minimum assistance    Upper Body Dressing: Contact guard assistance    Lower Body Dressing: Minimum assistance    Toileting: Minimum assistance                Functional Measure:  Barthel Index:    Bathin  Bladder: 0  Bowels: 10  Groomin  Dressin  Feeding: 10  Mobility: 0  Stairs: 0  Toilet Use: 5  Transfer (Bed to Chair and Back): 10  Total: 40       Barthel and G-code impairment scale:  Percentage of impairment CH  0% CI  1-19% CJ  20-39% CK  40-59% CL  60-79% CM  80-99% CN  100%   Barthel Score 0-100 100 99-80 79-60 59-40 20-39 1-19   0   Barthel Score 0-20 20 17-19 13-16 9-12 5-8 1-4 0      The Barthel ADL Index: Guidelines  1. The index should be used as a record of what a patient does, not as a record of what a patient could do. 2. The main aim is to establish degree of independence from any help, physical or verbal, however minor and for whatever reason. 3. The need for supervision renders the patient not independent. 4. A patient's performance should be established using the best available evidence.  Asking the patient, friends/relatives and nurses are the usual sources, but direct observation and common sense are also important. However direct testing is not needed. 5. Usually the patient's performance over the preceding 24-48 hours is important, but occasionally longer periods will be relevant. 6. Middle categories imply that the patient supplies over 50 per cent of the effort. 7. Use of aids to be independent is allowed. Lucinda Bravo., Barthel, D.W. (2017). Functional evaluation: the Barthel Index. [facility] (14)2. NATALIIA Downey, Joni Porter., Alan Perez., Gladys Buckmas, [facility] (1999). Measuring the change indisability after inpatient rehabilitation; comparison of the responsiveness of the Barthel Index and Functional Isabela Measure. Journal of Neurology, Neurosurgery, and Psychiatry, 66(4), 964-965. LOBO Ortiz, KODAK Simon, & Shannan Arora MTERA. (2004.) Assessment of post-stroke quality of life in cost-effectiveness studies: The usefulness of the Barthel Index and the EuroQoL-5D. Quality of Life Research, 13, 427-43       In compliance with CMSs Claims Based Outcome Reporting, the following G-code set was chosen for this patient based on their primary functional limitation being treated: The outcome measure chosen to determine the severity of the functional limitation was the Barthel Index with a score of 40/100 which was correlated with the impairment scale. ?  Self Care:     - CURRENT STATUS: CK - 40%-59% impaired, limited or restricted    - GOAL STATUS:  CI - 1%-19% impaired, limited or restricted    - D/C STATUS:  ---------------To be determined---------------       Occupational Therapy Evaluation Charge Determination   History Examination Decision-Making   LOW Complexity : Brief history review  LOW Complexity : 1-3 performance deficits relating to physical, cognitive , or psychosocial skils that result in activity limitations and / or participation restrictions  LOW Complexity : No comorbidities that affect functional and no verbal or physical assistance needed to complete eval tasks       Based on the above components, the patient evaluation is determined to be of the following complexity level: LOW       ADL Intervention and task modifications:  Initiated transfer, dressing ADL, bathroom mobility, pursed lip breathing, energy conservation and safety training. Pain:  Pain Scale 1: Numeric (0 - 10)  Pain Intensity 1: 0              Activity Tolerance:   93% at rest on 4LPM  85% while ambulating on 5LPM    After treatment:   [x] Patient left in no apparent distress sitting up in chair  [] Patient left in no apparent distress in bed  [x] Call bell left within reach  [x] Nursing notified  [] Caregiver present  [x] Bed alarm activated    COMMUNICATION/EDUCATION:   The patients plan of care was discussed with: Physical Therapist and Registered Nurse. [x] Home safety education was provided and the patient/caregiver indicated understanding. [x] Patient/family have participated as able in goal setting and plan of care. [x] Patient/family agree to work toward stated goals and plan of care. [] Patient understands intent and goals of therapy, but is neutral about his/her participation. [] Patient is unable to participate in goal setting and plan of care. This patients plan of care is appropriate for delegation to [facility].     Thank you for this referral.  Herbert Holman OTR/[facility]INITY

## 2017-03-29 NOTE — H&P
Mahad Jacobson, [address]   HISTORY AND PHYSICAL       Name:  Dayle Mcardle   MR#:  [id number]   :  1945   Account #:  [de-identified]        Date of Adm:  2017     MD Sarah Frankel / Linsey Prince   D:  2017   23:36   T:  2017   23:53   Job #:  [id number]

## 2017-03-29 NOTE — H&P
Hospitalist Admission Note    NAME:  Lazara Robert   :   1945   MRN:   [id number]     Date of admit:  3/28/2017    PCP: Devendra Newton III,     Assessment/Plan:      ? Acute exacerbation of chronic bronchiectasis POA  Sepsis POA WBC 12,700, temp 101 at home, HR 95  Check lactate  Sputum culture  IV levaquin, IVF if needed  Steroids and nebs    Substernal chest pain POA  Possible pericarditis POA  ? Related to bronchiectasis or pericarditis  Pain worse with breathing and laying flat, better with sitting up  I think hear a friction rub  NSAIDs  Check echo  Repeat cardiac enzymes in AM    Chronic respiratory failure POA on 4.5 L NC O2 at baseline  COPD POA not actively wheezing  Pulmonary fibrosis POA  Chronic bronchiectasis  Remote history of pulmonary TB s/p treatment  Continue O2  Steroid taper  Nebs, symbicort    Chronic systolic/diastolic CHF POA LVEF 26%  Continue diuretics    CAD s/p MI POA  Essential HTN POA  ASA, lisinopril, bystolic  Serial cardiac enzymes    Hypothyroidism POA on replacement    DVT prophylaxis: lovenox SQ     Stress ulcer prophylaxis: Not indicated    Code status: DNR, has signed durable DNR in computer, wife and pt confirmed it is still active    Next of Kin: Wife    Subjective:   CHIEF COMPLAINT: \"I started having bad chest pain today. \"      HISTORY OF PRESENT ILLNESS: A 75-year-old white male with   known pulmonary fibrosis and bronchiectasis. The bronchiectasis is felt   due to previous pulmonary tuberculosis. He also has a diagnosis of   COPD, and was a pack-per-day smoker until . At baseline, he   wears 4.5 liters nasal cannula oxygen, but does not have much of   chronic cough. He was last admitted at [facility] in 2017 with acute respiratory failure that improved with steroids.  He does   have a history of C difficile colitis on previous antibiotics and takes a   probiotic.      He was well until the last day or so when he began to have \"increasing   back pain,\" which he described as upper neck and chest and thoracic   back pain. At times it was quite severe. Over the past 24 hours he had   a fever as high as 101 at home per the wife. He has had an increasing   cough over the past day or two, with phlegm that was \"cloudy. \" He has   felt increasingly short of breath with walking for the past 2 days. Today   he began to develop a severe substernal chest pain. The pain was   worse with breathing and severe in intensity. It lasted for several hours   and finally improved with pain medications in the emergency room. The pain was definitely worse laying down and better sitting up. He did   not have any nausea, vomiting, diarrhea. No abdominal pain. His back   pain is improved. He has not had any dysuria, unusual headaches,   focal motor or sensory changes. He contacted Dr. Tu Chow because   of the chest pain and he was referred to the emergency room.      In the emergency room, he had an elevated white blood cell count of   12.7. His CPK and troponins were negative. EKG had no clear   ischemic changes.      CT scan of the chest had no pulmonary embolism, no evidence of   aortic dissection. There was evidence of bronchiectasis and pulmonary   fibrosis, but no new infiltrates. On exam he had a systolic murmur and   possibly a pericardial friction rub.  We were called to admit the patient.            Past Medical History:   Diagnosis Date    Chronic obstructive pulmonary disease ([address]     Diastolic CHF, chronic ([facility] 11/4/2015    Emphysema/COPD ([facility] [address] 9/20/2009    Essential hypertension, benign 9/20/2009    GERD (gastroesophageal reflux disease) 9/20/2009    Hypertension     Migraine headache 9/20/2009    Post-tuberculous bronchiectasis 10/17/2013    Thyroid disease     hypothyroidism      Social History:  Social History   Substance Use Topics    Smoking status: Former Smoker     Packs/day: 1.50     Years: 25.00     Types: Cigarettes     Quit date: 4/26/1979    Smokeless tobacco: Never Used    Alcohol use No    Lives with wife, wife is mPOA  Durable DNR, wife and pt confirmed it is still active    FAMILY HISTORY:  Family History   Problem Relation Age of Onset    Heart Disease Mother     Stroke Mother     Cancer Father      throat   No children of own    Allergies   Allergen Reactions    Cardizem [Diltiazem Hcl] Other (comments)     Headache and constipation        Prior to Admission medications    Medication Sig Start Date End Date Taking? Authorizing Provider   guaiFENesin-codeine (ROBITUSSIN AC) 100-10 mg/5 mL solution Take 5 mL by mouth two (2) times daily as needed for Cough. Max Daily Amount: 10 mL. 3/28/17   Theodore Guerrero III, DO   ipratropium (ATROVENT) 0.02 % nebulizer solution 2.5 mL by Nebulization route three (3) times daily. 3/28/17   Theodore Guerrero III, DO   zolpidem (AMBIEN) 5 mg tablet TAKE ONE TO TWO TABLETS BY MOUTH EVERY NIGHTLY AS NEEDED FOR SLEEP (MAX 2 TABLETS DAILY) 3/15/17   Theodore Guerrero III, DO   levalbuterol (XOPENEX) 1.25 mg/3 mL nebu 3 mL by Nebulization route every six (6) hours as needed. 3/8/17   Theodore Guerrero III, DO   suvorexant (BELSOMRA) 10 mg tablet Take 1 Tab by mouth nightly as needed for Insomnia. Max Daily Amount: 10 mg. 2/28/17   Marichuy Baker III, DO   OXYGEN-AIR DELIVERY SYSTEMS 4.5 L/min by Nasal route continuous. Historical Provider   ferrous sulfate 325 mg (65 mg iron) tablet Take 1 Tab by mouth daily (with breakfast). 2/12/17   Nellie Combs MD   tiotropium bromide (SPIRIVA RESPIMAT) 2.5 mcg/actuation inhaler Take 2 Puffs by inhalation daily.  2/12/17   Nellie Combs MD   predniSONE (DELTASONE) 20 mg tablet 2 tabs po q daily * 3 days, then 11/2 tab po q daily * 3 days, then 1 tab po q daily * 3 days, then 1/2 tab po q daily * 3 days 2/12/17   Nellie Combs MD   docusate sodium (COLACE) 100 mg capsule Take 1 Cap by mouth daily as needed for Constipation for up to 90 days. 2/12/17 5/13/17  MD KRYSTYNA Jurado. bifidum-B longum 1.5 billion cell cap Take 2 Caps by mouth daily. Historical Provider   nebivolol (BYSTOLIC) 5 mg tablet Take 5 mg by mouth two (2) times a day. Historical Provider   SUMATRIPTAN SUCCINATE (IMITREX PO) Take  by mouth as needed (migraine). Historical Provider   raNITIdine (ZANTAC) 150 mg tablet Take 150 mg by mouth daily (with lunch). Historical Provider   diphenhydrAMINE (BENADRYL) 25 mg capsule Take 25 mg by mouth two (2) times a day. Indications: COUGH    Historical Provider   LORazepam (ATIVAN) 1 mg tablet TAKE 1/2 TO 1 BY MOUTH UP TO 3 TIMES DAILY AS NEEDED FOR ANXIETY  Patient taking differently: TAKE  1 tablet BY MOUTH 2 times daily 1/23/17   Kalen Lewis Run III, DO   budesonide-formoterol (SYMBICORT) 160-4.5 mcg/actuation HFA inhaler Take 2 Puffs by inhalation two (2) times a day. 10/21/16   Hennepin Harpal III, DO   XOPENEX HFA 45 mcg/actuation inhaler USE 2 INHALATIONS BY MOUTH EVERY 4 HOURS AS NEEDED FOR WHEEZING 7/6/16   Kalen Harpal III, DO   potassium chloride SR (KLOR-CON 10) 10 mEq tablet Take 1 Tab by mouth two (2) times daily (with meals). Take 2nd dose when you take an extra Furosemide. Indications: HYPOKALEMIA  Patient taking differently: Take 20 mEq by mouth daily. Administration Instructions: Take two tabs by mouth daily, take a third dose of 10 mEq when a third dose of furosemide 20 mg is administered for weight gain of 2 lb  Indications: HYPOKALEMIA 5/10/16   Kalen Harpal III, DO   traZODone (DESYREL) 50 mg tablet Take 2 Tabs by mouth nightly. 5/10/16   Theodore Guerrero III, DO   lisinopril (PRINIVIL, ZESTRIL) 20 mg tablet TAKE 1 BY MOUTH DAILY 4/4/16   Ale Stokes MD   sertraline (ZOLOFT) 50 mg tablet Take 1.5 Tabs by mouth daily.  2/17/16   Ale Stokes MD   levothyroxine (SYNTHROID) 50 mcg tablet TAKE 1 TABLET DAILY BEFORE BREAKFAST 2/17/16   Ale Stokes MD   furosemide (LASIX) 20 mg tablet Take 2 Tabs by mouth daily. Indications: EDEMA  Patient taking differently: Take 40 mg by mouth daily. Administration Instructions: Patient is to take furosemide 20 mg tab, two tablets by month once daily. Patient is to weight himself daily and if there is a weight gain >2 lb, he is to take an additional 20 mg tablet for a maximum daily dose of 60 mg. Indications: Edema 16   Sveta Mas MD   aspirin delayed-release 81 mg tablet Take 81 mg by mouth daily. Historical Provider   polyethylene glycol (MIRALAX) 17 gram packet Take 17 g by mouth daily as needed.     Historical Provider       REVIEW OF SYSTEMS:  Bold equals positive    Yes Total of 12 systems reviewed as follows:  Constitutional: fever to 101 at home Chills  weight loss  Eyes:   Diplopia visual changes  eye pain  ENT:   coryza  Sore throat Dysphagia  Respiratory:  Increased cough with cloudy sputum  Hemoptysis Dyspnea on exertion  Cards:  chest pain substernal  orthopnea LE edema  GI:   Nausea/vomiting Diarrhea abdominal pain    melena  BRBPR  Genitourinary:  frequency  dysuria hematuria  Integument:  Rash Ulcers  Nodules  Hematologic:  Easy bruising, negative gum/nose bleeding  Endocrine: Polyuria/poldipsia heat/cold intolerance  Musculoskel: Myalgias Joint paiin/swelling/redness Gout    Increased back pain  Neurological:  Headaches  focal motor or sensory changes    Objective:   VITALS:    Patient Vitals for the past 24 hrs:   Temp Pulse Resp BP SpO2   17 2212 99.4 °F (37.4 °C) - - - -   17 - - - - 95 %   17 - (!) 106 24 145/81 91 %   17 - (!) 105 23 156/80 92 %   17 - (!) 109 21 159/62 (!) 87 %   17 1630 - - - 132/67 98 %   17 1600 - - - 133/72 96 %   17 1539 99.2 °F (37.3 °C) 94 22 137/65 93 %     Temp (24hrs), Av.3 °F (37.4 °C), Min:99.2 °F (37.3 °C), Max:99.4 °F (37.4 °C)    O2 Flow Rate (L/min): 4.5 l/min O2 Device: Nasal cannula    PHYSICAL EXAM: General:    Alert, thin, chronically ill appearing in no distress     HEENT: Normocephalic, atraumatic    PERRL, EOMI  Sclera no icterus    Nasal mucosa without masses or discharge  Hearing intact to voice    Oropharynx without erythema or exudate  No thrush  Pink MM  Neck:  No meningismus, trachea midline, no carotid bruits     Thyroid not enlarged, no nodules or tenderness  Lungs:   Course crackles bilaterally L > R, no wheezing    No accessory muscle use or retractions. Heart:   Regular rate and rhythm, grade 2/6 SM at LLSB, ? Pericardial friction rub     No LE edema    Peripheral pulses 2+, symmetric  Abdomen:   Soft, non-tender. Not distended. Bowel sounds normal.     No masses, No Hepatosplenomegaly, No Rebound or guarding  Lymph nodes: No cervical or inguinal KEIRY  Musculoskeletal:  No Joint swelling, erythema, warmth.  No Cyanosis or clubbing  Skin:      No rashes     Not Jaundiced   No nodules or thickening    Normal capillary refill  Neurologic: Alert and oriented X 3, follows commands     Cranial nerves 2 to 12 intact    Symmetric motor strength bilaterally         LAB DATA REVIEWED:    Recent Results (from the past 24 hour(s))   EKG, 12 LEAD, INITIAL    Collection Time: 03/28/17  3:37 PM   Result Value Ref Range    Ventricular Rate 95 BPM    Atrial Rate 95 BPM    P-R Interval 168 ms    QRS Duration 100 ms    Q-T Interval 366 ms    QTC Calculation (Bezet) 459 ms    Calculated P Axis 96 degrees    Calculated R Axis 103 degrees    Calculated T Axis 87 degrees    Diagnosis       ** Suspect arm lead reversal, interpretation assumes no reversal  Normal sinus rhythm  Right atrial enlargement  Rightward axis  Pulmonary disease pattern  Abnormal ECG  When compared with ECG of 09-FEB-2017 07:38,  premature ventricular complexes are no longer present  QT has shortened     TROPONIN I    Collection Time: 03/28/17  4:20 PM   Result Value Ref Range    Troponin-I, Qt. <0.04 <0.05 ng/mL   CK W/ REFLX CKMB    Collection Time: 03/28/17  4:20 PM   Result Value Ref Range    CK 61 39 - 308 U/L   CBC WITH AUTOMATED DIFF    Collection Time: 03/28/17  4:20 PM   Result Value Ref Range    WBC 12.7 (H) 4.1 - 11.1 K/uL    RBC 3.53 (L) 4.10 - 5.70 M/uL    HGB 9.0 (L) 12.1 - 17.0 g/dL    HCT 30.7 (L) 36.6 - 50.3 %    MCV 87.0 80.0 - 99.0 FL    MCH 25.5 (L) 26.0 - 34.0 PG    MCHC 29.3 (L) 30.0 - 36.5 g/dL    RDW 18.8 (H) 11.5 - 14.5 %    PLATELET 423 339 - 808 K/uL    NEUTROPHILS 76 (H) 32 - 75 %    LYMPHOCYTES 10 (L) 12 - 49 %    MONOCYTES 12 5 - 13 %    EOSINOPHILS 2 0 - 7 %    BASOPHILS 0 0 - 1 %    ABS. NEUTROPHILS 9.7 (H) 1.8 - 8.0 K/UL    ABS. LYMPHOCYTES 1.2 0.8 - 3.5 K/UL    ABS. MONOCYTES 1.5 (H) 0.0 - 1.0 K/UL    ABS. EOSINOPHILS 0.3 0.0 - 0.4 K/UL    ABS. BASOPHILS 0.0 0.0 - 0.1 K/UL   METABOLIC PANEL, COMPREHENSIVE    Collection Time: 03/28/17  4:20 PM   Result Value Ref Range    Sodium 140 136 - 145 mmol/L    Potassium 3.7 3.5 - 5.1 mmol/L    Chloride 100 97 - 108 mmol/L    CO2 34 (H) 21 - 32 mmol/L    Anion gap 6 5 - 15 mmol/L    Glucose 101 (H) 65 - 100 mg/dL    BUN 16 6 - 20 MG/DL    Creatinine 0.83 0.70 - 1.30 MG/DL    BUN/Creatinine ratio 19 12 - 20      GFR est AA >60 >60 ml/min/1.73m2    GFR est non-AA >60 >60 ml/min/1.73m2    Calcium 8.1 (L) 8.5 - 10.1 MG/DL    Bilirubin, total 0.3 0.2 - 1.0 MG/DL    ALT (SGPT) 29 12 - 78 U/L    AST (SGOT) 18 15 - 37 U/L    Alk.  phosphatase 52 45 - 117 U/L    Protein, total 6.6 6.4 - 8.2 g/dL    Albumin 2.9 (L) 3.5 - 5.0 g/dL    Globulin 3.7 2.0 - 4.0 g/dL    A-G Ratio 0.8 (L) 1.1 - 2.2     D DIMER    Collection Time: 03/28/17  4:20 PM   Result Value Ref Range    D-dimer 0.27 0.00 - 0.65 mg/L FEU   PRO-BNP    Collection Time: 03/28/17  4:20 PM   Result Value Ref Range    NT pro-BNP 2399 (H) 0 - 125 PG/ML   EKG, 12 LEAD, SUBSEQUENT    Collection Time: 03/28/17  5:43 PM   Result Value Ref Range    Ventricular Rate 102 BPM    Atrial Rate 102 BPM    P-R Interval 172 ms    QRS Duration 88 ms Q-T Interval 346 ms    QTC Calculation (Bezet) 450 ms    Calculated P Axis 85 degrees    Calculated R Axis 104 degrees    Calculated T Axis 80 degrees    Diagnosis       Sinus tachycardia  Lateral infarct , age undetermined  When compared with ECG of 28-MAR-2017 15:37,  MANUAL COMPARISON REQUIRED, DATA IS UNCONFIRMED     BLOOD GAS, ARTERIAL    Collection Time: 03/28/17  5:47 PM   Result Value Ref Range    pH 7.45 7.35 - 7.45      PCO2 50 (H) 35.0 - 45.0 mmHg    PO2 64 (L) 80 - 100 mmHg    O2 SAT 93 92 - 97 %    BICARBONATE 34 (H) 22 - 26 mmol/L    BASE EXCESS 8.3 mmol/L    O2 METHOD NASAL O2      O2 FLOW RATE 4.00 L/min    FIO2 36 %    Sample source ARTERIAL      SITE RIGHT RADIAL      SHAW'S TEST YES     TROPONIN I    Collection Time: 03/28/17  7:45 PM   Result Value Ref Range    Troponin-I, Qt. <0.04 <0.05 ng/mL       I have reviewed the results of all available imaging studies. Yes I have personally reviewed the actual    xray      EKG as read by me shows NSR rate 95, right axis deviation, no clear ischemic changes    CXR read by radiology and reviewed by myself shows   COMPARISON: 2/9/2017  A single frontal view was obtained. The time of this study is 1541 hours. There  is loss of volume in both upper lobes with calcifications consistent with the  history of previous tuberculosis. There is also overall loss of volume in the  left hemithorax. Pleural thickening is seen at both lung apices. All these  findings are similar to the previous exam. There is no new acute finding in  either lung. Mediastinum is shifted to the left consistent with volume loss on  the left. This is similar to the previous study as well. Heart and mediastinal  shadows are similar to the previous study as well. Visualized osseous structures  unremarkable. IMPRESSION:  No significant change since the previous exam. No definite acute finding. CTA chest and abdomen FINDINGS:  CT angiography:  Ascending aorta is normal in caliber.  Right vessels opacify normally. Minimal  atherosclerotic change of the posterior thoracic aortic arch and descending  aorta noted. There is no evidence of aneurysm or dissection. The abdominal aorta demonstrates mild atherosclerotic change without evidence of  aneurysm. No dissection of the abdominal aorta noted. Single renal arteries  noted without significant stenosis. The celiac artery and its branches as well  as superior mesenteric artery and its branches are unremarkable. Inferior  mesenteric artery fills normally. Iliac arteries demonstrate atherosclerotic  change without aneurysm. Internal and external iliac arteries opacify normally. Also incidentally noted is opacification of the pulmonary arteries. No filling  defects noted. Thorax:  Lungs again demonstrate severe fibrotic changes bilaterally more so on the left  than on the right. Bronchiectasis again is noted similar to the previous study. There is pleural thickening at both apices right less than left. These findings  are unchanged, however. No new superimposed acute process is identified in the  lungs. There is no mediastinal or hilar adenopathy. Moderate hiatal hernia noted. Osseous structures of the thorax, abdomen, and pelvis demonstrate no sclerotic  or lytic lesions. Abdomen pelvis:  Arterial phase imaging only is normal. Liver is homogeneous in this single  phase. Spleen is normal in size. Pancreas demonstrates no inflammatory changes. Adrenal glands normal. Previous described cyst of the right kidney is again  noted. There is no retroperitoneal adenopathy. There is no bowel obstruction. There is large amount of fecal material  throughout the colon. No ascites or free intraperitoneal air. Study of the pelvis demonstrates distended urinary bladder without  calcification. IMPRESSION:  1. No evidence of aortic aneurysm or dissection. 2. No evidence of pulmonary embolus.   3. Chronic findings in both lungs are again seen with bronchiectasis and  fibrosis and pleural thickening. No new acute findings seen. 4. No acute finding noted in the abdomen or pelvis.       ___________________________________________________    Inpatient is warranted for this patient because they presents with CP, SOB      I have a high level of concern for sepsis, acute exacerbation of bronchiectasis, ? pericarditis  I anticipate the stay in the hospital will span at least 2 midnights. My assessment of the clinical condition and my plan of care is as outlined above  __________________________________________________    Care Plan discussed with:    Patient, Wife, ED Doc     I saw the patient personally, took a history and did a complete physical exam at the bedside. I performed complex decision making in coming up with a diagnostic and treatment plan for the patient. I reviewed the patient's past medical records, current laboratory and radiology results, and actual Xray films/EKG. I have also discussed this case with the involved ED physician.     Risk of deterioration:  High    Total Time Coordinating 65 :      minutes    Total Critical Care Time:     Admitting Physician: Anna Nguyễn MD

## 2017-03-29 NOTE — WOUND CARE
Wound Care consult: Chart reviewed and patient assessed for his heels and his sacral redness that was present on admission. Pt. Is alert and tells me that he moves around quite well and sits in a recliner at home with his feet up to nap only. Assessment / Recommendations: Pt. Is very thin with prominent bony areas on the sacrum and torso (hips, shoulders). He has an abrasion on the right side of the sacrum that \"feels real sore, so I stay off of it\". The area is pink but is blanchable and macerated. He is at risk for pressure injury so he needs prevention measures initiated. The heels are only dry and scaly and need to be cleansed and moisturized daily and keep his heels floated while he is in the bed. Plan: Desitin ordered for his bottom and pressure ulcer prevention discussed with patient and his wife.      WOUND POA CONDITIONS    Wound Sacral/coccyx Right;Mid (Active)   DRESSING TYPE Open to air 3/29/2017  3:18 PM   Non-Pressure Ulcer Partial thickness (epider/derm) 3/29/2017  3:18 PM   Wound Length (cm) 5 cm 3/29/2017  3:18 PM   Wound Width (cm) 4.5 cm 3/29/2017  3:18 PM   Wound Depth (cm) 0 3/29/2017  3:18 PM   Wound Surface area (cm^3) 0 cm^2 3/29/2017  3:18 PM   Condition of [place] 3/29/2017  3:18 PM   Condition of Edges Open 3/29/2017  3:18 PM   Epithelialization (%) 50 3/29/2017  3:18 PM   Tissue Type Pink 3/29/2017  3:18 PM   Tissue Type Percent Pink 100 3/29/2017  3:18 PM   Drainage Amount  Scant 3/29/2017  3:18 PM   Drainage Color Clear 3/29/2017  3:18 PM   Wound Odor None 3/29/2017  3:18 PM   Periwound Skin Condition Macerated 3/29/2017  3:18 PM   Procedure Tolerated Well 3/29/2017  3:18 PM       Jaden Irby, RN, BSN, [facility]

## 2017-03-29 NOTE — PROGRESS NOTES
Pt admitted with COPD. Pt lives with spouse in [place], has a DDNR, PCP Dr Coco Lehman, Medicare//BC, has a cane, RW, rollator, wheelchair, oxygen, and needs assist with adls. Will clarify DDNR, name of oxygen company, and therapy recommendations. Care Management Interventions  PCP Verified by CM:  Yes  Mode of Transport at Discharge: S  Transition of Care Consult (CM Consult): Discharge Planning  MyChart Signup: No  Discharge Durable Medical Equipment: Yes  Health Maintenance Reviewed: Yes  Physical Therapy Consult: Yes  Occupational Therapy Consult: Yes  Speech Therapy Consult: No  Current Support Network: Lives with Spouse  Confirm Follow Up Transport: Family  Plan discussed with Pt/Family/Caregiver: Yes  Freedom of Choice Offered: Yes  Discharge Location  Discharge Placement: Home

## 2017-03-29 NOTE — ED NOTES
Patient continues to complaint of need to urinate but unable to do so despite given lasix prior to my shift. Bladder scanner completed 886 mL detected in the bladder. Dr. Rena Daniels notified. Order received for urinary catheter.

## 2017-03-29 NOTE — ED NOTES
TRANSFER - OUT REPORT:    Verbal report given to [address] [facility] [address] RN(name) on Marzena Mask  being transferred to Renal unit [id number]  (unit) for routine progression of care       Report consisted of patients Situation, Background, Assessment and   Recommendations(SBAR). Information from the following report(s) SBAR, ED Summary, MAR, Med Rec Status and Cardiac Rhythm NSR was reviewed with the receiving nurse. Lines:   Peripheral IV 03/28/17 Right Antecubital (Active)   Site Assessment Clean, dry, & intact 3/28/2017 11:04 PM   Phlebitis Assessment 0 3/28/2017 11:04 PM   Infiltration Assessment 0 3/28/2017 11:04 PM   Dressing Status Clean, dry, & intact 3/28/2017 11:04 PM   Dressing Type Tape;Transparent 3/28/2017 11:04 PM   Hub Color/Line Status Pink;Flushed;Patent 3/28/2017 11:04 PM        Opportunity for questions and clarification was provided.       Patient transported with:   O2 @ 4.5 liters   Tech

## 2017-03-29 NOTE — PROGRESS NOTES
Hospitalist Progress Note    NAME: Jennifer East   :  1945   MRN:  [id number]     Assessment / Plan:  Acute exacerbation of chronic bronchiectasis POA causing Sepsis POA   -lactic neg  -pending Sputum culture  -continue empirically on levaquin change to PO  -continues on Steroids and nebs changed to PO     Substernal chest pain POA of unclear origin  -TTE shows resolution of prior cardiomyopathy now with ef 55%, no pericardial effusion  -improved today only with ntg  -following with cardiology as to further work up evaluation - suspect DST Myoview to shed some light on whether ischemia playing a role  -neg cardiac panel     Chronic respiratory failure POA on 4.5 L NC O2 at baseline with acute on chronic COPD flare as above  Chronic Pulmonary fibrosis POA  Chronic bronchiectasis  Remote history of pulmonary TB s/p treatment  -as above Continue O2  -Steroid taper  -Nebs, symbicort     CAD s/p MI POA  Essential HTN POA  -for now will look to continue on ASA, lisinopril, bystolic  -neg Serial cardiac enzymes     Hypothyroidism POA on replacement     DVT prophylaxis: lovenox SQ      Stress ulcer prophylaxis: Not indicated     Code status: DNR, has signed durable DNR in computer, wife and pt confirmed it is still active     Next of Kin: Wife     Subjective:     Chief Complaint / Reason for Physician Visit  \"i feel much better\". Discussed with RN events overnight. Less sob, still with sscp that has not yet subsided but improved from [address]    Review of Systems:  Symptom Y/N Comments  Symptom Y/N Comments   Fever/Chills    Chest Pain y    Poor Appetite    Edema n    Cough    Abdominal Pain     Sputum n   Joint Pain     SOB/KOO y   Pruritis/Rash n    Nausea/vomit n   Tolerating PT/OT n    Diarrhea n   Tolerating Diet y    Constipation    Other       Could NOT obtain due to:      Objective:     VITALS:   Last 24hrs VS reviewed since prior progress note.  Most recent are:  Patient Vitals for the past 24 hrs:   Temp Pulse Resp BP SpO2   03/29/17 1641 98.1 °F (36.7 °C) 88 16 105/61 95 %   03/29/17 1612 - 94 - - (!) 89 %   03/29/17 1611 - - - - (!) 85 %   03/29/17 1605 - - - - 93 %   03/29/17 1604 - 90 - - (!) 86 %   03/29/17 1558 - 88 - - 90 %   03/29/17 1527 - - - - 98 %   03/29/17 1131 97.4 °F (36.3 °C) 80 16 104/68 96 %   03/29/17 0723 - - - - 94 %   03/29/17 0720 98 °F (36.7 °C) 78 16 97/52 96 %   03/29/17 0145 98.9 °F (37.2 °C) 100 20 109/65 92 %   03/28/17 2315 - 95 21 164/88 96 %   03/28/17 2300 - 96 20 153/81 96 %   03/28/17 2245 - 96 21 153/75 97 %   03/28/17 2230 - 96 20 (!) 158/92 98 %   03/28/17 2215 - 97 22 148/88 97 %   03/28/17 2212 99.4 °F (37.4 °C) - - - -   03/28/17 2200 - 99 22 148/80 97 %   03/28/17 2145 - 98 22 143/77 (!) 85 %   03/28/17 2130 - 99 22 150/78 96 %   03/28/17 2115 - 98 21 146/84 95 %   03/28/17 2100 - (!) 101 25 130/84 94 %   03/28/17 2045 - (!) 101 25 138/74 95 %   03/28/17 2030 - (!) 103 22 143/79 94 %   03/28/17 2015 - (!) 104 24 150/80 93 %   03/28/17 2000 - (!) 105 25 148/82 92 %   03/28/17 1945 - (!) 106 24 145/81 91 %   03/28/17 1930 - (!) 105 23 156/80 92 %   03/28/17 1915 - (!) 109 21 159/62 (!) 87 %       Intake/Output Summary (Last 24 hours) at 03/29/17 1727  Last data filed at 03/29/17 0612   Gross per 24 hour   Intake                0 ml   Output             1200 ml   Net            -1200 ml        PHYSICAL EXAM:  General: WD, thin chronically ill appearing cm sitting up in bed interactive/talkative, cooperative, no acute distress    EENT:  EOMI. Anicteric sclerae. MM dry  Resp:  Mild scattered wheeze, no rhonchi, +prolonged expiratory phase. No accessory muscle use  CV:  Regular  rhythm,  No edema  GI:  Soft, Non distended, Non tender.  +Bowel sounds  Neurologic:  Alert and oriented X 3, normal speech  Psych:   Good insight. Not anxious nor agitated  Skin:  no rashes or ulcers.   No jaundice    Reviewed most current lab test results and cultures  YES  Reviewed most current radiology test results   YES  Review and summation of old records today    NO  Reviewed patient's current orders and MAR    YES  PMH/SH reviewed - no change compared to H&P  ________________________________________________________________________  Care Plan discussed with:    Comments   Patient x    Family  x Discussed with patient and family in room. Questions answered (25   RN x    Care Manager     Consultant: x Cardiology 5                     Multidiciplinary team rounds were held today with , nursing, pharmacist and clinical coordinator. Patient's plan of care was discussed; medications were reviewed and discharge planning was addressed. ________________________________________________________________________  Total NON critical care TIME:  35   Minutes    Total CRITICAL CARE TIME Spent:   Minutes non procedure based      Comments   >50% of visit spent in counseling and coordination of care x See above   ________________________________________________________________________  Procedures: see electronic medical records for all procedures/Xrays and details which were not copied into this note but were reviewed prior to creation of Plan. LABS:  Recent Labs      03/29/17 0223 03/28/17   1620   WBC  25.5*  12.7*   HGB  9.8*  9.0*   HCT  33.3*  30.7*   PLT  275  266     Recent Labs      03/29/17 0223 03/28/17   1620   NA  141  140   K  4.2  3.7   CL  102  100   CO2  31  34*   BUN  18  16   CREA  0.97  0.83   GLU  144*  101*   CA  8.6  8.1*     Recent Labs      03/29/17 0223 03/28/17   1620   SGOT  19  18   ALT  30  29   AP  59  52   TBILI  0.4  0.3   TP  7.3  6.6   ALB  2.9*  2.9*   GLOB  4.4*  3.7     No results for input(s): INR, PTP, APTT in the last 72 hours. No lab exists for component: INREXT   No results for input(s): FE, TIBC, PSAT, FERR in the last 72 hours.    No results found for: FOL, RBCF   Recent Labs      03/28/17   1747   PH  7.45   PCO2  50*   PO2  64*     No results for input(s): PHI, PO2I, PCO2I in the last 72 hours. Recent Labs      03/29/17   0223  03/28/17   1945  03/28/17   1620   CPK  79   --    --    CKNDX  Cannot be calulated   --    --    TROIQ  <0.04  <0.04  <0.04     Lab Results   Component Value Date/Time    Cholesterol, total 163 12/27/2016 07:54 AM    HDL Cholesterol 60 12/27/2016 07:54 AM    LDL, calculated 93 12/27/2016 07:54 AM    Triglyceride 49 12/27/2016 07:54 AM     Lab Results   Component Value Date/Time    Glucose (POC) 129 02/12/2017 11:05 AM    Glucose (POC) 107 02/12/2017 07:36 AM    Glucose (POC) 113 02/11/2017 05:27 PM    Glucose (POC) 134 02/11/2017 12:47 PM    Glucose (POC) 137 02/10/2017 08:56 PM     Lab Results   Component Value Date/Time    Color YELLOW/STRAW 03/29/2017 12:30 AM    Appearance CLEAR 03/29/2017 12:30 AM    Specific gravity 1.016 03/29/2017 12:30 AM    pH (UA) 6.0 03/29/2017 12:30 AM    Protein NEGATIVE  03/29/2017 12:30 AM    Glucose NEGATIVE  03/29/2017 12:30 AM    Ketone NEGATIVE  03/29/2017 12:30 AM    Bilirubin NEGATIVE  03/29/2017 12:30 AM    Urobilinogen 0.2 03/29/2017 12:30 AM    Nitrites NEGATIVE  03/29/2017 12:30 AM    Leukocyte Esterase NEGATIVE  03/29/2017 12:30 AM    Epithelial cells FEW 03/29/2017 12:30 AM    Bacteria 1+ 03/29/2017 12:30 AM    WBC 0-4 03/29/2017 12:30 AM    RBC 0-5 03/29/2017 12:30 AM       RADIOGRAPHIC STUDIES:  CXR Results  (Last 48 hours)               03/28/17 1550  XR CHEST PORT Final result    Impression:  IMPRESSION:   No significant change since the previous exam. No definite acute finding. Narrative:  INDICATION: Chest pain and shortness of breath. Posttuberculosis bronchiectasis. Emphysema. COMPARISON: 2/9/2017       A single frontal view was obtained. The time of this study is 1541 hours. There   is loss of volume in both upper lobes with calcifications consistent with the   history of previous tuberculosis. There is also overall loss of volume in the   left hemithorax. Pleural thickening is seen at both lung apices. All these   findings are similar to the previous exam. There is no new acute finding in   either lung. Mediastinum is shifted to the left consistent with volume loss on   the left. This is similar to the previous study as well. Heart and mediastinal   shadows are similar to the previous study as well. Visualized osseous structures   unremarkable. .                          CT Results  (Last 48 hours)               03/28/17 1915  CTA CHEST W OR W WO CONT Final result    Impression:  IMPRESSION:       1. No evidence of aortic aneurysm or dissection. 2. No evidence of pulmonary embolus. 3. Chronic findings in both lungs are again seen with bronchiectasis and   fibrosis and pleural thickening. No new acute findings seen. 4. No acute finding noted in the abdomen or pelvis. Narrative:  EXAM:  CTA CHEST ABD PELVIS W CON        INDICATION: Emphysema. Posttuberculosis bronchiectasis. Severe chest pain. Shortness of breath. .       COMPARISON: 6/20/2014. CONTRAST: 100 mL of Isovue-370. TECHNIQUE:    Multislice helical CT arteriography was performed from the thoracic inlet to the   symphysis pubis during uneventful rapid bolus intravenous contrast   administration. No oral contrast was administered. Delayed images of the abdomen   were acquired. Contiguous 2.5 mm axial images were reconstructed and lung and   soft tissue windows were generated. Coronal and sagittal reformations and 3-D   MIP renderings were generated. CT dose reduction was achieved through use of a   standardized protocol tailored for this examination and automatic exposure   control for dose modulation. FINDINGS:   CT angiography:       Ascending aorta is normal in caliber. Right vessels opacify normally. Minimal   atherosclerotic change of the posterior thoracic aortic arch and descending   aorta noted. There is no evidence of aneurysm or dissection.        The abdominal aorta demonstrates mild atherosclerotic change without evidence of   aneurysm. No dissection of the abdominal aorta noted. Single renal arteries   noted without significant stenosis. The celiac artery and its branches as well   as superior mesenteric artery and its branches are unremarkable. Inferior   mesenteric artery fills normally. Iliac arteries demonstrate atherosclerotic   change without aneurysm. Internal and external iliac arteries opacify normally. Also incidentally noted is opacification of the pulmonary arteries. No filling   defects noted. Thorax:       Lungs again demonstrate severe fibrotic changes bilaterally more so on the left   than on the right. Bronchiectasis again is noted similar to the previous study. There is pleural thickening at both apices right less than left. These findings   are unchanged, however. No new superimposed acute process is identified in the   lungs. There is no mediastinal or hilar adenopathy. Moderate hiatal hernia noted. Osseous structures of the thorax, abdomen, and pelvis demonstrate no sclerotic   or lytic lesions. Abdomen pelvis:       Arterial phase imaging only is normal. Liver is homogeneous in this single   phase. Spleen is normal in size. Pancreas demonstrates no inflammatory changes. Adrenal glands normal. Previous described cyst of the right kidney is again   noted. There is no retroperitoneal adenopathy. There is no bowel obstruction. There is large amount of fecal material   throughout the colon. No ascites or free intraperitoneal air. Study of the pelvis demonstrates distended urinary bladder without   calcification. 03/28/17 1915  CTA ABDOMEN PELV W CONT Final result    Impression:  IMPRESSION:       1. No evidence of aortic aneurysm or dissection. 2. No evidence of pulmonary embolus.    3. Chronic findings in both lungs are again seen with bronchiectasis and   fibrosis and pleural thickening. No new acute findings seen. 4. No acute finding noted in the abdomen or pelvis. Narrative:  EXAM:  CTA CHEST ABD PELVIS W CON        INDICATION: Emphysema. Posttuberculosis bronchiectasis. Severe chest pain. Shortness of breath. .       COMPARISON: 6/20/2014. CONTRAST: 100 mL of Isovue-370. TECHNIQUE:    Multislice helical CT arteriography was performed from the thoracic inlet to the   symphysis pubis during uneventful rapid bolus intravenous contrast   administration. No oral contrast was administered. Delayed images of the abdomen   were acquired. Contiguous 2.5 mm axial images were reconstructed and lung and   soft tissue windows were generated. Coronal and sagittal reformations and 3-D   MIP renderings were generated. CT dose reduction was achieved through use of a   standardized protocol tailored for this examination and automatic exposure   control for dose modulation. FINDINGS:   CT angiography:       Ascending aorta is normal in caliber. Right vessels opacify normally. Minimal   atherosclerotic change of the posterior thoracic aortic arch and descending   aorta noted. There is no evidence of aneurysm or dissection. The abdominal aorta demonstrates mild atherosclerotic change without evidence of   aneurysm. No dissection of the abdominal aorta noted. Single renal arteries   noted without significant stenosis. The celiac artery and its branches as well   as superior mesenteric artery and its branches are unremarkable. Inferior   mesenteric artery fills normally. Iliac arteries demonstrate atherosclerotic   change without aneurysm. Internal and external iliac arteries opacify normally. Also incidentally noted is opacification of the pulmonary arteries. No filling   defects noted. Thorax:       Lungs again demonstrate severe fibrotic changes bilaterally more so on the left   than on the right.  Bronchiectasis again is noted similar to the previous study.   There is pleural thickening at both apices right less than left. These findings   are unchanged, however. No new superimposed acute process is identified in the   lungs. There is no mediastinal or hilar adenopathy. Moderate hiatal hernia noted. Osseous structures of the thorax, abdomen, and pelvis demonstrate no sclerotic   or lytic lesions. Abdomen pelvis:       Arterial phase imaging only is normal. Liver is homogeneous in this single   phase. Spleen is normal in size. Pancreas demonstrates no inflammatory changes. Adrenal glands normal. Previous described cyst of the right kidney is again   noted. There is no retroperitoneal adenopathy. There is no bowel obstruction. There is large amount of fecal material   throughout the colon. No ascites or free intraperitoneal air. Study of the pelvis demonstrates distended urinary bladder without   calcification. Echo Results  (Last 48 hours)               17 1342  2D ECHO COMPLETE ADULT (TTE) W OR WO CONTR Final result    Narrative:  [address]   [address] [facility], [address] [facility]   [phone]       Transthoracic Echocardiogram       Patient: Omega Rodriguez   MRN: [id number]   [address] #: [de-identified]   :    Age: 66 years   Gender: Male   Height: 71 in   Weight: 134.6 lb   BSA: 1.78 m squared   BP: 109 / 65 mmHg   Study date: 29-Mar-2017   Status:   Location:   [facility] #: [id number]       Allergies: DILTIAZEM HCL       Ordering Physician:  Karen Plascencia. Yudelka Alfonso MD   Reading Group:  [facility]   Technologist:  Delmy Magana RDCS   Reading Physician:  CESAR Abdalla:   Left ventricle: Systolic function was normal. Ejection fraction was   estimated in the range of 55 % to 60 %. There were no regional wall motion   abnormalities. INDICATIONS: chest pain/pericarditis.        PROCEDURE: The study included complete 2D imaging, M-mode, complete   spectral Doppler, and color Doppler. Systolic blood pressure was 109 mmHg,   at the start of the study. Diastolic blood pressure was 65 mmHg, at the   start of the study. LEFT VENTRICLE: Size was normal. Systolic function was normal. Ejection   fraction was estimated in the range of 55 % to 60 %. There were no   regional wall motion abnormalities. Wall thickness was normal.       RIGHT VENTRICLE: The size was normal. Systolic function was normal. Wall   thickness was normal.       LEFT ATRIUM: Size was normal.       RIGHT ATRIUM: Size was normal.       MITRAL VALVE: Normal valve structure. DOPPLER: There was no significant   regurgitation. AORTIC VALVE: The valve was trileaflet. Leaflets exhibited normal   thickness and normal cuspal separation. DOPPLER: Transaortic velocity was   within the normal range. There was no stenosis. There was no regurgitation. TRICUSPID VALVE: Normal valve structure. There was normal leaflet   separation. DOPPLER: The transtricuspid velocity was within the normal   range. There was no evidence for tricuspid stenosis. There was mild   regurgitation. Pulmonary artery systolic pressure: 42 mmHg. PULMONIC VALVE: Leaflets exhibited normal thickness, no calcification, and   normal cuspal separation. DOPPLER: The transpulmonic velocity was within   the normal range. There was no regurgitation. AORTA: The root exhibited normal size. PERICARDIUM: There was no pericardial effusion. The pericardium was normal   in appearance. SYSTEM MEASUREMENT TABLES       CW   AV Env. Ti: 282.8 ms   AV VTI: 23.4 cm   AV Vmax: 1.2 m/s   AV Vmean: 0.8 m/s   AV maxP.6 mmHg   AV meanPG: 3.1 mmHg   MR Vmax: 0.7 m/s   MR maxP.8 mmHg   RAP: 10 mmHg   TR Vmax: 2.8 m/s   TR maxP.1 mmHg       MM   AV Cusp: 0.9 cm   Ao Diam: 2 cm   LA Diam: 1.3 cm   LA/Ao: 0.6   EPSS: 0.9 cm       PW   LVOT Env. Ti: 366 ms   LVOT maxP.5 mmHg   LVOT meanP.7 mmHg   LVOT VTI: 14.3 cm   LVOT Vmax: 0.6 m/s   LVOT Vmean: 0.4 m/s   E': 0.1 m/s   E/E': 5.8   MV A [place]: 0.6 m/s   MV Dec [place]: 4.6 m/s2   MV DecT: 133.3 ms   MV E [place]: 0.6 m/s   MV E/A Ratio: 1   MV PHT: 38.7 ms   MV VTI: 12.5 cm   MV Vmax: 0.7 m/s   MV Vmean: 0.5 m/s   MV maxP.7 mmHg   MV meanP.9 mmHg   MVA By PHT: 5.7 cm2   PV Vmax: 0.9 m/s   PV maxPG: 3 mmHg   RVSP: 42.1 mmHg   HR: 204.2 BPM       Prepared and E-signed by       GRACE Jules M.D. Signed 29-Mar-2017 15:25:27                 VENOUS DOPPLER results  (Last 48 hours)    None          CULTURES:    Lab Results   Component Value Date/Time    Specimen Description: [facility] 2013 03:25 PM    Lab Results   Component Value Date/Time    Culture result:  2016 06:44 PM     NO ROUTINE ENTERIC PATHOGENS ISOLATED INCLUDING SALMONELLA, SHIGELLA, YERSINIA, VIBRIO OR E. COLI 0157:H7    Culture result: NO COLIFORMS ISOLATED 2016 06:44 PM    Culture result: HEAVY  MIXED GRAM POSITIVE CY   2016 06:44 PM    Culture result: NO GROWTH 5 DAYS 2016 02:00 PM    Culture result: NO GROWTH 5 DAYS 2013 03:25 PM          Signed:  Shiloh Gordon MD

## 2017-03-29 NOTE — PROGRESS NOTES
Physical Therapy  Consult received and chart reviewed. Patient currently off floor for ECHO. Will follow back later.   Travon Vitale, PT

## 2017-03-29 NOTE — WOUND CARE
Wound care consulted to see this patient and have attempted x 2 today to see him. He is currently away from the Renal Unit for a test (echo). Will try to see him later today.    Cj Peters RN, BSN, [employer]

## 2017-03-29 NOTE — CARDIO/PULMONARY
CP Rehab Note: chart review    Admit: chest pain/temp    Mhx: COPD, CHF, GERD, HTN, migraine, hypothyroidism, EF 40-45% on 7/31/14. Former smoker. Echo ordered. Cardiology consult ordered. CP Rehab teaching deferred at this time.

## 2017-03-29 NOTE — PROGRESS NOTES
Problem: Mobility Impaired (Adult and Pediatric)  Goal: *Acute Goals and Plan of Care (Insert Text)  Physical Therapy Goals  Initiated 3/29/2017  1. Patient will move from supine to sit and sit to supine , scoot up and down and roll side to side in bed with independence within 7 day(s). 2. Patient will transfer from bed to chair and chair to bed with modified independence using the least restrictive device within 7 day(s). 3. Patient will perform sit to stand with independence within 7 day(s). 4. Patient will ambulate with modified independence for 100 feet with the least restrictive device within 7 day(s). 5. Patient will ascend/descend 3 stairs with 2 handrail(s) with supervision/set-up within 7 day(s). PHYSICAL THERAPY EVALUATION  Patient: Lali Barclay (80 y.o. male)  Date: 3/29/2017  Primary Diagnosis: COPD (chronic obstructive pulmonary disease) ([facility])        Precautions: falls         ASSESSMENT :  Based on the objective data described below, the patient presents with generalized weakness and impaired endurance and activity tolerance which limits patient's functional independence. Patient requiring supervision to [place] [id number] for overall mobility. Gait is mildly unsteady and tremulous but no overt LOB noted. Patient's O2 sats decreased to 85% on 5 l/min while ambulating 10 feet to bathroom and required extended seated rest and pursed lip breathing for O2 to improve to 91% on 5 l/min. Patient would benefit from inpatient pulmonary rehab vs [facility] program following discharge to improve overall functional independence and endurance. .     Patient will benefit from skilled intervention to address the above impairments.   Patients rehabilitation potential is considered to be Fair  Factors which may influence rehabilitation potential include:   [ ]         None noted  [ ]         Mental ability/status  [X]         Medical condition  [ ]         Home/family situation and support systems  [ ]         Safety awareness  [ ] Pain tolerance/management  [ ]         Other:        PLAN :  Recommendations and Planned Interventions:  [X]           Bed Mobility Training             [ ]    Neuromuscular Re-Education  [X]           Transfer Training                   [ ]    Orthotic/Prosthetic Training  [X]           Gait Training                         [ ]    Modalities  [ ]           Therapeutic Exercises           [ ]    Edema Management/Control  [X]           Therapeutic Activities            [X]    Patient and Family Training/Education  [X]           Other (comment):stairs     Frequency/Duration: Patient will be followed by physical therapy  4 times a week to address goals. Discharge Recommendations: Inpatient Pulmonary Rehab vs [facility] program  Further Equipment Recommendations for Discharge: rollator       SUBJECTIVE:   Patient stated I feel short of breath.       OBJECTIVE DATA SUMMARY:   HISTORY:    Past Medical History:   Diagnosis Date    Chronic obstructive pulmonary disease ([facility] [address]      Diastolic CHF, chronic ([facility] [address] 11/4/2015    Emphysema/COPD ([facility] [address] 9/20/2009    Essential hypertension, benign 9/20/2009    GERD (gastroesophageal reflux disease) 9/20/2009    Hypertension      Migraine headache 9/20/2009    Post-tuberculous bronchiectasis 10/17/2013    Thyroid disease       hypothyroidism   History reviewed. No pertinent surgical history.   Prior Level of Function/Home Situation: patient reports modified independence with all mobility and wife assists with some ADLs; no falls; has recently been using RW for ambulation        Home Situation  Home Environment: Private residence  # Steps to Enter: 3  Rails to Enter: Yes  Hand Rails : Bilateral  One/Two Story Residence: One story  Living Alone: No  Support Systems: Family member(s)  Patient Expects to be Discharged to[de-identified] Private residence  Current DME Used/Available at Home: Cane, straight, Grab bars, Oxygen, portable, Raised toilet seat, Walker, rolling, Wheelchair EXAMINATION/PRESENTATION/DECISION MAKING:   Critical Behavior:  Neurologic State: Alert  Orientation Level: Oriented X4  Cognition: Appropriate decision making, Appropriate for age attention/concentration, Appropriate safety awareness, Follows commands     Hearing: Auditory  Auditory Impairment: None     Range Of Motion:  AROM: Generally decreased, functional                       Strength:    Strength: Generally decreased, functional                    Tone & Sensation:   Tone: Normal            Functional Mobility:  Bed Mobility:     Supine to Sit: Supervision     Scooting: Supervision  Transfers:  Sit to Stand: Contact guard assistance  Stand to Sit: Contact guard assistance        Bed to Chair: Contact guard assistance              Balance:   Sitting: Intact  Standing: Impaired  Standing - Static: Good;Constant support  Standing - Dynamic : Fair (using RW)  Ambulation/Gait Training:  Distance (ft): 20 Feet (ft) (10x2)  Assistive Device: Walker, rolling  Ambulation - Level of Assistance: Contact guard assistance        Gait Abnormalities: Decreased step clearance (tremulous)        Base of Support: Widened     Speed/Daysi: Pace decreased (<100 feet/min); Shuffled; Slow  Step Length: Left shortened;Right shortened      Gait is slow and mildly unsteady demonstrating short shuffled steps but no overt LOB noted; patient demonstrating tremulousness throughout extremities           Functional Measure:     Elder Mobility Scale      7/20            EMS and G-code impairment scale:  Percentage of impairment CH  0% CI  1-19% CJ  20-39% CK  40-59% CL  60-79% CM  80-99% CN  100%   EMS Score 0-20 20 17-19 13-16 9-12 5-8 1-4 0      Scores under 10  generally these patients are dependent in mobility maneuvers; require help with  basic ADL, such as transfers, toileting and dressing.      Scores between 10  13  generally these patients are borderline in terms of safe mobility and  independence in ADL i.e. they require some help with some mobility maneuvers. Scores over 14  Generally these patients are able to perform mobility maneuvers alone and safely  and are independent in basic ADL. G codes: In compliance with CMSs Claims Based Outcome Reporting, the following G-code set was chosen for this patient based on their primary functional limitation being treated: The outcome measure chosen to determine the severity of the functional limitation was the Elderly mobility scale with a score of 7/20 which was correlated with the impairment scale. · Mobility - Walking and Moving Around:               - CURRENT STATUS:    CL - 60%-79% impaired, limited or restricted               - GOAL STATUS:           CI - 1%-19% impaired, limited or restricted               - D/C STATUS:                       ---------------To be determined---------------            Pain:  Pain Scale 1: Numeric (0 - 10)  Pain Intensity 1: 0              Activity Tolerance:   Pulse Oximetry Assessment     93% at rest on 4LPM  85% while ambulating on 5LPM  Please refer to the flowsheet for vital signs taken during this treatment. After treatment:   [X]         Patient left in no apparent distress sitting up in chair  [ ]         Patient left in no apparent distress in bed  [X]         Call bell left within reach  [X]         Nursing notified  [ ]         Caregiver present  [X]         Bed alarm activated      COMMUNICATION/EDUCATION:   The patients plan of care was discussed with: Occupational Therapist and Registered Nurse.  [X]         Fall prevention education was provided and the patient/caregiver indicated understanding. [X]         Patient/family have participated as able in goal setting and plan of care. [X]         Patient/family agree to work toward stated goals and plan of care. [ ]         Patient understands intent and goals of therapy, but is neutral about his/her participation.   [ ]         Patient is unable to participate in goal setting and plan of care.      Thank you for this referral.  Arslan Momin, PT   Time Calculation: 24 mins

## 2017-03-29 NOTE — PROGRESS NOTES
Primary Nurse Sosa Malin RN and ROBERT Gould performed a dual skin assessment on this patient No impairment noted  Azael score is 19

## 2017-03-29 NOTE — PROGRESS NOTES
TRANSFER - IN REPORT:    Verbal report received from Hailey(jes) on Adarsh Quiroz  being received from ED(unit) for routine progression of care      Report consisted of patients Situation, Background, Assessment and   Recommendations(SBAR). Information from the following report(s) ED Summary, MAR and Recent Results. was reviewed with the receiving nurse. Opportunity for questions and clarification was provided. Assessment completed upon patients arrival to unit and care assumed.

## 2017-03-29 NOTE — ROUTINE PROCESS
Dr. Nathanael Reynaga is aware of patient's history of fever. Lactate was drawn and result 0.9. Will continue to monitor patient.

## 2017-03-30 NOTE — PROGRESS NOTES
Physical Therapy  Attempting to see patient for PT this pm. Patient currently off floor for testing. Will follow back later.   Sarah Wilcox, PT

## 2017-03-30 NOTE — PROGRESS NOTES
07:53- Called Dr. Bhavana Willson (cardiology) in regards to pt complaint of chest pain. Per MD, complete EKG, give up to 3 sublingual nitro- if pain not relieved, give 2 mg morphine and page MD again. 08:30- Pt reported pain 7/10- given PRN nitroglycerin. 08:38- Pt reported pain 2/10 when not breathing in.     08:44- Pt reported pain 0/10. Pt stated, \"I'm not sure if it is my heart or my breathing that is causing the pain. \"

## 2017-03-30 NOTE — PROGRESS NOTES
Hospitalist Progress Note    NAME: Fazal Vogel   :  1945   MRN:  [id number]     Assessment / Plan:  Acute exacerbation of chronic bronchiectasis POA causing Sepsis POA   -wbc down  -continue to follow cultures  -continue empirically on levaquin change to PO - well tolerated at this time.   -add joan q to return joan  -continues on Steroids and nebs changed to PO - tolerating  -still with high anxiety with drop in O2     Substernal chest pain POA of unclear origin  -DST pending  -TTE shows resolution of prior cardiomyopathy now with ef 55%, no pericardial effusion  -improved today only with ntg  -neg cardiac panel  -will give gi cocktail     Chronic respiratory failure POA on 4.5 L NC O2 at baseline with acute on chronic COPD flare as above  Chronic Pulmonary fibrosis POA  Chronic bronchiectasis  Remote history of pulmonary TB s/p treatment  -as above Continue O2  -Steroid taper  -Nebs, symbicort     CAD s/p MI POA  Essential HTN POA  -for now will look to continue on ASA, lisinopril, bystolic  -neg Serial cardiac enzymes     Hypothyroidism POA on replacement     DVT prophylaxis: lovenox SQ      Stress ulcer prophylaxis: Not indicated     Code status: DNR, has signed durable DNR in computer, wife and pt confirmed it is still active     Next of Kin: Wife    Suspect to IP Pulmonary rehab     Subjective:     Chief Complaint / Reason for Physician Visit  \"i feel some better today\" still with intermittent sscp, dull achy. No diaphoresis as prior. Still with panic attacks with drop in O2 sats which occur with movement. Discussed with RN events overnight.      Review of Systems:  Symptom Y/N Comments  Symptom Y/N Comments   Fever/Chills n   Chest Pain y    Poor Appetite y   Edema n    Cough n   Abdominal Pain     Sputum n   Joint Pain     SOB/KOO y   Pruritis/Rash n    Nausea/vomit n   Tolerating PT/OT n    Diarrhea n   Tolerating Diet y    Constipation    Other       Could NOT obtain due to:      Objective: VITALS:   Last 24hrs VS reviewed since prior progress note. Most recent are:  Patient Vitals for the past 24 hrs:   Temp Pulse Resp BP SpO2   03/30/17 1151 - (!) 118 - 180/80 -   03/30/17 1146 - (!) 109 - 152/80 -   03/30/17 1122 - 98 18 150/75 95 %   03/30/17 0833 98.1 °F (36.7 °C) 98 18 142/86 96 %   03/30/17 0718 - - - - 100 %   03/30/17 0235 98 °F (36.7 °C) 88 16 118/71 100 %   03/29/17 2303 98.1 °F (36.7 °C) 92 18 110/61 99 %   03/29/17 1934 97.6 °F (36.4 °C) 84 16 115/70 95 %   03/29/17 1641 98.1 °F (36.7 °C) 88 16 105/61 95 %   03/29/17 1612 - 94 - - (!) 89 %   03/29/17 1611 - - - - (!) 85 %   03/29/17 1605 - - - - 93 %   03/29/17 1604 - 90 - - (!) 86 %   03/29/17 1558 - 88 - - 90 %       Intake/Output Summary (Last 24 hours) at 03/30/17 1531  Last data filed at 03/30/17 0502   Gross per 24 hour   Intake              480 ml   Output              450 ml   Net               30 ml        PHYSICAL EXAM:  General: WD, thin chronically ill appearing cm sitting up in bed interactive/talkative, cooperative, no acute distress    EENT:  EOMI. Anicteric sclerae. MM dry  Resp:  Mild scattered wheeze, no rhonchi, +prolonged expiratory phase. No accessory muscle use  CV:  Regular  rhythm,  No edema  GI:  Soft, Non distended, Non tender.  +Bowel sounds  Neurologic:  Alert and oriented X 3, normal speech  Psych:   Good insight. +anxiety with movement,   Skin:  no rashes or ulcers. No jaundice    Reviewed most current lab test results and cultures  YES  Reviewed most current radiology test results   YES  Review and summation of old records today    NO  Reviewed patient's current orders and MAR    YES  PMH/SH reviewed - no change compared to H&P  ________________________________________________________________________  Care Plan discussed with:    Comments   Patient x    Family  x Discussed with patient and family in room.  Questions answered (20   RN x    Care Manager     Consultant: jay Card[facility] 5 Multidiciplinary team rounds were held today with , nursing, pharmacist and clinical coordinator. Patient's plan of care was discussed; medications were reviewed and discharge planning was addressed. ________________________________________________________________________  Total NON critical care TIME:  30   Minutes    Total CRITICAL CARE TIME Spent:   Minutes non procedure based      Comments   >50% of visit spent in counseling and coordination of care x See above   ________________________________________________________________________  Procedures: see electronic medical records for all procedures/Xrays and details which were not copied into this note but were reviewed prior to creation of Plan. LABS:  Recent Labs      03/30/17 0338 03/29/17 0223 03/28/17   1620   WBC  18.4*  25.5*  12.7*   HGB  9.9*  9.8*  9.0*   HCT  32.7*  33.3*  30.7*   PLT  315  275  266     Recent Labs      03/30/17   0338  03/29/17 0223 03/28/17   1620   NA  140  141  140   K  4.2  4.2  3.7   CL  102  102  100   CO2  31  31  34*   BUN  37*  18  16   CREA  0.90  0.97  0.83   GLU  102*  144*  101*   CA  8.6  8.6  8.1*   MG  2.5*   --    --      Recent Labs      03/29/17 0223 03/28/17   1620   SGOT  19  18   ALT  30  29   AP  59  52   TBILI  0.4  0.3   TP  7.3  6.6   ALB  2.9*  2.9*   GLOB  4.4*  3.7     No results for input(s): INR, PTP, APTT in the last 72 hours. No lab exists for component: INREXT, INREXT   No results for input(s): FE, TIBC, PSAT, FERR in the last 72 hours. No results found for: FOL, RBCF   Recent Labs      03/28/17   1747   PH  7.45   PCO2  50*   PO2  64*     No results for input(s): PHI, PO2I, PCO2I in the last 72 hours.   Recent Labs      03/29/17   0223  03/28/17   1945  03/28/17   1620   CPK  79   --    --    CKNDX  Cannot be calulated   --    --    TROIQ  <0.04  <0.04  <0.04     Lab Results   Component Value Date/Time    Cholesterol, total 163 12/27/2016 07:54 AM    HDL Cholesterol 60 12/27/2016 07:54 AM    LDL, calculated 93 12/27/2016 07:54 AM    Triglyceride 49 12/27/2016 07:54 AM     Lab Results   Component Value Date/Time    Glucose (POC) 129 02/12/2017 11:05 AM    Glucose (POC) 107 02/12/2017 07:36 AM    Glucose (POC) 113 02/11/2017 05:27 PM    Glucose (POC) 134 02/11/2017 12:47 PM    Glucose (POC) 137 02/10/2017 08:56 PM     Lab Results   Component Value Date/Time    Color YELLOW/STRAW 03/29/2017 12:30 AM    Appearance CLEAR 03/29/2017 12:30 AM    Specific gravity 1.016 03/29/2017 12:30 AM    pH (UA) 6.0 03/29/2017 12:30 AM    Protein NEGATIVE  03/29/2017 12:30 AM    Glucose NEGATIVE  03/29/2017 12:30 AM    Ketone NEGATIVE  03/29/2017 12:30 AM    Bilirubin NEGATIVE  03/29/2017 12:30 AM    Urobilinogen 0.2 03/29/2017 12:30 AM    Nitrites NEGATIVE  03/29/2017 12:30 AM    Leukocyte Esterase NEGATIVE  03/29/2017 12:30 AM    Epithelial cells FEW 03/29/2017 12:30 AM    Bacteria 1+ 03/29/2017 12:30 AM    WBC 0-4 03/29/2017 12:30 AM    RBC 0-5 03/29/2017 12:30 AM       RADIOGRAPHIC STUDIES:  CXR Results  (Last 48 hours)               03/28/17 1550  XR CHEST PORT Final result    Impression:  IMPRESSION:   No significant change since the previous exam. No definite acute finding. Narrative:  INDICATION: Chest pain and shortness of breath. Posttuberculosis bronchiectasis. Emphysema. COMPARISON: 2/9/2017       A single frontal view was obtained. The time of this study is 1541 hours. There   is loss of volume in both upper lobes with calcifications consistent with the   history of previous tuberculosis. There is also overall loss of volume in the   left hemithorax. Pleural thickening is seen at both lung apices. All these   findings are similar to the previous exam. There is no new acute finding in   either lung. Mediastinum is shifted to the left consistent with volume loss on   the left. This is similar to the previous study as well.  Heart and mediastinal   shadows are similar to the previous study as well. Visualized osseous structures   unremarkable. .                          CT Results  (Last 48 hours)               03/28/17 1915  CTA CHEST W OR W WO CONT Final result    Impression:  IMPRESSION:       1. No evidence of aortic aneurysm or dissection. 2. No evidence of pulmonary embolus. 3. Chronic findings in both lungs are again seen with bronchiectasis and   fibrosis and pleural thickening. No new acute findings seen. 4. No acute finding noted in the abdomen or pelvis. Narrative:  EXAM:  CTA CHEST ABD PELVIS W CON        INDICATION: Emphysema. Posttuberculosis bronchiectasis. Severe chest pain. Shortness of breath. .       COMPARISON: 6/20/2014. CONTRAST: 100 mL of Isovue-370. TECHNIQUE:    Multislice helical CT arteriography was performed from the thoracic inlet to the   symphysis pubis during uneventful rapid bolus intravenous contrast   administration. No oral contrast was administered. Delayed images of the abdomen   were acquired. Contiguous 2.5 mm axial images were reconstructed and lung and   soft tissue windows were generated. Coronal and sagittal reformations and 3-D   MIP renderings were generated. CT dose reduction was achieved through use of a   standardized protocol tailored for this examination and automatic exposure   control for dose modulation. FINDINGS:   CT angiography:       Ascending aorta is normal in caliber. Right vessels opacify normally. Minimal   atherosclerotic change of the posterior thoracic aortic arch and descending   aorta noted. There is no evidence of aneurysm or dissection. The abdominal aorta demonstrates mild atherosclerotic change without evidence of   aneurysm. No dissection of the abdominal aorta noted. Single renal arteries   noted without significant stenosis. The celiac artery and its branches as well   as superior mesenteric artery and its branches are unremarkable.  Inferior   mesenteric artery fills normally. Iliac arteries demonstrate atherosclerotic   change without aneurysm. Internal and external iliac arteries opacify normally. Also incidentally noted is opacification of the pulmonary arteries. No filling   defects noted. Thorax:       Lungs again demonstrate severe fibrotic changes bilaterally more so on the left   than on the right. Bronchiectasis again is noted similar to the previous study. There is pleural thickening at both apices right less than left. These findings   are unchanged, however. No new superimposed acute process is identified in the   lungs. There is no mediastinal or hilar adenopathy. Moderate hiatal hernia noted. Osseous structures of the thorax, abdomen, and pelvis demonstrate no sclerotic   or lytic lesions. Abdomen pelvis:       Arterial phase imaging only is normal. Liver is homogeneous in this single   phase. Spleen is normal in size. Pancreas demonstrates no inflammatory changes. Adrenal glands normal. Previous described cyst of the right kidney is again   noted. There is no retroperitoneal adenopathy. There is no bowel obstruction. There is large amount of fecal material   throughout the colon. No ascites or free intraperitoneal air. Study of the pelvis demonstrates distended urinary bladder without   calcification. 03/28/17 1915  CTA ABDOMEN PELV W CONT Final result    Impression:  IMPRESSION:       1. No evidence of aortic aneurysm or dissection. 2. No evidence of pulmonary embolus. 3. Chronic findings in both lungs are again seen with bronchiectasis and   fibrosis and pleural thickening. No new acute findings seen. 4. No acute finding noted in the abdomen or pelvis. Narrative:  EXAM:  CTA CHEST ABD PELVIS W CON        INDICATION: Emphysema. Posttuberculosis bronchiectasis. Severe chest pain. Shortness of breath. .       COMPARISON: 6/20/2014. CONTRAST: 100 mL of Isovue-370. TECHNIQUE:    Multislice helical CT arteriography was performed from the thoracic inlet to the   symphysis pubis during uneventful rapid bolus intravenous contrast   administration. No oral contrast was administered. Delayed images of the abdomen   were acquired. Contiguous 2.5 mm axial images were reconstructed and lung and   soft tissue windows were generated. Coronal and sagittal reformations and 3-D   MIP renderings were generated. CT dose reduction was achieved through use of a   standardized protocol tailored for this examination and automatic exposure   control for dose modulation. FINDINGS:   CT angiography:       Ascending aorta is normal in caliber. Right vessels opacify normally. Minimal   atherosclerotic change of the posterior thoracic aortic arch and descending   aorta noted. There is no evidence of aneurysm or dissection. The abdominal aorta demonstrates mild atherosclerotic change without evidence of   aneurysm. No dissection of the abdominal aorta noted. Single renal arteries   noted without significant stenosis. The celiac artery and its branches as well   as superior mesenteric artery and its branches are unremarkable. Inferior   mesenteric artery fills normally. Iliac arteries demonstrate atherosclerotic   change without aneurysm. Internal and external iliac arteries opacify normally. Also incidentally noted is opacification of the pulmonary arteries. No filling   defects noted. Thorax:       Lungs again demonstrate severe fibrotic changes bilaterally more so on the left   than on the right. Bronchiectasis again is noted similar to the previous study. There is pleural thickening at both apices right less than left. These findings   are unchanged, however. No new superimposed acute process is identified in the   lungs. There is no mediastinal or hilar adenopathy. Moderate hiatal hernia noted.        Osseous structures of the thorax, abdomen, and pelvis demonstrate no sclerotic   or lytic lesions. Abdomen pelvis:       Arterial phase imaging only is normal. Liver is homogeneous in this single   phase. Spleen is normal in size. Pancreas demonstrates no inflammatory changes. Adrenal glands normal. Previous described cyst of the right kidney is again   noted. There is no retroperitoneal adenopathy. There is no bowel obstruction. There is large amount of fecal material   throughout the colon. No ascites or free intraperitoneal air. Study of the pelvis demonstrates distended urinary bladder without   calcification. Echo Results  (Last 48 hours)               17 1342  2D ECHO COMPLETE ADULT (TTE) W OR WO CONTR Final result    Narrative:  [address]   [address] [facility], [address]   [phone]       Transthoracic Echocardiogram       Patient: Jayne Hartmann   MRN: [id number]   [address] #: [de-identified]   : [id number]   Age: 66 years   Gender: Male   Height: 71 in   Weight: 134.6 lb   BSA: 1.78 m squared   BP: 109 / 65 mmHg   Study date: 29-Mar-2017   Status:   Location:   [facility] #: [id number]       Allergies: DILTIAZEM HCL       Ordering Physician:  Miladis Bryant. Jennifer Theodore MD   Reading Group:  R[facility] Group   Technologist:  Carmita King RDCS   Reading Physician:  CESAR Saenz:   Left ventricle: Systolic function was normal. Ejection fraction was   estimated in the range of 55 % to 60 %. There were no regional wall motion   abnormalities. INDICATIONS: chest pain/pericarditis. PROCEDURE: The study included complete 2D imaging, M-mode, complete   spectral Doppler, and color Doppler. Systolic blood pressure was 109 mmHg,   at the start of the study. Diastolic blood pressure was 65 mmHg, at the   start of the study. LEFT VENTRICLE: Size was normal. Systolic function was normal. Ejection   fraction was estimated in the range of 55 % to 60 %.  There were no regional wall motion abnormalities. Wall thickness was normal.       RIGHT VENTRICLE: The size was normal. Systolic function was normal. Wall   thickness was normal.       LEFT ATRIUM: Size was normal.       RIGHT ATRIUM: Size was normal.       MITRAL VALVE: Normal valve structure. DOPPLER: There was no significant   regurgitation. AORTIC VALVE: The valve was trileaflet. Leaflets exhibited normal   thickness and normal cuspal separation. DOPPLER: Transaortic velocity was   within the normal range. There was no stenosis. There was no regurgitation. TRICUSPID VALVE: Normal valve structure. There was normal leaflet   separation. DOPPLER: The transtricuspid velocity was within the normal   range. There was no evidence for tricuspid stenosis. There was mild   regurgitation. Pulmonary artery systolic pressure: 42 mmHg. PULMONIC VALVE: Leaflets exhibited normal thickness, no calcification, and   normal cuspal separation. DOPPLER: The transpulmonic velocity was within   the normal range. There was no regurgitation. AORTA: The root exhibited normal size. PERICARDIUM: There was no pericardial effusion. The pericardium was normal   in appearance. SYSTEM MEASUREMENT TABLES       CW   AV Env. Ti: 282.8 ms   AV VTI: 23.4 cm   AV Vmax: 1.2 m/s   AV Vmean: 0.8 m/s   AV maxP.6 mmHg   AV meanPG: 3.1 mmHg   MR Vmax: 0.7 m/s   MR maxP.8 mmHg   RAP: 10 mmHg   TR Vmax: 2.8 m/s   TR maxP.1 mmHg       MM   AV Cusp: 0.9 cm   Ao Diam: 2 cm   LA Diam: 1.3 cm   LA/Ao: 0.6   EPSS: 0.9 cm       PW   LVOT Env. Ti: 366 ms   LVOT maxP.5 mmHg   LVOT meanP.7 mmHg   LVOT VTI: 14.3 cm   LVOT Vmax: 0.6 m/s   LVOT Vmean: 0.4 m/s   E': 0.1 m/s   E/E': 5.8   MV A [place]: 0.6 m/s   MV Dec [place]: 4.6 m/s2   MV DecT: 133.3 ms   MV E [place]: 0.6 m/s   MV E/A Ratio: 1   MV PHT: 38.7 ms   MV VTI: 12.5 cm   MV Vmax: 0.7 m/s   MV Vmean: 0.5 m/s   MV maxP.7 mmHg   MV meanP.9 mmHg   MVA By PHT: 5.7 cm2   PV Vmax: 0.9 m/s   PV maxPG: 3 mmHg   RVSP: 42.1 mmHg   HR: 204.2 BPM       Prepared and E-signed by       GRACE Chu M.D. Signed 29-Mar-2017 15:25:27                 VENOUS DOPPLER results  (Last 48 hours)    None          CULTURES:    Lab Results   Component Value Date/Time    Specimen Description: [facility] 01/24/2013 03:25 PM    Lab Results   Component Value Date/Time    Culture result: NO GROWTH 1 DAY 03/29/2017 12:30 AM    Culture result:  07/07/2016 06:44 PM     NO ROUTINE ENTERIC PATHOGENS ISOLATED INCLUDING SALMONELLA, SHIGELLA, YERSINIA, VIBRIO OR E. COLI 0157:H7    Culture result: NO COLIFORMS ISOLATED 07/07/2016 06:44 PM    Culture result: HEAVY  MIXED GRAM POSITIVE CY   07/07/2016 06:44 PM    Culture result: NO GROWTH 5 DAYS 07/07/2016 02:00 PM          Signed:  Tena Briones MD

## 2017-03-30 NOTE — PROGRESS NOTES
Pt stressed with Dobutamine. Last images to be done around 2pm today. Pt may eat and drink per Dr. Linda Estevez.

## 2017-03-30 NOTE — PROGRESS NOTES
Occupational Therapy  Attempted to see for treatment however off the floor, will follow up as able.    Nita Penny, OTR/L

## 2017-03-30 NOTE — PROGRESS NOTES
[address] [place] [address]  [phone]      Cardiology Progress Note      3/30/2017 5:09 PM    Admit Date: 3/28/2017    Admit Diagnosis:   COPD (chronic obstructive pulmonary disease) ([facility] [address]    Subjective:     Kaden Dewitt is a 66 y.o. male with PMH COPD, HTN, thyroid disease, cardiomyopathy who was admitted for pleuritic chest pain and SOB. Overnight events:  -stress test completed  -VSS with some tachycardia today   -weight stable; I/O incomplete  -WBC improving   -Mr. Inna Pope is feeling well. He states he had an \"episode\" this morning while he was laying in bed that he felt chest pain and anxiety. He denies current chest pain, palpitations. His SOB is improving.       Visit Vitals    /88    Pulse 90    Temp 98.2 °F (36.8 °C)    Resp 18    Wt 61.8 kg (136 lb 3.2 oz)    SpO2 96%    BMI 19.01 kg/m2       Current Facility-Administered Medications   Medication Dose Route Frequency    nitroglycerin (NITROSTAT) tablet 0.4 mg  0.4 mg SubLINGual PRN    atropine injection 1 mg  1 mg IntraVENous ONCE    atorvastatin (LIPITOR) tablet 10 mg  10 mg Oral DAILY    guaiFENesin (ROBITUSSIN) 100 mg/5 mL oral liquid 100 mg  100 mg Oral Q6H PRN    zinc oxide-cod liver oil (DESITIN) 40 % paste   Topical PRN    zolpidem (AMBIEN) tablet 5 mg  5 mg Oral QHS PRN    melatonin tablet 10.5 mg  10.5 mg Oral QHS PRN    sodium chloride (NS) flush 5-10 mL  5-10 mL IntraVENous Q8H    sodium chloride (NS) flush 5-10 mL  5-10 mL IntraVENous PRN    acetaminophen (TYLENOL) tablet 650 mg  650 mg Oral Q6H PRN    oxyCODONE-acetaminophen (PERCOCET) 5-325 mg per tablet 1 Tab  1 Tab Oral Q6H PRN    naloxone (NARCAN) injection 0.4 mg  0.4 mg IntraVENous PRN    ondansetron (ZOFRAN) injection 4 mg  4 mg IntraVENous Q4H PRN    bisacodyl (DULCOLAX) suppository 10 mg  10 mg Rectal DAILY PRN    enoxaparin (LOVENOX) injection 40 mg  40 mg SubCUTAneous Q24H    ipratropium (ATROVENT) 0.02 % nebulizer solution 0.5 mg  0.5 mg Nebulization TID    levalbuterol (XOPENEX) nebulizer soln 1.25 mg/3 mL  1.25 mg Nebulization TID    docusate sodium (COLACE) capsule 100 mg  100 mg Oral DAILY PRN    Saccharomyces boulardii (FLORASTOR) capsule 250 mg  250 mg Oral BID    nebivolol (BYSTOLIC) tablet 5 mg  5 mg Oral BID    predniSONE (DELTASONE) tablet 40 mg  40 mg Oral DAILY WITH BREAKFAST    umeclidinium (INCRUSE ELLIPTA) 62.5 mcg/actuation  1 Puff Inhalation DAILY    LORazepam (ATIVAN) tablet 1 mg  1 mg Oral BID    fluticasone-vilanterol (BREO ELLIPTA) 100mcg-25mcg/puff  1 Puff Inhalation DAILY    potassium chloride SR (KLOR-CON 10) tablet 10 mEq  10 mEq Oral BID WITH MEALS    traZODone (DESYREL) tablet 100 mg  100 mg Oral QHS    lisinopril (PRINIVIL, ZESTRIL) tablet 20 mg  20 mg Oral DAILY    furosemide (LASIX) tablet 40 mg  40 mg Oral DAILY    levothyroxine (SYNTHROID) tablet 50 mcg  50 mcg Oral 7am    sertraline (ZOLOFT) tablet 75 mg  75 mg Oral DAILY    aspirin delayed-release tablet 81 mg  81 mg Oral DAILY    polyethylene glycol (MIRALAX) packet 17 g  17 g Oral DAILY    levoFLOXacin (LEVAQUIN) tablet 750 mg  750 mg Oral Q24H       Objective:      Physical Exam:  General Appearance:  ill-appearing, cachectic, elderly  male sitting in chair in NAD   Chest:   coarse bilaterally with exp wheezing in R fields   Cardiovascular:  Regular rate and rhythm, no murmur.   Abdomen:   Soft, non-tender, bowel sounds are active.   Extremities: palpable distal pulses; no edema   Skin:  Warm and dry.     Data Review:   Recent Labs      03/30/17   0338  03/29/17   0223  03/28/17   1620   WBC  18.4*  25.5*  12.7*   HGB  9.9*  9.8*  9.0*   HCT  32.7*  33.3*  30.7*   PLT  315  275  266     Recent Labs      03/30/17   0338  03/29/17   0223  03/28/17   1620   NA  140  141  140   K  4.2  4.2  3.7   CL  102  102  100   CO2  31  31  34*   GLU  102*  144*  101*   BUN  37*  18  16   CREA  0.90  0.97  0.83   CA  8.6  8.6 8.1*   MG  2.5*   --    --    ALB   --   2.9*  2.9*   TBILI   --   0.4  0.3   SGOT   --   19  18   ALT   --   30  29       Recent Labs      03/29/17   0223  03/28/17   1945 03/28/17   1620   TROIQ  <0.04  <0.04  <0.04   CPK  79   --    --    CKMB  <1.0   --    --          Intake/Output Summary (Last 24 hours) at 03/30/17 1709  Last data filed at 03/30/17 0502   Gross per 24 hour   Intake              480 ml   Output              450 ml   Net               30 ml        Telemetry: SR-ST; PACs, PVCs   EKG:ST  Cxray: volume loss; no acute finding   CTA chest: \"1. No evidence of aortic aneurysm or dissection. 2. No evidence of pulmonary embolus. 3. Chronic findings in both lungs are again seen with bronchiectasis and  fibrosis and pleural thickening. No new acute findings seen. 4. No acute finding noted in the abdomen or pelvis. 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 Prior cardiomyopathy normalized. · Negative stress test; ECHO as above  · Continue ASA, BB, ACEi, diuretic, statin   · Patient appears to be progressing well with pulmonary treatment per the Hospitalists. Mr. Inna Pope can follow-up with Dr. Conrado Fuller at [facility] after discharge. Will sign off. Please call if new concerns arise.         Ever Zelaya NP  DNP, RN, AGACNP-BC

## 2017-03-30 NOTE — CARDIO/PULMONARY
CP Rehab Note:  Chart review     Admit: Chest pain/temp     Mhx: COPD, CHF, GERD, HTN, migraine, hypothyroidism, EF 40-45% on 7/31/14.     Former smoker. Pt being transported back to [place] for final images following Dobutamine stress. Cardiac teaching deferred. Will continue to follow for education and support.

## 2017-03-30 NOTE — PROGRESS NOTES
Spiritual Care Partner Volunteer visited patient in Renal on March 30, 2017.   Documented by:  LILI Andrew, [facility], [address] [place] [address] [address]     Paging Service  [phone])

## 2017-03-30 NOTE — PROGRESS NOTES
Dobutamine stress complete, nuclear images to follow.   Patient may return to previous diet order if ok by MD.

## 2017-03-30 NOTE — PROGRESS NOTES
[facility] to assess for admission and need for insurance auth.

## 2017-03-30 NOTE — PROGRESS NOTES
SUP Follow-up for Attending MD    Please advise if Zantac 150mg PO daily with lunch (Pepcid per formulary option) should be continued from PTA medication list given reported GERD history.     Thank you,  Edgardo Dumont, [facility]

## 2017-03-30 NOTE — PROGRESS NOTES
Bedside shift change report given to Haleigh Wade (oncoming nurse) by Mary Anne Boyce (offgoing nurse). Report included the following information SBAR, Kardex, Intake/Output, MAR and Recent Results. Zone Phone for oncoming shift:   [phone]    Shift Summary: Pt rested quietly through night. No c/o of pain during night. One instance approximately 0615 of pt complaining of SOB. Examined pt w/ no findings of airway constriction and a pulse ox of 98%. LDAs               Peripheral IV 03/28/17 Right Antecubital (Active)   Site Assessment Clean, dry, & intact 3/30/2017  3:27 AM   Phlebitis Assessment 0 3/30/2017  3:27 AM   Infiltration Assessment 0 3/30/2017  3:27 AM   Dressing Status Clean, dry, & intact 3/30/2017  3:27 AM   Dressing Type Transparent;Tape 3/30/2017  3:27 AM   Hub Color/Line Status Pink;Flushed 3/30/2017  3:27 AM                    Urinary Catheter 03/28/17 One[place] - Temperature-Criteria for Appropriate Use: Obstruction/retention   Intake & Output   Date 03/29/17 0700 - 03/30/17 0659 03/30/17 0700 - 03/31/17 0659   Shift 1259-5637 1072-6014 24 Hour Total 9864-8485 6884-9148 24 Hour Total   I  N  T  A  K  E   P.O.  480 480         P. O.  480 480       Shift Total  (mL/kg)  480  (7.8) 480  (7.8)      O  U  T  P  U  T   Urine  (mL/kg/hr)  450  (0.6) 450  (0.3)         Urine Output (mL) (Urinary Catheter 03/28/17 Oneal - Temperature)  450 450       Shift Total  (mL/kg)  450  (7.3) 450  (7.3)      NET  30 30      Weight (kg) 61.6 61.8 61.8 61.8 61.8 61.8      Last Bowel Movement     Glucose Checks [x] N/A  [] AC/HS  [] Q6  Concerns:   Nutrition Active Orders   Diet    DIET NPO With Meds       Consults [x]PT  [x]OT  []Speech  [x]Case Management   Cardiac Monitoring []N/A [x]Yes Expires:48 hrs

## 2017-03-30 NOTE — PROGRESS NOTES
Bedside shift change report given to Britney Barrow (oncoming nurse) by Nicole Garcia (offgoing nurse). Report included the following information SBAR, Kardex, Intake/Output, MAR and Recent Results. Shift Summary: Pt had stress test today    LDAs               Peripheral IV 03/28/17 Right Antecubital (Active)   Site Assessment Clean, dry, & intact 3/30/2017  3:13 PM   Phlebitis Assessment 0 3/30/2017  3:13 PM   Infiltration Assessment 0 3/30/2017  3:13 PM   Dressing Status Clean, dry, & intact 3/30/2017  3:13 PM   Dressing Type Transparent;Tape 3/30/2017  3:13 PM   Hub Color/Line Status Pink;Capped 3/30/2017  3:13 PM                    Urinary Catheter 03/28/17 Oneal - Temperature-Criteria for Appropriate Use: Obstruction/retention   Intake & Output   Date 03/29/17 1900 - 03/30/17 0659 03/30/17 0700 - 03/31/17 0659   Shift 6728-2250 24 Hour Total 4098-2406 3363-8499 24 Hour Total   I  N  T  A  K  E   P.O. 480 480 480  480      P. O. 480 480 480  480    Shift Total  (mL/kg) 480  (7.8) 480  (7.8) 480  (7.8)  480  (7.8)   O  U  T  P  U  T   Urine  (mL/kg/hr)   (1.4)  1050      Urine Output (mL) (Urinary Catheter 03/28/17 Oneal - Temperature)   1050    Shift Total  (mL/kg) 450  (7.3) 450  (7.3) 1050  (17)  1050  (17)   NET 30 30 -570  -570   Weight (kg) 61.8 61.8 61.8 61.8 61.8      Last Bowel Movement     Glucose Checks [] N/A  [] AC/HS  [] Q6  Concerns:   Nutrition Active Orders   Diet    DIET CARDIAC Regular       Consults []PT  []OT  []Speech  []Case Management   Cardiac Monitoring []N/A []Yes Expires:

## 2017-03-31 NOTE — PROGRESS NOTES
Bedside shift change report given to [place] (oncoming nurse) by Michael Mike (offgoing nurse). Report included the following information SBAR, Kardex, Intake/Output, MAR and Recent Results. Zone Phone for oncoming shift:   [phone]    Shift Summary: Pt rested quietly through night. Minor c/o headache towards end of shift, relieved by medication. LDAs               Peripheral IV 03/28/17 Right Antecubital (Active)   Site Assessment Clean, dry, & intact 3/31/2017  3:30 AM   Phlebitis Assessment 0 3/31/2017  3:30 AM   Infiltration Assessment 0 3/31/2017  3:30 AM   Dressing Status Clean, dry, & intact 3/31/2017  3:30 AM   Dressing Type Transparent;Tape 3/31/2017  3:30 AM   Hub Color/Line Status Pink;Flushed;Capped 3/31/2017  3:30 AM                    Urinary Catheter 03/28/17 Oneal - Temperature-Criteria for Appropriate Use: Obstruction/retention   Intake & Output   Date 03/30/17 0700 - 03/31/17 0659 03/31/17 0700 - 04/01/17 0659   Shift 7659-1165 3191-6429 24 Hour Total 3321-2018 2528-9501 24 Hour Total   I  N  T  A  K  E   P.O. 480 240 720         P. O. 480 240 720       Shift Total  (mL/kg) 480  (7.8) 240  (3.9) 720  (11.6)      O  U  T  P  U  T   Urine  (mL/kg/hr) 1050  (1.4) 550 1600         Urine Output (mL) (Urinary Catheter 03/28/17 Oneal - Temperature) 7630 954 6007       Shift Total  (mL/kg) 1050  (17) 550  (8.8) 1600  (25.7)      NET -570 -310 -250      Weight (kg) 61.8 62.3 62.3 62.3 62.3 62.3      Last Bowel Movement     Glucose Checks [] N/A  [] AC/HS  [] Q6  Concerns:   Nutrition Active Orders   Diet    DIET CARDIAC Regular       Consults []PT  []OT  []Speech  []Case Management   Cardiac Monitoring []N/A []Yes Expires:

## 2017-03-31 NOTE — PROGRESS NOTES
Home Health Care Discharge Planning: [facility]  Face to Face Encounter      NAME: Tracey Valera   :  1945   MRN:  [id number]     Primary Diagnosis: copd exacerbation    Date of Face to Face:  3/31/2017 12:03 PM                                  Face to Face Encounter findings are related to primary reason for home care:   YES    1. I certify that the patient needs intermittent skilled nursing care, physical therapy and/or speech therapy. I will not be following this patient in the Community and Dr. Cruz Fails, DO will be responsible for signing the [address] 2. Initial Orders for Care: Skilled Nursing, Physical Therapy and Occupational Therapy    3. I certify that this patient is homebound because of illness or injury, need the aid of supportive devices such as crutches, canes, wheelchairs, and walkers; the use of special transportation; or the assistance of another person in order to leave their place of residence. There exists a normal inability to leave home and leaving home requires a considerable and taxing effort. 4. I certify that this patient is under my care and that I had a Face-to-Face Encounter that meets the physician Face-to-Face Encounter requirements.   Document the physical findings from the Face-to-Face Encounter that support the need for skilled services: Has new diagnosis that requires skilled nursing teaching and intervention  and Needs skilled safety assessment and interventions     Tj Cardoza MD  Discharging Physician  Office: [phone]  Fax:   [phone]

## 2017-03-31 NOTE — PROGRESS NOTES
3/31/2017 12:50 PM    Admit Date: 3/28/2017    Admit Diagnosis:   COPD (chronic obstructive pulmonary disease) (HCC)    Subjective:     Milena Castellanos states mild pleuritic chest discomfort is significantly improved.     Current Facility-Administered Medications   Medication Dose Route Frequency    nitroglycerin (NITROSTAT) tablet 0.4 mg  0.4 mg SubLINGual PRN    atorvastatin (LIPITOR) tablet 10 mg  10 mg Oral DAILY    guaiFENesin (ROBITUSSIN) 100 mg/5 mL oral liquid 100 mg  100 mg Oral Q6H PRN    zinc oxide-cod liver oil (DESITIN) 40 % paste   Topical PRN    zolpidem (AMBIEN) tablet 5 mg  5 mg Oral QHS PRN    melatonin tablet 10.5 mg  10.5 mg Oral QHS PRN    sodium chloride (NS) flush 5-10 mL  5-10 mL IntraVENous Q8H    sodium chloride (NS) flush 5-10 mL  5-10 mL IntraVENous PRN    acetaminophen (TYLENOL) tablet 650 mg  650 mg Oral Q6H PRN    oxyCODONE-acetaminophen (PERCOCET) 5-325 mg per tablet 1 Tab  1 Tab Oral Q6H PRN    naloxone (NARCAN) injection 0.4 mg  0.4 mg IntraVENous PRN    ondansetron (ZOFRAN) injection 4 mg  4 mg IntraVENous Q4H PRN    bisacodyl (DULCOLAX) suppository 10 mg  10 mg Rectal DAILY PRN    enoxaparin (LOVENOX) injection 40 mg  40 mg SubCUTAneous Q24H    ipratropium (ATROVENT) 0.02 % nebulizer solution 0.5 mg  0.5 mg Nebulization TID    levalbuterol (XOPENEX) nebulizer soln 1.25 mg/3 mL  1.25 mg Nebulization TID    docusate sodium (COLACE) capsule 100 mg  100 mg Oral DAILY PRN    Saccharomyces boulardii (FLORASTOR) capsule 250 mg  250 mg Oral BID    nebivolol (BYSTOLIC) tablet 5 mg  5 mg Oral BID    predniSONE (DELTASONE) tablet 40 mg  40 mg Oral DAILY WITH BREAKFAST    umeclidinium (INCRUSE ELLIPTA) 62.5 mcg/actuation  1 Puff Inhalation DAILY    LORazepam (ATIVAN) tablet 1 mg  1 mg Oral BID    fluticasone-vilanterol (BREO ELLIPTA) 100mcg-25mcg/puff  1 Puff Inhalation DAILY    potassium chloride SR (KLOR-CON 10) tablet 10 mEq  10 mEq Oral BID WITH MEALS    traZODone (DESYREL) tablet 100 mg  100 mg Oral QHS    lisinopril (PRINIVIL, ZESTRIL) tablet 20 mg  20 mg Oral DAILY    furosemide (LASIX) tablet 40 mg  40 mg Oral DAILY    levothyroxine (SYNTHROID) tablet 50 mcg  50 mcg Oral 7am    sertraline (ZOLOFT) tablet 75 mg  75 mg Oral DAILY    aspirin delayed-release tablet 81 mg  81 mg Oral DAILY    polyethylene glycol (MIRALAX) packet 17 g  17 g Oral DAILY    levoFLOXacin (LEVAQUIN) tablet 750 mg  750 mg Oral Q24H         Objective:      Physical Exam:    Visit Vitals    /74 (BP 1 Location: Left arm, BP Patient Position: Sitting)    Pulse 86    Temp 98.7 °F (37.1 °C)    Resp 16    Wt 137 lb 6.4 oz (62.3 kg)    SpO2 95%    BMI 19.17 kg/m2     Gen:  NAD  Mental Status - Alert. General Appearance - Not in acute distress. Chest and Lung Exam   Inspection: Accessory muscles - No use of accessory muscles in breathing. Auscultation:   Breath sounds: -Fine bilateral crackles consistent with interstitial lung disease  Cardiovascular   Inspection: Jugular vein - Bilateral - Inspection Normal.   Palpation/Percussion:   Apical Impulse: - Normal.   Auscultation: Rhythm - Regular. Heart Sounds - S1 WNL and S2 WNL. No S3 or S4. Murmurs & Other Heart Sounds: Auscultation of the heart reveals - No Murmurs. Peripheral Vascular   Upper Extremity: Inspection - Bilateral - No Cyanotic nailbeds or Digital clubbing. Lower Extremity:   Palpation: Edema - Bilateral - No edema. Abdomen:   Soft, non-tender, bowel sounds are active.   Neuro: A&O times 3, CN and motor grossly WNL    Data Review:   Recent Labs      03/31/17 0222 03/30/17 0338 03/29/17 0223   WBC  12.7*  18.4*  25.5*   HGB  9.7*  9.9*  9.8*   HCT  32.7*  32.7*  33.3*   PLT  300  315  275     Recent Labs      03/31/17 0222 03/30/17 0338 03/29/17 0223 03/28/17   1620   NA  142  140  141  140   K  4.3  4.2  4.2  3.7   CL  104  102  102  100   CO2  32  31  31  34*   GLU  104*  102*  144*  101* BUN  30*  37*  18  16   CREA  0.76  0.90  0.97  0.83   CA  8.3*  8.6  8.6  8.1*   MG  2.4  2.5*   --    --    ALB   --    --   2.9*  2.9*   TBILI   --    --   0.4  0.3   SGOT   --    --   19  18   ALT   --    --   30  29       Recent Labs      03/29/17   0223  03/28/17   1945  03/28/17   1620   TROIQ  <0.04  <0.04  <0.04   CPK  79   --    --    CKMB  <1.0   --    --          Intake/Output Summary (Last 24 hours) at 03/31/17 1250  Last data filed at 03/31/17 0912   Gross per 24 hour   Intake             1200 ml   Output             1600 ml   Net             -400 ml        Telemetry: normal sinus rhythm    Assessment:     Active Problems:    Pulmonary emphysema with fibrosis of lung ([facility] [address] (9/20/2009)      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 Prior cardiomyopathy normalized. · Negative stress test; ECHO with normalized LVEF  · Continue ASA, BB, ACEi, diuretic, statin   · Patient appears to be progressing well with pulmonary treatment per the Hospitalists. Advised patient to follow-up with me within 2-3 weeks of discharge. He chooses to follow-up with me due to proximity to his home. Thanks for the consult. Noted plans for discharge today.

## 2017-03-31 NOTE — PROGRESS NOTES
Pharmacist Discharge Medication Reconciliation    Significant PMH:   Past Medical History:   Diagnosis Date    Chronic obstructive pulmonary disease [facility]     Diastolic CHF, chronic ([facility] 11/4/2015    Emphysema/COPD ([facility] 9/20/2009    Essential hypertension, benign 9/20/2009    GERD (gastroesophageal reflux disease) 9/20/2009    Hypertension     Migraine headache 9/20/2009    Post-tuberculous bronchiectasis 10/17/2013    Thyroid disease     hypothyroidism     Chief Complaint for this Admission:   Chief Complaint   Patient presents with    Chest Pain     CP since 1100 with SOB, denies exertion when it started     Allergies: Cardizem [diltiazem hcl]    Discharge Medications:   Current Discharge Medication List        START taking these medications    Details   levoFLOXacin (LEVAQUIN) 750 mg tablet Take 1 Tab by mouth every twenty-four (24) hours for 10 days. Qty: 10 Tab, Refills: 0           CONTINUE these medications which have CHANGED    Details   predniSONE (DELTASONE) 20 mg tablet Take 2 tablets daily for 4 days, then 1 tablet daily for 4 days, then 1/2 a tablet daily for 4 days, then stop  Qty: 14 Tab, Refills: 0           CONTINUE these medications which have NOT CHANGED    Details   guaiFENesin-codeine (ROBITUSSIN AC) 100-10 mg/5 mL solution Take 5 mL by mouth two (2) times daily as needed for Cough. Max Daily Amount: 10 mL. Qty: 463 mL, Refills: 0      ipratropium (ATROVENT) 0.02 % nebulizer solution 2.5 mL by Nebulization route three (3) times daily. Qty: 750 mL, Refills: 3    Comments: Dispense 300 vials. zolpidem (AMBIEN) 5 mg tablet TAKE ONE TO TWO TABLETS BY MOUTH EVERY NIGHTLY AS NEEDED FOR SLEEP (MAX 2 TABLETS DAILY)  Qty: 90 Tab, Refills: 0      levalbuterol (XOPENEX) 1.25 mg/3 mL nebu 3 mL by Nebulization route every six (6) hours as needed. Qty: 792 mL, Refills: 6      suvorexant (BELSOMRA) 10 mg tablet Take 1 Tab by mouth nightly as needed for Insomnia.  Max Daily Amount: 10 mg.  Qty: 30 Tab, Refills: 4      OXYGEN-AIR DELIVERY SYSTEMS 4.5 L/min by Nasal route continuous. ferrous sulfate 325 mg (65 mg iron) tablet Take 1 Tab by mouth daily (with breakfast). Qty: 30 Tab, Refills: 1      tiotropium bromide (SPIRIVA RESPIMAT) 2.5 mcg/actuation inhaler Take 2 Puffs by inhalation daily. Qty: 1 Inhaler, Refills: 1      docusate sodium (COLACE) 100 mg capsule Take 1 Cap by mouth daily as needed for Constipation for up to 90 days. Qty: 60 Cap, Refills: 2      L. gasseri-B. bifidum-B longum 1.5 billion cell cap Take 2 Caps by mouth daily. nebivolol (BYSTOLIC) 5 mg tablet Take 5 mg by mouth two (2) times a day. SUMATRIPTAN SUCCINATE (IMITREX PO) Take  by mouth as needed (migraine). raNITIdine (ZANTAC) 150 mg tablet Take 150 mg by mouth daily (with lunch). diphenhydrAMINE (BENADRYL) 25 mg capsule Take 25 mg by mouth two (2) times a day. Indications: COUGH      LORazepam (ATIVAN) 1 mg tablet TAKE 1/2 TO 1 BY MOUTH UP TO 3 TIMES DAILY AS NEEDED FOR ANXIETY  Qty: 180 Tab, Refills: 0      budesonide-formoterol (SYMBICORT) 160-4.5 mcg/actuation HFA inhaler Take 2 Puffs by inhalation two (2) times a day. Qty: 3 Inhaler, Refills: 4      XOPENEX HFA 45 mcg/actuation inhaler USE 2 INHALATIONS BY MOUTH EVERY 4 HOURS AS NEEDED FOR WHEEZING  Qty: 45 Inhaler, Refills: 6      potassium chloride SR (KLOR-CON 10) 10 mEq tablet Take 1 Tab by mouth two (2) times daily (with meals). Take 2nd dose when you take an extra Furosemide. Indications: HYPOKALEMIA  Qty: 180 Tab, Refills: 1      traZODone (DESYREL) 50 mg tablet Take 2 Tabs by mouth nightly. Qty: 180 Tab, Refills: 3      lisinopril (PRINIVIL, ZESTRIL) 20 mg tablet TAKE 1 BY MOUTH DAILY  Qty: 90 Tab, Refills: 3    Associated Diagnoses: Essential hypertension, benign      sertraline (ZOLOFT) 50 mg tablet Take 1.5 Tabs by mouth daily.   Qty: 135 Tab, Refills: 3    Associated Diagnoses: Adjustment disorder with mixed anxiety and depressed mood      levothyroxine (SYNTHROID) 50 mcg tablet TAKE 1 TABLET DAILY BEFORE BREAKFAST  Qty: 90 Tab, Refills: 3    Associated Diagnoses: Other specified hypothyroidism      furosemide (LASIX) 20 mg tablet Take 2 Tabs by mouth daily. Indications: EDEMA  Qty: 180 Tab, Refills: 3    Associated Diagnoses: Chronic systolic congestive heart failure (HCC)      aspirin delayed-release 81 mg tablet Take 81 mg by mouth daily. Associated Diagnoses: Ischemic heart disease, chronic      polyethylene glycol (MIRALAX) 17 gram packet Take 17 g by mouth daily as needed. Associated Diagnoses: Chronic constipation           STOP taking these medications       azithromycin (ZITHROMAX) 250 mg tablet Comments:   Reason for Stopping:               The patient's chart, MAR and AVS were reviewed by Juan A Briones RPH. Discharging Provider: Dr. Timothy Funes  (Remove the following comments before filing to note)  Recommendations/Clarifications: Discharging physician has reviewed poly pharmacy items, which may contribute to pt's unsteadiness, with pt's family and MD is contacting pt's PCP to share same concern. Poly pharmacy items include (Gelsomra, lorazepam, Trazodone, Zolpidem and Zoloft). Pt has been on Prednisone 40mg daily and taper is appropriatiately included in discharge instructions. No further recommendations.      Time spent: 15 min    Thank You,     BRITTANEY Albrecht [facility]

## 2017-03-31 NOTE — DISCHARGE SUMMARY
Hospitalist Discharge Summary    NAME: Anselmo Gonzalez   :  1945   MRN:  [id number]     DISCHARGE DIAGNOSIS:  Acute exacerbation of chronic bronchiectasis POA causing Sepsis POA   Substernal chest pain POA of unclear origin, not cardiac in origin however  Chronic respiratory failure POA on 4.5 L NC O2 at baseline with acute on chronic COPD flare as above  Chronic Pulmonary fibrosis POA  Chronic bronchiectasis  Remote history of pulmonary TB s/p treatment  CAD s/p MI POA  Essential HTN POA  Hypothyroidism POA    CONSULTATIONS: Cardiology    Follow Up: Follow-up Information     Follow up With Details Comments Contact Info    Key Sosa MD Schedule an appointment as soon as possible for a visit in 2 weeks Call Sooner, As needed, If symptoms worsen [phone]       Sylvain Tucker III, DO Schedule an appointment as soon as possible for a visit in 1 week Call Sooner, As needed, If symptoms worsen [address] [facility]  [address] [facility]  [address] [facility]      Kelvin Cheema MD Schedule an appointment as soon as possible for a visit in 2 weeks Call Sooner, As needed, If symptoms worsen [phone] [facility]  [address]  [address] [facility]      Angie Zapata MD Schedule an appointment as soon as possible for a visit in 1 week to get english catheter out 60 [facility] [address] [phone]            Procedures: see electronic medical records for all procedures/Xrays and details which were not copied into this note but were reviewed prior to creation of Plan. Please follow-up tests/labs that are still pendin. None     DISCHARGE SUMMARY/HOSPITAL COURSE: for full details see H&P, daily progress notes, labs, consult notes. Briefly As Per HPI:   A 66-year-old white male with   known pulmonary fibrosis and bronchiectasis.  The bronchiectasis is felt   due to previous pulmonary tuberculosis. He also has a diagnosis of   COPD, and was a pack-per-day smoker until 1979. At baseline, he   wears 4.5 liters nasal cannula oxygen, but does not have much of   chronic cough. He was last admitted at [facility] in February 2017 with acute respiratory failure that improved with steroids. He does   have a history of C difficile colitis on previous antibiotics and takes a   probiotic.       He was well until the last day or so when he began to have \"increasing   back pain,\" which he described as upper neck and chest and thoracic   back pain. At times it was quite severe. Over the past 24 hours he had   a fever as high as 101 at home per the wife. He has had an increasing   cough over the past day or two, with phlegm that was \"cloudy. \" He has   felt increasingly short of breath with walking for the past 2 days. Today   he began to develop a severe substernal chest pain. The pain was   worse with breathing and severe in intensity. It lasted for several hours   and finally improved with pain medications in the emergency room. The pain was definitely worse laying down and better sitting up. He did   not have any nausea, vomiting, diarrhea. No abdominal pain. His back   pain is improved. He has not had any dysuria, unusual headaches,   focal motor or sensory changes. He contacted Dr. Jennifer Theodore because   of the chest pain and he was referred to the emergency room.       In the emergency room, he had an elevated white blood cell count of   12.7. His CPK and troponins were negative. EKG had no clear   ischemic changes.       CT scan of the chest had no pulmonary embolism, no evidence of   aortic dissection. There was evidence of bronchiectasis and pulmonary   fibrosis, but no new infiltrates. On exam he had a systolic murmur and   possibly a pericardial friction rub. We were called to admit the patient.     The patient's hospital course was complicated by:  Acute exacerbation of chronic bronchiectasis POA causing Sepsis POA   Substernal chest pain POA of unclear origin, not cardiac in origin however  Chronic respiratory failure POA on 4.5 L NC O2 at baseline with acute on chronic COPD flare as above  Chronic Pulmonary fibrosis POA  Chronic bronchiectasis  Remote history of pulmonary TB s/p treatment  CAD s/p MI POA  Essential HTN POA  Hypothyroidism POA    Patient with complex inpatient course. Please see all notes/labs/vitals/testing/procedures for details, briefly the patient was admitted following presentation to ED with sob and sscp. Found on CXR to have bronchiectatic changes and along with near sepsis findings was admitted for w/u evalatuoin. Noted to have hypoxia from baseline. Improved with abx/nebs/steroids to his baseline per wife. He would like to proceed home with hh/rn/pt. Further noted this stay to have bladder retention and difficulty urinating - could be from inhalers vs opioids - english to remain in place with [facility] follow [facility] to remove. Of note - we discussed IP pulmonary rehab - the patient and wife wished to proceed home first - we informed them that we could help place there if needed. They were appreciative. sscp eval neg cardiac etiology - likely GI. One great fear for me is polypharmacy contributing to his unsteadiness/tremor. Discussed with family and they will discuss with Dr MARIBEL Moore I will call him as well.   _______________________________________________________________________   Patient seen and examined by me on day of discharge. Pertinent findings are:  Gen: chronically ill cm at baseline chronic respiratory distress  HEENT: nc at  Chest: crackles and rhonchi chronically on the right  Cv: no r/g  Abd: s/nd/nt +bs  Neuro: cn 2-12 gi    See Discharge Instructions for further details.   _______________________________________________________________________  DISPOSITION:    Home with Family:    Home with HH/PT/OT/RN: x   SNF/LTC: SOTO:    OTHER:    ________________________________________________________________________  Medications Reviewed:  Current Discharge Medication List      START taking these medications    Details   levoFLOXacin (LEVAQUIN) 750 mg tablet Take 1 Tab by mouth every twenty-four (24) hours for 10 days. Qty: 10 Tab, Refills: 0         CONTINUE these medications which have CHANGED    Details   predniSONE (DELTASONE) 20 mg tablet Take 2 tablets daily for 4 days, then 1 tablet daily for 4 days, then 1/2 a tablet daily for 4 days, then stop  Qty: 14 Tab, Refills: 0         CONTINUE these medications which have NOT CHANGED    Details   guaiFENesin-codeine (ROBITUSSIN AC) 100-10 mg/5 mL solution Take 5 mL by mouth two (2) times daily as needed for Cough. Max Daily Amount: 10 mL. Qty: 463 mL, Refills: 0      ipratropium (ATROVENT) 0.02 % nebulizer solution 2.5 mL by Nebulization route three (3) times daily. Qty: 750 mL, Refills: 3    Comments: Dispense 300 vials. zolpidem (AMBIEN) 5 mg tablet TAKE ONE TO TWO TABLETS BY MOUTH EVERY NIGHTLY AS NEEDED FOR SLEEP (MAX 2 TABLETS DAILY)  Qty: 90 Tab, Refills: 0      levalbuterol (XOPENEX) 1.25 mg/3 mL nebu 3 mL by Nebulization route every six (6) hours as needed. Qty: 792 mL, Refills: 6      suvorexant (BELSOMRA) 10 mg tablet Take 1 Tab by mouth nightly as needed for Insomnia. Max Daily Amount: 10 mg.  Qty: 30 Tab, Refills: 4      OXYGEN-AIR DELIVERY SYSTEMS 4.5 L/min by Nasal route continuous. ferrous sulfate 325 mg (65 mg iron) tablet Take 1 Tab by mouth daily (with breakfast). Qty: 30 Tab, Refills: 1      tiotropium bromide (SPIRIVA RESPIMAT) 2.5 mcg/actuation inhaler Take 2 Puffs by inhalation daily. Qty: 1 Inhaler, Refills: 1      docusate sodium (COLACE) 100 mg capsule Take 1 Cap by mouth daily as needed for Constipation for up to 90 days. Qty: 60 Cap, Refills: 2      L. gasseri-B. bifidum-B longum 1.5 billion cell cap Take 2 Caps by mouth daily.       nebivolol (BYSTOLIC) 5 mg tablet Take 5 mg by mouth two (2) times a day. SUMATRIPTAN SUCCINATE (IMITREX PO) Take  by mouth as needed (migraine). raNITIdine (ZANTAC) 150 mg tablet Take 150 mg by mouth daily (with lunch). diphenhydrAMINE (BENADRYL) 25 mg capsule Take 25 mg by mouth two (2) times a day. Indications: COUGH      LORazepam (ATIVAN) 1 mg tablet TAKE 1/2 TO 1 BY MOUTH UP TO 3 TIMES DAILY AS NEEDED FOR ANXIETY  Qty: 180 Tab, Refills: 0      budesonide-formoterol (SYMBICORT) 160-4.5 mcg/actuation HFA inhaler Take 2 Puffs by inhalation two (2) times a day. Qty: 3 Inhaler, Refills: 4      XOPENEX HFA 45 mcg/actuation inhaler USE 2 INHALATIONS BY MOUTH EVERY 4 HOURS AS NEEDED FOR WHEEZING  Qty: 45 Inhaler, Refills: 6      potassium chloride SR (KLOR-CON 10) 10 mEq tablet Take 1 Tab by mouth two (2) times daily (with meals). Take 2nd dose when you take an extra Furosemide. Indications: HYPOKALEMIA  Qty: 180 Tab, Refills: 1      traZODone (DESYREL) 50 mg tablet Take 2 Tabs by mouth nightly. Qty: 180 Tab, Refills: 3      lisinopril (PRINIVIL, ZESTRIL) 20 mg tablet TAKE 1 BY MOUTH DAILY  Qty: 90 Tab, Refills: 3    Associated Diagnoses: Essential hypertension, benign      sertraline (ZOLOFT) 50 mg tablet Take 1.5 Tabs by mouth daily. Qty: 135 Tab, Refills: 3    Associated Diagnoses: Adjustment disorder with mixed anxiety and depressed mood      levothyroxine (SYNTHROID) 50 mcg tablet TAKE 1 TABLET DAILY BEFORE BREAKFAST  Qty: 90 Tab, Refills: 3    Associated Diagnoses: Other specified hypothyroidism      furosemide (LASIX) 20 mg tablet Take 2 Tabs by mouth daily. Indications: EDEMA  Qty: 180 Tab, Refills: 3    Associated Diagnoses: Chronic systolic congestive heart failure (HCC)      aspirin delayed-release 81 mg tablet Take 81 mg by mouth daily. Associated Diagnoses: Ischemic heart disease, chronic      polyethylene glycol (MIRALAX) 17 gram packet Take 17 g by mouth daily as needed.     Associated Diagnoses: Chronic constipation         STOP taking these medications       azithromycin (ZITHROMAX) 250 mg tablet Comments:   Reason for Stopping:             PMH/SH reviewed - no change compared to H&P  ________________________________________________________________________  Total time spent in discharge (min): 40   ________________________________________________________________________  Risk of deterioration:    Low    Moderate    High x   ________________________________________________________________________  Care Plan discussed with:    Comments   Patient x    Family  x    RN x    Care Manager x                   Consultant:  x Pulmonary, cardiology   ________________________________________________________________________  CDMP Checked:   Yes yx     Signed:  Lexii Sifuentes MD

## 2017-03-31 NOTE — DISCHARGE INSTRUCTIONS
DISCHARGE DIAGNOSIS:  Acute exacerbation of chronic bronchiectasis POA causing Sepsis POA   Substernal chest pain POA of unclear origin, not cardiac in origin however  Chronic respiratory failure POA on 4.5 L NC O2 at baseline with acute on chronic COPD flare as above  Chronic Pulmonary fibrosis POA  Chronic bronchiectasis  Remote history of pulmonary TB s/p treatment  CAD s/p MI POA  Essential HTN POA  Hypothyroidism POA    MEDICATIONS:  · It is important that you take the medication exactly as they are prescribed. · Keep your medication in the bottles provided by the pharmacist and keep a list of the medication names, dosages, and times to be taken in your wallet. · Do not take other medications without consulting your doctor. Pain Management: per above medications    What to do at [address]:  Cardiac Diet    Recommended activity: Activity as tolerated    If you have questions regarding the hospital related prescriptions or hospital related issues please call [address] [place] [address] at . You can always direct your questions to your primary care doctor if you are unable to reach your hospital physician; your PCP works as an extension of your hospital doctor just like your hospital doctor is an extension of your PCP for your time at the hospital [facility]). If you experience any of the following symptoms then please call your primary care physician or return to the emergency room if you cannot get hold of your doctor:  Fever, chills, nausea, vomiting, diarrhea, change in mentation, falling, bleeding, shortness of breath    Resume home medicine. NOTE that we added an antibiotic as you have a lung infection. FURTHER we continued you on steroids to further help you recover. [facility] RN to come to your home with physical therapy to help you recover further.

## 2017-03-31 NOTE — PROGRESS NOTES
Yesterday, I spoke in detail with wife//pt regarding d/c planning. 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 They just finished [employer] and are willing to renew per MD request.  All in agreement with d/c to home today with [employer]. IM on chart. Orders sent to them.

## 2017-03-31 NOTE — PROGRESS NOTES
Pt given discharge instructions and hard scripts for medication. Oneal bag changed to leg bag. Pt taken off of telemetry monitoring and IV taken out. Volunteers called for discharge.

## 2017-04-03 NOTE — Clinical Note
AMBER [id number] scheduled. Isael Pham - able to resolve on 5/1/17; no needs identified at this time; to have english removed 4/5/17.

## 2017-04-03 NOTE — PROGRESS NOTES
[facility] Hospitalization           Referral from [place]. Patient admitted to [facility] on 3/28/17 and discharged on 3/31/17 with diagnosis of Acute exacerbation of chronic bronchiectasis causing sepsis, substernal chest pain of unclear origin (not cardiac), Chronic respiratory failure on 4.5 L NC O2 at baseline with acute on chronic. NOTE  Urine Culture results Negative and Lactic Acid WNL.         Significant Lab/Diagnostic Findings:  Lab Results  Component Value Date/Time   WBC 12.7 03/31/2017 02:22 AM   Hemoglobin (POC) 11.9 06/20/2014 12:44 PM   HGB 9.7 03/31/2017 02:22 AM   Hematocrit (POC) 35 06/20/2014 12:44 PM   HCT 32.7 03/31/2017 02:22 AM   PLATELET 881 26/61/8501 02:22 AM   MCV 85.8 03/31/2017 02:22 AM       Lab Results   Component Value Date/Time    CK 79 03/29/2017 02:23 AM    CK - MB <1.0 03/29/2017 02:23 AM    CK-MB Index Cannot be calulated 03/29/2017 02:23 AM    Troponin-I, Qt. <0.04 03/29/2017 02:23 AM      Lab Results   Component Value Date/Time    NT pro-BNP 2399 03/28/2017 04:20 PM    NT pro-BNP 3971 02/09/2017 07:52 AM    NT pro-BNP 3434 05/06/2016 10:30 PM      Lab Results   Component Value Date/Time    Sodium 142 03/31/2017 02:22 AM    Potassium 4.3 03/31/2017 02:22 AM    Chloride 104 03/31/2017 02:22 AM    CO2 32 03/31/2017 02:22 AM    Anion gap 6 03/31/2017 02:22 AM    Glucose 104 03/31/2017 02:22 AM    Glucose 89 11/09/2009 07:45 AM    BUN 30 03/31/2017 02:22 AM    Creatinine 0.76 03/31/2017 02:22 AM    BUN/Creatinine ratio 39 03/31/2017 02:22 AM    GFR est AA >60 03/31/2017 02:22 AM    GFR est non-AA >60 03/31/2017 02:22 AM    Calcium 8.3 03/31/2017 02:22 AM      Lab Results   Component Value Date/Time    Sodium 142 03/31/2017 02:22 AM    Potassium 4.3 03/31/2017 02:22 AM    Chloride 104 03/31/2017 02:22 AM    CO2 32 03/31/2017 02:22 AM    Anion gap 6 03/31/2017 02:22 AM    Glucose 104 03/31/2017 02:22 AM    Glucose 89 11/09/2009 07:45 AM    BUN 30 03/31/2017 02:22 AM    Creatinine 0.76 03/31/2017 02:22 AM    BUN/Creatinine ratio 39 03/31/2017 02:22 AM    GFR est AA >60 03/31/2017 02:22 AM    GFR est non-AA >60 03/31/2017 02:22 AM    Calcium 8.3 03/31/2017 02:22 AM    Bilirubin, total 0.4 03/29/2017 02:23 AM    ALT (SGPT) 30 03/29/2017 02:23 AM    AST (SGOT) 19 03/29/2017 02:23 AM    Alk. phosphatase 59 03/29/2017 02:23 AM    Protein, total 7.3 03/29/2017 02:23 AM    Albumin 2.9 03/29/2017 02:23 AM    Globulin 4.4 03/29/2017 02:23 AM    A-G Ratio 0.7 03/29/2017 02:23 AM      ABG:  Component      Latest Ref Rng & Units 3/28/2017           5:47 PM   pH      7.35 - 7.45   7.45   PCO2      35.0 - 45.0 mmHg 50 (H)   PO2      80 - 100 mmHg 64 (L)   O2 SAT      92 - 97 % 93   BICARBONATE      22 - 26 mmol/L 34 (H)   BASE EXCESS      mmol/L 8.3   O2 METHOD       NASAL O2   O2 FLOW RATE      L/min 4.00   FIO2      % 36   Sample source       ARTERIAL   SITE       RIGHT RADIAL   SHAW'S TEST       YES               Urine Culture - final results  Component      Latest Ref Rng & Units 3/29/2017          12:30 AM   Special Requests:       NO SPECIAL REQUESTS . . Mortimer Coombe Count       <1,000 COLONIES/mL   Culture result:       NO GROWTH 1 DAY       Lactic Acid  Component      Latest Ref Rng & Units 3/28/2017          11:51 PM   Lactic acid      0.4 - 2.0 MMOL/L 0.9           NN has updated care team members in the patient's chart. RRAT score:   Medium Risk            15       Total Score        3 Relationship with PCP    4 More than 1 Admission in calendar year    8 Charlson Comorbidity Score        Criteria that do not apply:    Patient Living Status    Patient Length of Stay > 5    Patient Insurance is Medicare, Medicaid or Self Pay                  Advance Medical Directive on file in EMR? Yes; DDNR AND has an advanced directive - a copy has been provided. Patient was evaluated by care management during hospitalization.    Per notes, Discharge Disposition from [facility]: Home with Home Health Services through [address] Recommended Post-Hospital Discharge follow-up (see below as listed on Hospital Discharge AVS/Instructions). Follow Up: Follow-up Information     Follow up With Details Comments Contact Info     Andria Arreola MD Schedule an appointment as soon as possible for a visit in 2 weeks Call Sooner, As needed, If symptoms worsen [phone]      [facility] III, DO Schedule an appointment as soon as possible for a visit in 1 week Call Sooner, As needed, If symptoms worsen [address]  [address] [facility]  [address]     Wojciech Dumont MD Schedule an appointment as soon as possible for a visit in 2 weeks Call Sooner, As needed, If symptoms worsen [phone] [facility]  Neeraj Worthy [address]  [facility] 83.  [phone]     Issa Pearl MD Schedule an appointment as soon as possible for a visit in 1 week to get english catheter out 60 [facility] 14 [facility] [phone]           - patient discharged to Home with English Catheter; urinary retention/ difficulty urinating. Most recent HIPAA form in the patient's chart (dated 2/28/17) was reviewed; authorized individual(s) listed on HIPAA form include aCrri Perry. NN follow-up phone call  NN contacted the patient today to complete NN post-hospital discharge assessment. Two patient identifiers verified. NN introduced self to the patient, including NN role and purpose of NN follow-up phone call; patient verbalizes understanding. NN inquired about the patient's current condition and how the patient is feeling; patient states, \"I'm doing very well. \"       Patient reports that urine output is \"yellow\" and \"clear\". Reviewed english catheter care with patient; patient reports that this was also reviewed with him prior to hospital discharge.      Patient denies fever, chills, dizziness, chest pain, shortness of breath, nausea, vomiting since hospital discharge. Patient reports that oxygen saturation is \"97%\" at rest on 4.5 lpm via nasal cannula. See details of Functional Assessment below as reported by the Patient. Living Situation/Support System: Lives with his Wife; Wife works outside of the home during the day, however patient reports that her schedule is flexible and she comes home at lunch time. Patient reports a good support system that consists of brothers-in-law, friends, neighbors and other family members that are able to provide assistance if needed; denies any concerns regarding support system. ADLs: Currently requiring minimal assistance with bathing; independent with dressing. Wife prepares meals. Mobility: Trixie Dey; denies history of falls within the past 6 months. Transportation: Wife provides patient with transportation. Medication Management: Wife manages the patient's medications. Financial Status: Denies any financial concerns regarding his medications. Barriers to care? no               - Home Health. Patient reports that home health has contacted him since hospital discharge; reports that home health nurse is scheduled to visit this afternoon. Patient verbalizes understanding of discharge instructions that were provided to him upon discharge from [address] [place] [address]. Red Flags reviewed with patient and patient understands when to contact Dr. Fransisca Keys office versus use of EMS. Opportunity for patient to ask NN questions was provided. NN contact information provided to patient and patient advised to contact NN as needed. Red Flags:  Fever, chills, dizziness, chest pain, shortness of breath, abdominal pain/discomfort, nausea, vomiting, weight gain, increased lower extremity swelling, new/worsened symptoms. PLAN    - Patient requests that this NN contact his Wife to complete med rec and schedule AMBER appt with PCP.   -Patient to contact this NN and/or PCP office with any questions/concerns.  -Notified of availability of PCP on-call physician after-hours and on the weekends for non-emergent/non-life threatening medical questions/concerns; patient verbalizes understanding. Patient expressed no questions, concerns or needs for this NN at this time. Patient verbalized understanding of all information discussed. NN contact information provided and patient advised to contact NN as needed. NN will route this encounter to Dr. Karli Rodríguez for notification/review. NN will route this encounter to Ambulatory  NN for notification/review and continued follow-up during AMBER period. This note will not be viewable in [address]

## 2017-04-03 NOTE — PROGRESS NOTES
NNTOCIP Post Hospitalization           NN follow-up phone call  NN contacted the patient's Wife, Maricel Anderson today to complete medication reconciliation and to schedule the patient for a AMBER appt with PCP. Two patient identifiers verified. NN introduced self to the patient's Wife, including NN role and purpose of NN follow-up phone call; Mrs. Cobb Charter understanding. Mrs. Jackie Metcalf denies any questions/concerns regarding english catheter care and/or emptying of drainage bag. Patient's Allergies Reviewed with Mrs. Jackie Metcalf and Medication Reconciliation completed and updated. Mrs. Jackie Metcalf verbalizes understanding of management of the patient's medications and reports patient compliance with prescribed medication regimen. Med Rec during this call  Current Outpatient Prescriptions   Medication Sig    LORazepam (ATIVAN) 1 mg tablet TAKE 1/2 TO 1 BY MOUTH UP TO 3 TIMES DAILY AS NEEDED FOR ANXIETY    melatonin tab tablet Take 10 mg by mouth nightly.  levoFLOXacin (LEVAQUIN) 750 mg tablet Take 1 Tab by mouth every twenty-four (24) hours for 10 days.  predniSONE (DELTASONE) 20 mg tablet Take 2 tablets daily for 4 days, then 1 tablet daily for 4 days, then 1/2 a tablet daily for 4 days, then stop    guaiFENesin-codeine (ROBITUSSIN AC) 100-10 mg/5 mL solution Take 5 mL by mouth two (2) times daily as needed for Cough. Max Daily Amount: 10 mL.  ipratropium (ATROVENT) 0.02 % nebulizer solution 2.5 mL by Nebulization route three (3) times daily. (Patient taking differently: 0.5 mg by Nebulization route two (2) times a day.)    levalbuterol (XOPENEX) 1.25 mg/3 mL nebu 3 mL by Nebulization route every six (6) hours as needed. (Patient taking differently: 1.25 mg by Nebulization route two (2) times a day.)    suvorexant (BELSOMRA) 10 mg tablet Take 1 Tab by mouth nightly as needed for Insomnia. Max Daily Amount: 10 mg.    OXYGEN-AIR DELIVERY SYSTEMS 4.5 L/min by Nasal route continuous.     ferrous sulfate 325 mg (65 mg iron) tablet Take 1 Tab by mouth daily (with breakfast).  tiotropium bromide (SPIRIVA RESPIMAT) 2.5 mcg/actuation inhaler Take 2 Puffs by inhalation daily.  docusate sodium (COLACE) 100 mg capsule Take 1 Cap by mouth daily as needed for Constipation for up to 90 days.  L. gasseri-B. bifidum-B longum 1.5 billion cell cap Take 2 Caps by mouth daily.  nebivolol (BYSTOLIC) 5 mg tablet Take 5 mg by mouth two (2) times a day.  SUMATRIPTAN SUCCINATE (IMITREX PO) Take  by mouth as needed (migraine).  raNITIdine (ZANTAC) 150 mg tablet Take 150 mg by mouth daily (with lunch).  diphenhydrAMINE (BENADRYL) 25 mg capsule Take 25 mg by mouth two (2) times a day. Indications: COUGH    budesonide-formoterol (SYMBICORT) 160-4.5 mcg/actuation HFA inhaler Take 2 Puffs by inhalation two (2) times a day.  XOPENEX HFA 45 mcg/actuation inhaler USE 2 INHALATIONS BY MOUTH EVERY 4 HOURS AS NEEDED FOR WHEEZING    potassium chloride SR (KLOR-CON 10) 10 mEq tablet Take 1 Tab by mouth two (2) times daily (with meals). Take 2nd dose when you take an extra Furosemide. Indications: HYPOKALEMIA (Patient taking differently: Take 20 mEq by mouth daily. Administration Instructions: Take two tabs by mouth daily, take a third dose of 10 mEq when a third dose of furosemide 20 mg is administered for weight gain of 2 lb  Indications: HYPOKALEMIA)    traZODone (DESYREL) 50 mg tablet Take 2 Tabs by mouth nightly.  lisinopril (PRINIVIL, ZESTRIL) 20 mg tablet TAKE 1 BY MOUTH DAILY    sertraline (ZOLOFT) 50 mg tablet Take 1.5 Tabs by mouth daily.  levothyroxine (SYNTHROID) 50 mcg tablet TAKE 1 TABLET DAILY BEFORE BREAKFAST    furosemide (LASIX) 20 mg tablet Take 2 Tabs by mouth daily. Indications: EDEMA (Patient taking differently: Take 40 mg by mouth daily. Administration Instructions: Patient is to take furosemide 20 mg tab, two tablets by month once daily.  Patient is to weight himself daily and if there is a weight gain >2 lb, he is to take an additional 20 mg tablet for a maximum daily dose of 60 mg. Indications: Edema)    aspirin delayed-release 81 mg tablet Take 81 mg by mouth daily.  polyethylene glycol (MIRALAX) 17 gram packet Take 17 g by mouth daily as needed. No current facility-administered medications for this visit. Medications Discontinued During This Encounter   Medication Reason    LORazepam (ATIVAN) 1 mg tablet Alternate Therapy    zolpidem (AMBIEN) 5 mg tablet Alternate Therapy               Lorazepam discontinued in error; added back to med rec. Ambien discontinued because Mrs. Carlos Reese reports that the patient is not currently taking; reports patient is currently taking Belsomra as prescribed. Med Rec updated. New medications at discharge include: Levaquin  Medication(s) changed at hospital discharge include: Furosemide, Lorazepam, Potassium Chloride  Medication(s) discontinued at hospital discharge include: Zithromax  Patient able to fill/pickup new medications without difficulty: Yes; reports patient compliance with Levaquin as prescribed at hospital discharge. Any Questions/Concerns regarding new Medication(s) and/or Medication Change(s): Denies any questions/concerns. - Mrs. Carlos Reese reports patient compliance with daily weights and reports that the patient's weight this morning was 130 lb; verbalizes understanding to contact Cardiologist for weight gain of 2-3 pounds in one day or 5 pounds in one week. Mrs. Virgil Inman understanding of discharge instructions that were provided to the patient/family upon the patient's discharge from [facility]. Patient has been scheduled for a AMBER appointment with Dr. Lorena Olguin; Mrs. Virgil Inman understanding of appointment information. Opportunity for patient to ask NN questions was provided. NN contact information provided to patient and patient advised to contact NN as needed.         PLAN  -Self-Management of Chronic Disease(s):      - COPD: Reviewed importance of patient compliance with medications; Mrs. Velásquez Sessions understanding and reports patient compliance. Patient on home oxygen at 4.5 lpm; Inga Keller.    -Per Patient and Wife, [facility] () is scheduled to visit the patient today.  -Patient to attend Transitions Of Care Appointment with Dr. Kelly Shay on 4/7/17.  -Patient to attend follow-up appointment(s) with Surgeon(if applicable) and/or Speciality Provider(s): Dr. Shila Wong (cardiology) - to be scheduled by patient; Dr. Torres Ruby (Pulmonology) on 4/14/17; [facility] on 4/5/17 for English Removal.    -Patient/ Wife to contact this NN and/or PCP office with any questions/concerns.  -Notified of availability of PCP on-call physician after-hours and on the weekends for non-emergent/non-life threatening medical questions/concerns; Mrs. Columba Mcmahon verbalizes understanding.  -Goals:  Goals        COPD     Knowledge and adherence of medication (ie. action, side effects, missed dose, etc.).            4/3/17: Reviewed importance of patient compliance with medications; Wife reports patient compliance and denies any questions/concerns. Post Hospitalization     Knowledge and adherence of prescribed medication (ie. action, side effects, missed dose, etc.).            4/3/17: Patient compliance with Levaquin and Prednisone reported; reviewed importance of patient compliance with medications.  Prevent complications post hospitalization. 4/3/17: NN initial AMBER call completed; AMBER [id number] scheduled. Patient compliance with Levaquin reported; reports patient follow-up with [facility] (english removal) and Pulmonology scheduled. English Catheter Care reviewed. Patient to be followed by [facility]. Patient's currently scheduled future appointments are listed below.   Future Appointments      Provider Calvin Cardenas   4/4/2017 To Be Determined Varghese Moya, [address] [facility] HOME CARE SCHEDULING [facility]   4/7/2017 2:30 PM Nandini JayceDorothea carrasquillo 6   4/28/2017 2:45 PM [facility]               Mrs. Char Graves expressed no questions, concerns or needs for this NN at this time. Mrs. Char Graves verbalized understanding of all information discussed. NN contact information provided and Mrs. Char Graves advised to contact NN as needed. NN will route this encounter to Dr. Karli Rodríguez for notification/review. NN will route this encounter to Ambulatory  NN for notification/review and continued follow-up during AMBER period. This note will not be viewable in [address]

## 2017-04-07 PROBLEM — J96.90 RESPIRATORY FAILURE (HCC): Status: RESOLVED | Noted: 2017-01-01 | Resolved: 2017-01-01

## 2017-04-07 NOTE — PROGRESS NOTES
Reviewed record in preparation for visit and have obtained necessary documentation. Identified pt with two pt identifiers(name and ). Chief Complaint   Patient presents with   [facility] Follow Up       Health Maintenance Due   Topic Date Due    ZOSTER VACCINE AGE 60>  2003    GLAUCOMA SCREENING Q2Y  2008    MEDICARE YEARLY EXAM  2008       Mr. Sonny Orozco has a reminder for a \"due or due soon\" health maintenance. I have asked that he discuss health maintenance topic(s) due with His  primary care provider. Coordination of Care Questionnaire:  :     1) Have you been to an emergency room, urgent care clinic since your last visit? yes   Hospitalized since your last visit? yes             2) Have you seen or consulted any other health care providers outside of [address] [place] [address] since your last visit? no  (Include any pap smears or colon screenings in this section.)      Patient is accompanied by self I have received verbal consent from Albert Rodríguez to discuss any/all medical information while they are present in the room.

## 2017-04-07 NOTE — PATIENT INSTRUCTIONS
Breathing Techniques for COPD: Care Instructions  Your Care Instructions    Breathing is hard when you have chronic obstructive pulmonary disease (COPD). You may take quick, short breaths. Breathing this way makes it harder to get air into your lungs. Learning new ways to control your breathing may help. You may feel better and be able to do more. You can try three basic ways to control your breathing. They are pursed-lip breathing, diaphragmatic breathing, and breathing while bending. Use these methods when you are more short of breath than normal. Practice them often so you can do them well. Follow-up care is a key part of your treatment and safety. Be sure to make and go to all appointments, and call your doctor if you are having problems. It's also a good idea to know your test results and keep a list of the medicines you take. How can you care for yourself at home? · Pursed-lip breathing helps you breathe more air out so that your next breath can be deeper. For this type of breathing, you breathe in through your nose and out through your mouth while almost closing your lips. Breathe in for about 2 seconds, and breathe out for 4 to 6 seconds. Pursed-lip breathing decreases shortness of breath and improves your ability to exercise. · Diaphragmatic breathing helps your lungs expand so that they take in more air. ¨ Lie on your back, or prop yourself up on several pillows. ¨ Put one hand on your belly and the other on your chest. When you breathe in, push your belly out as far as possible. You should feel the hand on your belly move out, while the hand on your chest does not move. ¨ When you breathe out, you should feel the hand on your belly move in. When you can do this type of breathing well while lying down, learn to do it while sitting or standing. Many people with COPD find this breathing method helpful. ¨ Practice diaphragmatic breathing for 20 minutes, 2 or 3 times a day.   · Breathing while bending forward at the waist may make breathing easier. It can reduce shortness of breath while you exercise or rest. It helps the diaphragm move more easily. The diaphragm is a large muscle that separates your lungs from your belly. It helps draw air into your lungs as you breathe. · Do not smoke. Smoking makes COPD worse. If you need help quitting, talk to your doctor about stop-smoking programs and medicines. These can increase your chances of quitting for good. When should you call for help? Call your doctor now or seek immediate medical care if:  · Your breathing methods do not help. · Your shortness of breath gets worse. · You cough up blood. · You have swelling in your belly and legs. · You have severe chest pain. Watch closely for changes in your health, and be sure to contact your doctor if you have any problems. Where can you learn more? Go to http://garfield-pamela.info/. Enter U128 in the search box to learn more about \"Breathing Techniques for COPD: Care Instructions. \"  Current as of: May 23, 2016  Content Version: 11.2  © 0144-0130 [employer]. Care instructions adapted under license by [employer] (which disclaims liability or warranty for this information). If you have questions about a medical condition or this instruction, always ask your healthcare professional. [address] any warranty or liability for your use of this information. Resume your zithromax next Wednesday.   Ask dr Sandra Gonzalez about stopping the bystolic

## 2017-04-07 NOTE — MR AVS SNAPSHOT
Visit Information Date & Time Provider Department Dept. Phone Encounter #  
 4/7/2017  2:30 PM Kiran Brown, [address]  [id number] Follow-up Instructions Return in about 3 months (around 7/7/2017). Your Appointments 4/11/2017  2:45 PM  
HOSPITAL FOLLOW-UP with MD Shawanda [facility]) Appt Note: hosptl /fu ; mld forms-lj 4-4-17  
 [address] [facility]  
[phone] [address] [facility]  
  
    
 4/28/2017  2:45 PM  
ROUTINE CARE with DO BAKARI Clarke III [facility]) Appt Note: 2 month follow up  
 [address] [address]  
[address] [place] [address] [address] [facility] Upcoming Health Maintenance Date Due  
 GLAUCOMA SCREENING Q2Y 5/5/2008 MEDICARE YEARLY EXAM 5/5/2008 COLONOSCOPY 6/3/2023 DTaP/Tdap/Td series (2 - Td) 8/11/2026 Allergies as of 4/7/2017  Review Complete On: 4/7/2017 By: Ariana Snowden III, DO Severity Noted Reaction Type Reactions Cardizem [Diltiazem Hcl]  06/17/2009    Other (comments) Headache and constipation Current Immunizations  Reviewed on 10/20/2015 Name Date Influenza High Dose Vaccine PF 10/15/2015 Influenza Vaccine 10/1/2016, 10/23/2014, 9/12/2013 Influenza Vaccine Split 11/12/2012  9:22 AM, 11/14/2011, 10/20/2010 Pneumococcal Conjugate (PCV-13) 10/8/2015 Pneumococcal Polysaccharide (PPSV-23) 1/27/2014 Pneumococcal Vaccine (Unspecified Type) 4/1/2008, 11/1/1997 Tdap 8/11/2016  4:24 PM  
  
 Not reviewed this visit You Were Diagnosed With   
  
 Codes Comments  Hypoxemic respiratory failure, chronic ([facility]    -  Primary ICD-10-CM: J96.11 
ICD-9-CM: 518.83, 799.02   
 Pulmonary emphysema with fibrosis of lung ([facility]     ICD-10-CM: J43.9, J84.10 ICD-9-CM: 492.8, 515 Primary insomnia     ICD-10-CM: F51.01 
ICD-9-CM: 307.42 Hypothyroidism due to acquired atrophy of thyroid     ICD-10-CM: E03.4 ICD-9-CM: 244.8, 246.8 Ischemic heart disease, chronic     ICD-10-CM: I25.9 ICD-9-CM: 414.9 Post-tuberculous bronchiectasis     ICD-10-CM: A15.0 ICD-9-CM: 011.50 Chronic constipation     ICD-10-CM: K59.09 
ICD-9-CM: 564.00 Chronic systolic congestive heart failure (HCC)     ICD-10-CM: I50.22 ICD-9-CM: 428.22, 428.0 Vitals BP Pulse Temp Resp Height(growth percentile) Weight(growth percentile) (!) 94/39 (BP 1 Location: Left arm, BP Patient Position: Sitting) 70 97.8 °F (36.6 °C) (Oral) 18 5' 10.98\" (1.803 m) 138 lb (62.6 kg) SpO2 BMI Smoking Status 97% 19.26 kg/m2 Former Smoker Vitals History BMI and BSA Data Body Mass Index Body Surface Area  
 19.26 kg/m 2 1.77 m 2 Preferred Pharmacy Pharmacy Name Phone [facility] [phone] Your Updated Medication List  
  
   
This list is accurate as of: 4/7/17  3:24 PM.  Always use your most recent med list.  
  
  
  
  
 aspirin delayed-release 81 mg tablet Take 81 mg by mouth daily. budesonide-formoterol 160-4.5 mcg/actuation HFA inhaler Commonly known as:  SYMBICORT Take 2 Puffs by inhalation two (2) times a day. diphenhydrAMINE 25 mg capsule Commonly known as:  BENADRYL Take 25 mg by mouth two (2) times a day. Indications: COUGH  
  
 ferrous sulfate 325 mg (65 mg iron) tablet Take 1 Tab by mouth daily (with breakfast). furosemide 20 mg tablet Commonly known as:  LASIX Take 2 Tabs by mouth daily. Indications: EDEMA  
  
 guaiFENesin-codeine 100-10 mg/5 mL solution Commonly known as:  ROBITUSSIN AC Take 5 mL by mouth two (2) times daily as needed for Cough.  Max Daily Amount: 10 mL. IMITREX PO Take  by mouth as needed (migraine). ipratropium 0.02 % nebulizer solution Commonly known as:  ATROVENT  
2.5 mL by Nebulization route two (2) times a day. L. gasseri-B. bifidum-B longum 1.5 billion cell Cap Take 2 Caps by mouth daily. levalbuterol 1.25 mg/3 mL Nebu Commonly known as:  XOPENEX  
3 mL by Nebulization route every six (6) hours as needed. levoFLOXacin 750 mg tablet Commonly known as:  Nayeli Camps Take 1 Tab by mouth every twenty-four (24) hours for 10 days. levothyroxine 50 mcg tablet Commonly known as:  SYNTHROID  
TAKE 1 TABLET DAILY BEFORE BREAKFAST  
  
 lisinopril 20 mg tablet Commonly known as:  PRINIVIL, ZESTRIL  
TAKE 1 BY MOUTH DAILY LORazepam 1 mg tablet Commonly known as:  ATIVAN  
TAKE 1/2 TO 1 BY MOUTH UP TO 3 TIMES DAILY AS NEEDED FOR ANXIETY  
  
 melatonin Tab tablet Take 10 mg by mouth nightly. MIRALAX 17 gram packet Generic drug:  polyethylene glycol Take 17 g by mouth daily as needed. nebivolol 5 mg tablet Commonly known as:  BYSTOLIC Take 5 mg by mouth two (2) times a day. OXYGEN-AIR DELIVERY SYSTEMS  
4.5 L/min by Nasal route continuous. potassium chloride SR 10 mEq tablet Commonly known as:  KLOR-CON 10 Take 1 Tab by mouth two (2) times daily (with meals). Take 2nd dose when you take an extra Furosemide. Indications: HYPOKALEMIA  
  
 predniSONE 20 mg tablet Commonly known as:  [employer] Take 2 tablets daily for 4 days, then 1 tablet daily for 4 days, then 1/2 a tablet daily for 4 days, then stop  
  
 raNITIdine 150 mg tablet Commonly known as:  ZANTAC Take 150 mg by mouth daily (with lunch). sertraline 50 mg tablet Commonly known as:  ZOLOFT Take 1.5 Tabs by mouth daily. suvorexant 10 mg tablet Commonly known as:  Diana Brantleyard Take 1 Tab by mouth nightly as needed for Insomnia. Max Daily Amount: 10 mg. tiotropium bromide 2.5 mcg/actuation inhaler Commonly known as:  [place] Jayme Take 2 Puffs by inhalation daily. traZODone 50 mg tablet Commonly known as:  [facility] Take 2 Tabs by mouth nightly. XOPENEX HFA 45 mcg/actuation inhaler Generic drug:  levalbuterol tartrate USE 2 INHALATIONS BY MOUTH EVERY 4 HOURS AS NEEDED FOR WHEEZING Follow-up Instructions Return in about 3 months (around 7/7/2017). To-Do List   
 04/10/2017 To Be Determined Appointment with Aaron Almazan PT at [address] [place] [address]  
  
 04/12/2017 To Be Determined Appointment with Rocio Riddle LPN at [address] [place] [address]  
  
 04/19/2017 To Be Determined Appointment with Rocio Riddle LPN at [address] [place] [address]  
  
 04/26/2017 To Be Determined Appointment with Maikel Prieto RN at [address] [place] [address] Patient Instructions Breathing Techniques for COPD: Care Instructions Your Care Instructions Breathing is hard when you have chronic obstructive pulmonary disease (COPD). You may take quick, short breaths. Breathing this way makes it harder to get air into your lungs. Learning new ways to control your breathing may help. You may feel better and be able to do more. You can try three basic ways to control your breathing. They are pursed-lip breathing, diaphragmatic breathing, and breathing while bending. Use these methods when you are more short of breath than normal. Practice them often so you can do them well. Follow-up care is a key part of your treatment and safety. Be sure to make and go to all appointments, and call your doctor if you are having problems. It's also a good idea to know your test results and keep a list of the medicines you take. How can you care for yourself at home?  
· Pursed-lip breathing helps you breathe more air out so that your next breath can be deeper. For this type of breathing, you breathe in through your nose and out through your mouth while almost closing your lips. Breathe in for about 2 seconds, and breathe out for 4 to 6 seconds. Pursed-lip breathing decreases shortness of breath and improves your ability to exercise. · Diaphragmatic breathing helps your lungs expand so that they take in more air. ¨ Lie on your back, or prop yourself up on several pillows. ¨ Put one hand on your belly and the other on your chest. When you breathe in, push your belly out as far as possible. You should feel the hand on your belly move out, while the hand on your chest does not move. ¨ When you breathe out, you should feel the hand on your belly move in. When you can do this type of breathing well while lying down, learn to do it while sitting or standing. Many people with COPD find this breathing method helpful. ¨ Practice diaphragmatic breathing for 20 minutes, 2 or 3 times a day. · Breathing while bending forward at the waist may make breathing easier. It can reduce shortness of breath while you exercise or rest. It helps the diaphragm move more easily. The diaphragm is a large muscle that separates your lungs from your belly. It helps draw air into your lungs as you breathe. · Do not smoke. Smoking makes COPD worse. If you need help quitting, talk to your doctor about stop-smoking programs and medicines. These can increase your chances of quitting for good. When should you call for help? Call your doctor now or seek immediate medical care if: 
· Your breathing methods do not help. · Your shortness of breath gets worse. · You cough up blood. · You have swelling in your belly and legs. · You have severe chest pain. Watch closely for changes in your health, and be sure to contact your doctor if you have any problems. Where can you learn more? Go to http://garfield-pamela.info/. Enter P479 in the search box to learn more about \"Breathing Techniques for COPD: Care Instructions. \" Current as of: May 23, 2016 Content Version: 11.2 © 2599-3874 [employer]. Care instructions adapted under license by [employer] (which disclaims liability or warranty for this information). If you have questions about a medical condition or this instruction, always ask your healthcare professional. [address] any warranty or liability for your use of this information. Resume your zithromax next Wednesday. Ask dr Kreg Phalen about stopping the bystolic [facility]! Dear Juan Arredondo: Thank you for requesting a Skytree account. Our records indicate that you already have an active Skytree account. You can access your account anytime at [email or web address]"[email or web address]. [employer]ox/"[employer] Did you know that you can access your hospital and ER discharge instructions at any time in Skytree? You can also review all of your test results from your hospital stay or ER visit. Additional Information If you have questions, please visit the Frequently Asked Questions section of the Skytree website at [email or web address]"[email or web address]. [employer]/"[employer]/. Remember, Skytree is NOT to be used for urgent needs. For medical emergencies, dial 911. Now available from your iPhone and Android! Please provide this summary of care documentation to your next provider. Your primary care clinician is listed as Neetu Smith If you have any questions after today's visit, please call [phone].

## 2017-04-07 NOTE — PROGRESS NOTES
Damon Aquino is a 66 y.o. male who presents for evaluation of transition of care. Was inpt [facility] from march 28-31 with a/c hypoxic resp failure, copd exac, bronchiectasis flare. Also had chest pain and urinary retention (thought due to excessive use of xopenex). Had negative stress test, and needed english catheter for few days, but urine output is back to normal now without any issues. Breathing also improving, not quite back to his baseline yet, but approaching. ROS:  Constitutional: negative for fevers, chills, anorexia and weight loss  Eyes:   negative for visual disturbance and irritation  ENT:   negative for tinnitus,sore throat,nasal congestion,ear pain,hoarseness  Respiratory:  negative for cough, hemoptysis, dyspnea,wheezing  CV:   negative for chest pain, palpitations, lower extremity edema  GI:   negative for nausea, vomiting, diarrhea, abdominal pain,melena  Genitourinary: negative for frequency, dysuria and hematuria  Musculoskel: negative for myalgias, arthralgias, back pain, muscle weakness, joint pain  Neurological:  negative for headaches, dizziness, focal weakness, numbness  Psychiatric:     Negative for depression or anxiety      Past Medical History:   Diagnosis Date    Chronic obstructive pulmonary disease ([address]     Diastolic CHF, chronic ([address] 11/4/2015    Emphysema/COPD ([address] 9/20/2009    Essential hypertension, benign 9/20/2009    GERD (gastroesophageal reflux disease) 9/20/2009    Hypertension     Migraine headache 9/20/2009    Post-tuberculous bronchiectasis 10/17/2013    Thyroid disease     hypothyroidism       History reviewed. No pertinent surgical history. Family History   Problem Relation Age of Onset    Heart Disease Mother     Stroke Mother     Cancer Father      throat       Social History     Social History    Marital status:      Spouse name: N/A    Number of children: N/A    Years of education: N/A     Occupational History    Not on file. Social History Main Topics    Smoking status: Former Smoker     Packs/day: 1.50     Years: 25.00     Types: Cigarettes     Quit date: 4/26/1979    Smokeless tobacco: Never Used    Alcohol use No    Drug use: No    Sexual activity: Not Currently     Other Topics Concern    Not on file     Social History Narrative            Visit Vitals    BP (!) 94/39 (BP 1 Location: Left arm, BP Patient Position: Sitting)    Pulse 70    Temp 97.8 °F (36.6 °C) (Oral)    Resp 18    Ht 5' 10.98\" (1.803 m)    Wt 138 lb (62.6 kg)    SpO2 97%    BMI 19.26 kg/m2       Physical Examination:   General - frail appearing male  HEENT - PERRL, TM no erythema/opacification, normal nasal turbinates, no oropharyngeal erythema or exudate, MMM  Neck - supple, no bruits, no thyroidomegaly, no lymphadenopathy  Pulm - mild rhonchi bilaterally  Cardio - RRR, normal S1 S2, no murmur  Abd - soft, nontender, no masses, no HSM  Extrem - no edema, +2 distal pulses  Neuro-  No focal deficits, CN intact     Assessment/Plan:    1. A/c hypoxic resp failure from copd--back to his baseline 4 1/2 liters n/c. Will finish his prednisone and levaquin on Monday. Continue with symbicort, spiriva and prn xopenex. Also uses katherin ac daily. 2.  Hypothyroid--on synthroid  3. Hx htn--on lisinopril, bystolic  4. Anxiety and depression--zoloft, trazodone, ativan  5  Chronic insomnia--trying belsomra, discussed increasing dose to max 20 mg  6. Chronic constipation--miralax daily  7. Urinary retention--required english briefly, but now out and voiding without difficulty. ? Due to xopenex  8. Chest pain--negative stress test, will follow up with dr Delmi Valdovinos next week. Also sees cardio at [facility].  9.  dnr code status      Has appt for 3 weeks which he will keep for now. If doing remarkably well at that time, can cancel and follow up in 3 months time.         Ngo Mark III, DO

## 2017-04-07 NOTE — PROGRESS NOTES
This note will not be viewable in [address] NNTOCIP Post Hospitalization - admitted to [address] [place] [address] on 3/28/17 and discharged on 3/31/17 with diagnosis of Acute exacerbation of chronic bronchiectasis causing sepsis, substernal chest pain of unclear origin (not cardiac), Chronic respiratory failure on 4.5 L NC O2 at baseline with acute on chronic.      - Patient attended [facility] appointment with Dr. French Hatfield on today 4/7/17; was advised to follow-up in 3 weeks. Pt stated doing okay. Future Appointments:  4/10/2017 To Be Determined Edwardo Coy, MARGARITO BON [address] [facility] [facility] [address]   4/11/2017 2:45 PM Delonte Qureshi MD [facility]   4/12/2017 To Be Determined Alla Ponce LPN [facility] [address] [place] [address]   4/19/2017 To Be Determined Alla Ponce LPN [facility] [address]   4/26/2017 To Be Determined Destini Colbert RN [facility] [address] [facility] [facility] [address]   4/28/2017 2:45 PM Genetta Fruits, [address]       Goals      Prevent complications post hospitalization. 4/3/17: NN initial HERB call completed; HERB [id number] scheduled. Patient compliance with Levaquin reported; [facility]english removal[facility] and Pulmonology scheduled. English Catheter Care reviewed.  Patient to be followed by [facility].    4/7/17- pt completed herb appt with Dr. French Hatfield today; appt with Dr. Luis Correa 4/11/17; appt with Dr. Los Goodrich 4/14/17; saw urology 4/5/17; home health and OT is on hold until he sees all the specialists; will take Miralax this evening for constipation as hasn't had bm since Sunday after taking Miralax; oxygen in office today is set at 6 LPM, but is at 4.5 LPM at home; having difficulty sleeping again and will discuss with Dr. French Hatfield during ov today; Robitussin is keeping the sputum coming up; chest pain is gone; has ACP on file.  DW

## 2017-04-12 NOTE — TELEPHONE ENCOUNTER
From: Radha Padilla  To: Ena Cardona MD  Sent: 4/12/2017 9:11 AM EDT  Subject: Medication Renewal Request    Original authorizing provider: MD Radha Ho would like a refill of the following medications:  sertraline (ZOLOFT) 50 mg tablet Ena Cardona MD]    Preferred pharmacy: [address] [place] [address] [address]    Comment:  AZITHROMYCIN TAB 250MG FUROSEMIDE TAB 20MMG

## 2017-04-12 NOTE — TELEPHONE ENCOUNTER
Pt requesting a refill on Ferrous Sulfate 325 mg Please to [employer] [place] at [phone].  Pt's can be reached at [phone]         Message received & copied from [facility]

## 2017-04-14 NOTE — TELEPHONE ENCOUNTER
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 Would you please call in a new prescription for a 20 mg. tablet. to [facility] in [place] - phone number   [phone]. Since he is taking two at a time of the 10 mg. he has enough to last him for 6 days. Thank you.     Windy Palmer  1945  [phone] (home)

## 2017-04-14 NOTE — TELEPHONE ENCOUNTER
Patient states he is now taking 2 tabs qhs. Med pending in orders. Requested Prescriptions     Pending Prescriptions Disp Refills    suvorexant (BELSOMRA) 10 mg tablet 30 Tab 4     Sig: Take 2 Tabs by mouth nightly as needed for Insomnia. Max Daily Amount: 20 mg.

## 2017-04-17 PROBLEM — J43.9 PULMONARY EMPHYSEMA (HCC): Status: ACTIVE | Noted: 2017-01-01

## 2017-04-17 PROBLEM — I48.91 A-FIB (HCC): Status: ACTIVE | Noted: 2017-01-01

## 2017-04-17 PROBLEM — N17.9 ARF (ACUTE RENAL FAILURE) (HCC): Status: ACTIVE | Noted: 2017-01-01

## 2017-04-17 NOTE — ED TRIAGE NOTES
Triage: Patient presents from home. Wife noticed that he has been retaining fluid in his ankles and his weight has gone up. 4lb weight gain since Friday. Per wife has had 60mg of lasix today with very little urine output.

## 2017-04-17 NOTE — IP AVS SNAPSHOT
[address] [facility] 
[phone] Patient: Rajni Perez MRN: [id number] MSF:8[birth year]3/[id number] You are allergic to the following Allergen Reactions Cardizem (Diltiazem Hcl) Other (comments) Headache and constipation Recent Documentation Height Weight BMI Smoking Status 1.778 m 61.3 kg 19.4 kg/m2 Former Smoker Emergency Contacts Name Discharge Info Relation Home Work Mobile [address] CAREGIVER [3] Spouse [3] [phone] Janusz,([employer]  Other Relative [6]  [phone] Melva Hagen  Spouse [3] [phone] About your hospitalization You were admitted on:  April 17, 2017 You last received care in the:  \[facility]\"" 2 PROGRESSIVE CARE You were discharged on:  April 22, 2017 Unit phone number:  [phone] Why you were hospitalized Your primary diagnosis was:  Not on File Your diagnoses also included:  A-Fib (Hcc), Copd Exacerbation (Hcc), Arf (Acute Renal Failure) (Hcc) Providers Seen During Your Hospitalizations Provider Role Specialty Primary office phone Stephanie Smith MD Attending Provider Emergency Medicine [phone] Eugenie Medina MD Attending Provider Internal Medicine [phone] Your Primary Care Physician (PCP) Primary Care Physician Office Phone Office Fax Shan Colbert MAHENDRA [phone] Follow-up Information Follow up With Details Comments Contact Info [address]  S[facility]  This is your Pulmonary Rehab Provider. [facility] [address] [place] [address] 18 [phone] Addis Johnson DO In 2 weeks  [address] [facility] [address] [facility] 
[phone] 91 Angeline Lord MD In 1 week for follow up on your pacemaker [address] [facility] 
[phone] Ranjana Valentin MD  as already scheduled in May [address] [facility] [address] [facility] 
[phone] [facility] In 7 days for follow up on your catheter [facility] [address] [address] Your Appointments Wednesday April 26, 2017 To Be Determined SN REASSESSMENT with ROBERT Smallwood [address] ([address]) [address] ([address]) Friday April 28, 2017  2:45 PM EDT ROUTINE CARE with Leavy Carrel III, DO [facility] [address] [facility]  
[phone] Current Discharge Medication List  
  
START taking these medications Dose & Instructions Dispensing Information Comments Morning Noon Evening Bedtime  
 apixaban 5 mg tablet Commonly known as:  Venida Stacy Your last dose was: Your next dose is:    
   
   
 Dose:  5 mg Take 1 Tab by mouth two (2) times a day for 30 days. Quantity:  60 Tab Refills:  0  
     
   
   
   
  
 dilTIAZem  mg ER capsule Commonly known as:  CARDIZEM CD Your last dose was: Your next dose is:    
   
   
 Dose:  180 mg Take 1 Cap by mouth daily for 30 days. Quantity:  30 Cap Refills:  0  
     
   
   
   
  
 docusate sodium 100 mg capsule Commonly known as:  Evalee Dinning Your last dose was: Your next dose is:    
   
   
 Dose:  100 mg Take 1 Cap by mouth two (2) times a day for 90 days. Quantity:  180 Cap Refills:  0  
     
   
   
   
  
 nystatin 100,000 unit/mL suspension Commonly known as:  MYCOSTATIN Your last dose was: Your next dose is:    
   
   
 Dose:  777904 Units Take 5 mL by mouth two (2) times a day for 30 days. swish and spit Quantity:  300 mL Refills:  0  
     
   
   
   
  
 oxyCODONE-acetaminophen 5-325 mg per tablet Commonly known as:  PERCOCET Your last dose was: Your next dose is:    
   
   
 Dose:  1 Tab Take 1 Tab by mouth every eight (8) hours as needed for Pain (from pacemaker incision). Max Daily Amount: 3 Tabs. Quantity:  10 Tab Refills:  0  
     
   
   
   
  
 sorbitol 70 % solution Your last dose was: Your next dose is:    
   
   
 Dose:  30 mL Take 30 mL by mouth daily as needed for up to 30 days. For constipation Quantity:  454 mL Refills:  0 CONTINUE these medications which have CHANGED Dose & Instructions Dispensing Information Comments Morning Noon Evening Bedtime * levalbuterol 1.25 mg/3 mL Nebu Commonly known as:  Sloane Herrmann What changed:   
- when to take this 
- reasons to take this Your last dose was: Your next dose is:    
   
   
 Dose:  1.25 mg  
3 mL by Nebulization route two (2) times a day for 30 days. Quantity:  180 mL Refills:  0  
     
   
   
   
  
 * levalbuterol 1.25 mg/3 mL Nebu Commonly known as:  Sloane Herrmann What changed: You were already taking a medication with the same name, and this prescription was added. Make sure you understand how and when to take each. Your last dose was: Your next dose is:    
   
   
 Dose:  1.25 mg  
3 mL by Nebulization route every four (4) hours as needed. For shortness of breath Quantity:  30 Nebule Refills:  0  
     
   
   
   
  
 potassium chloride SR 10 mEq tablet Commonly known as:  KLOR-CON 10 What changed:   
- how much to take - when to take this 
- additional instructions Your last dose was: Your next dose is:    
   
   
 Dose:  10 mEq Take 1 Tab by mouth two (2) times daily (with meals).  Take 2nd dose when you take an extra Furosemide. Indications: HYPOKALEMIA Quantity:  180 Tab Refills:  1  
     
   
   
   
  
 predniSONE 10 mg tablet Commonly known as:  Khadra Rubioriki What changed:   
- medication strength 
- additional instructions Your last dose was: Your next dose is: Take 3 tabs by mouth daily for 8 days, then 2 tabs by mouth daily for 7 days, then 1 tab by mouth daily for 7 days Quantity:  45 Tab Refills:  0  
     
   
   
   
  
 suvorexant 10 mg tablet Commonly known as:  Maria Del Carmen Ortega What changed:   
- when to take this 
- reasons to take this Your last dose was: Your next dose is:    
   
   
 Dose:  20 mg Take 2 Tabs by mouth nightly. Max Daily Amount: 20 mg.  
 Quantity:  20 Tab Refills:  0  
     
   
   
   
  
 * Notice: This list has 2 medication(s) that are the same as other medications prescribed for you. Read the directions carefully, and ask your doctor or other care provider to review them with you. CONTINUE these medications which have NOT CHANGED Dose & Instructions Dispensing Information Comments Morning Noon Evening Bedtime  
 aspirin delayed-release 81 mg tablet Your last dose was: Your next dose is:    
   
   
 Dose:  81 mg Take 81 mg by mouth daily. Refills:  0  
     
   
   
   
  
 azithromycin 250 mg tablet Commonly known as:  Saulo Andersen Your last dose was: Your next dose is:    
   
   
 Dose:  250 mg Take 1 Tab by mouth every Monday, Wednesday, Friday. Quantity:  39 Tab Refills:  1  
     
   
   
   
  
 budesonide-formoterol 160-4.5 mcg/actuation HFA inhaler Commonly known as:  SYMBICORT Your last dose was: Your next dose is:    
   
   
 Dose:  2 Puff Take 2 Puffs by inhalation two (2) times a day. Quantity:  3 Inhaler Refills:  4  
     
   
   
   
  
 ferrous sulfate 325 mg (65 mg iron) tablet Your last dose was: Your next dose is:    
   
   
 Dose:  325 mg Take 1 Tab by mouth daily (with breakfast). Quantity:  30 Tab Refills:  1  
     
   
   
   
  
 furosemide 20 mg tablet Commonly known as:  LASIX Your last dose was: Your next dose is:    
   
   
 Dose:  40 mg Take 2 Tabs by mouth daily. Administration Instructions: Patient is to take furosemide 20 mg tab, two tablets by month once daily. Patient is to weight himself daily and if there is a weight gain >2 lb, he is to take an additional 20 mg tablet for a maximum daily dose of 60 mg. Indications: Edema Quantity:  60 Tab Refills:  6  
     
   
   
   
  
 guaiFENesin-codeine 100-10 mg/5 mL solution Commonly known as:  ROBITUSSIN AC Your last dose was: Your next dose is:    
   
   
 Dose:  5 mL Take 5 mL by mouth two (2) times daily as needed for Cough. Max Daily Amount: 10 mL. Quantity:  463 mL Refills:  0 IMITREX PO Your last dose was: Your next dose is: Take  by mouth as needed (migraine). Refills:  0  
     
   
   
   
  
 ipratropium 0.02 % nebulizer solution Commonly known as:  ATROVENT Your last dose was: Your next dose is:    
   
   
 Dose:  0.5 mg  
2.5 mL by Nebulization route two (2) times a day. Quantity:  7.5 mL Refills:  3 Dispense 300 vials. L. gasseri-B. bifidum-B longum 1.5 billion cell Cap Your last dose was: Your next dose is:    
   
   
 Dose:  2 Cap Take 2 Caps by mouth daily. Refills:  0  
     
   
   
   
  
 levothyroxine 50 mcg tablet Commonly known as:  SYNTHROID Your last dose was: Your next dose is: TAKE 1 TABLET DAILY BEFORE BREAKFAST Quantity:  90 Tab Refills:  3 LORazepam 1 mg tablet Commonly known as:  ATIVAN Your last dose was: Your next dose is: TAKE 1/2 TO 1 BY MOUTH UP TO 3 TIMES DAILY AS NEEDED FOR ANXIETY Refills:  0  
     
   
   
   
  
 melatonin Tab tablet Your last dose was: Your next dose is:    
   
   
 Dose:  10 mg Take 10 mg by mouth nightly. Refills:  0 MIRALAX 17 gram packet Generic drug:  polyethylene glycol Your last dose was: Your next dose is:    
   
   
 Dose:  17 g Take 17 g by mouth daily as needed. Refills:  0 OXYGEN-AIR DELIVERY SYSTEMS Your last dose was: Your next dose is:    
   
   
 Dose:  4.5 L/min 4.5 L/min by Nasal route continuous. Refills:  0  
     
   
   
   
  
 raNITIdine 150 mg tablet Commonly known as:  ZANTAC Your last dose was: Your next dose is:    
   
   
 Dose:  150 mg Take 150 mg by mouth daily (with lunch). Refills:  0  
     
   
   
   
  
 sertraline 50 mg tablet Commonly known as:  ZOLOFT Your last dose was: Your next dose is:    
   
   
 Dose:  75 mg Take 1.5 Tabs by mouth daily. Quantity:  135 Tab Refills:  3  
     
   
   
   
  
 tiotropium bromide 2.5 mcg/actuation inhaler Commonly known as:  Marj Leaks Your last dose was: Your next dose is:    
   
   
 Dose:  2 Puff Take 2 Puffs by inhalation daily. Quantity:  1 Inhaler Refills:  1  
     
   
   
   
  
 traZODone 50 mg tablet Commonly known as:  Adalgisa Moh Your last dose was: Your next dose is:    
   
   
 Dose:  100 mg Take 2 Tabs by mouth nightly. Quantity:  180 Tab Refills:  3 XOPENEX HFA 45 mcg/actuation inhaler Generic drug:  levalbuterol tartrate Your last dose was: Your next dose is:    
   
   
 USE 2 INHALATIONS BY MOUTH EVERY 4 HOURS AS NEEDED FOR WHEEZING Quantity:  45 Inhaler Refills:  6 STOP taking these medications diphenhydrAMINE 25 mg capsule Commonly known as:  BENADRYL  
   
  
 lisinopril 20 mg tablet Commonly known as:  PRINIVIL, ZESTRIL  
   
  
 nebivolol 5 mg tablet Commonly known as:  BYSTOLIC Where to Get Your Medications Information on where to get these meds will be given to you by the nurse or doctor. ! Ask your nurse or doctor about these medications  
  apixaban 5 mg tablet  
 dilTIAZem  mg ER capsule  
 docusate sodium 100 mg capsule  
 guaiFENesin-codeine 100-10 mg/5 mL solution  
 levalbuterol 1.25 mg/3 mL Nebu  
 levalbuterol 1.25 mg/3 mL Nebu  
 nystatin 100,000 unit/mL suspension  
 oxyCODONE-acetaminophen 5-325 mg per tablet  
 predniSONE 10 mg tablet  
 sorbitol 70 % solution  
 suvorexant 10 mg tablet Discharge Instructions HOSPITALIST DISCHARGE INSTRUCTIONS 
 
NAME: Katelyn Bro :  1945 MRN:  [id number] Date/Time:  2017 10:55 AM 
 
ADMIT DATE: 2017 DISCHARGE DATE: 2017 Attending Physician: Liv Sandoval MD 
 
DISCHARGE DIAGNOSIS: 
New onset atrial fibrillation Chronic hypoxic respiratory failure due to chronic bronchiectasis, chronic COPD and pulmonary fibrosis in setting of remote pulmonary TB Acute kidney injury (resolved) in setting of acute recurrent urinary retention Acute transaminitis, resolved with chronic Hepatitis C Oral thrush CAD s/p MI POA Hypothyroidism Medications: Per above medication reconciliation. Pain Management: per above medications Recommended diet: Cardiac diet and ensure supplements TID with meals Recommended activity: See surgical instructions below for precautions related to new pacemaker. Fall precautions. Wound care: None Indwelling devices: English catheter, chronic with care per routine. Do not remove english catheter. Pt will see [facility] in ~1 week for follow up. Supplemental Oxygen: Chronic Oxygen,  4.5  LNC at baseline Required Lab work: None Glucose management:  None Code status: DNR Outside physician follow up: Follow-up Information Follow up With Details Comments Contact Info [address] [facility]  This is your Pulmonary Rehab Provider. [address] [place] [address] [phone] Jaun Lopez, DO In 2 weeks  [address] [facility] [address] [facility] 
[phone] Jovanna Guzman MD In 1 week for follow up on your pacemaker [address] [facility] 
[phone] Kelly Arboleda MD  as already scheduled in May [address] [facility] [address] [facility] 
[phone] [facility] In 7 days for follow up on your catheter [address] [address] Skilled nursing facility/ SNF MD responsible for above on discharge. Information obtained by : 
I understand that if any problems occur once I am at home I am to contact my physician. I understand and acknowledge receipt of the instructions indicated above. Physician's or R.N.'s Signature                                                                  Date/Time Patient or Representative [address] [address]  [phone] ICD/PACEMAKER DISCHARGE INSTRUCTIONS Patient ID: 
Janneth Myers [id number] 
89 y.o. 
1945 Admit Date: 4/17/2017 Discharge Date: 4/21/2017 Admitting Physician: Skyler Jurado MD  
 
Discharge Physician: Jovanna Guzman MD 
 
Admission Diagnoses:  
A-fib [facility]) Discharge Diagnoses: Active Problems: COPD exacerbation ([facility] (12/5/2016) A-fib ([facility] (4/17/2017) [facility] (4/17/2017) Discharge Condition: Good Cardiology Procedures this Admission:  av node ablation and pacemaker Disposition: home Reference discharge instructions provided by nursing for diet and activity. Follow-up with Dr. Darrell Darling in 2 weeks. Please contact the office for an appointment at [phone]. Signed: 
Carlos Gamble MD 
4/21/2017 
1:03 PM 
 
DISCHARGE INSTRUCTIONS FOR PATIENTS WITH ICD'S AND PACEMAKERS 1. Carry you ID card for your ICD/Pacemaker with you at all times. This card will be given to you in the hospital or mailed to you. 2. Medic Alert Bracelets are available from your pharmacist to wear at all times. 3. Call for an appointment in 2 weeks [phone]. 4. The pacemaker will bulge slightly under your skin. An ICD bulges a little more because it is larger. The bulge will decrease in size over the next few weeks. Please notify the doctor's office if you notice any of the following around your ICD site: A.  A bruise that does not go away B. Soreness or yellow, green, or brown drainage from the site. C. Any swelling from the site. D. If you have a fever of 100 degrees or higher that lasts for a few days INCISION CARE 1.  Leave dressing over your site until you see the doctor. 2.  Leave steri-strips over your site until they start to fall off.  
3.   You may shower after as long as your incision isnt submerged or directly sprayed upon until well healed. 4.  For comfort, wear loose fitting clothing. 5.  Ice pack to affected shoulder for first 24 hours, wear your sling for 2 days. 6.  Report any signs of infection, fever, pain, swelling, redness, oozing, or heat at site especially if these symptoms increase after the first 3 to 4 days. ACTIVITY PRECAUTIONS 1. Avoid rough contact with the implant site. 2. No driving for 14 days. 3. Avoid lifting your arm over your head, carrying anything on the affected side, or lifting over 10 pounds for 30 days. For the first 2 days only bend your arm at the elbow. 4. Any extreme activity such as golf, weight lifting or exercise biking should be restricted for 60 days. 5. Do not carry objects by holding them against your implant site. 6.  No shooting rifles or any type of gun with the affected shoulder permanently. 7.  If you have an ICD, welding and chainsaws are prohibited. SPECIAL PRECAUTIONS 1. You should avoid all strong magnetic fields, such as arc welding, large transformers, large motors. Some ICD devices will beep if it detects a strong magnet. If this occurs, move out of the area. 2.  You may not have an MRI. 3.  Treatments or surgery that requires diathermy or electrocautery should be discussed with your doctor before scheduled. 4. Avoid radio frequency transmitters, including radar. 5. Advise dentist or other medical personnel you see that you have a pacemaker or ICD. 6.  Cell phones and microwave oven use is okay. 7.  If you plan to move or take a trip to a new area, the doctor's office will give you a name of a doctor to contact for any problems. SPECIAL INSTRUCTIONS ON SHOCKS 1. Notify your doctor for any of the following: A. Anytime a shock is received in a 24 hour period. An office visit is not usually required for a single shock. B.  Two or more shocks in a row. If you do not feel well, call the [facility], otherwise call your doctor. This may require an office visit. C. Two or more shocks spaced apart by several hours. This may require an office visit. 2.  Keep a record of events. Include date, time, symptoms and activity at that time. ANTIBIOTIC THERAPY During the first 8 weeks after your pacemaker or ICD insertion, you may need antibiotics before any dental work or certain tests or operations. Let the dentist or doctor who is caring for you know that you have had an implanted device. Discharge Orders None [employer] Announcement We are excited to announce that we are making your provider's discharge notes available to you in [employer] You will see these notes when they are completed and signed by the physician that discharged you from your recent hospital stay. If you have any questions or concerns about any information you see in [employer] please call the Health Information Department where you were seen or reach out to your Primary Care Provider for more information about your plan of care. Introducing [facility]! Dear Janet Aguilar: Thank you for requesting a [employer] account. Our records indicate that you already have an active [employer] account. You can access your account anytime at [email or web address] Did you know that you can access your hospital and ER discharge instructions at any time in [employer] You can also review all of your test results from your hospital stay or ER visit. Additional Information If you have questions, please visit the Frequently Asked Questions section of the [employer] website at [email or web address] Remember, [employer] is NOT to be used for urgent needs. For medical emergencies, dial 911. Now available from your iPhone and Android! General Information Please provide this summary of care documentation to your next provider. Patient Signature:  ____________________________________________________________ Date:  ____________________________________________________________  
  
Maryam Graham Provider Signature:  ____________________________________________________________ Date:  ____________________________________________________________

## 2017-04-17 NOTE — IP AVS SNAPSHOT
Current Discharge Medication List  
  
START taking these medications Dose & Instructions Dispensing Information Comments Morning Noon Evening Bedtime  
 apixaban 5 mg tablet Commonly known as:  Tye Gregorio Your last dose was: Your next dose is:    
   
   
 Dose:  5 mg Take 1 Tab by mouth two (2) times a day for 30 days. Quantity:  60 Tab Refills:  0  
     
   
   
   
  
 dilTIAZem  mg ER capsule Commonly known as:  CARDIZEM CD Your last dose was: Your next dose is:    
   
   
 Dose:  180 mg Take 1 Cap by mouth daily for 30 days. Quantity:  30 Cap Refills:  0  
     
   
   
   
  
 docusate sodium 100 mg capsule Commonly known as:  Everlina Simpler Your last dose was: Your next dose is:    
   
   
 Dose:  100 mg Take 1 Cap by mouth two (2) times a day for 90 days. Quantity:  180 Cap Refills:  0  
     
   
   
   
  
 nystatin 100,000 unit/mL suspension Commonly known as:  MYCOSTATIN Your last dose was: Your next dose is:    
   
   
 Dose:  987002 Units Take 5 mL by mouth two (2) times a day for 30 days. swish and spit Quantity:  300 mL Refills:  0  
     
   
   
   
  
 oxyCODONE-acetaminophen 5-325 mg per tablet Commonly known as:  PERCOCET Your last dose was: Your next dose is:    
   
   
 Dose:  1 Tab Take 1 Tab by mouth every eight (8) hours as needed for Pain (from pacemaker incision). Max Daily Amount: 3 Tabs. Quantity:  10 Tab Refills:  0  
     
   
   
   
  
 sorbitol 70 % solution Your last dose was: Your next dose is:    
   
   
 Dose:  30 mL Take 30 mL by mouth daily as needed for up to 30 days. For constipation Quantity:  454 mL Refills:  0 CONTINUE these medications which have CHANGED Dose & Instructions Dispensing Information Comments Morning Noon Evening Bedtime * levalbuterol 1.25 mg/3 mL Nebu Commonly known as:  Annita Samuel What changed:   
- when to take this 
- reasons to take this Your last dose was: Your next dose is:    
   
   
 Dose:  1.25 mg  
3 mL by Nebulization route two (2) times a day for 30 days. Quantity:  180 mL Refills:  0  
     
   
   
   
  
 * levalbuterol 1.25 mg/3 mL Nebu Commonly known as:  Annita Samuel What changed: You were already taking a medication with the same name, and this prescription was added. Make sure you understand how and when to take each. Your last dose was: Your next dose is:    
   
   
 Dose:  1.25 mg  
3 mL by Nebulization route every four (4) hours as needed. For shortness of breath Quantity:  30 Nebule Refills:  0  
     
   
   
   
  
 potassium chloride SR 10 mEq tablet Commonly known as:  KLOR-CON 10 What changed:   
- how much to take - when to take this 
- additional instructions Your last dose was: Your next dose is:    
   
   
 Dose:  10 mEq Take 1 Tab by mouth two (2) times daily (with meals). Take 2nd dose when you take an extra Furosemide. Indications: HYPOKALEMIA Quantity:  180 Tab Refills:  1  
     
   
   
   
  
 predniSONE 10 mg tablet Commonly known as:  Adilson Rendon What changed:   
- medication strength 
- additional instructions Your last dose was: Your next dose is: Take 3 tabs by mouth daily for 8 days, then 2 tabs by mouth daily for 7 days, then 1 tab by mouth daily for 7 days Quantity:  45 Tab Refills:  0  
     
   
   
   
  
 suvorexant 10 mg tablet Commonly known as:  Lula Plata What changed:   
- when to take this 
- reasons to take this Your last dose was: Your next dose is:    
   
   
 Dose:  20 mg Take 2 Tabs by mouth nightly. Max Daily Amount: 20 mg.  
 Quantity:  20 Tab Refills:  0  
     
   
   
   
  
 * Notice:   This list has 2 medication(s) that are the same as other medications prescribed for you. Read the directions carefully, and ask your doctor or other care provider to review them with you. CONTINUE these medications which have NOT CHANGED Dose & Instructions Dispensing Information Comments Morning Noon Evening Bedtime  
 aspirin delayed-release 81 mg tablet Your last dose was: Your next dose is:    
   
   
 Dose:  81 mg Take 81 mg by mouth daily. Refills:  0  
     
   
   
   
  
 azithromycin 250 mg tablet Commonly known as:  Armen Colindres Your last dose was: Your next dose is:    
   
   
 Dose:  250 mg Take 1 Tab by mouth every Monday, Wednesday, Friday. Quantity:  39 Tab Refills:  1  
     
   
   
   
  
 budesonide-formoterol 160-4.5 mcg/actuation HFA inhaler Commonly known as:  SYMBICORT Your last dose was: Your next dose is:    
   
   
 Dose:  2 Puff Take 2 Puffs by inhalation two (2) times a day. Quantity:  3 Inhaler Refills:  4  
     
   
   
   
  
 ferrous sulfate 325 mg (65 mg iron) tablet Your last dose was: Your next dose is:    
   
   
 Dose:  325 mg Take 1 Tab by mouth daily (with breakfast). Quantity:  30 Tab Refills:  1  
     
   
   
   
  
 furosemide 20 mg tablet Commonly known as:  LASIX Your last dose was: Your next dose is:    
   
   
 Dose:  40 mg Take 2 Tabs by mouth daily. Administration Instructions: Patient is to take furosemide 20 mg tab, two tablets by month once daily. Patient is to weight himself daily and if there is a weight gain >2 lb, he is to take an additional 20 mg tablet for a maximum daily dose of 60 mg. Indications: Edema Quantity:  60 Tab Refills:  6  
     
   
   
   
  
 guaiFENesin-codeine 100-10 mg/5 mL solution Commonly known as:  ROBITUSSIN AC Your last dose was: Your next dose is:    
   
   
 Dose:  5 mL Take 5 mL by mouth two (2) times daily as needed for Cough.  Max Daily Amount: 10 mL. Quantity:  463 mL Refills:  0 IMITREX PO Your last dose was: Your next dose is: Take  by mouth as needed (migraine). Refills:  0  
     
   
   
   
  
 ipratropium 0.02 % nebulizer solution Commonly known as:  ATROVENT Your last dose was: Your next dose is:    
   
   
 Dose:  0.5 mg  
2.5 mL by Nebulization route two (2) times a day. Quantity:  7.5 mL Refills:  3 Dispense 300 vials. L. gasseri-B. bifidum-B longum 1.5 billion cell Cap Your last dose was: Your next dose is:    
   
   
 Dose:  2 Cap Take 2 Caps by mouth daily. Refills:  0  
     
   
   
   
  
 levothyroxine 50 mcg tablet Commonly known as:  SYNTHROID Your last dose was: Your next dose is: TAKE 1 TABLET DAILY BEFORE BREAKFAST Quantity:  90 Tab Refills:  3 LORazepam 1 mg tablet Commonly known as:  ATIVAN Your last dose was: Your next dose is: TAKE 1/2 TO 1 BY MOUTH UP TO 3 TIMES DAILY AS NEEDED FOR ANXIETY Refills:  0  
     
   
   
   
  
 melatonin Tab tablet Your last dose was: Your next dose is:    
   
   
 Dose:  10 mg Take 10 mg by mouth nightly. Refills:  0 MIRALAX 17 gram packet Generic drug:  polyethylene glycol Your last dose was: Your next dose is:    
   
   
 Dose:  17 g Take 17 g by mouth daily as needed. Refills:  0 OXYGEN-AIR DELIVERY SYSTEMS Your last dose was: Your next dose is:    
   
   
 Dose:  4.5 L/min 4.5 L/min by Nasal route continuous. Refills:  0  
     
   
   
   
  
 raNITIdine 150 mg tablet Commonly known as:  ZANTAC Your last dose was: Your next dose is:    
   
   
 Dose:  150 mg Take 150 mg by mouth daily (with lunch). Refills:  0 sertraline 50 mg tablet Commonly known as:  ZOLOFT Your last dose was: Your next dose is:    
   
   
 Dose:  75 mg Take 1.5 Tabs by mouth daily. Quantity:  135 Tab Refills:  3  
     
   
   
   
  
 tiotropium bromide 2.5 mcg/actuation inhaler Commonly known as:  Paramjit Blanchard Your last dose was: Your next dose is:    
   
   
 Dose:  2 Puff Take 2 Puffs by inhalation daily. Quantity:  1 Inhaler Refills:  1  
     
   
   
   
  
 traZODone 50 mg tablet Commonly known as:  Patricia Villalobos Your last dose was: Your next dose is:    
   
   
 Dose:  100 mg Take 2 Tabs by mouth nightly. Quantity:  180 Tab Refills:  3 XOPENEX HFA 45 mcg/actuation inhaler Generic drug:  levalbuterol tartrate Your last dose was: Your next dose is:    
   
   
 USE 2 INHALATIONS BY MOUTH EVERY 4 HOURS AS NEEDED FOR WHEEZING Quantity:  45 Inhaler Refills:  6 STOP taking these medications diphenhydrAMINE 25 mg capsule Commonly known as:  BENADRYL  
   
  
 lisinopril 20 mg tablet Commonly known as:  PRINIVIL, ZESTRIL  
   
  
 nebivolol 5 mg tablet Commonly known as:  BYSTOLIC Where to Get Your Medications Information on where to get these meds will be given to you by the nurse or doctor. ! Ask your nurse or doctor about these medications  
  apixaban 5 mg tablet  
 dilTIAZem  mg ER capsule  
 docusate sodium 100 mg capsule  
 guaiFENesin-codeine 100-10 mg/5 mL solution  
 levalbuterol 1.25 mg/3 mL Nebu  
 levalbuterol 1.25 mg/3 mL Nebu  
 nystatin 100,000 unit/mL suspension  
 oxyCODONE-acetaminophen 5-325 mg per tablet  
 predniSONE 10 mg tablet  
 sorbitol 70 % solution  
 suvorexant 10 mg tablet

## 2017-04-17 NOTE — PROGRESS NOTES
TRANSFER - IN REPORT:    Verbal report received from Georgiana(name) on Hood Callahan  being received from ED(unit) for routine progression of care      Report consisted of patients Situation, Background, Assessment and   Recommendations(SBAR). Information from the following report(s) SBAR, ED Summary, [facility] and Cardiac Rhythm afib was reviewed with the receiving nurse. Opportunity for questions and clarification was provided. Assessment completed upon patients arrival to unit and care assumed.      Report immediately provided to Buddy Hernadez RN

## 2017-04-17 NOTE — ED NOTES
Answered call bell, pt request water, Dr. Eldon Lou at bedside to update plan of care, water given wife at bedside

## 2017-04-17 NOTE — CONSULTS
Subjective:                 [address] [place] [address], [place], [address] [facility]  [phone]    Date of  Admission: 4/17/2017  1:13 PM     Admission type:Emergency    Cole Conley is a 66 y.o. male admitted for [facility]). He presented with edema, lethargy and worsening sob. He was given a nebulizer in the ER and then went into AF with rvr. He was started on an amio gtt and we are asked to see him for further recs. He denies cp.  Sob worse than usual.    Patient Active Problem List    Diagnosis Date Noted    [facility]) 04/17/2017    ARF (acute renal failure) ([address] 04/17/2017    Atypical chest pain 03/29/2017    Cardiomyopathy ([address] 03/29/2017    Pulmonary emphysema ([address] 03/28/2017    Primary insomnia 02/28/2017    Essential hypertension 12/05/2016    COPD exacerbation ([address] 12/05/2016    Diarrhea 07/07/2016    DNR no code (do not resuscitate) 05/10/2016    Chronic systolic congestive heart failure ([address] 02/20/2016    Chronic constipation 06/06/2015    Hypothyroidism due to acquired atrophy of thyroid 05/04/2015    Persistent disorder of initiating or maintaining sleep 04/19/2015    Vasomotor rhinitis 02/11/2015    Adjustment disorder with mixed anxiety and depressed mood 08/11/2014    Ischemic heart disease, chronic 08/05/2014    Sepsis ([address] 06/20/2014     Class: Acute    Post-tuberculous bronchiectasis 10/17/2013    Hypoxemic respiratory failure, chronic (HCC) 10/17/2013    Hypokalemia 01/24/2013    Pulmonary emphysema with fibrosis of lung ([address] 09/20/2009    GERD (gastroesophageal reflux disease) 09/20/2009    Migraine headache 09/20/2009    ED (erectile dysfunction) 09/20/2009      Keaton Castellanos III, DO  Past Medical History:   Diagnosis Date    Adjustment disorder with mixed anxiety and depressed mood     Bronchiectasis (HCC)     Chronic obstructive pulmonary disease (HCC)     Diastolic CHF, chronic ([address] 11/4/2015    Essential hypertension, benign 9/20/2009    GERD (gastroesophageal reflux disease) 9/20/2009    MI (myocardial infarction) ([address]     Migraine headache 9/20/2009    Post-tuberculous bronchiectasis 10/17/2013    Pulmonary fibrosis (HCC)     Pulmonary tuberculosis     Thyroid disease     hypothyroidism    Urinary retention       History reviewed. No pertinent surgical history. Allergies   Allergen Reactions    Cardizem [Diltiazem Hcl] Other (comments)     Headache and constipation      Social History   Substance Use Topics    Smoking status: Former Smoker     Packs/day: 1.50     Years: 25.00     Types: Cigarettes     Quit date: 4/26/1979    Smokeless tobacco: Never Used    Alcohol use No     Family History   Problem Relation Age of Onset    Heart Disease Mother     Stroke Mother     Cancer Father      throat      Current Facility-Administered Medications   Medication Dose Route Frequency    amiodarone (CORDARONE) 300 mg in dextrose 5% 250 mL infusion  1 mg/min IntraVENous CONTINUOUS    Followed by   [facility] amiodarone (CORDARONE) 300 mg in dextrose 5% 250 mL infusion  0.5 mg/min IntraVENous CONTINUOUS    [START ON 4/18/2017] aspirin delayed-release tablet 81 mg  81 mg Oral DAILY    azithromycin (ZITHROMAX) tablet 250 mg  250 mg Oral Q MON, WED & FRI    . PHARMACY TO SUBSTITUTE PER PROTOCOL    Per Protocol    [START ON 4/18/2017] ferrous sulfate tablet 325 mg  325 mg Oral DAILY WITH BREAKFAST    furosemide (LASIX) tablet 40 mg  40 mg Oral ACB&D    guaiFENesin-codeine (ROBITUSSIN AC) 100-10 mg/5 mL solution 5 mL  5 mL Oral BID PRN    ipratropium (ATROVENT) 0.02 % nebulizer solution 0.5 mg  0.5 mg Nebulization QID RT    . PHARMACY TO SUBSTITUTE PER PROTOCOL    Per Protocol    [START ON 4/18/2017] levothyroxine (SYNTHROID) tablet 50 mcg  50 mcg Oral 7am    LORazepam (ATIVAN) tablet 1 mg  1 mg Oral Q6H PRN    melatonin tablet 10.5 mg  10.5 mg Oral QHS    [START ON 4/18/2017] polyethylene glycol (MIRALAX) packet 17 g  17 g Oral DAILY    [START ON 4/18/2017] predniSONE (DELTASONE) tablet 30 mg  30 mg Oral DAILY WITH BREAKFAST    . PHARMACY TO SUBSTITUTE PER PROTOCOL    Per Protocol    sertraline (ZOLOFT) tablet 75 mg  75 mg Oral QPM    traZODone (DESYREL) tablet 100 mg  100 mg Oral QHS    sodium chloride (NS) flush 5-10 mL  5-10 mL IntraVENous Q8H    sodium chloride (NS) flush 5-10 mL  5-10 mL IntraVENous PRN    acetaminophen (TYLENOL) tablet 650 mg  650 mg Oral Q4H PRN    ondansetron (ZOFRAN) injection 4 mg  4 mg IntraVENous Q4H PRN    docusate sodium (COLACE) capsule 100 mg  100 mg Oral BID    [START ON 4/18/2017] enoxaparin (LOVENOX) injection 40 mg  40 mg SubCUTAneous DAILY    metoprolol (LOPRESSOR) injection 5 mg  5 mg IntraVENous Q6H    nystatin (MYCOSTATIN) 100,000 unit/mL oral suspension 500,000 Units  500,000 Units Oral QID         Review of Symptoms:  Constitutional: negative  Eyes: negative  Ears, nose, mouth, throat, and face: negative  Respiratory: sob, cough  Cardiovascular: tachy  Gastrointestinal: negative  Genitourinary: negative  Musculoskeletal: negative  Neurological: negative  Behvioral/Psych: negative  Endocrine: negative     Subjective:      Visit Vitals    /87    Pulse (!) 140    Temp 97.6 °F (36.4 °C)    Resp 24    Ht 5' 10\" (1.778 m)    Wt 138 lb (62.6 kg)    SpO2 99%    BMI 19.8 kg/m2       Physical Exam  Abdomen: soft, non-tender.    Extremities: extremities normal  Heart: irr irr, tachy  Lungs: sob  Pulses: 2+ and symmetric    Cardiographics    Telemetry: afib with rvr    ECG: afib with rvr    Labs:  Recent Labs      04/17/17   1359   WBC  12.9*   HGB  10.3*   HCT  35.2*   PLT  354     Recent Labs      04/17/17   1441  04/17/17   1359   NA   --   138   K   --   4.7   CL   --   101   CO2   --   31   GLU   --   127*   BUN   --   35*   CREA   --   1.30   CA   --   8.6   ALB   --   3.0*   TBILI   --   0.4   SGOT   --   91*   ALT   --   117*   INR  1.2*   --        Recent Labs      04/17/17   1359   TROIQ  <0.04 No intake or output data in the 24 hours ending 04/17/17 1901      Assessment:     Assessment:       Active Problems:    COPD exacerbation ([facility] (12/5/2016)      A-fib ([facility] (4/17/2017)      ARF (acute renal failure) ([facility] (4/17/2017)         Plan:     Avis Mena is a pleasant gentleman with end stage COPD, urinary retention with english and ARF who went into AF post nebulizer in the ER. Amio is ok for the short term but not preferable. Most likely, due to his severe COPD, his AF will soon become chronic. Will try a trial of cardizem gtt overnight. Will keep npo p mn in case Dr Lucius Banks wants to do a cardioversion in the am. Most likely, he will end up needing an av node ablation/pacemaker.  Will d/w Dr Lucius Banks tmrw am.     Jero Bro MD, [facility]    4/17/2017    No need for repeat echo - echo on 3/29 demonstrated nl lvef

## 2017-04-17 NOTE — ED PROVIDER NOTES
HPI Comments: Ada Myrick, 66 y.o. male, presents via EMS to ED [facility] ED with cc of acute onset difficulty urinating x 2 days. The patient states that he has not urinated in the past 36 hours. The patient notes a hx of COPD and CHF, and he states that he is prescribed a diuretic. Per spouse, the patient was evaluated by his Pulmonologist recently and was prescribed a Prednisone taper. The pt denies dysuria. He specifically denies any fevers, chills, nausea, vomiting, chest pain, shortness of breath, headache, rash, diarrhea, sweating or weight loss. PCP: Tianna Ochoa DO  Pulmonology: Danny Chapman MD  Cardiology: Delonte Qureshi MD    PMHx significant for: Emphysema/COPD, HTN, GERD, thyroid disease, CHF   Social history significant for: - Tobacco (former), - EtOH, - Illicit Drug Use    There are no other complaints, changes, or physical findings at this time. Written by Shantell Dao ED Scribe, as dictated by Yan Mask. Ama Samaniego MD.      The history is provided by the patient. No  was used. Past Medical History:   Diagnosis Date    Chronic obstructive pulmonary disease ([address]     Diastolic CHF, chronic ([address] 11/4/2015    Emphysema/COPD ([address] 9/20/2009    Essential hypertension, benign 9/20/2009    GERD (gastroesophageal reflux disease) 9/20/2009    Hypertension     MI (myocardial infarction) ([address]     Migraine headache 9/20/2009    Post-tuberculous bronchiectasis 10/17/2013    Thyroid disease     hypothyroidism       History reviewed. No pertinent surgical history. Family History:   Problem Relation Age of Onset    Heart Disease Mother     Stroke Mother     Cancer Father      throat       Social History     Social History    Marital status:      Spouse name: N/A    Number of children: N/A    Years of education: N/A     Occupational History    Not on file.      Social History Main Topics    Smoking status: Former Smoker     Packs/day: 1.50     Years: 25.00     Types: Cigarettes     Quit date: 4/26/1979    Smokeless tobacco: Never Used    Alcohol use No    Drug use: No    Sexual activity: Not Currently     Other Topics Concern    Not on file     Social History Narrative         ALLERGIES: Cardizem [diltiazem hcl]    Review of Systems   Constitutional: Negative. Negative for activity change, appetite change, chills, fatigue, fever and unexpected weight change. HENT: Negative. Negative for congestion, hearing loss, rhinorrhea, sneezing and voice change. Eyes: Negative. Negative for pain and visual disturbance. Respiratory: Negative. Negative for apnea, cough, choking, chest tightness and shortness of breath. Cardiovascular: Negative. Negative for chest pain and palpitations. Gastrointestinal: Negative. Negative for abdominal distention, abdominal pain, blood in stool, diarrhea, nausea and vomiting. Genitourinary: Positive for difficulty urinating. Negative for flank pain, frequency and urgency. No discharge   Musculoskeletal: Negative. Negative for arthralgias, back pain, myalgias and neck stiffness. Skin: Negative. Negative for color change and rash. Neurological: Negative. Negative for dizziness, seizures, syncope, speech difficulty, weakness, numbness and headaches. Hematological: Negative for adenopathy. Psychiatric/Behavioral: Negative. Negative for agitation, behavioral problems, dysphoric mood and suicidal ideas. The patient is not nervous/anxious. Patient Vitals for the past 12 hrs:   Temp Pulse Resp BP SpO2   04/17/17 1430 - (!) 146 20 111/83 100 %   04/17/17 1400 - (!) 145 19 - 100 %   04/17/17 1352 97.6 °F (36.4 °C) (!) 150 20 107/87 99 %       Physical Exam   Constitutional: He is oriented to person, place, and time. He appears well-developed and well-nourished. No distress. HENT:   Head: Normocephalic and atraumatic.    Mouth/Throat: Oropharynx is clear and moist. No oropharyngeal exudate. Eyes: Conjunctivae and EOM are normal. Pupils are equal, round, and reactive to light. Right eye exhibits no discharge. Left eye exhibits no discharge. Neck: Normal range of motion. Neck supple. Cardiovascular: Intact distal pulses. An irregularly irregular rhythm present. Tachycardia present. Exam reveals no gallop and no friction rub. No murmur heard. Pulmonary/Chest: Accessory muscle usage present. No respiratory distress. He has no rales. He exhibits no tenderness. Mild diffuse wheezing   Abdominal: Soft. Bowel sounds are normal. He exhibits no distension and no mass. There is no tenderness. There is no rebound and no guarding. Musculoskeletal: Normal range of motion. He exhibits no edema. Trace edema to BL lower extremities    Lymphadenopathy:     He has no cervical adenopathy. Neurological: He is alert and oriented to person, place, and time. No cranial nerve deficit. Coordination normal.   Skin: Skin is warm and dry. No rash noted. No erythema. Trace BL LE edema   Psychiatric: He has a normal mood and affect. Nursing note and vitals reviewed. Written by SULMA Mendoza, as dictated by [employer]  Connie Muller MD.    MDM  Number of Diagnoses or Management Options  Paroxysmal atrial fibrillation [facility]):   Diagnosis management comments:   DDx: UTI, renal failure, COPD       Amount and/or Complexity of Data Reviewed  Clinical lab tests: ordered and reviewed  Tests in the radiology section of CPT®: ordered and reviewed  Tests in the medicine section of CPT®: ordered and reviewed  Obtain history from someone other than the patient: yes (Spouse)  Review and summarize past medical records: yes  Discuss the patient with other providers: yes (Cardiology, Hospitalist)  Independent visualization of images, tracings, or specimens: yes    Critical Care  Total time providing critical care: 30-74 minutes    Patient Progress  Patient progress: improved    ED Course       Procedures Chief Complaint   Patient presents with    Ankle swelling       1:31 PM  The patients presenting problems have been discussed, and they are in agreement with the care plan formulated and outlined with them. I have encouraged them to ask questions as they arise throughout their visit. MEDICATIONS GIVEN:  Medications   albuterol (PROVENTIL VENTOLIN) nebulizer solution 5 mg (not administered)   furosemide (LASIX) injection 40 mg (not administered)   amiodarone (CORDARONE) 450 mg in dextrose 5% 250 mL infusion (not administered)   amiodarone (CORDARONE) 150 mg in dextrose 5% 100 mL bolus infusion (150 mg IntraVENous New Bag 4/17/17 1453)       LABS REVIEWED:  Recent Results (from the past 24 hour(s))   CBC WITH AUTOMATED DIFF    Collection Time: 04/17/17  1:59 PM   Result Value Ref Range    WBC 12.9 (H) 4.1 - 11.1 K/uL    RBC 4.05 (L) 4.10 - 5.70 M/uL    HGB 10.3 (L) 12.1 - 17.0 g/dL    HCT 35.2 (L) 36.6 - 50.3 %    MCV 86.9 80.0 - 99.0 FL    MCH 25.4 (L) 26.0 - 34.0 PG    MCHC 29.3 (L) 30.0 - 36.5 g/dL    RDW 18.0 (H) 11.5 - 14.5 %    PLATELET 338 249 - 367 K/uL    NEUTROPHILS 95 (H) 32 - 75 %    LYMPHOCYTES 2 (L) 12 - 49 %    MONOCYTES 3 (L) 5 - 13 %    EOSINOPHILS 0 0 - 7 %    BASOPHILS 0 0 - 1 %    ABS. NEUTROPHILS 12.2 (H) 1.8 - 8.0 K/UL    ABS. LYMPHOCYTES 0.3 (L) 0.8 - 3.5 K/UL    ABS. MONOCYTES 0.4 0.0 - 1.0 K/UL    ABS. EOSINOPHILS 0.0 0.0 - 0.4 K/UL    ABS.  BASOPHILS 0.0 0.0 - 0.1 K/UL    RBC COMMENTS ANISOCYTOSIS  1+        DF SMEAR SCANNED     METABOLIC PANEL, COMPREHENSIVE    Collection Time: 04/17/17  1:59 PM   Result Value Ref Range    Sodium 138 136 - 145 mmol/L    Potassium 4.7 3.5 - 5.1 mmol/L    Chloride 101 97 - 108 mmol/L    CO2 31 21 - 32 mmol/L    Anion gap 6 5 - 15 mmol/L    Glucose 127 (H) 65 - 100 mg/dL    BUN 35 (H) 6 - 20 MG/DL    Creatinine 1.30 0.70 - 1.30 MG/DL    BUN/Creatinine ratio 27 (H) 12 - 20      GFR est AA >60 >60 ml/min/1.73m2    GFR est non-AA 54 (L) >60 ml/min/1.73m2 Calcium 8.6 8.5 - 10.1 MG/DL    Bilirubin, total 0.4 0.2 - 1.0 MG/DL    ALT (SGPT) 117 (H) 12 - 78 U/L    AST (SGOT) 91 (H) 15 - 37 U/L    Alk.  phosphatase 54 45 - 117 U/L    Protein, total 6.9 6.4 - 8.2 g/dL    Albumin 3.0 (L) 3.5 - 5.0 g/dL    Globulin 3.9 2.0 - 4.0 g/dL    A-G Ratio 0.8 (L) 1.1 - 2.2     PRO-BNP    Collection Time: 04/17/17  1:59 PM   Result Value Ref Range    NT pro-BNP 95100 (H) 0 - 125 PG/ML   TROPONIN I    Collection Time: 04/17/17  1:59 PM   Result Value Ref Range    Troponin-I, Qt. <0.04 <0.05 ng/mL   EKG, 12 LEAD, INITIAL    Collection Time: 04/17/17  2:02 PM   Result Value Ref Range    Ventricular Rate 141 BPM    Atrial Rate 187 BPM    QRS Duration 92 ms    Q-T Interval 308 ms    QTC Calculation (Bezet) 471 ms    Calculated R Axis 108 degrees    Calculated T Axis 87 degrees    Diagnosis       Atrial fibrillation with rapid ventricular response  Septal infarct , age undetermined  Lateral infarct (cited on or before 30-MAR-2017)  When compared with ECG of 30-MAR-2017 07:49,  Atrial fibrillation has replaced Sinus rhythm  ST no longer elevated in Lateral leads     URINALYSIS W/MICROSCOPIC    Collection Time: 04/17/17  2:32 PM   Result Value Ref Range    Color YELLOW/STRAW      Appearance CLEAR CLEAR      Specific gravity 1.014 1.003 - 1.030      pH (UA) 5.5 5.0 - 8.0      Protein NEGATIVE  NEG mg/dL    Glucose NEGATIVE  NEG mg/dL    Ketone NEGATIVE  NEG mg/dL    Bilirubin NEGATIVE  NEG      Blood NEGATIVE  NEG      Urobilinogen 0.2 0.2 - 1.0 EU/dL    Nitrites NEGATIVE  NEG      Leukocyte Esterase NEGATIVE  NEG      WBC 0-4 0 - 4 /hpf    RBC 0-5 0 - 5 /hpf    Epithelial cells FEW FEW /lpf    Bacteria NEGATIVE  NEG /hpf    Hyaline cast 2-5 0 - 5 /lpf   TSH 3RD GENERATION    Collection Time: 04/17/17  2:32 PM   Result Value Ref Range    TSH 1.25 0.36 - 3.74 uIU/mL   POC INR    Collection Time: 04/17/17  2:41 PM   Result Value Ref Range    INR (POC) 1.2 (H) <1.2         VITAL SIGNS:  Patient Vitals for the past 12 hrs:   Temp Pulse Resp BP SpO2   04/17/17 1430 - (!) 146 20 111/83 100 %   04/17/17 1400 - (!) 145 19 - 100 %   04/17/17 1352 97.6 °F (36.4 °C) (!) 150 20 107/87 99 %       RADIOLOGY RESULTS:  The following have been ordered and reviewed:  CXR Results  (Last 48 hours)               04/17/17 1511  XR CHEST PORT Final result    Impression:  IMPRESSION: No acute findings. Narrative:  EXAM:  XR CHEST PORT       INDICATION:  Chest Pain       COMPARISON:  March 28       FINDINGS: A portable AP radiograph of the chest was obtained at 1458 hours. The   patient is on a cardiac monitor. Biapical lung scarring is stable. Pleural and   parenchymal scarring at the left base is stable. The cardiac and mediastinal   contours and pulmonary vascularity are normal.  The bones and soft tissues are   grossly within normal limits. EKG interpretation: (Preliminary) 14:02  Rhythm: Atrial fibrillation with RVR; and irregularly irregular. Rate (approx.): 141. Written by SULMA Santos, as dictated by [employer] Tana Irby, [address]:  4:07 PM  Ignacio Irby MD spoke with Robby Garibay MD,  Specialty: Cardiology  Discussed pt's hx, disposition, and available diagnostic and imaging results. Reviewed care plans. Consultant agrees with plans as outlined. Dr. Josiane Lee recommends hospitalist admission. Written by SULMA Santos, as dictated by Sanjeev Irby MD.    CONSULT NOTE:   4:22 PM  Ignacio Irby MD spoke with Rafael Gonzalez MD,   Specialty: Hospitalist  Discussed pt's hx, disposition, and available diagnostic and imaging results. Reviewed care plans. Consultant will evaluate pt for admission. Written by SULMA Santos, as dictated by Sanjeev Irby MD.    PROGRESS NOTES:  2:21 PM  The patient is currently in afib with RVR. Will administer amiodarone. Written by SULMA Santos, as dictated by Sanjeev Irby MD.    CRITICAL CARE NOTE :  4:45 PM  IMPENDING DETERIORATION -Cardiovascular  ASSOCIATED RISK FACTORS - Dysrhythmia  MANAGEMENT- Bedside Assessment and Supervision of Care  INTERPRETATION -  Xrays, ECG and Blood Pressure  INTERVENTIONS - Metobolic interventions  CASE REVIEW - Hospitalist  TREATMENT RESPONSE -Improved and Stable  PERFORMED BY - Self  NOTES   :  I have spent 45 minutes of critical care time involved in lab review, consultations with specialist, family decision- making, bedside attention and documentation. During this entire length of time I was immediately available to the patient . DIAGNOSIS:    1. Paroxysmal atrial fibrillation (HCC)        PLAN: Admit to Hospitalist    ED COURSE: The patients hospital course has been uncomplicated. 4:24 PM  Patient is being admitted to the hospital.  The results of their tests and reasons for their admission have been discussed with them and/or available family. They convey agreement and understanding for the need to be admitted and for their admission diagnosis. Consultation has been made with the inpatient physician specialist for hospitalization. This note is prepared by Melvin Price, acting as a Scribe for [employer] Jassi Diaz, [address] [facility] Jassi Diaz MD: The scribe's documentation has been prepared under my direction and personally reviewed by me in its entirety. I confirm that the notes above accurately reflects all work, treatment, procedures, and medical decision making performed by me.

## 2017-04-17 NOTE — ROUTINE PROCESS
TRANSFER - OUT REPORT:    Verbal report given to Tatiana(name) on Vladimir Stein  being transferred to PCU(unit) for routine progression of care       Report consisted of patients Situation, Background, Assessment and   Recommendations(SBAR). Information from the following report(s) ED Summary and Procedure Summary was reviewed with the receiving nurse. Lines:   Peripheral IV 04/17/17 Right Forearm (Active)   Site Assessment Clean, dry, & intact 4/17/2017  1:57 PM   Phlebitis Assessment 0 4/17/2017  1:57 PM   Infiltration Assessment 0 4/17/2017  1:57 PM   Dressing Status Clean, dry, & intact 4/17/2017  1:57 PM   Dressing Type Transparent 4/17/2017  1:57 PM   Hub Color/Line Status Pink 4/17/2017  1:57 PM       Peripheral IV 04/17/17 Left Wrist (Active)   Site Assessment Clean, dry, & intact 4/17/2017  2:12 PM   Phlebitis Assessment 0 4/17/2017  2:12 PM   Infiltration Assessment 0 4/17/2017  2:12 PM   Dressing Status Clean, dry, & intact 4/17/2017  2:12 PM   Dressing Type Transparent 4/17/2017  2:12 PM   Hub Color/Line Status Pink 4/17/2017  2:12 PM        Opportunity for questions and clarification was provided.       Patient transported with:   Monitor  Registered Nurse

## 2017-04-17 NOTE — H&P
Hospitalist Admission Note    NAME: Damon Aquino   :  1945   MRN:  [id number]     Date/Time:  2017 4:22 PM    Patient PCP: Simran Bruce III, DO  ________________________________________________________________________    My assessment of this patient's clinical condition and my plan of care is as follows. Assessment / Plan:  New onset atrial fibrillation:  Recent cardiac w/u including negative stress testing 3/17  - CXR no acute findings  - started on amiodarone gtt per cardiology recommendations (Pt with reported HA/constipation due to diltiazem)  - schedule metoprolol with hold parameters. Hold nadolol and lisinopril for now. - con't home lasix  - echo ordered  - check thyroid function  - cardiology consulted  Chronic hypoxic respiratory failure due to chronic bronchiectasis, chronic COPD and pulmonary fibrosis in setting of remote pulmonary TB:  4.5 LPM at baseline  - con't steroids (started long taper per Dr. Susan Hickman last week)  - con't ipratropium/xopenex nebs, will schedule QID  - con't symbicort, recently started. Holding spiriva. - con't robitussin AC as he does at home  Acute kidney injury in setting of recurrent urinary retention: baseline Cr 0.7-0.9, had english removed ~1 1/2 wks ago in urology office  - con't lasix with caution  - holding lisinopril  - english replaced in ER with >700mL. Will need to keep in place until seen again by urology. Acute transaminitis:  No clear etiology, ?decreased circulation due to afib  - serial LFTs  - consider additional imaging pending clinical course  Oral thrush: due to steroids  - nystatin ordered  CAD s/p MI POA  Hypothyroidism: con't synthroid, checking thyroid function as above    Code Status: DNR  Surrogate Decision Maker: wife  DVT Prophylaxis: lovenox        Subjective:   CHIEF COMPLAINT: can't urinary, short of breath, lethargic    HISTORY OF PRESENT ILLNESS:     Francisco De Los Santos is a 66 y.o.    male who presents with above. Pt with recent admissions for respiratory failure. After last admission, he refused acute rehab referral and went home but was too weak to participate in any activity. His wife says that he has been very lethargic and sleeping most of the day. He has been walking very little and has been short of breath with ambulation. He has been coughing but it is nonproductive. Last week, he was started on a long prednisone taper and symbicort by pulmonology. He complains of dizziness when he tries to walk a lot. He denies chest pain. His appetite has been very poor but he denies nausea. No diarrhea - has been constipated since leaving the hospital.  He denies being able to urinary for 2 days. He has noted new edema and 8 lbs weight gain. He denies focal weakness. We were asked to admit for work up and evaluation of the above problems. Past Medical History:   Diagnosis Date    Adjustment disorder with mixed anxiety and depressed mood     Bronchiectasis (HCC)     Chronic obstructive pulmonary disease (HCC)     Diastolic CHF, chronic ([address] 11/4/2015    Essential hypertension, benign 9/20/2009    GERD (gastroesophageal reflux disease) 9/20/2009    [facility]     Migraine headache 9/20/2009    Post-tuberculous bronchiectasis 10/17/2013    Pulmonary fibrosis ([facility]     Pulmonary tuberculosis     Thyroid disease     hypothyroidism    Urinary retention         History reviewed. No pertinent surgical history.     Social History   Substance Use Topics    Smoking status: Former Smoker     Packs/day: 1.50     Years: 25.00     Types: Cigarettes     Quit date: 4/26/1979    Smokeless tobacco: Never Used    Alcohol use No        Family History   Problem Relation Age of Onset    Heart Disease Mother     Stroke Mother     Cancer Father      throat     Allergies   Allergen Reactions    Cardizem [Diltiazem Hcl] Other (comments)     Headache and constipation        Prior to Admission medications    Medication Sig Start Date End Date Taking? Authorizing Provider   suvorexant (BELSOMRA) 10 mg tablet Take 2 Tabs by mouth nightly as needed for Insomnia. Max Daily Amount: 20 mg. 4/14/17   Eudelia Cea III, DO   furosemide (LASIX) 20 mg tablet Take 2 Tabs by mouth daily. Administration Instructions: Patient is to take furosemide 20 mg tab, two tablets by month once daily. Patient is to weight himself daily and if there is a weight gain >2 lb, he is to take an additional 20 mg tablet for a maximum daily dose of 60 mg. Indications: Edema 4/12/17   Eudelia Cea III, DO   ferrous sulfate 325 mg (65 mg iron) tablet Take 1 Tab by mouth daily (with breakfast). 4/12/17   Eudelia Cea III, DO   azithromycin (ZITHROMAX) 250 mg tablet Take 1 Tab by mouth every Monday, Wednesday, Friday. 4/12/17   Theodore Guerrero III, DO   lisinopril (PRINIVIL, ZESTRIL) 20 mg tablet TAKE 1 BY MOUTH DAILY 4/6/17   Theodore Guerrero III, DO   ipratropium (ATROVENT) 0.02 % nebulizer solution 2.5 mL by Nebulization route two (2) times a day. 4/6/17   Theodore Guerrero III, DO   LORazepam (ATIVAN) 1 mg tablet TAKE 1/2 TO 1 BY MOUTH UP TO 3 TIMES DAILY AS NEEDED FOR ANXIETY    Historical Provider   melatonin tab tablet Take 10 mg by mouth nightly. Historical Provider   predniSONE (DELTASONE) 20 mg tablet Take 2 tablets daily for 4 days, then 1 tablet daily for 4 days, then 1/2 a tablet daily for 4 days, then stop 3/31/17   William Munguia MD   guaiFENesin-codeine (ROBITUSSIN AC) 100-10 mg/5 mL solution Take 5 mL by mouth two (2) times daily as needed for Cough. Max Daily Amount: 10 mL. 3/28/17   Theodore Guerrero III, DO   levalbuterol (XOPENEX) 1.25 mg/3 mL nebu 3 mL by Nebulization route every six (6) hours as needed. Patient taking differently: 1.25 mg by Nebulization route two (2) times a day.  3/8/17   Theodore Guerrero III, DO   OXYGEN-AIR DELIVERY SYSTEMS 4.5 L/min by Nasal route continuous. Historical Provider   tiotropium bromide (SPIRIVA RESPIMAT) 2.5 mcg/actuation inhaler Take 2 Puffs by inhalation daily. 2/12/17   MD KRYSTYNA Bedoya. bifidum-B longum 1.5 billion cell cap Take 2 Caps by mouth daily. Historical Provider   nebivolol (BYSTOLIC) 5 mg tablet Take 5 mg by mouth two (2) times a day. Historical Provider   SUMATRIPTAN SUCCINATE (IMITREX PO) Take  by mouth as needed (migraine). Historical Provider   raNITIdine (ZANTAC) 150 mg tablet Take 150 mg by mouth daily (with lunch). Historical Provider   diphenhydrAMINE (BENADRYL) 25 mg capsule Take 25 mg by mouth two (2) times a day. Indications: COUGH    Historical Provider   budesonide-formoterol (SYMBICORT) 160-4.5 mcg/actuation HFA inhaler Take 2 Puffs by inhalation two (2) times a day. 10/21/16   Lindalee Primer III, DO   XOPENEX HFA 45 mcg/actuation inhaler USE 2 INHALATIONS BY MOUTH EVERY 4 HOURS AS NEEDED FOR WHEEZING 7/6/16   Lindalee Primer III, DO   potassium chloride SR (KLOR-CON 10) 10 mEq tablet Take 1 Tab by mouth two (2) times daily (with meals). Take 2nd dose when you take an extra Furosemide. Indications: HYPOKALEMIA  Patient taking differently: Take 20 mEq by mouth daily. Administration Instructions: Take two tabs by mouth daily, take a third dose of 10 mEq when a third dose of furosemide 20 mg is administered for weight gain of 2 lb  Indications: HYPOKALEMIA 5/10/16   Lindalee Primer III, DO   traZODone (DESYREL) 50 mg tablet Take 2 Tabs by mouth nightly. 5/10/16   Theodore Guerrero III, DO   sertraline (ZOLOFT) 50 mg tablet Take 1.5 Tabs by mouth daily. 2/17/16   Karen Preciado MD   levothyroxine (SYNTHROID) 50 mcg tablet TAKE 1 TABLET DAILY BEFORE BREAKFAST 2/17/16   Karen Preciado MD   aspirin delayed-release 81 mg tablet Take 81 mg by mouth daily. Historical Provider   polyethylene glycol (MIRALAX) 17 gram packet Take 17 g by mouth daily as needed. Historical Provider       REVIEW OF SYSTEMS:     I am not able to complete the review of systems because: The patient is intubated and sedated    The patient has altered mental status due to his acute medical problems    The patient has baseline aphasia from prior stroke(s)    The patient has baseline dementia and is not reliable historian    The patient is in acute medical distress and unable to provide information           Total of 12 systems reviewed as follows:       POSITIVE= underlined text  Negative = text not underlined  General:  fever, chills, sweats, generalized weakness, weight loss/gain,      loss of appetite   Eyes:    blurred vision, eye pain, loss of vision, double vision  ENT:    rhinorrhea, pharyngitis   Respiratory:   cough, sputum production, SOB, KOO, wheezing, pleuritic pain   Cardiology:   chest pain, palpitations, orthopnea, PND, edema, syncope   Gastrointestinal:  abdominal pain , N/V, diarrhea, dysphagia, constipation, bleeding   Genitourinary:  frequency, urgency, dysuria, hematuria, incontinence   Muskuloskeletal :  arthralgia, myalgia, back pain  Hematology:  easy bruising, nose or gum bleeding, lymphadenopathy   Dermatological: rash, ulceration, pruritis, color change / jaundice  Endocrine:   hot flashes or polydipsia   Neurological:  headache, dizziness, confusion, focal weakness, paresthesia,     Speech difficulties, memory loss, gait difficulty  Psychological: Feelings of anxiety, depression, agitation    Objective:   VITALS:    Visit Vitals    /83    Pulse (!) 146    Temp 97.6 °F (36.4 °C)    Resp 20    Ht 5' 10\" (1.778 m)    Wt 62.6 kg (138 lb)    SpO2 100%    BMI 19.8 kg/m2       PHYSICAL EXAM:    General:    Alert, cooperative, no distress, appears stated age.      HEENT: Atraumatic, anicteric sclerae, pink conjunctivae     No oral ulcers, mucosa moist, throat clear, dentition fair  Neck:  Supple, symmetrical,  thyroid: non tender  Lungs:   Clear to auscultation bilaterally. No Wheezing or Rhonchi. No rales. Chest wall:  No tenderness  No Accessory muscle use. Heart:   Tachycardic, irregularly irregular  rhythm,  No  murmur   2+ edema  Abdomen:   Soft, non-tender. Not distended. Bowel sounds normal  Extremities: No cyanosis. No clubbing    Skin:     Not pale. Not Jaundiced  No rashes. Multiple UE ecchymosis. Psych:  Some insight. Not depressed. Not anxious or agitated. Neurologic: EOMs intact. No facial asymmetry. No aphasia or slurred speech. Symmetrical strength, Sensation grossly intact. Alert and oriented X 4.     _______________________________________________________________________  Care Plan discussed with:    Comments   Patient x    Family  x wife   RN x    Care Manager                    Consultant:      _______________________________________________________________________  Expected  Disposition:   Home with Family    HH/PT/OT/RN    SNF/LTC x   SOTO    ________________________________________________________________________  TOTAL TIME:  48 Minutes    Critical Care Provided     Minutes non procedure based      Comments    x Reviewed previous records   >50% of visit spent in counseling and coordination of care x Discussion with patient and/or family and questions answered       ________________________________________________________________________  Signed: Eliud Madura, MD    Procedures: see electronic medical records for all procedures/Xrays and details which were not copied into this note but were reviewed prior to creation of Plan.     LAB DATA REVIEWED:    Recent Results (from the past 24 hour(s))   CBC WITH AUTOMATED DIFF    Collection Time: 04/17/17  1:59 PM   Result Value Ref Range    WBC 12.9 (H) 4.1 - 11.1 K/uL    RBC 4.05 (L) 4.10 - 5.70 M/uL    HGB 10.3 (L) 12.1 - 17.0 g/dL    HCT 35.2 (L) 36.6 - 50.3 %    MCV 86.9 80.0 - 99.0 FL    MCH 25.4 (L) 26.0 - 34.0 PG    MCHC 29.3 (L) 30.0 - 36.5 g/dL    RDW 18.0 (H) 11.5 - 14.5 %    PLATELET 351 150 - 400 K/uL    NEUTROPHILS 95 (H) 32 - 75 %    LYMPHOCYTES 2 (L) 12 - 49 %    MONOCYTES 3 (L) 5 - 13 %    EOSINOPHILS 0 0 - 7 %    BASOPHILS 0 0 - 1 %    ABS. NEUTROPHILS 12.2 (H) 1.8 - 8.0 K/UL    ABS. LYMPHOCYTES 0.3 (L) 0.8 - 3.5 K/UL    ABS. MONOCYTES 0.4 0.0 - 1.0 K/UL    ABS. EOSINOPHILS 0.0 0.0 - 0.4 K/UL    ABS. BASOPHILS 0.0 0.0 - 0.1 K/UL    RBC COMMENTS ANISOCYTOSIS  1+        DF SMEAR SCANNED     METABOLIC PANEL, COMPREHENSIVE    Collection Time: 04/17/17  1:59 PM   Result Value Ref Range    Sodium 138 136 - 145 mmol/L    Potassium 4.7 3.5 - 5.1 mmol/L    Chloride 101 97 - 108 mmol/L    CO2 31 21 - 32 mmol/L    Anion gap 6 5 - 15 mmol/L    Glucose 127 (H) 65 - 100 mg/dL    BUN 35 (H) 6 - 20 MG/DL    Creatinine 1.30 0.70 - 1.30 MG/DL    BUN/Creatinine ratio 27 (H) 12 - 20      GFR est AA >60 >60 ml/min/1.73m2    GFR est non-AA 54 (L) >60 ml/min/1.73m2    Calcium 8.6 8.5 - 10.1 MG/DL    Bilirubin, total 0.4 0.2 - 1.0 MG/DL    ALT (SGPT) 117 (H) 12 - 78 U/L    AST (SGOT) 91 (H) 15 - 37 U/L    Alk.  phosphatase 54 45 - 117 U/L    Protein, total 6.9 6.4 - 8.2 g/dL    Albumin 3.0 (L) 3.5 - 5.0 g/dL    Globulin 3.9 2.0 - 4.0 g/dL    A-G Ratio 0.8 (L) 1.1 - 2.2     PRO-BNP    Collection Time: 04/17/17  1:59 PM   Result Value Ref Range    NT pro-BNP 50589 (H) 0 - 125 PG/ML   TROPONIN I    Collection Time: 04/17/17  1:59 PM   Result Value Ref Range    Troponin-I, Qt. <0.04 <0.05 ng/mL   EKG, 12 LEAD, INITIAL    Collection Time: 04/17/17  2:02 PM   Result Value Ref Range    Ventricular Rate 141 BPM    Atrial Rate 187 BPM    QRS Duration 92 ms    Q-T Interval 308 ms    QTC Calculation (Bezet) 471 ms    Calculated R Axis 108 degrees    Calculated T Axis 87 degrees    Diagnosis       Atrial fibrillation with rapid ventricular response  Septal infarct , age undetermined  Lateral infarct (cited on or before 30-MAR-2017)  When compared with ECG of 30-MAR-2017 07:49,  Atrial fibrillation has replaced Sinus rhythm  ST no longer elevated in Lateral leads     URINALYSIS W/MICROSCOPIC    Collection Time: 04/17/17  2:32 PM   Result Value Ref Range    Color YELLOW/STRAW      Appearance CLEAR CLEAR      Specific gravity 1.014 1.003 - 1.030      pH (UA) 5.5 5.0 - 8.0      Protein NEGATIVE  NEG mg/dL    Glucose NEGATIVE  NEG mg/dL    Ketone NEGATIVE  NEG mg/dL    Bilirubin NEGATIVE  NEG      Blood NEGATIVE  NEG      Urobilinogen 0.2 0.2 - 1.0 EU/dL    Nitrites NEGATIVE  NEG      Leukocyte Esterase NEGATIVE  NEG      WBC 0-4 0 - 4 /hpf    RBC 0-5 0 - 5 /hpf    Epithelial cells FEW FEW /lpf    Bacteria NEGATIVE  NEG /hpf    Hyaline cast 2-5 0 - 5 /lpf   TSH 3RD GENERATION    Collection Time: 04/17/17  2:32 PM   Result Value Ref Range    TSH 1.25 0.36 - 3.74 uIU/mL   POC INR    Collection Time: 04/17/17  2:41 PM   Result Value Ref Range    INR (POC) 1.2 (H) <1.2

## 2017-04-18 NOTE — PROGRESS NOTES
Pharmacy  [facility] Monitoring     Enoxaparin Indication: thromboprophylaxis for atrial fibrillation  Current Dose: Enoxaparin 70 mg sq q 24 hr   Creatinine Clearance: 57  Recent Labs      04/18/17   0520  04/17/17   1359   CREA  1.08  1.30   BUN  29*  35*   HGB  10.0*  10.3*   PLT  266  354     Wt Readings from Last 1 Encounters:   04/18/17 66.5 kg (146 lb 9.7 oz)   Body mass index is 21.04 kg/(m^2). Impression/Plan: Per note, full dose Lovenox is desired.   Will increase to 70 mg sq q 12 hr     Thanks,  Cordelia Terry, PHARMD

## 2017-04-18 NOTE — CARDIO/PULMONARY
C/P Rehab. Note:    Patient is a 66 y.o. male admitted for A-fib. He presented to ER on 4/17/17 with edema, lethargy and increased sob. After nebulizer tx. In ER he went into AF with RVR. PMH :   Adjustment disorder with mixed anxiety and depressed mood      Bronchiectasis (HCC)      Chronic obstructive pulmonary disease (HCC)      Diastolic CHF, chronic ([facility] [address] 11/4/2015    Essential hypertension, benign 9/20/2009    GERD (gastroesophageal reflux disease) 9/20/2009    MI (myocardial infarction) ([facility] [address]      Migraine headache 9/20/2009    Post-tuberculous bronchiectasis 10/17/2013    Pulmonary fibrosis (HCC)      Pulmonary tuberculosis      Thyroid disease       hypothyroidism    Urinary retention      He is a former smoker. Echo of 3/29/17 showed EF 55-60%. Met with patient and family member who is at bedside. Information given on A fib since this is a new diagnosis for him. Printed materials provided and reviewed re: atrial fibrillation, treatment options and medication management. Discussed the effect of afib on the heart rate and rhythm, the risk of blood clotting and methods for treatment. Discussed the need for anticoagulant therapy if afib is prolonged or there is risk of reoccurance. Medication management discussed. Per family member Dr. Ying Pabon was in to see patient this morning but no plan for cardioversion at this time. Will continue to follow for teaching as needed.

## 2017-04-18 NOTE — PROGRESS NOTES
PT IS A READMIT: 3/29/17 TO 3/31/17. Pt is a 65 y/o female that admitted on 4/17/17 due to a Dx of AFIB. Pt is alert and oriented. SW met with PT and spouse to review demographic information and discuss consult for pulmonary rehab program.  Pt lives with his spouse in a one story home with 3 steps to enter. Pt works fulltime as a  with [employer], she is 68. Pt is alone during the day when she is at work 15 minutes away and she frequently checks on him. Pt is open to [facility]. Pt has the following DME: Cane, walker, Rollator, WC, Oxygen provided by [facility], and grab bars in the home. Pt has no experience with a rehab facility. SW was consulted to discuss Inpatient Pulmonary Rehab with Pt and spouse. SW discussed two options that would benefit this Pt. [facility] Pulmonary Rehab Program and [facility] Pulmonary Rehab Program.  Pt and spouse felt that [facility]. would be the closest for the spouse to visit and she would be able to be more involved there. Pt and spouse wanted to talk to a representative from [facility] to discuss. ANGUS called and asked [facility] with [place] to come and discuss this program.  SW will touch base with family to see if they want me to send the referral to [address] [place] [address]. SW also noted the Consult for the [employer]. SW will work on that request.    Suresh Perez called Flory with [facility] to let her know that Pt was still in the hospital and to provide coordination of care. 2:40 PM  ANGUS heard from [facility], at [address] [place] [address]. ANGUS met with Pt and he is interested in going to [address] [place] [address] for Pulmonary Rehab Program.  ANGUS sent referral to [address] [place] [address] via [address]. ANGUS in unsure of discharge date but will try to keep [place] updated as needed. CM will continue to monitor discharge plan. Care Management Interventions  PCP Verified by CM:  Yes  Transition of Care Consult (CM Consult): Discharge Planning (Pt was open to [facility])  Discharge Durable Medical Equipment:  (Pt has Cane, RW, Rollator, WC, Oxygen, and Grab Bars.)  Physical Therapy Consult: Yes  Occupational Therapy Consult: Yes  Current Support Network: Lives with Spouse, Own Home (Pt lives with spouse in a one story home with 3 JUAN)  Discharge Location  Discharge Placement:  (TBD)    Taime Prabhakar, [address]

## 2017-04-18 NOTE — PROGRESS NOTES
Problem: Self Care Deficits Care Plan (Adult)  Goal: *Acute Goals and Plan of Care (Insert Text)  Occupational Therapy Goals  Initiated 4/18/2017  1. Patient will perform grooming in standing with supervision/set-up within 7 day(s). 2. Patient will perform upper body dressing and lower body dressing, using adaptive aids, prn, with supervision/set-up within 7 day(s). 3. Patient will perform simple home management with minimal assistance/contact guard assist within 7 day(s). 4. Patient will perform toilet transfers with supervision/set-up within 7 day(s). 5. Patient will perform all aspects of toileting with supervision/set-up within 7 day(s). 6. Patient will participate in upper extremity therapeutic exercise/activities with supervision/set-up for 5 minutes within 7 day(s). 7. Patient will utilize energy conservation techniques during functional activities with minimal verbal cues within 7 day(s). OCCUPATIONAL THERAPY EVALUATION  Patient: Cole Conley (89 y.o. male)  Date: 4/18/2017  Primary Diagnosis: A-fib [facility])        Precautions:   Fall, DNR      ASSESSMENT :  Based on the objective data described below, the patient presents with generalized weakness, impaired endurance, and balance impairing independence and safety during adls and functional mobility. This is a readmission for pt due to COPD exacerbation . Pt is functioning at set up to moderate assistance for adls, but his endurance is so poor that he needs increased time to complete adl tasks. Pt ambulated to the bathroom which was aborted due to poor O2 sats (86%) with pt requiring additional time for increasing O2 on his baseline, 4.5 L (4 minutes seated to obtain 90%). Pt's wife assists him at home, but she works and cannot be with him during the day. Pt was educated in energy conservation and provided additional information/handouts. Pt required CGA/Min A for the short distance that he ambulated.   He is functioning below his baseline of independent and will benefit from inpatient pulmonary rehab program.   This was encouraged during discussion with pt and his wife. Patient will benefit from skilled intervention to address the above impairments. Patients rehabilitation potential is considered to be Fair  Factors which may influence rehabilitation potential include:   [ ]             None noted  [ ]             Mental ability/status  [X]             Medical condition  [ ]             Home/family situation and support systems  [ ]             Safety awareness  [ ]             Pain tolerance/management  [ ]             Other:        PLAN :  Recommendations and Planned Interventions:  [X]               Self Care Training                  [X]        Therapeutic Activities  [X]               Functional Mobility Training    [ ]        Cognitive Retraining  [X]               Therapeutic Exercises           [X]        Endurance Activities  [X]               Balance Training                   [ ]        Neuromuscular Re-Education  [ ]               Visual/Perceptual Training     [X]   Home Safety Training  [X]               Patient Education                 [X]        Family Training/Education  [ ]               Other (comment):     Frequency/Duration: Patient will be followed by occupational therapy 4 times a week to address goals. Discharge Recommendations: To Be Determined -  Intensive inpatient pulmonary rehab program or [facility] program with pulmonary focus. Further Equipment Recommendations for Discharge: tbd-trial of adaptive aids for lower body adls. SUBJECTIVE:   Patient stated Viviana bourgeois .       OBJECTIVE DATA SUMMARY:   HISTORY:   Past Medical History:   Diagnosis Date    Adjustment disorder with mixed anxiety and depressed mood      Bronchiectasis (HCC)      Chronic obstructive pulmonary disease (HCC)      Diastolic CHF, chronic ([facility] [address] 11/4/2015    Essential hypertension, benign 9/20/2009    GERD (gastroesophageal reflux disease) 9/20/2009    MI (myocardial infarction) [facility] [address]      Migraine headache 9/20/2009    Post-tuberculous bronchiectasis 10/17/2013    Pulmonary fibrosis (HCC)      Pulmonary tuberculosis      Thyroid disease       hypothyroidism    Urinary retention     History reviewed. No pertinent surgical history. Prior Level of Function/Home Situation: Pt lives with his wife. He is generally independent at baseline for aDls, however, he's been needing assistance for the past several weeks since his last admission. Pt has been sedentary at home while his wife works. Expanded or extensive additional review of patient history: review of previous admissions. Had admissions in Feb (barthel index: 50/100 and March (Barthel INdex: 40/100) this year. Inpatient pulmonary rehab was recommended however, pt did not want rehab. Home Situation  Home Environment: Private residence  # Steps to Enter: 3  Rails to Enter: Yes  Hand Rails : Bilateral  One/Two Story Residence: One story  Living Alone: No  Support Systems: Spouse/Significant Other/Partner  Patient Expects to be Discharged to[de-identified] Private residence  Current DME Used/Available at Home: Grab bars, Commode, bedside, Wheelchair, power, Wheelchair, Walker, rolling, Oxygen, portable  Tub or Shower Type: Shower  [X]  Right hand dominant             [ ]  Left hand dominant     EXAMINATION OF PERFORMANCE DEFICITS:  Cognitive/Behavioral Status:  Neurologic State: Alert  Orientation Level: Oriented X4  Cognition: Follows commands  Perception: Appears intact  Perseveration: No perseveration noted  Safety/Judgement: Fall prevention     Skin: multiple bruises and skin tear/wounds     Edema:  BLEs     Hearing:   Auditory  Auditory Impairment: None     Vision/Perceptual:    Tracking:  (able to scan environment)                      Acuity: Impaired near vision    Corrective Lenses: Reading glasses     Range of Motion:  BUEs:  AROM: Within functional limits  PROM: Within functional limits                       Strength:  BUEs:  Strength: Generally decreased, functional                 Coordination:     Fine Motor Skills-Upper: Left Intact; Right Intact    Gross Motor Skills-Upper: Left Intact; Right Intact     Tone & Sensation: Tone is normal     Sensation: Intact                       Balance:  Sitting: Intact  Standing: Impaired  Standing - Static: Constant support; Fair  Standing - Dynamic : Fair (constant support)     Functional Mobility and Transfers for ADLs:  Bed Mobility:  Pt was seated in chair upon arrival.     Transfers:  Sit to Stand: Minimum assistance  Stand to Sit: Minimum assistance  Bed to Chair: Minimum assistance  Toilet Transfer :  (attempted but aborted due to desat)     ADL Assessment:  Feeding: Setup (pt is currently without diet orders)     Oral Facial Hygiene/Grooming: Supervision;Contact guard assistance (for standing at sink)     Bathing: Moderate assistance     Upper Body Dressing: Setup     Lower Body Dressing: Moderate assistance     Toileting: Total assistance (english)                 ADL Intervention and task modifications:     Encouraged pt to participate in functional mobility to bathroom. Aborted due to desat to mid [de-identified] requiring 4 minutes to recover to 90 once seated at rest.  Educated pt on lower body adls-possible use of adaptive aids for energy conservation, pt can reach to his ankles and finds crossed leg technique difficult. He reports that he can dress himself, but it takes him too long and he is tired. May benefit of trial of adaptive aids for lower body adls. Cognitive Retraining  Safety/Judgement: Fall prevention     Therapeutic Exercise:  Encouraged sitting up in chair and performing BUE exercises and BLE exercises as demonstrated,  B shoulder shrugs, rotations, B shoulder flexion, seated marching and ankle pumps.    Functional Measure:  Barthel Index:      Bathin  Bladder: 0  Bowels: 5  Grooming: 5  Dressin  Feeding: 10  Mobility: 0  Stairs: 0  Toilet Use: 0  Transfer (Bed to Chair and Back): 10  Total: 35         Barthel and G-code impairment scale:  Percentage of impairment CH  0% CI  1-19% CJ  20-39% CK  40-59% CL  60-79% CM  80-99% CN  100%   Barthel Score 0-100 100 99-80 79-60 59-40 20-39 1-19    0   Barthel Score 0-20 20 17-19 13-16 9-12 5-8 1-4 0      The Barthel ADL Index: Guidelines  1. The index should be used as a record of what a patient does, not as a record of what a patient could do. 2. The main aim is to establish degree of independence from any help, physical or verbal, however minor and for whatever reason. 3. The need for supervision renders the patient not independent. 4. A patient's performance should be established using the best available evidence. Asking the patient, friends/relatives and nurses are the usual sources, but direct observation and common sense are also important. However direct testing is not needed. 5. Usually the patient's performance over the preceding 24-48 hours is important, but occasionally longer periods will be relevant. 6. Middle categories imply that the patient supplies over 50 per cent of the effort. 7. Use of aids to be independent is allowed. Shweta Latif., Barthel, DMarcW. ([id number]). Functional evaluation: the Barthel Index. [address] [facility] (14)2. Johnathan Mckeon moses NATALIIA Duran, Jonah Martínez., Keegan Sandoval., [place], [address] [place] [address] (). Measuring the change indisability after inpatient rehabilitation; comparison of the responsiveness of the Barthel Index and Functional [place] Measure. Journal of Neurology, Neurosurgery, and Psychiatry, 66(4), 577-875. Reynaldo Hutton, N.J.A, Ry Negro  WMarcJ.M, & Alisson Cage M.A. (2004.) Assessment of post-stroke quality of life in cost-effectiveness studies: The usefulness of the Barthel Index and the EuroQoL-5D. Quality of Life Research, 13, 128-81         G codes:   In compliance with CMSs Claims Based Outcome Reporting, the following G-code set was chosen for this patient based on their primary functional limitation being treated: The outcome measure chosen to determine the severity of the functional limitation was the BArthel Index with a score of 35/100 which was correlated with the impairment scale. · Self Care:               - CURRENT STATUS:    CL - 60%-79% impaired, limited or restricted               - GOAL STATUS:           CK - 40%-59% impaired, limited or restricted               - D/C STATUS:                       ---------------To be determined---------------      Occupational Therapy Evaluation Charge Determination   History Examination Decision-Making   MEDIUM Complexity : Expanded review of history including physical, cognitive and psychosocial  history  MEDIUM Complexity : 3-5 performance deficits relating to physical, cognitive , or psychosocial skils that result in activity limitations and / or participation restrictions MEDIUM Complexity : Patient may present with comorbidities that affect occupational performnce. Miniml to moderate modification of tasks or assistance (eg, physical or verbal ) with assesment(s) is necessary to enable patient to complete evaluation       Based on the above components, the patient evaluation is determined to be of the following complexity level: MEDIUM  Pain:  Pain Scale 1: Numeric (0 - 10)  Pain Intensity 1: 0              Activity Tolerance:   Poor  O2 sats decrease with minimal activity to mid 80s requiring seated rest and 4 min. Time to recover to 90% on 4.5 L -home setting. Pt has difficulty with PLB technique - weak exhalation.   After treatment:   [X] Patient left in no apparent distress sitting up in chair  [ ] Patient left in no apparent distress in bed  [X] Call bell left within reach  [X] Nursing notified  [X] Caregiver present  [X] Bed alarm activated      COMMUNICATION/EDUCATION:   The patients plan of care was discussed with: Registered Nurse and . [ ] Home safety education was provided and the patient/caregiver indicated understanding. [X] Patient/family have participated as able in goal setting and plan of care. [X] Patient/family agree to work toward stated goals and plan of care. [ ] Patient understands intent and goals of therapy, but is neutral about his/her participation. [ ] Patient is unable to participate in goal setting and plan of care. This patients plan of care is appropriate for delegation to [facility].      Thank you for this referral.  Herberth Rome, OTR/L  Time Calculation: 70 mins

## 2017-04-18 NOTE — CDMP QUERY
Please clarify if this patient is being treated/managed for:    =>Acute on Chronic Diastolic  CHF POA in the setting of elevated BNP/edema requiring lasix  =>Other Explanation of clinical findings  =>Unable to Determine (no explanation of clinical findings)    The medical record reflects the following clinical findings, treatment, and risk factors:    Risk Factors: HX of CHF, A-fib  Clinical Indicators: NT pro-BNP: 46346, increased weight gain, edema  Treatment: lasix     Please clarify and document your clinical opinion in the progress notes and discharge summary including the definitive and/or presumptive diagnosis, (suspected or probable), related to the above clinical findings. Please include clinical findings supporting your diagnosis.     Thank you,         [facility]

## 2017-04-18 NOTE — PROGRESS NOTES
.    PCU SHIFT NURSING NOTE      Bedside shift change report given to Davis Parr RN (oncoming nurse) by Gisela Woods (offgoing nurse). Report included the following information SBAR, Kardex, Intake/Output, MAR and Recent Results. Shift Summary:   0830- Patient resting in bed. Denies pain or SOB. Remains NPO for possible cardioversion today. 1000- Patient up to chair with PT. Wife remains at bedside. 1100- Dr. Lyn López at bedside. Gave verbal order to stop amiodarone drip, start cardizem 30 mg every 6 hours, cardiac diet, and continue IV cardizem until tomorrow. Patient and wife aware of orders. 36- Dr. Derrick Darling at bedside. Admission Date 4/17/2017   Admission Diagnosis A-fib ([address]   Consults IP CONSULT TO CARDIOLOGY        Consults   [x]PT   [x]OT   []Speech   []Case Management      [] Palliative      Cardiac Monitoring Order   []Yes   []No     IV drips   [x]Yes    Drip:   Amiodarone                        Dose: 0.5 mg  Drip:    Cardizem                        Dose: 5mg/hr  Drip:                            Dose:   []No     GI Prophylaxis   []Yes   [x]No         DVT Prophylaxis   SCDs:             Jm stockings:         [x] Medication   []Contraindicated   []None      Activity Level Activity Level: Up with Assistance     Activity Assistance: Partial (two people)   Purposeful Rounding every 1-2 hour? [x]Yes   Pelaez Score  Total Score: 3   Bed Alarm (If score 3 or >)   [x]Yes   [] Refused (See signed refusal form in chart)   Azael Score  Azael Score: 17   Azael Score (if score 14 or less)   []PMT consult   []Wound Care consult      []Specialty bed   [] Nutrition consult          Needs prior to discharge:   Home O2 required:    [x]Yes   []No    If yes, how much O2 required?     Other:    Last Bowel Movement: Last Bowel Movement Date: 04/15/17      Influenza Vaccine Received Flu Vaccine for Current Season (usually Sept-March): Not Flu Season        Pneumonia Vaccine           Diet Active Orders   Diet    DIET NPO LDAs               Peripheral IV 04/17/17 Left Wrist (Active)   Site Assessment Clean;Dry 4/18/2017  8:02 AM   Phlebitis Assessment 0 4/18/2017  8:02 AM   Infiltration Assessment 0 4/18/2017  8:02 AM   Dressing Status Clean, dry, & intact 4/18/2017  8:02 AM   Dressing Type Tape;Transparent 4/18/2017  8:02 AM   Hub Color/Line Status Flushed;Pink 4/18/2017  5:30 AM   Alcohol Cap Used Yes 4/17/2017  8:40 PM       Peripheral IV 04/18/17 Right Forearm (Active)   Site Assessment Clean;Dry 4/18/2017  8:02 AM   Phlebitis Assessment 0 4/18/2017  8:02 AM   Infiltration Assessment 0 4/18/2017  8:02 AM   Dressing Status Clean, dry, & intact 4/18/2017  8:02 AM   Dressing Type Tape;Transparent 4/18/2017  8:02 AM   Hub Color/Line Status Blue;Flushed 4/18/2017  5:30 AM                      Urinary Catheter Urinary Catheter 04/17/17 Oneal-Criteria for Appropriate Use: Obstruction/retention    Intake & Output   Date 04/17/17 0700 - 04/18/17 0659 04/18/17 0700 - 04/19/17 0659   Shift 0055-1956 9122-0803 24 Hour Total 4013-8597 3033-4063 24 Hour Total   I  N  T  A  K  E   P.O.  240 240         P. O.  240 240       I.V.  (mL/kg/hr)  431.6  (0.5) 431.6  (0.3)         Cardizem Volume  44.1 44.1         Amiodarone Volume  387.5 387.5       Shift Total  (mL/kg)  671.6  (10.1) 671.6  (10.1)      O  U  T  P  U  T   Urine  (mL/kg/hr)  1450  (1.8) 1450  (0.9)         Urine Output (mL) (Urinary Catheter 04/17/17 Oneal)  1450 1450       Shift Total  (mL/kg)  1450  (21.8) 1450  (21.8)      NET  -778.4 -778.4      Weight (kg) 62.6 66.5 66.5 66.5 66.5 66.5         Readmission Risk Assessment Tool Score High Risk            27       Total Score        3 Relationship with PCP    2 Patient Living Status    9 More than 1 Admission in calendar year    5 Patient Insurance is Medicare, Medicaid or Self Pay    8 Charlson Comorbidity Score        Criteria that do not apply:    Patient Length of Stay > 5       Expected Length of Stay - - -   Actual Length of Stay 1

## 2017-04-18 NOTE — CDMP QUERY
Documentation of Acute Kidney Injury\" is noted in the progress notes. Currently the patient does not meet RIFLE criteria (BSV approved) to support this diagnosis. If you are using another criteria to support this diagnosis, please document this in your progress note. Otherwise, please document in the progress notes the clinical indicators that support this diagnosis or state that the diagnosis has been ruled out.     Current documentation:  Admission labs     GFR  54    BUN: 35   Creatinine: 1.30  Baseline labs        GFR >60   BUN: 30   Creatinine: 0.76    RIFLE  (BSV Approved)    RISK:  Increased SCr x 1.5 or GFR decrease > 25% (within 7 days)    INJURY:  Increased SCr x 2.0 or GFR decreased > 50%    FAILURE:  Increased SCr x 3.0 or GFR decrease > 75% or SCr >4.0 mg/dL or acute increase >0.5 mg/dL    LOSS:  Persistent acute renal failure = complete loss of kidney function > 4 weeks    END STAGE:  End stage of kidney disease > 3 months      AKIN    STAGE  1:  Increase in SCr >/= 0.3 mg/dL or >/= 150% to 200% (1.5 to 2-fold) from baseline (within 48 hours)    STAGE  2:  Increase in SCr to more than 200% to 300% (>2-3 fold) from baseline    STAGE  3:  Increase in SCr to more than 300% (>3-fold) from baseline or SCr >/= 4.0 mg/dL with an acute increase of at least 0.5 mg/dL or initiation of renal replacement therapy      KDIGO    STAGE  1:  Increase in SCr by >/= 0.3 mg/dL within 48 hours or increase in SCr 1.5 to 1.9 times baseline which is known or presumed to have occurred within the prior 7 days    STAGE  2:  Increase in SCr to 2.0 to 2.9 times baseline    STAGE  3:  Increase in SCr to 3.0 times baseline or increase in SCr to >/= baseline or increase in SCr to >/= 4.0 mg/dL or initiation of renal replacement therapy      Thank you,         [place], [place]

## 2017-04-18 NOTE — PROGRESS NOTES
Interdisciplinary team rounds were held 4/18/2017 with the following team members:Care Management, Nursing, Nutrition, Pharmacy, Physical Therapy and Physician and the patient. Plan of care discussed. See clinical pathway and/or care plan for interventions and desired outcomes.     Cardizem gtt; needs possible urology consult; 4.5L NC at baseline; PT/OT; discharge disposition: [facility]

## 2017-04-18 NOTE — PROGRESS NOTES
Hospitalist Progress Note    NAME: Sharyle Conception   :  1945   MRN:  [id number]         Assessment / Plan:  New onset atrial fibrillation: Recent cardiac w/u including negative stress testing 3/17. Improving HR overnight. Afib likely worsened by neb treatments in ER.  - CXR on admission no acute findings  - d/c amiodarone gtt  - weaning diltiazem gtt to oral  - home nadalol restarted  - con't home lasix  - free T4 normal  - CM to check coverage for eliquis  - cardiology consult appreciated, not recommending additional testing/procedures  Chronic hypoxic respiratory failure due to chronic bronchiectasis, chronic COPD and pulmonary fibrosis in setting of remote pulmonary TB:  4.5 LPM at baseline, feels he is improving today  - con't steroids at slightly reduced dose in case these are contributing to his edema (started long taper per Dr. Jacoby Bartlett last week)  - con't ipratropium nebs scheduled QID with prn xopenex  - con't symbicort, recently started. Holding spiriva. - con't robitussin AC as he does at home  Acute kidney injury in setting of recurrent urinary retention: baseline Cr 0.7-0.9, had english removed ~1 1/2 wks ago in urology office. Improving today. - con't lasix with caution  - still holding home lisinopril  - english replaced in ER with >700mL. Will need to keep in place until seen again by urology. Acute transaminitis: No clear etiology, LFTs worsening this morning. Denies sx.  - no acute liver findings on CT A/P 3/28/17  - check hepatitis panel, EBV  - serial LFTs  Oral thrush: due to steroids  - nystatin oral  CAD s/p MI POA  Hypothyroidism: con't synthroid, checking thyroid function as above     Code Status: DNR  Surrogate Decision Maker: wife  DVT Prophylaxis: lovenox     Subjective:     Chief Complaint / Reason for Physician Visit  \"I'm doing better today\". Discussed with RN events overnight.      Review of Systems:  Symptom Y/N Comments  Symptom Y/N Comments   Fever/Chills n   Chest Pain n    Poor Appetite n   Edema y    Cough n   Abdominal Pain n    Sputum n   Joint Pain     SOB/KOO y   Pruritis/Rash     Nausea/vomit    Tolerating PT/OT     Diarrhea    Tolerating Diet     Constipation    Other       Could NOT obtain due to:      Objective:     VITALS:   Last 24hrs VS reviewed since prior progress note. Most recent are:  Patient Vitals for the past 24 hrs:   Temp Pulse Resp BP SpO2   04/18/17 1013 - (P) 95 - (P) 108/65 (P) 95 %   04/18/17 0754 97.4 °F (36.3 °C) 86 22 100/56 99 %   04/18/17 0734 - - - - 93 %   04/18/17 0530 97.7 °F (36.5 °C) 86 24 103/74 98 %   04/18/17 0524 - 94 - 107/77 -   04/18/17 0500 - 94 - 107/77 100 %   04/18/17 0430 - 82 - 100/60 100 %   04/18/17 0400 - 84 - 108/69 100 %   04/18/17 0330 - 95 - 103/74 99 %   04/18/17 0300 - 87 - 91/64 100 %   04/18/17 0230 - 91 - 101/61 99 %   04/18/17 0200 - 89 - 103/63 100 %   04/18/17 0130 - 95 - 106/66 100 %   04/18/17 0100 - 97 - 107/73 100 %   04/18/17 0030 - 93 - 103/71 100 %   04/17/17 2330 - 98 - 107/84 99 %   04/17/17 2300 97.8 °F (36.6 °C) (!) 114 20 101/77 100 %   04/17/17 2230 - (!) 124 - 111/72 96 %   04/17/17 2200 - (!) 119 - 112/65 98 %   04/17/17 2130 - (!) 128 - 110/74 97 %   04/17/17 2100 - (!) 140 - 104/82 94 %   04/17/17 2040 - - - - 98 %   04/17/17 2017 98.2 °F (36.8 °C) (!) 149 24 (!) 118/94 98 %   04/17/17 1835 97.6 °F (36.4 °C) (!) 141 22 119/76 97 %   04/17/17 1738 - (!) 140 24 - 99 %   04/17/17 1731 - (!) 137 22 109/87 98 %   04/17/17 1723 - (!) 127 22 - 95 %   04/17/17 1700 - (!) 120 20 106/61 96 %   04/17/17 1630 - (!) 120 20 90/74 100 %   04/17/17 1430 - (!) 146 20 111/83 100 %   04/17/17 1400 - (!) 145 19 - 100 %   04/17/17 1352 97.6 °F (36.4 °C) (!) 150 20 107/87 -       Intake/Output Summary (Last 24 hours) at 04/18/17 1101  Last data filed at 04/18/17 0530   Gross per 24 hour   Intake           671.58 ml   Output             1450 ml   Net          -778.42 ml        PHYSICAL EXAM:  General: WD, WN.  Alert, cooperative, no acute distress    EENT:  EOMI. Anicteric sclerae. MMM  Resp:  Improved but still poor air movement, crackles L base. No accessory muscle use  CV:  Irregular rhythm,  No edema  GI:  Soft, mildly distended, Non tender.  +Bowel sounds  Neurologic:  Alert and oriented X 3, normal speech,   Psych:   Some insight. Not anxious nor agitated  Skin:  Pale, No rashes. No jaundice    Reviewed most current lab test results and cultures  YES  Reviewed most current radiology test results   YES  Review and summation of old records today    NO  Reviewed patient's current orders and MAR    YES  PMH/SH reviewed - no change compared to H&P  ________________________________________________________________________  Care Plan discussed with:    Comments   Patient x    Family  x wife   RN x    Care Manager     Consultant  x cardiology                     Multidiciplinary team rounds were held today with , nursing, pharmacist and clinical coordinator. Patient's plan of care was discussed; medications were reviewed and discharge planning was addressed. ________________________________________________________________________  Total NON critical care TIME:  40 Minutes    Total CRITICAL CARE TIME Spent:   Minutes non procedure based      Comments   >50% of visit spent in counseling and coordination of care x    ________________________________________________________________________  Silvano Marin MD     Procedures: see electronic medical records for all procedures/Xrays and details which were not copied into this note but were reviewed prior to creation of Plan. LABS:  I reviewed today's most current labs and imaging studies.   Pertinent labs include:  Recent Labs      04/18/17   0520  04/17/17   1359   WBC  10.7  12.9*   HGB  10.0*  10.3*   HCT  32.1*  35.2*   PLT  266  354     Recent Labs      04/18/17   0520  04/17/17   1441  04/17/17   1359   NA  140   --   138   K  3.6   --   4.7   CL  99   --   101 CO2  35*   --   31   GLU  103*   --   127*   BUN  29*   --   35*   CREA  1.08   --   1.30   CA  8.2*   --   8.6   ALB  2.6*   --   3.0*   TBILI  0.5   --   0.4   SGOT  168*   --   91*   ALT  172*   --   117*   INR   --   1.2*   --        Signed: Eliud Nicholas MD

## 2017-04-18 NOTE — PROGRESS NOTES
PCU SHIFT NURSING NOTE      1250 Bedside and Verbal shift change report given to Fazal Mejia RN (oncoming nurse) by Dmitriy Daniel RN (offgoing nurse). Report included the following information SBAR, Kardex, Intake/Output, MAR, Accordion, Recent Results, Med Rec Status and Cardiac Rhythm A Fib. Shift Summary:     1640 PTA med list still awaiting completion. Pt unable to review, states his wife will be back later today. 245 [facility] robitussin AC given per pt request for cough. 1 Pt's wife now at bedside. PTA med list reviewed and completed. 1915 Bedside and Verbal shift change report given to Cris Skelton RN (oncoming nurse) by Fazal Mejia RN (offgoing nurse). Report included the following information SBAR, Kardex, Intake/Output, MAR, Accordion, Recent Results, Med Rec Status and Cardiac Rhythm A Fib 110s. Cardizem gtt continues at 5mg/min. Admission Date 4/17/2017   Admission Diagnosis A-fib ([address]   Consults IP CONSULT TO CARDIOLOGY        Consults   [x]PT   [x]OT   []Speech   [x]Case Management      [] Palliative      Cardiac Monitoring Order   [x]Yes   []No     IV drips   [x]Yes    Drip:     cardizem                       Dose:  5mg/min  Drip:                            Dose:  Drip:                            Dose:   []No     GI Prophylaxis   []Yes   []No         DVT Prophylaxis   SCDs:             Jm stockings:         [x] Medication   []Contraindicated   []None      Activity Level Activity Level: Up with Assistance     Activity Assistance: Partial (two people)   Purposeful Rounding every 1-2 hour? [x]Yes   Pelaez Score  Total Score: 3   Bed Alarm (If score 3 or >)   [x]Yes   [] Refused (See signed refusal form in chart)   Azael Score  Azael Score: 17   Azael Score (if score 14 or less)   []PMT consult   []Wound Care consult      []Specialty bed   [] Nutrition consult          Needs prior to discharge:   Home O2 required:    [x]Yes   []No    If yes, how much O2 required?    4.5 L    Other:    Last Bowel Movement: Last Bowel Movement Date: 04/15/17      Influenza Vaccine Received Flu Vaccine for Current Season (usually Sept-March): Not Flu Season        Pneumonia Vaccine           Diet Active Orders   Diet    DIET REGULAR 2 GM NA (House Low NA)      LDAs               Peripheral IV 04/17/17 Left Wrist (Active)   Site Assessment Clean;Dry 4/18/2017  8:02 AM   Phlebitis Assessment 0 4/18/2017  8:02 AM   Infiltration Assessment 0 4/18/2017  8:02 AM   Dressing Status Clean, dry, & intact 4/18/2017  8:02 AM   Dressing Type Tape;Transparent 4/18/2017  8:02 AM   Hub Color/Line Status Flushed;Pink 4/18/2017  5:30 AM   Alcohol Cap Used Yes 4/17/2017  8:40 PM       Peripheral IV 04/18/17 Right Forearm (Active)   Site Assessment Clean;Dry 4/18/2017  8:02 AM   Phlebitis Assessment 0 4/18/2017  8:02 AM   Infiltration Assessment 0 4/18/2017  8:02 AM   Dressing Status Clean, dry, & intact 4/18/2017  8:02 AM   Dressing Type Tape;Transparent 4/18/2017  8:02 AM   Hub Color/Line Status Blue;Flushed 4/18/2017  5:30 AM                      Urinary Catheter Urinary Catheter 04/17/17 Oneal-Criteria for Appropriate Use: Obstruction/retention    Intake & Output   Date 04/17/17 0700 - 04/18/17 0659 04/18/17 0700 - 04/19/17 0659   Shift 4672-8457 0796-6221 24 Hour Total 7980-2012 1236-2375 24 Hour Total   I  N  T  A  K  E   P.O.  240 240         P. O.  240 240       I.V.  (mL/kg/hr)  431.6  (0.5) 431.6  (0.3)         Cardizem Volume  44.1 44.1         Amiodarone Volume  387.5 387.5       Shift Total  (mL/kg)  671.6  (10.1) 671.6  (10.1)      O  U  T  P  U  T   Urine  (mL/kg/hr)  1450  (1.8) 1450  (0.9)         Urine Output (mL) (Urinary Catheter 04/17/17 Oneal)  1450 1450       Shift Total  (mL/kg)  1450  (21.8) 1450  (21.8)      NET  -778.4 -778.4      Weight (kg) 62.6 66.5 66.5 66.5 66.5 66.5         Readmission Risk Assessment Tool Score High Risk            27       Total Score        3 Relationship with PCP    2 Patient Living Status    9 More than 1 Admission in calendar year    5 Patient Insurance is Medicare, Medicaid or Self Pay    8 Charlson Comorbidity Score        Criteria that do not apply:    Patient Length of Stay > 5       Expected Length of Stay 2d 14h   Actual Length of Stay 1

## 2017-04-18 NOTE — PROGRESS NOTES
PCU SHIFT NURSING NOTE      0773 TRANSFER - IN REPORT:    Verbal report received from [facility] RN(name) on Devendra Johnson  being received from ED (unit) for routine progression of care      Report consisted of patients Situation, Background, Assessment and   Recommendations(SBAR). Information from the following report(s) SBAR, Kardex, Intake/Output, MAR, Accordion, Recent Results, Med Rec Status and Cardiac Rhythm A Fib was reviewed with the receiving nurse. Opportunity for questions and clarification was provided. Shift Summary:     0944 Pt arrived to PCU. Primary Nurse Stephanie Choe, ROBERT and Wellington Shepherd RN performed a dual skin assessment on this patient Impairment noted- see wound doc flow sheet. Skin tears noted to L and R forearms, some peeling skin noted to sacrum but area intact and blanchable, callouses noted to bilateral heels. Azael score is 17    1830 Dr. Marta Byers paged regarding pt's heart rate sustained in the 140s. Dr. Marta Byers came to bedside, verbal orders for titratable cardizem and continued amio gtt. Cardizem allergy reviewed with Dr. Marta Byers and pt's wife at bedside, Dr. Marta Byers states to go ahead with orders. 1910 Bedside and Verbal shift change report given to Kota Rosado RN (oncoming nurse) by Shailesh Serna RN (offgoing nurse). Report included the following information SBAR, Kardex, Intake/Output, MAR, Accordion, Recent Results, Med Rec Status and Cardiac Rhythm A Fib. Oncoming RN aware of new orders for cardizem gtt. Admission database partially completed, questions answered by pt and pt's wife Melva.         Admission Date 4/17/2017   Admission Diagnosis A-fib ([facility]   Consults IP CONSULT TO CARDIOLOGY        Consults   []PT   []OT   []Speech   []Case Management      [] Palliative      Cardiac Monitoring Order   [x]Yes   []No     IV drips   [x]Yes    Drip:        amio                  Dose:   1mg/min  Drip:   START cardizem                         Dose:   titratable  Drip: Dose:   []No     GI Prophylaxis   []Yes   []No         DVT Prophylaxis   SCDs:             Jm stockings:         [] Medication   []Contraindicated   []None      Activity Level Activity Level: Up with Assistance     Activity Assistance: Partial (two people)   Purposeful Rounding every 1-2 hour? [x]Yes   Pelaez Score  Total Score: 3   Bed Alarm (If score 3 or >)   [x]Yes   [] Refused (See signed refusal form in chart)   Azael Score  Azael Score: 17   Azael Score (if score 14 or less)   []PMT consult   []Wound Care consult      []Specialty bed   [] Nutrition consult          Needs prior to discharge:   Home O2 required:    [x]Yes   []No    If yes, how much O2 required? 4.5 L    Other:    Last Bowel Movement: Last Bowel Movement Date: 04/15/17      Influenza Vaccine Received Flu Vaccine for Current Season (usually Sept-March): Not Flu Season        Pneumonia Vaccine           Diet Active Orders   Diet    DIET NPO    DIET REGULAR      LDAs               Peripheral IV 04/17/17 Right Forearm (Active)   Site Assessment Clean, dry, & intact 4/17/2017  6:15 PM   Phlebitis Assessment 0 4/17/2017  6:15 PM   Infiltration Assessment 0 4/17/2017  6:15 PM   Dressing Status Clean, dry, & intact 4/17/2017  6:15 PM   Dressing Type Transparent;Tape 4/17/2017  6:15 PM   Hub Color/Line Status Pink;Capped 4/17/2017  6:15 PM       Peripheral IV 04/17/17 Left Wrist (Active)   Site Assessment Clean, dry, & intact 4/17/2017  6:15 PM   Phlebitis Assessment 0 4/17/2017  6:15 PM   Infiltration Assessment 0 4/17/2017  6:15 PM   Dressing Status Clean, dry, & intact 4/17/2017  6:15 PM   Dressing Type Tape;Transparent 4/17/2017  6:15 PM   Hub Color/Line Status Pink; Infusing 4/17/2017  6:15 PM                      Urinary Catheter Urinary Catheter 04/17/17 Oneal-Criteria for Appropriate Use: Obstruction/retention    Intake & Output   Date 04/16/17 1900 - 04/17/17 0659(Not Admitted) 04/17/17 0700 - 04/18/17 0738 Shift 7686-8081 24 Hour Total 6983-7197 5266-7650 24 Hour Total   I  N  T  A  K  E   Shift Total  (mL/kg)        O  U  T  P  U  T   Urine    650 650      Urine Output (mL) (Urinary Catheter 04/17/17 Oneal)    650 650    Shift Total  (mL/kg)    650  (10.4) 650  (10.4)   NET    -650 -650   Weight (kg)   62.6 62.6 62.6         Readmission Risk Assessment Tool Score High Risk            27       Total Score        3 Relationship with PCP    2 Patient Living Status    9 More than 1 Admission in calendar year    5 Patient Insurance is Medicare, Medicaid or Self Pay    8 Charlson Comorbidity Score        Criteria that do not apply:    Patient Length of Stay > 5       Expected Length of Stay - - -   Actual Length of Stay 0

## 2017-04-18 NOTE — PROGRESS NOTES
PCU SHIFT NURSING NOTE      1900: Bedside shift report received from Fernando, 24[facility], 121 [facility] nurse. Report included the following information: SBAR, Kardex, ED summary, I/O, MAR, and cardiac rhythm afib. Shift Summary:     1930: Patient resting in bed. AFib rate 140-150's, patient asymptomatic, BP stable. Amiodarone infusion at 1mg/min infusing. Wife at bedside. 2041: Cardizem gtt started at 5mg/hr for Afib 's despite Amiodarone infusion and Metoprolol 5mg IVP, BP within MD set parameters. Will monitor. 2105: Robitussin AC 5ml given per patient request for non-productive cough. Also given HS sleep aids at this time per request.   5382: patient unable to rest despite sleep aids given. Ativan 1mg PO given at this time for anxiety. Afib rate 100-110's per monitor. Amio gtt now at 0.5mg/min, Cardizem at 5mg/hr and IV metoprolol given q6h. BP stable, remains afebrile. Call light in reach, will cont to closely monitor. 0600: Patient remains in afib rate 70-90's. BP stable. Denies complaints. Safety measures maintained. 0730: Bedside report given to Malvin Neil RN. Patient condition stable. Admission Date 4/17/2017   Admission Diagnosis A-fib ([address]   Consults IP CONSULT TO CARDIOLOGY        Consults   []PT   []OT   []Speech   []Case Management      [] Palliative      Cardiac Monitoring Order   []Yes   []No     IV drips   []Yes    Drip:                            Dose:  Drip:                            Dose:  Drip:                            Dose:   []No     GI Prophylaxis   []Yes   []No         DVT Prophylaxis   SCDs:             Jm stockings:         [] Medication   []Contraindicated   []None      Activity Level Activity Level: Up with Assistance     Activity Assistance: Partial (two people)   Purposeful Rounding every 1-2 hour?    []Yes   Pelaez Score  Total Score: 3   Bed Alarm (If score 3 or >)   []Yes   [] Refused (See signed refusal form in chart)   Azael Score  Azael Score: 17   Azael Score (if score 14 or less)   []PMT consult   []Wound Care consult      []Specialty bed   [] Nutrition consult          Needs prior to discharge:   Home O2 required:    []Yes   []No    If yes, how much O2 required?     Other:    Last Bowel Movement: Last Bowel Movement Date: 04/15/17      Influenza Vaccine Received Flu Vaccine for Current Season (usually Sept-March): Not Flu Season        Pneumonia Vaccine           Diet Active Orders   Diet    DIET NPO      LDAs               Peripheral IV 04/17/17 Left Wrist (Active)   Site Assessment Clean, dry, & intact 4/17/2017 11:00 PM   Phlebitis Assessment 0 4/17/2017 11:00 PM   Infiltration Assessment 0 4/17/2017 11:00 PM   Dressing Status Clean, dry, & intact 4/17/2017 11:00 PM   Dressing Type Transparent 4/17/2017 11:00 PM   Hub Color/Line Status Pink 4/17/2017 11:00 PM   Alcohol Cap Used Yes 4/17/2017  8:40 PM                      Urinary Catheter Urinary Catheter 04/17/17 Oneal-Criteria for Appropriate Use: Obstruction/retention    Intake & Output   Date 04/17/17 0700 - 04/18/17 0659 04/18/17 0700 - 04/19/17 0659   Shift 5540-3331 3353-7738 24 Hour Total 6125-8425 1485-2105 24 Hour Total   I  N  T  A  K  E   I.V.  (mL/kg/hr)  236.6 236.6         Cardizem Volume  11.6 11.6         Amiodarone Volume  225 225       Shift Total  (mL/kg)  236.6  (3.8) 236.6  (3.8)      O  U  T  P  U  T   Urine  (mL/kg/hr)  1100 1100         Urine Output (mL) (Urinary Catheter 04/17/17 Oneal)  1100 1100       Shift Total  (mL/kg)  1100  (17.6) 1100  (17.6)      NET  -863.4 -863.4      Weight (kg) 62.6 62.6 62.6 62.6 62.6 62.6         Readmission Risk Assessment Tool Score High Risk            27       Total Score        3 Relationship with PCP    2 Patient Living Status    9 More than 1 Admission in calendar year    5 Patient Insurance is Medicare, Medicaid or Self Pay    8 Charlson Comorbidity Score        Criteria that do not apply:    Patient Length of Stay > 5       Expected Length of Stay - - -   Actual Length of Stay 1

## 2017-04-18 NOTE — PROGRESS NOTES
[address] [place] [address]  [phone]      Cardiology Progress Note      4/18/2017 11:00AM     Admit Date: 4/17/2017    Admit Diagnosis:   A-fib [facility])    Subjective:     Mary Sharp has no complaints. Continues with weakness, but has no c/o CP or SOB. EF 55%. Remains in Afib with RVR. Discontinued amiodarone IV, starting on PO Dilt today for rate control.       Visit Vitals    BP 99/67 (BP 1 Location: Left arm, BP Patient Position: At rest)    Pulse (!) 111    Temp 97.9 °F (36.6 °C)    Resp 20    Ht 5' 10\" (1.778 m)    Wt 66.5 kg (146 lb 9.7 oz)    SpO2 100%    BMI 21.04 kg/m2       Current Facility-Administered Medications   Medication Dose Route Frequency    dilTIAZem (CARDIZEM) IR tablet 30 mg  30 mg Oral Q6H    nebivolol (BYSTOLIC) tablet 5 mg  5 mg Oral DAILY    aspirin delayed-release tablet 81 mg  81 mg Oral DAILY    azithromycin (ZITHROMAX) tablet 250 mg  250 mg Oral Q MON, WED & FRI    fluticasone-vilanterol (BREO ELLIPTA) 100mcg-25mcg/puff  1 Puff Inhalation DAILY    ferrous sulfate tablet 325 mg  325 mg Oral DAILY WITH BREAKFAST    guaiFENesin-codeine (ROBITUSSIN AC) 100-10 mg/5 mL solution 5 mL  5 mL Oral BID PRN    ipratropium (ATROVENT) 0.02 % nebulizer solution 0.5 mg  0.5 mg Nebulization QID RT    lactobac ac& pc-s.therm-b.anim (CY Q/RISAQUAD)  1 Cap Oral DAILY    levothyroxine (SYNTHROID) tablet 50 mcg  50 mcg Oral 7am    LORazepam (ATIVAN) tablet 1 mg  1 mg Oral Q6H PRN    melatonin tablet 9 mg  9 mg Oral QHS    polyethylene glycol (MIRALAX) packet 17 g  17 g Oral DAILY    predniSONE (DELTASONE) tablet 30 mg  30 mg Oral DAILY WITH BREAKFAST    famotidine (PEPCID) tablet 20 mg  20 mg Oral DAILY WITH LUNCH    sertraline (ZOLOFT) tablet 75 mg  75 mg Oral QPM    traZODone (DESYREL) tablet 100 mg  100 mg Oral QHS    sodium chloride (NS) flush 5-10 mL  5-10 mL IntraVENous Q8H    sodium chloride (NS) flush 5-10 mL  5-10 mL IntraVENous PRN  acetaminophen (TYLENOL) tablet 650 mg  650 mg Oral Q4H PRN    ondansetron (ZOFRAN) injection 4 mg  4 mg IntraVENous Q4H PRN    docusate sodium (COLACE) capsule 100 mg  100 mg Oral BID    enoxaparin (LOVENOX) injection 40 mg  40 mg SubCUTAneous DAILY    nystatin (MYCOSTATIN) 100,000 unit/mL oral suspension 500,000 Units  500,000 Units Oral QID    furosemide (LASIX) tablet 40 mg  40 mg Oral ACB&D    dilTIAZem (CARDIZEM) 100 mg in dextrose 5% (MBP/ADV) 100 mL infusion  0-15 mg/hr IntraVENous TITRATE       Objective:      Physical Exam:  General Appearance:  elderly thin  male in no acute distress  Chest:   basilar rales  Cardiovascular:  irr, irr, no murmur.   Abdomen:   Soft, non-tender, bowel sounds are active.   Extremities: no peripheral edema  Skin:  Warm and dry.     Data Review:   Recent Labs      04/18/17   0520  04/17/17   1359   WBC  10.7  12.9*   HGB  10.0*  10.3*   HCT  32.1*  35.2*   PLT  266  354     Recent Labs      04/18/17   0520  04/17/17   1441  04/17/17   1359   NA  140   --   138   K  3.6   --   4.7   CL  99   --   101   CO2  35*   --   31   GLU  103*   --   127*   BUN  29*   --   35*   CREA  1.08   --   1.30   CA  8.2*   --   8.6   ALB  2.6*   --   3.0*   TBILI  0.5   --   0.4   SGOT  168*   --   91*   ALT  172*   --   117*   INR   --   1.2*   --        Recent Labs      04/17/17   1359   TROIQ  <0.04         Intake/Output Summary (Last 24 hours) at 04/18/17 1441  Last data filed at 04/18/17 0530   Gross per 24 hour   Intake           671.58 ml   Output             1450 ml   Net          -778.42 ml        Telemetry: afib with RVR at 110s    Assessment:     Active Problems:    COPD exacerbation (HCC) (12/5/2016)      A-fib ([facility] [address] (4/17/2017)      [facility] (4/17/2017)        Plan:     Afib with RVR, chronic:  Remains in Afib rate better controlled on amiodarone and IV Dilt  Discontinue amio, change IV Dilt to PO Dilt 30mg Q6H  CHADS2 vasc score:  3, recommend long term anticoagulation  Continues on Lovenox, increase to full dose for coverage   Case management consult to determine if patient can afford NOAC  If rate remains uncontrolled, consider ASHU and cardioversion   May require AV escobar ablation if rate/rhythm difficult to control

## 2017-04-18 NOTE — PROGRESS NOTES
Problem: Mobility Impaired (Adult and Pediatric)  Goal: *Acute Goals and Plan of Care (Insert Text)  Physical Therapy Goals  Initiated 4/18/2017  1. Patient will move from supine to sit and sit to supine in bed with modified independence within 7 day(s). 2. Patient will transfer from bed to chair and chair to bed with modified independence using the least restrictive device within 7 day(s). 3. Patient will perform sit to stand with modified independence within 7 day(s). 4. Patient will ambulate with /contact guard assist for 50 feet with the least restrictive device within 7 day(s). 5. Patient will ascend/descend 3 stairs with bilateral handrail(s) with CGA within 7 day(s). PHYSICAL THERAPY EVALUATION  Patient: Sharyle Conception (43 y.o. male)  Date: 4/18/2017  Primary Diagnosis: A-fib [facility])        Precautions:   Fall, DNR      ASSESSMENT :  Based on the objective data described below, the patient presents with decreased functional mobility, impaired balance and gait, decreased tolerance to activity. Patient with recent admission and discharged home with HHPT, but per spouse's report patient was not able to participate due to weakness and shortness of breath. Patient received supine in bed and agreeable to therapy. Patient on 5L of oxygen at rest, spO2: 98%. Patient completed supine to sit edge of bed with supervision, use of bed rails and increased time to complete. Sit<>stand with min assist and HHA x 2. Patient ambulated a short distance for bed to chair transfer with min assist and HHA x 2. Patient with reports of shortness of breath during minimal activity and requiring increased oxygen support from baseline. SpO2 with activity was 95%. Patient will continue to beenfit from PT to progress mobility and reach amanda level of independence and improve tolerance to activity. Patient would likely benefit from inpatient pulmonary rehab.       Patient will benefit from skilled intervention to address the above impairments. Patients rehabilitation potential is considered to be Fair  Factors which may influence rehabilitation potential include:   [ ]         None noted  [ ]         Mental ability/status  [ ]         Medical condition  [ ]         Home/family situation and support systems  [ ]         Safety awareness  [ ]         Pain tolerance/management  [ ]         Other:        PLAN :  Recommendations and Planned Interventions:  [ ]           Bed Mobility Training             [ ]    Neuromuscular Re-Education  [ ]           Transfer Training                   [ ]    Orthotic/Prosthetic Training  [ ]           Gait Training                         [ ]    Modalities  [ ]           Therapeutic Exercises           [ ]    Edema Management/Control  [ ]           Therapeutic Activities            [ ]    Patient and Family Training/Education  [ ]           Other (comment):     Frequency/Duration: Patient will be followed by physical therapy  4 times a week to address goals. Discharge Recommendations: Inpatient pulmonary rehab  Further Equipment Recommendations for Discharge:TBD at rehab       SUBJECTIVE:   Patient stated I am a little weak.       OBJECTIVE DATA SUMMARY:   HISTORY:    Past Medical History:   Diagnosis Date    Adjustment disorder with mixed anxiety and depressed mood      Bronchiectasis (HCC)      Chronic obstructive pulmonary disease (HCC)      Diastolic CHF, chronic ([facility] [address] 11/4/2015    Essential hypertension, benign 9/20/2009    GERD (gastroesophageal reflux disease) 9/20/2009    MI (myocardial infarction) ([facility] [address]      Migraine headache 9/20/2009    Post-tuberculous bronchiectasis 10/17/2013    Pulmonary fibrosis ([facility] [address]      Pulmonary tuberculosis      Thyroid disease       hypothyroidism    Urinary retention     History reviewed. No pertinent surgical history.   Prior Level of Function/Home Situation: mod I with use of RW, on oxygen 4L at home  Personal factors and/or comorbidities impacting plan of care:      Home Situation  Home Environment: Private residence  # Steps to Enter: 3  Rails to Enter: Yes  Hand Rails : Bilateral  One/Two Story Residence: One story  Living Alone: No  Support Systems: Spouse/Significant Other/Partner  Patient Expects to be Discharged to[de-identified] Private residence  Current DME Used/Available at Home: Grab bars, Commode, bedside, Wheelchair, power, Wheelchair, Walker, rolling, Oxygen, portable  Tub or Shower Type: Shower     EXAMINATION/PRESENTATION/DECISION MAKING:   Critical Behavior:  Neurologic State: Alert  Orientation Level: Oriented X4  Cognition: Appropriate decision making     Hearing: Auditory  Auditory Impairment: None  Skin:    Edema:   Range Of Motion:  AROM: Within functional limits           PROM: Within functional limits           Strength:    Strength: Generally decreased, functional                    Tone & Sensation:                  Sensation: Intact               Coordination:     Vision:      Functional Mobility:  Bed Mobility:     Supine to Sit: Supervision; Additional time; Adaptive equipment        Transfers:  Sit to Stand: Minimum assistance  Stand to Sit: Minimum assistance        Bed to Chair: Minimum assistance              Balance:   Sitting: Intact  Standing: Impaired; With support  Standing - Static: Fair  Standing - Dynamic : Fair  Ambulation/Gait Training:  Distance (ft): 5 Feet (ft)  Assistive Device: Gait belt  Ambulation - Level of Assistance: Minimal assistance (HHA x 2)        Gait Abnormalities: Decreased step clearance;Shuffling gait;Trunk sway increased        Base of Support: Narrowed     Speed/Daysi: Pace decreased (<100 feet/min); Shuffled  Step Length: Right shortened;Left shortened                       Therapeutic Exercises:    Ankle pumps, LAQ x 10     Functional Measure:  Tinetti test:      Sitting Balance: 1  Arises: 1  Attempts to Rise: 1  Immediate Standing Balance: 1  Standing Balance: 0  Nudged: 0  Eyes Closed: 0  Turn 360 Degrees - Continuous/Discontinuous: 0  Turn 360 Degrees - Steady/Unsteady: 0  Sitting Down: 1  Balance Score: 5  Indication of Gait: 0  R Step Length/Height: 1  L Step Length/Height: 1  R Foot Clearance: 1  L Foot Clearance: 1  Step Symmetry: 1  Step Continuity: 0  Path: 0  Trunk: 1  Walking Time: 1  Gait Score: 7  Total Score: 12         Tinetti Test and G-code impairment scale:  Percentage of Impairment CH     0%    CI     1-19% CJ     20-39% CK     40-59% CL     60-79% CM     80-99% CN      100%   Tinetti  Score 0-28 28 23-27 17-22 12-16 6-11 1-5 0          Tinetti Tool Score Risk of Falls  <19 = High Fall Risk  19-24 = Moderate Fall Risk  25-28 = Low Fall Risk  Tinetti ME. Performance-Oriented Assessment of Mobility Problems in Elderly Patients. [address]; [id number]. (Scoring Description: PT Bulletin Feb. 10, 1993)     Older adults: Johnny Lord et al, 2009; n = 1000 [facility] elderly evaluated with ABC, MARK, ADL, and IADL)  · Mean MARK score for males aged 69-68 years = 26.21(3.40)  · Mean MARK score for females age 69-68 years = 25.16(4.30)  · Mean MARK score for males over 80 years = 23.29(6.02)  · Mean MARK score for females over 80 years = 17.20(8.32)            G codes: In compliance with CMSs Claims Based Outcome Reporting, the following G-code set was chosen for this patient based on their primary functional limitation being treated: The outcome measure chosen to determine the severity of the functional limitation was the Tinetti with a score of 12/28 which was correlated with the impairment scale.       · Mobility - Walking and Moving Around:               - CURRENT STATUS:    CK - 40%-59% impaired, limited or restricted               - GOAL STATUS:           CJ - 20%-39% impaired, limited or restricted               - D/C STATUS:                       ---------------To be determined---------------       Based on the above components, the patient evaluation is determined to be of the following complexity level: LOW      Pain:  Pain Scale 1: Numeric (0 - 10)  Pain Intensity 1: 0              Activity Tolerance:   Good. VSS  Please refer to the flowsheet for vital signs taken during this treatment. After treatment:   [X]         Patient left in no apparent distress sitting up in chair  [ ]         Patient left in no apparent distress in bed  [X]         Call bell left within reach  [X]         Nursing notified  [X]         Caregiver present  [X]         Bed alarm activated      COMMUNICATION/EDUCATION:   The patients plan of care was discussed with: Occupational Therapist and Registered Nurse.  [X]         Fall prevention education was provided and the patient/caregiver indicated understanding. [X]         Patient/family have participated as able in goal setting and plan of care. [X]         Patient/family agree to work toward stated goals and plan of care. [ ]         Patient understands intent and goals of therapy, but is neutral about his/her participation. [ ]         Patient is unable to participate in goal setting and plan of care.      Thank you for this referral.  Wiliam Ferrell, PT, DPT   Time Calculation: 12 mins

## 2017-04-19 NOTE — PROGRESS NOTES
Problem: Mobility Impaired (Adult and Pediatric)  Goal: *Acute Goals and Plan of Care (Insert Text)  Physical Therapy Goals  Initiated 4/18/2017  1. Patient will move from supine to sit and sit to supine in bed with modified independence within 7 day(s). 2. Patient will transfer from bed to chair and chair to bed with modified independence using the least restrictive device within 7 day(s). 3. Patient will perform sit to stand with modified independence within 7 day(s). 4. Patient will ambulate with /contact guard assist for 50 feet with the least restrictive device within 7 day(s). 5. Patient will ascend/descend 3 stairs with bilateral handrail(s) with CGA within 7 day(s). PHYSICAL THERAPY TREATMENT  Patient: Hood Callahan (89 y.o. male)  Date: 4/19/2017  Diagnosis: A-fib [facility]) <principal problem not specified>       Precautions: Fall, DNR      ASSESSMENT:  Patient received seated in chair and agreeable to therapy. Patient remains on 4.5L throughout session. Patient completed sit<>stand with RW with CGA-min assist. Patient increased gait distance ambulated with CGA-min assist 15 ft x 2. Patient spO2: 93% during activity and noted HR increased to 120 bpm at it's highest for a brief moment and returned back to 90's-105 bpm. Patient able to complete standing tasks at the sink with CGA (see OT note for details). Patient remained seated in chair and encouraged to continue to ambulate with RN staff to and from the bathroom with RW to promote additional ambulation throughout the day. Patient will continue to benefit from PT to progress mobility as tolerated and reach highest level of independence. Patient would benefit from pulmonary rehab upon discharge.    Progression toward goals:  [X]    Improving appropriately and progressing toward goals  [ ]    Improving slowly and progressing toward goals  [ ]    Not making progress toward goals and plan of care will be adjusted       PLAN:  Patient continues to benefit from skilled intervention to address the above impairments. Continue treatment per established plan of care. Discharge Recommendations: Pulmonary Inpatient Rehab  Further Equipment Recommendations for Discharge:  TBD       SUBJECTIVE:   Patient stated I'm feeling better.       OBJECTIVE DATA SUMMARY:   Critical Behavior:  Neurologic State: Alert  Orientation Level: Oriented X4  Cognition: Follows commands, Appropriate decision making  Safety/Judgement: Fall prevention  Functional Mobility Training:  Bed Mobility:                    Transfers:  Sit to Stand: Minimum assistance;Contact guard assistance  Stand to Sit: Contact guard assistance                             Balance:  Sitting: Intact  Standing: Impaired; With support  Standing - Static: Fair  Standing - Dynamic : Fair  Ambulation/Gait Training:  Distance (ft): 30 Feet (ft) (15 ft x 2)  Assistive Device: Gait belt;Walker, rolling  Ambulation - Level of Assistance: Minimal assistance        Gait Abnormalities: Decreased step clearance;Shuffling gait;Trunk sway increased        Base of Support: Narrowed     Speed/Daysi: Pace decreased (<100 feet/min); Shuffled  Step Length: Right shortened;Left shortened        Pain:  Pain Scale 1: Numeric (0 - 10)  Pain Intensity 1: 0              Activity Tolerance:   Good. VSS. Please refer to the flowsheet for vital signs taken during this treatment.   After treatment:   [X]    Patient left in no apparent distress sitting up in chair  [ ]    Patient left in no apparent distress in bed  [X]    Call bell left within reach  [X]    Nursing notified  [ ]    Caregiver present  [X]    Bed alarm activated      COMMUNICATION/COLLABORATION:   The patients plan of care was discussed with: Occupational Therapist and Registered Nurse     Bettie Benitez PT, DPT   Time Calculation: 19 mins

## 2017-04-19 NOTE — PROGRESS NOTES
Problem: Self Care Deficits Care Plan (Adult)  Goal: *Acute Goals and Plan of Care (Insert Text)  Occupational Therapy Goals  Initiated 4/18/2017  1. Patient will perform grooming in standing with supervision/set-up within 7 day(s). 2. Patient will perform upper body dressing and lower body dressing, using adaptive aids, prn, with supervision/set-up within 7 day(s). 3. Patient will perform simple home management with minimal assistance/contact guard assist within 7 day(s). 4. Patient will perform toilet transfers with supervision/set-up within 7 day(s). 5. Patient will perform all aspects of toileting with supervision/set-up within 7 day(s). 6. Patient will participate in upper extremity therapeutic exercise/activities with supervision/set-up for 5 minutes within 7 day(s). 7. Patient will utilize energy conservation techniques during functional activities with minimal verbal cues within 7 day(s). OCCUPATIONAL THERAPY TREATMENT  Patient: Taylor Flores (89 y.o. male)  Date: 4/19/2017  Diagnosis: A-fib ([facility] [address] <principal problem not specified>       Precautions: Fall, DNR      ASSESSMENT:  Pt was sitting up in recliner and call bell light was on. Pt had to use the restroom. He was able to stand with min assist of 2 and min to CGA for transfers to toilet with RW. He was min assist for sit to stand from toilet. CGA for standing at the sink to wash his hands. He did not care to brush his teeth or wash his face but was wanting to walk to the door of the room. He did so with CGA and was left in recliner resting when OT left the room. Pt was on NC and his O2% remained above 95% through out the session. Pt is progressing and recommendations for pt at discharge is pulmonary rehab.   Progression toward goals:  [ ]       Improving appropriately and progressing toward goals  [X]       Improving slowly and progressing toward goals  [ ]       Not making progress toward goals and plan of care will be adjusted PLAN:  Patient continues to benefit from skilled intervention to address the above impairments. Continue treatment per established plan of care. Discharge Recommendations:  Pulmonary Rehab  Further Equipment Recommendations for Discharge:  tbd       SUBJECTIVE:   Patient stated I dont need to rest.  I can walk to the door. Felton Ryan      OBJECTIVE DATA SUMMARY:   Cognitive/Behavioral Status:  Neurologic State: Alert  Orientation Level: Oriented X4  Cognition: Appropriate decision making; Appropriate for age attention/concentration; Appropriate safety awareness; Follows commands              Functional Mobility and Transfers for ADLs:           Transfers:  Sit to Stand: Minimum assistance;Contact guard assistance        Balance:  Sitting: Intact  Standing: Impaired; With support  Standing - Static: Fair  Standing - Dynamic : Fair     ADL Intervention:     pts wife reports that she helps pt with ADLs PTA and may help him too much. He is setup for grooming per wife seated and max assist for rest of ADLs. Pain:  Pain Scale 1: Numeric (0 - 10)  Pain Intensity 1: 0              Activity Tolerance:   vss  Please refer to the flowsheet for vital signs taken during this treatment.   After treatment:   [X] Patient left in no apparent distress sitting up in chair  [ ] Patient left in no apparent distress in bed  [X] Call bell left within reach  [X] Nursing notified  [X] Caregiver present  [ ] Bed alarm activated      COMMUNICATION/COLLABORATION:   The patients plan of care was discussed with: Physical Therapist and Registered Nurse     Marleen Torrez OT  Time Calculation: 19 mins

## 2017-04-19 NOTE — PROGRESS NOTES
[address] [address]  [phone]      Cardiology Progress Note      4/19/2017 2:00 PM    Admit Date: 4/17/2017    Admit Diagnosis:   A-fib [facility])    Subjective:     Rebecca Dhaliwal has no c/o SOB, CP, remains in afib. Amiodarone discontinued, rate trending back upward to 100s on PO Dilt QID and Bystolic. BP overall stable, low 90s overnight.      Visit Vitals    /64 (BP 1 Location: Left arm, BP Patient Position: At rest)    Pulse 100    Temp 97.7 °F (36.5 °C)    Resp 18    Ht 5' 10\" (1.778 m)    Wt 63 kg (138 lb 12.8 oz)    SpO2 98%    BMI 19.92 kg/m2       Current Facility-Administered Medications   Medication Dose Route Frequency    furosemide (LASIX) tablet 20 mg  20 mg Oral ACB&D    dilTIAZem CD (CARDIZEM CD) capsule 180 mg  180 mg Oral DAILY    nebivolol (BYSTOLIC) tablet 5 mg  5 mg Oral DAILY    enoxaparin (LOVENOX) 70 mg  70 mg SubCUTAneous Q12H    ipratropium (ATROVENT) 0.02 % nebulizer solution 0.5 mg  0.5 mg Nebulization Q6HWA RT    aspirin delayed-release tablet 81 mg  81 mg Oral DAILY    azithromycin (ZITHROMAX) tablet 250 mg  250 mg Oral Q MON, WED & FRI    fluticasone-vilanterol (BREO ELLIPTA) 100mcg-25mcg/puff  1 Puff Inhalation DAILY    ferrous sulfate tablet 325 mg  325 mg Oral DAILY WITH BREAKFAST    guaiFENesin-codeine (ROBITUSSIN AC) 100-10 mg/5 mL solution 5 mL  5 mL Oral BID PRN    lactobac ac& pc-s.therm-b.anim (CY Q/RISAQUAD)  1 Cap Oral DAILY    levothyroxine (SYNTHROID) tablet 50 mcg  50 mcg Oral 7am    LORazepam (ATIVAN) tablet 1 mg  1 mg Oral Q6H PRN    melatonin tablet 9 mg  9 mg Oral QHS    polyethylene glycol (MIRALAX) packet 17 g  17 g Oral DAILY    predniSONE (DELTASONE) tablet 30 mg  30 mg Oral DAILY WITH BREAKFAST    famotidine (PEPCID) tablet 20 mg  20 mg Oral DAILY WITH LUNCH    sertraline (ZOLOFT) tablet 75 mg  75 mg Oral QPM    traZODone (DESYREL) tablet 100 mg  100 mg Oral QHS    sodium chloride (NS) flush 5-10 mL  5-10 mL IntraVENous Q8H    sodium chloride (NS) flush 5-10 mL  5-10 mL IntraVENous PRN    acetaminophen (TYLENOL) tablet 650 mg  650 mg Oral Q4H PRN    ondansetron (ZOFRAN) injection 4 mg  4 mg IntraVENous Q4H PRN    docusate sodium (COLACE) capsule 100 mg  100 mg Oral BID    nystatin (MYCOSTATIN) 100,000 unit/mL oral suspension 500,000 Units  500,000 Units Oral QID       Objective:      Physical Exam:  General Appearance:  thin  male in no acute distress  Chest:  rhonchi  Cardiovascular:  irr, irr  no murmur.   Abdomen:   Soft, non-tender, bowel sounds are active.   Extremities: no peripheral edema  Skin:  Warm and dry.     Data Review:   Recent Labs      04/19/17   0507  04/18/17   0520  04/17/17   1359   WBC  9.0  10.7  12.9*   HGB  10.0*  10.0*  10.3*   HCT  31.9*  32.1*  35.2*   PLT  247  266  354     Recent Labs      04/19/17   0507  04/18/17   0520  04/17/17   1441  04/17/17   1359   NA  142  140   --   138   K  3.3*  3.6   --   4.7   CL  98  99   --   101   CO2  35*  35*   --   31   GLU  79  103*   --   127*   BUN  24*  29*   --   35*   CREA  0.96  1.08   --   1.30   CA  7.8*  8.2*   --   8.6   ALB  2.4*  2.6*   --   3.0*   TBILI  0.5  0.5   --   0.4   SGOT  55*  168*   --   91*   ALT  120*  172*   --   117*   INR   --    --   1.2*   --        Recent Labs      04/17/17   1359   TROIQ  <0.04         Intake/Output Summary (Last 24 hours) at 04/19/17 1400  Last data filed at 04/19/17 1214   Gross per 24 hour   Intake           525.16 ml   Output             2875 ml   Net         -2349.84 ml        Telemetry: afib with RVR at 90-110s    Assessment:     Active Problems:    COPD exacerbation (HCC) (12/5/2016)      A-fib ([address] (4/17/2017)      ARF (acute renal failure) ([facility] [address] (4/17/2017)        Plan:     Afib with RVR, chronic:  Remains in Afib rate trending upward  Change short acting Dilt to long acting for better rate control  Long discussion with patient and family reference AV Gm Ablation with Pacemaker implant as a last option for control  CHADS2 vasc score: 3, recommend long term anticoagulation  Continues on Lovenox, increase to full dose for coverage   Case management consult to determine if patient can afford NOAC - Eliquis  If rate remains uncontrolled, consider ASHU and cardioversion

## 2017-04-19 NOTE — PROGRESS NOTES
Interdisciplinary team rounds were held 4/19/2017 with the following team members:Care Management, Nursing, Nutrition, Pharmacy, Physical Therapy and Physician and the patient. Plan of care discussed. See clinical pathway and/or care plan for interventions and desired outcomes.     Discharge disposition: pulmonary rehab tomorrow

## 2017-04-19 NOTE — PROGRESS NOTES
Spoke with Soha Colindres the pharmacist at [facility] -- [phone] to price Eliquis 5mg po bid. Per Soha Colindres the cost to patient would be $30.00 and no preauthorization is required. Patient informed and that is fine with him. Called and informed Dr Virgen Harman.

## 2017-04-19 NOTE — PROGRESS NOTES
0730 Report received from Solange Weinberg, [address] [place] [address].  1000 pt. Ambulated to chair with PT assistance x2 and walker. 1130 pt. Returned to bed with assistance x2 and walker. 1838 pt. Ambulated to bathroom, had small bowel movement. Assisted to recliner.

## 2017-04-19 NOTE — PROGRESS NOTES
Hospitalist Progress Note    NAME: Maude Call   :  1945   MRN:  [id number]         Assessment / Plan:  New onset atrial fibrillation: Recent cardiac w/u including negative stress testing 3/17. Still with elevated HR, mostly 100-120  - CXR on admission no acute findings  - off amiodarone. Con't diltiazem oral, transitioning to daily dosing  - con't home nadalol and lasix  - free T4 normal  - CM checking coverage for eliquis  - cardiology consult appreciated, will monitor HR with activity to determine if needs additional procedures while here (such as ablation). If HR adequate, may go to SNF for improvement in conditioning and then re-assess cardiac needs. Chronic hypoxic respiratory failure due to chronic bronchiectasis, chronic COPD and pulmonary fibrosis in setting of remote pulmonary TB:  4.5 LPM at baseline, able to wean below baseline today due to improving oxygenation  - con't steroids at slightly reduced dose in case these are contributing to his edema (started long taper per Dr. Torres Ruby last week)  - con't ipratropium nebs scheduled QID with prn xopenex  - con't symbicort, recently started. Still holding spiriva while on ipratropium nebs. - con't robitussin AC as he does at home  Acute kidney injury (resolved) in setting of acute recurrent urinary retention: baseline Cr 0.7-0.9, had english removed ~1 1/2 wks ago in urology office  - con't lasix with caution  - still holding home lisinopril due to hypotension  - english replaced in ER with >700mL. Will need to keep in place until seen again by urology.   Acute transaminitis: No clear etiology, LFTs now improving  - no acute liver findings on CT A/P 3/28/17  - hepatitis panel, EBV pending  - serial LFTs  Oral thrush: due to steroids  - nystatin oral  CAD s/p MI POA  Hypothyroidism: con't synthroid, checking thyroid function as above      Code Status: DNR  Surrogate Decision Maker: wife  DVT Prophylaxis: lovenox     Subjective:     Chief Complaint / Reason for Physician Visit  \"I'm doing better\". Able to ambulate to bathroom x 3. Discussed with RN events overnight. Review of Systems:  Symptom Y/N Comments  Symptom Y/N Comments   Fever/Chills n   Chest Pain n    Poor Appetite n   Edema y    Cough n   Abdominal Pain n    Sputum n   Joint Pain     SOB/KOO y   Pruritis/Rash     Nausea/vomit    Tolerating PT/OT     Diarrhea    Tolerating Diet     Constipation    Other       Could NOT obtain due to:      Objective:     VITALS:   Last 24hrs VS reviewed since prior progress note. Most recent are:  Patient Vitals for the past 24 hrs:   Temp Pulse Resp BP SpO2   04/19/17 1131 97.7 °F (36.5 °C) 100 18 118/64 100 %   04/19/17 0757 97.5 °F (36.4 °C) 96 20 128/86 96 %   04/19/17 0731 - - - - 99 %   04/19/17 0520 - 90 - 106/70 -   04/19/17 0400 98 °F (36.7 °C) 89 20 105/65 100 %   04/19/17 0300 - 89 - 110/68 100 %   04/19/17 0001 - 81 - 103/56 99 %   04/18/17 2312 - 83 - 93/64 -   04/18/17 2300 - 87 - 93/64 96 %   04/18/17 2228 - 83 - 91/57 99 %   04/18/17 2130 - (!) 115 - 109/82 95 %   04/18/17 2100 - (!) 113 - 112/71 94 %   04/18/17 2030 - (!) 107 - 117/69 95 %   04/18/17 2000 - (!) 104 - 107/71 95 %   04/18/17 1957 - - - - 94 %   04/18/17 1930 - (!) 109 - 107/73 95 %   04/18/17 1900 - (!) 113 - 119/86 99 %   04/18/17 1824 - (!) 111 - 116/72 -   04/18/17 1630 97.9 °F (36.6 °C) (!) 115 20 105/74 99 %   04/18/17 1358 97.9 °F (36.6 °C) (!) 111 20 99/67 100 %       Intake/Output Summary (Last 24 hours) at 04/19/17 1152  Last data filed at 04/19/17 0556   Gross per 24 hour   Intake           709.99 ml   Output             2225 ml   Net         -1515.01 ml        PHYSICAL EXAM:  General: WD, WN. Alert, cooperative, no acute distress    EENT:  EOMI. Anicteric sclerae. MMM  Resp:  Poor air movement, crackles R base, no wheezing or rales.   No accessory muscle use  CV:  Irregular rhythm,  pedal edema  GI:  Soft, Non distended, Non tender.  +Bowel sounds  Neurologic:  Alert and oriented X 3, normal speech,   Psych:   Good insight. Not anxious nor agitated  Skin:  No rashes. No jaundice    Reviewed most current lab test results and cultures  YES  Reviewed most current radiology test results   YES  Review and summation of old records today    NO  Reviewed patient's current orders and MAR    YES  PMH/SH reviewed - no change compared to H&P  ________________________________________________________________________  Care Plan discussed with:    Comments   Patient x    Family  x wife   RN x    Care Manager     Consultant  x cardiology                     Multidiciplinary team rounds were held today with , nursing, pharmacist and clinical coordinator. Patient's plan of care was discussed; medications were reviewed and discharge planning was addressed. ________________________________________________________________________  Total NON critical care TIME:  30 Minutes    Total CRITICAL CARE TIME Spent:   Minutes non procedure based      Comments   >50% of visit spent in counseling and coordination of care x    ________________________________________________________________________  Skyler Jurado MD     Procedures: see electronic medical records for all procedures/Xrays and details which were not copied into this note but were reviewed prior to creation of Plan. LABS:  I reviewed today's most current labs and imaging studies.   Pertinent labs include:  Recent Labs      04/19/17   0507  04/18/17   0520  04/17/17   1359   WBC  9.0  10.7  12.9*   HGB  10.0*  10.0*  10.3*   HCT  31.9*  32.1*  35.2*   PLT  247  266  354     Recent Labs      04/19/17   0507  04/18/17   0520  04/17/17   1441  04/17/17   1359   NA  142  140   --   138   K  3.3*  3.6   --   4.7   CL  98  99   --   101   CO2  35*  35*   --   31   GLU  79  103*   --   127*   BUN  24*  29*   --   35*   CREA  0.96  1.08   --   1.30   CA  7.8*  8.2*   --   8.6   ALB  2.4*  2.6*   --   3.0* TBILI  0.5  0.5   --   0.4   SGOT  55*  168*   --   91*   ALT  120*  172*   --   117*   INR   --    --   1.2*   --        Signed: Gladis Esteban MD

## 2017-04-19 NOTE — PROGRESS NOTES
Initial Nutrition Assessment:    INTERVENTIONS/RECOMMENDATIONS:   · Meals/Snacks: General/healthful diet: Continue Regular, 2g Na diet   · Supplements: Commercial supplement: Continue vanilla ensure TID    ASSESSMENT:   Patient medically noted for AFIB, RYAN, Chronic respiratory failure, and oral thrush. PMH for COPD, GERD, HTN, cardiomyopathy, and CHF. Patient reports a fair appetite; decreased intake related to this and constipation. He reports food is pretty good. Ensure shakes ordered this morning by MD. Patient and family at bedside report he drinks vanilla at home. Will continue TID. Encouraged use of menu/room service to receive preferences. Diet Order: Regular (2g Na + ensure TID)  % Eaten:  No data found. Pertinent Medications: [x]Reviewed []Other: Amiodarone, cardizem, colace, famotidine, ferrous sulfate, lasix, joan q, synthroid, miralax, KCl, prednisone, zoloft, trazodone   Pertinent Labs: [x]Reviewed []Other: K+ 3.3  Food Allergies: [x]None []Other   Last BM: 4/15   [x]Active     []Hyperactive  []Hypoactive       [] Absent BS  Skin:    [] Intact   [] Incision  [] Breakdown  [x] Other: Skin tears bilateral forearms     Anthropometrics:   Height: 5' 10\" (177.8 cm) Weight: 63 kg (138 lb 12.8 oz)   IBW (%IBW):   ( ) UBW (%UBW):   (  %)   Last Weight Metrics:  Weight Loss Metrics 4/19/2017 4/7/2017 3/31/2017 3/28/2017 3/1/2017 2/28/2017 2/27/2017   Today's Wt 138 lb 12.8 oz 138 lb 137 lb 6.4 oz - 132 lb 8 oz 136 lb 132 lb   BMI 19.92 kg/m2 19.26 kg/m2 - 19.17 kg/m2 18.49 kg/m2 18.98 kg/m2 18.42 kg/m2       BMI: Body mass index is 19.92 kg/(m^2). This BMI is indicative of:   []Underweight    [x]Normal    []Overweight    [] Obesity   [] Extreme Obesity (BMI>40)     Estimated Nutrition Needs (Based on):   1796 Kcals/day (BMR (1381) x 1. 3AF) , 63 g (-76g (1.0-1.2 g/kg bw)) Protein  Carbohydrate:  At Least 130 g/day  Fluids: 1800 mL/day (1ml/kcal)    Pt expected to meet estimated nutrient needs: [x]Yes []No    NUTRITION DIAGNOSES:   Problem:  Inadequate protein-energy intake      Etiology: related to decreased appetite, constipation     Signs/Symptoms: as evidenced by PO intake <50% of meals on average       NUTRITION INTERVENTIONS:  Meals/Snacks: General/healthful diet   Supplements: Commercial supplement              GOAL:   PO intake >50% of meals/supplements next 3-5 days    LEARNING NEEDS (Diet, Food/Nutrient-Drug Interaction):    [x] None Identified   [] Identified and Education Provided/Documented   [] Identified and Pt declined/was not appropriate     Cultureal, Amish, OR Ethnic Dietary Needs:    [x] None Identified   [] Identified and Addressed     [x] Interdisciplinary Care Plan Reviewed/Documented    [x] Discharge Planning:  Continue Regular/low sodium diet + Ensure 2-3x/day     MONITORING /EVALUATION:   Food/Nutrient Intake Outcomes:  Total energy intake  Physical Signs/Symptoms Outcomes: Weight/weight change, Electrolyte and renal profile    NUTRITION RISK:    [] High              [x] Moderate           []  Low  []  Minimal/Uncompromised    PT SEEN FOR:    [x]  MD Consult: []Calorie Count      []Diabetic Diet Education        []Diet Education     []Electrolyte Management     [x]General Nutrition Management and Supplements     []Management of Tube Feeding     []TPN Recommendations    []  RN Referral:  []MST score >=2     []Enteral/Parenteral Nutrition PTA     []Pregnant: Gestational DM or Multigestation     []Pressure Ulcer/Wound Care needs        []  Low BMI  []  DTR Referral       Isabella Lopes  Pager [phone]                 Weekend Pager [phone]

## 2017-04-19 NOTE — PROGRESS NOTES
PCU SHIFT NURSING NOTE      1915: Bedside shift change report given to Ara Valentin RN (oncoming nurse) by Chester Lantigua RN (offgoing nurse). Report included the following information: SBAR, Kardex, MAR, I/O, Progress notes, cardiac telemetry monitor. Shift Summary:     2045: assessment complete. Patient resting in bed, denies complaints. Safety measures intact. Bed in lowest position and locked. Call light in reach. Will cont to monitor. 2315: Cardizem gtt turned off at this time per MD order, patient receiving Cardizem PO given. Ativan 1mg PO given for restlessness. Safety measures intact. Will cont to monitor. Admission Date 4/17/2017   Admission Diagnosis A-fib ([address]   Consults IP CONSULT TO CARDIOLOGY        Consults   [x]PT   []OT   []Speech   []Case Management      [] Palliative      Cardiac Monitoring Order   [x]Yes   []No     IV drips   []Yes    Drip:                            Dose:  Drip:                            Dose:  Drip:                            Dose:   [x]No     GI Prophylaxis   [x]Yes   []No         DVT Prophylaxis   SCDs:             Jm stockings:         [x] Medication   []Contraindicated   []None      Activity Level Activity Level: Up with Assistance     Activity Assistance: Partial (two people)   Purposeful Rounding every 1-2 hour? [x]Yes   Pelaez Score  Total Score: 3   Bed Alarm (If score 3 or >)   []Yes   [] Refused (See signed refusal form in chart)   Azael Score  Azael Score: 17   Azael Score (if score 14 or less)   []PMT consult   []Wound Care consult      []Specialty bed   [] Nutrition consult          Needs prior to discharge:   Home O2 required:    []Yes   []No    If yes, how much O2 required?     Other:    Last Bowel Movement: Last Bowel Movement Date: 04/15/17      Influenza Vaccine Received Flu Vaccine for Current Season (usually Sept-March): Not Flu Season        Pneumonia Vaccine           Diet Active Orders   Diet    DIET REGULAR 2 GM NA (House Low NA)      LDAs Peripheral IV 04/17/17 Left Wrist (Active)   Site Assessment Clean, dry, & intact 4/18/2017  8:45 PM   Phlebitis Assessment 0 4/18/2017  8:45 PM   Infiltration Assessment 0 4/18/2017  8:45 PM   Dressing Status Clean, dry, & intact 4/18/2017  8:45 PM   Dressing Type Transparent 4/18/2017  8:45 PM   Hub Color/Line Status Pink 4/18/2017  8:45 PM   Alcohol Cap Used Yes 4/17/2017  8:40 PM       Peripheral IV 04/18/17 Right Forearm (Active)   Site Assessment Clean, dry, & intact 4/18/2017  8:45 PM   Phlebitis Assessment 0 4/18/2017  8:45 PM   Infiltration Assessment 0 4/18/2017  8:45 PM   Dressing Status Clean, dry, & intact 4/18/2017  8:45 PM   Dressing Type Transparent 4/18/2017  8:45 PM   Hub Color/Line Status Blue 4/18/2017  8:45 PM                      Urinary Catheter Urinary Catheter 04/17/17 Oneal-Criteria for Appropriate Use: Obstruction/retention    Intake & Output   Date 04/18/17 0700 - 04/19/17 0659 04/19/17 0700 - 04/20/17 0659   Shift 4660-2039 8274-0006 24 Hour Total 8200-2903 8211-8347 24 Hour Total   I  N  T  A  K  E   P.O.  240 240         P. O.  240 240       I.V.  (mL/kg/hr) 209.9  (0.3) 20.1 230         Cardizem Volume 67.4 20.1 87.5         Amiodarone Volume 142.5  142.5       Shift Total  (mL/kg) 209.9  (3.2) 260.1  (3.9) 470  (7.1)      O  U  T  P  U  T   Urine  (mL/kg/hr) 675  (0.8) 450 1125         Urine Output (mL) (Urinary Catheter 04/17/17 Oneal)        Shift Total  (mL/kg) 675  (10.2) 450  (6.8) 1125  (16.9)      NET -465.1 -189.9 -655      Weight (kg) 66.5 66.5 66.5 66.5 66.5 66.5         Readmission Risk Assessment Tool Score High Risk            27       Total Score        3 Relationship with PCP    2 Patient Living Status    9 More than 1 Admission in calendar year    5 Patient Insurance is Medicare, Medicaid or Self Pay    8 Charlson Comorbidity Score        Criteria that do not apply:    Patient Length of Stay > 5       Expected Length of Stay 2d 14h   Actual Length of Stay 2

## 2017-04-20 NOTE — PROGRESS NOTES
Hospitalist Progress Note    NAME: Dameon Kirkland   :  1945   MRN:  [id number]       Interim Hospital Summary: 66 y.o. male whom presented on 2017 with      Assessment / Plan:  New onset atrial fibrillation: Recent cardiac w/u including negative stress testing 3/17. Still with elevated HR, worsening overnight for unclear reason (clinically otherwise improved)  - CXR on admission no acute findings  - off amiodarone. Con't diltiazem oral, dose uptitrated today  - con't home nadalol and lasix  - free T4 normal  - eliquis started, appreciate CM consult regarding cost  - cardiology consult appreciated, plan for DC cardioversion in AM.  D/w Pt that he will be NPO after midnight tonight. Chronic hypoxic respiratory failure due to chronic bronchiectasis, chronic COPD and pulmonary fibrosis in setting of remote pulmonary TB:  4.5 LPM at baseline, able to wean below baseline due to improving oxygenation  - con't steroids at slightly reduced dose in case these are contributing to his edema (started long taper per Dr. Sandro Goodman last week)  - con't ipratropium nebs scheduled QID with prn xopenex  - con't symbicort, recently started. Still holding spiriva while on ipratropium nebs. - con't robitussin AC as he does at home  Acute kidney injury (resolved) in setting of acute recurrent urinary retention: baseline Cr 0.7-0.9, had english removed ~1 1/2 wks ago in urology office  - con't lasix with caution  - still holding home lisinopril due to hypotension and to allow for titration of diltiazem  - english replaced in ER with >700mL. Will need to keep in place until seen again by urology. Acute transaminitis: No clear etiology, LFTs now improving  - no acute liver findings on CT A/P 3/28/17  - hepatitis C Ab positive   - EBV IgM negative  - serial LFTs  Oral thrush: due to steroids.   Con't nystatin oral.  CAD s/p MI POA  Hypothyroidism: con't synthroid, checking thyroid function as above      Code Status: DNR  Surrogate Decision Maker: wife  DVT Prophylaxis: lovenox     Subjective:     Chief Complaint / Reason for Physician Visit  \"I was able to get to the bathroom and have a BM\". Discussed with RN events overnight. Review of Systems:  Symptom Y/N Comments  Symptom Y/N Comments   Fever/Chills n   Chest Pain n    Poor Appetite n   Edema n    Cough n   Abdominal Pain n    Sputum n   Joint Pain     SOB/KOO n   Pruritis/Rash     Nausea/vomit    Tolerating PT/OT     Diarrhea    Tolerating Diet     Constipation    Other       Could NOT obtain due to:      Objective:     VITALS:   Last 24hrs VS reviewed since prior progress note. Most recent are:  Patient Vitals for the past 24 hrs:   Temp Pulse Resp BP SpO2   04/20/17 1035 - (!) 151 - - -   04/20/17 0750 98.2 °F (36.8 °C) (!) 120 22 (!) 118/99 92 %   04/20/17 0714 - - - - 96 %   04/20/17 0548 97.8 °F (36.6 °C) (!) 114 24 111/76 95 %   04/19/17 2319 97.8 °F (36.6 °C) (!) 103 24 110/76 100 %   04/19/17 1951 98.2 °F (36.8 °C) (!) 121 24 119/90 98 %   04/19/17 1520 98.3 °F (36.8 °C) 97 18 109/72 99 %   04/19/17 1338 - - - - 98 %   04/19/17 1131 97.7 °F (36.5 °C) 100 18 118/64 100 %       Intake/Output Summary (Last 24 hours) at 04/20/17 1129  Last data filed at 04/20/17 2930   Gross per 24 hour   Intake              240 ml   Output             1950 ml   Net            -1710 ml        PHYSICAL EXAM:  General: Thin. Alert, cooperative, no acute distress    EENT:  EOMI. Anicteric sclerae. MMM  Resp:  CTA bilaterally, no wheezing or rales. No accessory muscle use  CV:  Tachycaric, irregular rhythm,  No edema  GI:  Soft, Non distended, Non tender.  +Bowel sounds  Neurologic:  Alert and oriented X 3, normal speech,   Psych:   Fair insight. Not anxious nor agitated  Skin:  Pale, No rashes.   No jaundice    Reviewed most current lab test results and cultures  YES  Reviewed most current radiology test results   YES  Review and summation of old records today    NO  Reviewed patient's current orders and MAR    YES  PMH/SH reviewed - no change compared to H&P  ________________________________________________________________________  Care Plan discussed with:    Comments   Patient x    Family  x wife   RN     Care Manager     Consultant  x kavita                     Multidiciplinary team rounds were held today with , nursing, pharmacist and clinical coordinator. Patient's plan of care was discussed; medications were reviewed and discharge planning was addressed. ________________________________________________________________________  Total NON critical care TIME:  30 Minutes    Total CRITICAL CARE TIME Spent:   Minutes non procedure based      Comments   >50% of visit spent in counseling and coordination of care x    ________________________________________________________________________  Eugenie Medina MD     Procedures: see electronic medical records for all procedures/Xrays and details which were not copied into this note but were reviewed prior to creation of Plan. LABS:  I reviewed today's most current labs and imaging studies.   Pertinent labs include:  Recent Labs      04/20/17 0340 04/19/17   0507  04/18/17   0520   WBC  10.7  9.0  10.7   HGB  10.5*  10.0*  10.0*   HCT  33.5*  31.9*  32.1*   PLT  244  247  266     Recent Labs      04/20/17   0340  04/19/17   0507  04/18/17   0520  04/17/17   1441   NA  142  142  140   --    K  4.0  3.3*  3.6   --    CL  99  98  99   --    CO2  36*  35*  35*   --    GLU  103*  79  103*   --    BUN  22*  24*  29*   --    CREA  0.83  0.96  1.08   --    CA  8.1*  7.8*  8.2*   --    ALB  2.5*  2.4*  2.6*   --    TBILI  0.4  0.5  0.5   --    SGOT  26  55*  168*   --    ALT  89*  120*  172*   --    INR   --    --    --   1.2*       Signed: Eugenie Medina MD

## 2017-04-20 NOTE — PROGRESS NOTES
PCU SHIFT NURSING NOTE      Bedside and Verbal shift change report given to Ham Donaldson (oncoming nurse) by Torri RN (offgoing nurse). Report included the following information SBAR, Kardex, Recent Results and Med Rec Status. Shift Summary:       Admission Date 4/17/2017   Admission Diagnosis A-fib ([address]   Consults IP CONSULT TO CARDIOLOGY        Consults   []PT   []OT   []Speech   []Case Management      [] Palliative      Cardiac Monitoring Order   []Yes   []No     IV drips   []Yes    Drip:                            Dose:  Drip:                            Dose:  Drip:                            Dose:   []No     GI Prophylaxis   []Yes   []No         DVT Prophylaxis   SCDs:             Jm stockings:         [] Medication   []Contraindicated   []None      Activity Level Activity Level: Up with Assistance     Activity Assistance: Partial (one person)   Purposeful Rounding every 1-2 hour? []Yes   Pelaez Score  Total Score: 3   Bed Alarm (If score 3 or >)   []Yes   [] Refused (See signed refusal form in chart)   Azael Score  Azael Score: 18   Azael Score (if score 14 or less)   []PMT consult   []Wound Care consult      []Specialty bed   [] Nutrition consult          Needs prior to discharge:   Home O2 required:    []Yes   []No    If yes, how much O2 required?     Other:    Last Bowel Movement: Last Bowel Movement Date: 04/19/17      Influenza Vaccine Received Flu Vaccine for Current Season (usually Sept-March): Not Flu Season        Pneumonia Vaccine           Diet Active Orders   Diet    DIET NPO    DIET REGULAR      LDAs               Peripheral IV 04/17/17 Left Wrist (Active)   Site Assessment Clean, dry, & intact 4/20/2017  4:01 PM   Phlebitis Assessment 0 4/20/2017  4:01 PM   Infiltration Assessment 0 4/20/2017  4:01 PM   Dressing Status Clean, dry, & intact 4/20/2017  4:01 PM   Dressing Type Tape;Transparent 4/20/2017  4:01 PM   Hub Color/Line Status Pink;Flushed;Patent 4/20/2017  4:01 PM   Alcohol Cap Used Yes 4/17/2017  8:40 PM       Peripheral IV 04/18/17 Right Forearm (Active)   Site Assessment Dry; Intact 4/20/2017  4:01 PM   Phlebitis Assessment 0 4/20/2017  4:01 PM   Infiltration Assessment 0 4/20/2017  4:01 PM   Dressing Status Old drainage 4/20/2017  4:01 PM   Dressing Type Tape;Transparent 4/20/2017  4:01 PM   Hub Color/Line Status Blue;Flushed;Patent 4/20/2017  4:01 PM                      Urinary Catheter Urinary Catheter 04/17/17 Oneal-Criteria for Appropriate Use: Obstruction/retention    Intake & Output   Date 04/19/17 1900 - 04/20/17 0659 04/20/17 0700 - 04/21/17 0659   Shift 5640-0667 24 Hour Total 6157-3462 2078-9347 24 Hour Total   I  N  T  A  K  E   P.O. 240 240 480  480      P. O. 240 240 480  480    Shift Total  (mL/kg) 240  (3.8) 240  (3.8) 480  (7.6)  480  (7.6)   O  U  T  P  U  T   Urine  (mL/kg/hr) 900 1950         Urine Output (mL) (Urinary Catheter 04/17/17 Oneal) 900 1950       Stool           Stool Occurrence(s)  1 x       Shift Total  (mL/kg) 900  (14.2) 1950  (30.8)      NET -660 -1710 480  480   Weight (kg) 63.3 63.3 63.3 63.3 63.3         Readmission Risk Assessment Tool Score High Risk            27       Total Score        3 Relationship with PCP    2 Patient Living Status    9 More than 1 Admission in calendar year    5 Patient Insurance is Medicare, Medicaid or Self Pay    8 Charlson Comorbidity Score        Criteria that do not apply:    Patient Length of Stay > 5       Expected Length of Stay 2d 14h   Actual Length of Stay 3

## 2017-04-20 NOTE — PROGRESS NOTES
0730 Report received from Katherine Aguilera, [address] [place] [address].  1400 heart rate 114 while ambulating in [place] with PT/OT.

## 2017-04-20 NOTE — PROGRESS NOTES
PCU SHIFT NURSING NOTE    1900: Bedside shift change report given to Jessica Bland, RN (oncoming nurse) by ROBERT Wild (offgoing nurse). Report included the following information: SBAR, Kardex, MAR, I/O, Progress notes, Cardiac rhythm. Shift Summary:   1940: patient triggered bed alarm getting OOB, patient confused about where he is. Easily reoriented and assisted back to bed. Positioned for comfort. 0200: Patient calling out, awoke slightly confused regarding procedure to be done in AM. Pt reoriented to time of day, positioned in bed for comfort. Bed alarm armed. Call light in reach. 0630: Patient awoke a few times through the night calling out unsure where he was, easily reoriented. No other changes noted. Safety measures intact. NPO for ASHU and possible cardioversion       Admission Date 4/17/2017   Admission Diagnosis A-fib ([address]   Consults IP CONSULT TO CARDIOLOGY        Consults   [x]PT   [x]OT   []Speech   []Case Management      [] Palliative      Cardiac Monitoring Order   [x]Yes   []No     IV drips   []Yes    Drip:                            Dose:  Drip:                            Dose:  Drip:                            Dose:   [x]No     GI Prophylaxis   [x]Yes   []No         DVT Prophylaxis   SCDs:             Jm stockings:         [x] Medication   []Contraindicated   []None      Activity Level Activity Level: Up with Assistance     Activity Assistance: Partial (one person)   Purposeful Rounding every 1-2 hour? [x]Yes   Pelaez Score  Total Score: 4   Bed Alarm (If score 3 or >)   [x]Yes   [] Refused (See signed refusal form in chart)   Azael Score  Azael Score: 18   Azael Score (if score 14 or less)   []PMT consult   []Wound Care consult      []Specialty bed   [] Nutrition consult          Needs prior to discharge:   Home O2 required:    []Yes   []No    If yes, how much O2 required?     Other:    Last Bowel Movement: Last Bowel Movement Date: 04/19/17      Influenza Vaccine Received Flu Vaccine for Current Season (usually Sept-March): Not Flu Season        Pneumonia Vaccine           Diet Active Orders   Diet    DIET NPO      LDAs               Peripheral IV 04/17/17 Left Wrist (Active)   Site Assessment Clean, dry, & intact 4/19/2017  7:50 PM   Phlebitis Assessment 0 4/19/2017  7:50 PM   Infiltration Assessment 0 4/19/2017  7:50 PM   Dressing Status Clean, dry, & intact 4/19/2017  7:50 PM   Dressing Type Transparent 4/19/2017  7:50 PM   Hub Color/Line Status Pink 4/19/2017  7:50 PM   Alcohol Cap Used Yes 4/17/2017  8:40 PM       Peripheral IV 04/18/17 Right Forearm (Active)   Site Assessment Clean, dry, & intact 4/19/2017  7:50 PM   Phlebitis Assessment 0 4/19/2017  7:50 PM   Infiltration Assessment 0 4/19/2017  7:50 PM   Dressing Status Clean, dry, & intact 4/19/2017  7:50 PM   Dressing Type Transparent 4/19/2017  7:50 PM   Hub Color/Line Status Blue 4/19/2017  7:50 PM                      Urinary Catheter Urinary Catheter 04/17/17 Oneal-Criteria for Appropriate Use: Obstruction/retention    Intake & Output   Date 04/19/17 0700 - 04/20/17 0659 04/20/17 0700 - 04/21/17 0659   Shift 3908-6812 0310-8699 24 Hour Total 0116-4009 0867-6901 24 Hour Total   I  N  T  A  K  E   P.O.  240 240         P. O.  240 240       Shift Total  (mL/kg)  240  (3.8) 240  (3.8)      O  U  T  P  U  T   Urine  (mL/kg/hr) 1050  (1.4) 400 1450         Urine Output (mL) (Urinary Catheter 04/17/17 Oneal) 9224 778 6695       Stool            Stool Occurrence(s) 1 x  1 x       Shift Total  (mL/kg) 1050  (16.7) 400  (6.4) 1450  (23)      NET -1050 -160 -1210      Weight (kg) 63 63 63 63 63 63         Readmission Risk Assessment Tool Score High Risk            27       Total Score        3 Relationship with PCP    2 Patient Living Status    9 More than 1 Admission in calendar year    5 Patient Insurance is Medicare, Medicaid or Self Pay    8 Charlson Comorbidity Score        Criteria that do not apply:    Patient Length of Stay > 5 Expected Length of Stay 2d 14h   Actual Length of Stay 3

## 2017-04-20 NOTE — TELEPHONE ENCOUNTER
Patient's wife stated Mr. Annita Webb needs a new order for Belsomra. He has 10mg tabs so he is taking 2 tabs a day. She is requesting 20mg tabs for him and wants them called into their [employer]. Also, she wanted to let us know that he is currently in [facility] and will be going to a rehab facility on [facility] after being discharged.

## 2017-04-20 NOTE — PROGRESS NOTES
Problem: Self Care Deficits Care Plan (Adult)  Goal: *Acute Goals and Plan of Care (Insert Text)  Occupational Therapy Goals  Initiated 4/18/2017  1. Patient will perform grooming in standing with supervision/set-up within 7 day(s). 2. Patient will perform upper body dressing and lower body dressing, using adaptive aids, prn, with supervision/set-up within 7 day(s). 3. Patient will perform simple home management with minimal assistance/contact guard assist within 7 day(s). 4. Patient will perform toilet transfers with supervision/set-up within 7 day(s). 5. Patient will perform all aspects of toileting with supervision/set-up within 7 day(s). Met 4/20/2017    6. Patient will participate in upper extremity therapeutic exercise/activities with supervision/set-up for 5 minutes within 7 day(s). 7. Patient will utilize energy conservation techniques during functional activities with minimal verbal cues within 7 day(s). OCCUPATIONAL THERAPY TREATMENT  Patient: Katelyn Bro (28 y.o. male)  Date: 4/20/2017  Diagnosis: A-fib [facility]) <principal problem not specified>       Precautions: Fall, DNR      ASSESSMENT:  Pt was sitting in chair and on 3.5 liters of NC and his O2% was at 99% and HR was 90 to 115. Pt was able to come to stand with SBA to CGA and with use of RW he was able to walk to the door of the room and out in the hernandez to turn around. Pt was tired when he walked back to chair and rested. Pt was able to stand once more with SBA and was able to stand for 2 to 3 minuets and reach out to touch therapist hands. Pt was tired and needed to stand and then worked on holding his shoulders back and his head up. Pt was left in chair and recommend that pt have further therapy at discharge, pulmonary rehab.    Progression toward goals:  [X]       Improving appropriately and progressing toward goals  [ ]       Improving slowly and progressing toward goals  [ ]       Not making progress toward goals and plan of care will be adjusted       PLAN:  Patient continues to benefit from skilled intervention to address the above impairments. Continue treatment per established plan of care. Discharge Recommendations:  Rehab  Further Equipment Recommendations for Discharge:  tbd       SUBJECTIVE:   Patient stated I need to rest..      OBJECTIVE DATA SUMMARY:   Cognitive/Behavioral Status:  Neurologic State: Alert  Orientation Level: Oriented X4  Cognition: Follows commands;Decreased attention/concentration; Impulsive              Functional Mobility and Transfers for ADLs:           Transfers:  Sit to Stand: Contact guard assistance        Balance:  Sitting: Intact; Without support  Standing: Impaired; With support  Standing - Static: Constant support;Good  Standing - Dynamic : Fair        Pain:  Pain Scale 1: Numeric (0 - 10)  Pain Intensity 1: 0              Activity Tolerance:   vss  Please refer to the flowsheet for vital signs taken during this treatment.   After treatment:   [X] Patient left in no apparent distress sitting up in chair  [ ] Patient left in no apparent distress in bed  [X] Call bell left within reach  [X] Nursing notified  [ ] Caregiver present  [ ] Bed alarm activated      COMMUNICATION/COLLABORATION:   The patients plan of care was discussed with: Physical Therapist and Registered Nurse     Patt Mak OT  Time Calculation: 15 mins

## 2017-04-20 NOTE — PROGRESS NOTES
Problem: Mobility Impaired (Adult and Pediatric)  Goal: *Acute Goals and Plan of Care (Insert Text)  Physical Therapy Goals  Initiated 4/18/2017  1. Patient will move from supine to sit and sit to supine in bed with modified independence within 7 day(s). 2. Patient will transfer from bed to chair and chair to bed with modified independence using the least restrictive device within 7 day(s). 3. Patient will perform sit to stand with modified independence within 7 day(s). 4. Patient will ambulate with /contact guard assist for 50 feet with the least restrictive device within 7 day(s). 5. Patient will ascend/descend 3 stairs with bilateral handrail(s) with CGA within 7 day(s). PHYSICAL THERAPY TREATMENT  Patient: Taylor Flores (2 y.o. male)  Date: 4/20/2017  Diagnosis: A-fib [facility]) <principal problem not specified>       Precautions: Fall, DNR      ASSESSMENT:  Patient received sitting in the chair, agreeable to PT. Patient required CGA for sit <> stand with RW. Patient ambulated 45 feet with CGA and RW. Patient's HR elevated to 120's during activity and O2 sats at 95% on 4L O2. Patient performed standing activities as described below with CGA and RW. Patient with decreased endurance and strength. Patient will benefit from IP pulmonary rehab at discharge. Progression toward goals:  [X]    Improving appropriately and progressing toward goals  [ ]    Improving slowly and progressing toward goals  [ ]    Not making progress toward goals and plan of care will be adjusted       PLAN:  Patient continues to benefit from skilled intervention to address the above impairments. Continue treatment per established plan of care. Discharge Recommendations:  Inpatient Rehab pulmonary  Further Equipment Recommendations for Discharge:  TBD       SUBJECTIVE:   Patient stated I can do whatever you want.       OBJECTIVE DATA SUMMARY:   Critical Behavior:  Neurologic State: Alert  Orientation Level: Oriented X4  Cognition: Follows commands, Decreased attention/concentration, Impulsive  Safety/Judgement: Fall prevention  Functional Mobility Training:  Bed Mobility:                    Transfers:  Sit to Stand: Contact guard assistance  Stand to Sit: Contact guard assistance        Bed to Chair: Contact guard assistance                    Balance:  Sitting: Intact; Without support  Standing: Impaired; With support  Standing - Static: Constant support;Good  Standing - Dynamic : Fair  Ambulation/Gait Training:  Distance (ft): 45 Feet (ft)  Assistive Device: Gait belt;Walker, rolling  Ambulation - Level of Assistance: Contact guard assistance        Gait Abnormalities: Decreased step clearance        Base of Support: Narrowed     Speed/Dayis: Pace decreased (<100 feet/min)  Step Length: Left shortened;Right shortened                    Therapeutic Exercises:   Marching in place with RW x 30 seconds, standing x 1.5 minutes with CGA and RW. Reaching from side to side x 10 each  Pain:  Pain Scale 1: Numeric (0 - 10)  Pain Intensity 1: 0              Activity Tolerance:   Improving. Please refer to the flowsheet for vital signs taken during this treatment.   After treatment:   [X]    Patient left in no apparent distress sitting up in chair  [ ]    Patient left in no apparent distress in bed  [X]    Call bell left within reach  [X]    Nursing notified  [ ]    Caregiver present  [X]    Bed alarm activated      COMMUNICATION/COLLABORATION:   The patients plan of care was discussed with: Occupational Therapist, Registered Nurse and      Layla Candelaria PT, DPT   Time Calculation: 18 mins

## 2017-04-20 NOTE — PROGRESS NOTES
[address] [place] [address]  [phone]      Cardiology Progress Note      4/20/2017 10:00AM    Admit Date: 4/17/2017    Admit Diagnosis:   A-fib [facility])    Subjective:     Darlys Every c/o not getting to eat. Plan was for ASHU and cardioversion this AM, but Dr. Candice Lebron feels would not be successful in cardioversion and/or remaining in SR due to underlying pulmonary process. The patient states he has felt the best today that he's felt in a long time.   Remains in afib with -150s    Visit Vitals    /80 (BP 1 Location: Left arm, BP Patient Position: At rest)    Pulse (!) 124    Temp 98.1 °F (36.7 °C)    Resp 24    Ht 5' 10\" (1.778 m)    Wt 63.3 kg (139 lb 9.6 oz)    SpO2 99%    BMI 20.03 kg/m2       Current Facility-Administered Medications   Medication Dose Route Frequency    apixaban (ELIQUIS) tablet 5 mg  5 mg Oral BID    dilTIAZem (CARDIZEM) IR tablet 60 mg  60 mg Oral BID    [START ON 4/21/2017] dilTIAZem CD (CARDIZEM CD) capsule 240 mg  240 mg Oral DAILY    furosemide (LASIX) tablet 20 mg  20 mg Oral ACB&D    nebivolol (BYSTOLIC) tablet 5 mg  5 mg Oral DAILY    ipratropium (ATROVENT) 0.02 % nebulizer solution 0.5 mg  0.5 mg Nebulization Q6HWA RT    aspirin delayed-release tablet 81 mg  81 mg Oral DAILY    azithromycin (ZITHROMAX) tablet 250 mg  250 mg Oral Q MON, WED & FRI    fluticasone-vilanterol (BREO ELLIPTA) 100mcg-25mcg/puff  1 Puff Inhalation DAILY    ferrous sulfate tablet 325 mg  325 mg Oral DAILY WITH BREAKFAST    guaiFENesin-codeine (ROBITUSSIN AC) 100-10 mg/5 mL solution 5 mL  5 mL Oral BID PRN    lactobac ac& pc-s.therm-b.anim (CY Q/RISAQUAD)  1 Cap Oral DAILY    levothyroxine (SYNTHROID) tablet 50 mcg  50 mcg Oral 7am    LORazepam (ATIVAN) tablet 1 mg  1 mg Oral Q6H PRN    melatonin tablet 9 mg  9 mg Oral QHS    polyethylene glycol (MIRALAX) packet 17 g  17 g Oral DAILY    predniSONE (DELTASONE) tablet 30 mg  30 mg Oral DAILY WITH BREAKFAST    famotidine (PEPCID) tablet 20 mg  20 mg Oral DAILY WITH LUNCH    sertraline (ZOLOFT) tablet 75 mg  75 mg Oral QPM    traZODone (DESYREL) tablet 100 mg  100 mg Oral QHS    sodium chloride (NS) flush 5-10 mL  5-10 mL IntraVENous Q8H    sodium chloride (NS) flush 5-10 mL  5-10 mL IntraVENous PRN    acetaminophen (TYLENOL) tablet 650 mg  650 mg Oral Q4H PRN    ondansetron (ZOFRAN) injection 4 mg  4 mg IntraVENous Q4H PRN    docusate sodium (COLACE) capsule 100 mg  100 mg Oral BID    nystatin (MYCOSTATIN) 100,000 unit/mL oral suspension 500,000 Units  500,000 Units Oral QID       Objective:      Physical Exam:  General Appearance:  elderly thin  male in no acute distress  Chest:   rhonchi  Cardiovascular:  irr, irr  no murmur.   Abdomen:   Soft, non-tender, bowel sounds are active.   Extremities: no peripheral edema  Skin:  Warm and dry.     Data Review:   Recent Labs      04/20/17   0340  04/19/17   0507  04/18/17   0520   WBC  10.7  9.0  10.7   HGB  10.5*  10.0*  10.0*   HCT  33.5*  31.9*  32.1*   PLT  244  247  266     Recent Labs      04/20/17   0340  04/19/17   0507  04/18/17   0520  04/17/17   1441   NA  142  142  140   --    K  4.0  3.3*  3.6   --    CL  99  98  99   --    CO2  36*  35*  35*   --    GLU  103*  79  103*   --    BUN  22*  24*  29*   --    CREA  0.83  0.96  1.08   --    CA  8.1*  7.8*  8.2*   --    ALB  2.5*  2.4*  2.6*   --    TBILI  0.4  0.5  0.5   --    SGOT  26  55*  168*   --    ALT  89*  120*  172*   --    INR   --    --    --   1.2*       Recent Labs      04/17/17   1359   TROIQ  <0.04         Intake/Output Summary (Last 24 hours) at 04/20/17 1307  Last data filed at 04/20/17 1934   Gross per 24 hour   Intake              240 ml   Output             1300 ml   Net            -1060 ml        Telemetry: afib with RVR      Assessment:     Active Problems:    COPD exacerbation (HCC) (12/5/2016)      A-fib ([facility] (4/17/2017)      ARF (acute renal failure) ([facility]) (4/17/2017)        Plan:     Chances of restoring and maintaining sinus rhythm are somewhat less with recent respiratory decompensation and use of steroids. If we can control heart rate, would pursue ASHU and cardioversion electively as an outpatient. If poor heart rate control, could consider attempt at ASHU and cardioversion tomorrow.      Afib with RVR, chronic:  Remains in Afib rate 110-130s  Diltiazem long acting increased to 240mg today as HR uncontrolled  Long discussion with patient and family reference AV Gm Ablation with Pacemaker implant as a last option for control  CHADS2 vasc score: 3, continue Eliquis  If rate remains uncontrolled with increasing Diltiazem and BB, may need to restart amiodarone even though not best option  Consider ASHU and cardioversion

## 2017-04-21 NOTE — PROGRESS NOTES
0700 Report received from [place], [place].  0730 HR 160s-170s, pt. Confused, anxious, just received breathing treatment. Luc Rodriguez NP contacted, orders to give Diltiazem po now. 0800 HR in 140s-150s, pt. Calm. Stress lab states he is scheduled for procedure at 1200, wife contacted. Prn Ativan po administered for anxiety, prn Robitussin administered for cough. 1130 pt. Off unit for cardiology. 1250 pt. Returned to room, vss, drowsy responds to voice, wife present at bedside. Left upper chest dressing clean dry and intact, dressing to right groin clean dry and intact both without signs of bleeding or hematoma. Distal pulses palpable. [id number] contacted regarding STAT CXR, states will perform portable. 1400 ice pack placed to left upper chest, sling placed to left arm.

## 2017-04-21 NOTE — PROGRESS NOTES
Interdisciplinary team rounds were held 4/21/2017 with the following team members:Care Management, Nursing, Nutrition, Pharmacy, Physical Therapy and Physician and the patient. Plan of care discussed. See clinical pathway and/or care plan for interventions and desired outcomes.     Ablation/pacemaker placement today; per Dr. Quinones Sample okay to d/c later today if pt continues to do well

## 2017-04-21 NOTE — PROGRESS NOTES
1248 received from EP lab, VSS, right groin site intact with palp distal pulses.  Left chest wall with dsg cdi, wife at bedside, pt restful

## 2017-04-21 NOTE — PROGRESS NOTES
OT note:     Attempted to see patient for OT session, however RN requested to hold therapy at this time. Patient is planned for a cardiac procedure at 12:00 and noted resting HR ranging 130-150bpm. Will hold at this time and follow up time permitting and patient availability.  Thanks.

## 2017-04-21 NOTE — PROGRESS NOTES
Cardiology Progress Note            [address], [address] [facility]  [phone]    4/21/2017 1:01 PM    Admit Date: 4/17/2017    Admit Diagnosis: A-fib [facility])    Subjective:     Silvia Feliciano   denies chest pain.     Visit Vitals    /71 (BP 1 Location: Left arm, BP Patient Position: At rest)    Pulse 75    Temp 97.3 °F (36.3 °C)    Resp 18    Ht 5' 10\" (1.778 m)    Wt 140 lb (63.5 kg)    SpO2 99%    BMI 20.09 kg/m2     Current Facility-Administered Medications   Medication Dose Route Frequency    vancomycin (VANCOCIN) 1,000 mg in 0.9% sodium chloride (MBP/ADV) 250 mL  1,000 mg IntraVENous ONCE    apixaban (ELIQUIS) tablet 5 mg  5 mg Oral BID    dilTIAZem CD (CARDIZEM CD) capsule 240 mg  240 mg Oral DAILY    furosemide (LASIX) tablet 20 mg  20 mg Oral ACB&D    ipratropium (ATROVENT) 0.02 % nebulizer solution 0.5 mg  0.5 mg Nebulization Q6HWA RT    aspirin delayed-release tablet 81 mg  81 mg Oral DAILY    azithromycin (ZITHROMAX) tablet 250 mg  250 mg Oral Q MON, WED & FRI    fluticasone-vilanterol (BREO ELLIPTA) 100mcg-25mcg/puff  1 Puff Inhalation DAILY    ferrous sulfate tablet 325 mg  325 mg Oral DAILY WITH BREAKFAST    guaiFENesin-codeine (ROBITUSSIN AC) 100-10 mg/5 mL solution 5 mL  5 mL Oral BID PRN    lactobac ac& pc-s.therm-b.anim (CY Q/RISAQUAD)  1 Cap Oral DAILY    levothyroxine (SYNTHROID) tablet 50 mcg  50 mcg Oral 7am    LORazepam (ATIVAN) tablet 1 mg  1 mg Oral Q6H PRN    melatonin tablet 9 mg  9 mg Oral QHS    polyethylene glycol (MIRALAX) packet 17 g  17 g Oral DAILY    predniSONE (DELTASONE) tablet 30 mg  30 mg Oral DAILY WITH BREAKFAST    famotidine (PEPCID) tablet 20 mg  20 mg Oral DAILY WITH LUNCH    sertraline (ZOLOFT) tablet 75 mg  75 mg Oral QPM    traZODone (DESYREL) tablet 100 mg  100 mg Oral QHS    sodium chloride (NS) flush 5-10 mL  5-10 mL IntraVENous Q8H    sodium chloride (NS) flush 5-10 mL  5-10 mL IntraVENous PRN    acetaminophen (TYLENOL) tablet 650 mg  650 mg Oral Q4H PRN    ondansetron (ZOFRAN) injection 4 mg  4 mg IntraVENous Q4H PRN    docusate sodium (COLACE) capsule 100 mg  100 mg Oral BID    nystatin (MYCOSTATIN) 100,000 unit/mL oral suspension 500,000 Units  500,000 Units Oral QID         Objective:      Visit Vitals    /71 (BP 1 Location: Left arm, BP Patient Position: At rest)    Pulse 75    Temp 97.3 °F (36.3 °C)    Resp 18    Ht 5' 10\" (1.778 m)    Wt 140 lb (63.5 kg)    SpO2 99%    BMI 20.09 kg/m2       Physical Exam:  Abdomen: soft, non-tender  Extremities: extremities normal  Heart: regular rate and rhythm  Lungs: clear to auscultation bilaterally  Pulses: 2+ and symmetric    Data Review:   Labs:    Recent Labs      04/21/17   0132  04/20/17   0340  04/19/17   0507   WBC  12.9*  10.7  9.0   HGB  11.2*  10.5*  10.0*   HCT  38.6  33.5*  31.9*   PLT  281  244  247     Recent Labs      04/21/17   0132  04/20/17   0340  04/19/17   0507   NA  142  142  142   K  4.0  4.0  3.3*   CL  99  99  98   CO2  36*  36*  35*   GLU  112*  103*  79   BUN  20  22*  24*   CREA  0.88  0.83  0.96   CA  8.6  8.1*  7.8*   ALB   --   2.5*  2.4*   TBILI   --   0.4  0.5   SGOT   --   26  55*   ALT   --   89*  120*       No results for input(s): TROIQ, CPK, CKMB in the last 72 hours. Intake/Output Summary (Last 24 hours) at 04/21/17 1301  Last data filed at 04/21/17 0403   Gross per 24 hour   Intake              240 ml   Output             1050 ml   Net             -810 ml        Telemetry: v paced    Assessment:     Active Problems:    COPD exacerbation ([facility] (12/5/2016)      A-fib ([facility] (4/17/2017)      ARF [facility]acute [facility] failure) ([facility] (4/17/2017)        Plan:     Sharyle Conception is sp av node ablation and pacemaker. cxr pending. If cxr wnl, then ok for dc this evening from EP standpoint.  F/u in 1 week    Bam Vogt MD, [facility], [facility]    4/21/2017

## 2017-04-21 NOTE — PROGRESS NOTES
[address]  [phone]      Cardiology Progress Note      4/21/2017 9:30AM    Admit Date: 4/17/2017    Admit Diagnosis:   A-fib [facility])    Subjective:     Elias Stauffer slightly confused this AM, wife at bedside. Continues with afib with RVR, uncontrolled on Dilt and BB. Dr Joy Dailey discussed further management with Dr. Jennifer De La Rosa - decision to proceed with PPI/AV escobar ablation.     Visit Vitals    BP (!) 154/98 (BP 1 Location: Left arm, BP Patient Position: At rest)    Pulse (!) 163    Temp 97.6 °F (36.4 °C)    Resp 22    Ht 5' 10\" (1.778 m)    Wt 63.5 kg (140 lb)    SpO2 96%    BMI 20.09 kg/m2       Current Facility-Administered Medications   Medication Dose Route Frequency    apixaban (ELIQUIS) tablet 5 mg  5 mg Oral BID    dilTIAZem CD (CARDIZEM CD) capsule 240 mg  240 mg Oral DAILY    furosemide (LASIX) tablet 20 mg  20 mg Oral ACB&D    ipratropium (ATROVENT) 0.02 % nebulizer solution 0.5 mg  0.5 mg Nebulization Q6HWA RT    aspirin delayed-release tablet 81 mg  81 mg Oral DAILY    azithromycin (ZITHROMAX) tablet 250 mg  250 mg Oral Q MON, WED & FRI    fluticasone-vilanterol (BREO ELLIPTA) 100mcg-25mcg/puff  1 Puff Inhalation DAILY    ferrous sulfate tablet 325 mg  325 mg Oral DAILY WITH BREAKFAST    guaiFENesin-codeine (ROBITUSSIN AC) 100-10 mg/5 mL solution 5 mL  5 mL Oral BID PRN    lactobac ac& pc-s.therm-b.anim (CY Q/RISAQUAD)  1 Cap Oral DAILY    levothyroxine (SYNTHROID) tablet 50 mcg  50 mcg Oral 7am    LORazepam (ATIVAN) tablet 1 mg  1 mg Oral Q6H PRN    melatonin tablet 9 mg  9 mg Oral QHS    polyethylene glycol (MIRALAX) packet 17 g  17 g Oral DAILY    predniSONE (DELTASONE) tablet 30 mg  30 mg Oral DAILY WITH BREAKFAST    famotidine (PEPCID) tablet 20 mg  20 mg Oral DAILY WITH LUNCH    sertraline (ZOLOFT) tablet 75 mg  75 mg Oral QPM    traZODone (DESYREL) tablet 100 mg  100 mg Oral QHS    sodium chloride (NS) flush 5-10 mL 5-10 mL IntraVENous Q8H    sodium chloride (NS) flush 5-10 mL  5-10 mL IntraVENous PRN    acetaminophen (TYLENOL) tablet 650 mg  650 mg Oral Q4H PRN    ondansetron (ZOFRAN) injection 4 mg  4 mg IntraVENous Q4H PRN    docusate sodium (COLACE) capsule 100 mg  100 mg Oral BID    nystatin (MYCOSTATIN) 100,000 unit/mL oral suspension 500,000 Units  500,000 Units Oral QID       Objective:      Physical Exam:  General Appearance:  thin elderly  male in no acute distress  Chest:   rhomchi, slight wheezing left  Cardiovascular:  irr, irr , no murmur.   Abdomen:   Soft, non-tender, bowel sounds are active.   Extremities: no peripheral edema  Skin:  Warm and dry.     Data Review:   Recent Labs      04/21/17   0132 04/20/17   0340  04/19/17   0507   WBC  12.9*  10.7  9.0   HGB  11.2*  10.5*  10.0*   HCT  38.6  33.5*  31.9*   PLT  281  244  247     Recent Labs      04/21/17 0132 04/20/17   0340  04/19/17   0507   NA  142  142  142   K  4.0  4.0  3.3*   CL  99  99  98   CO2  36*  36*  35*   GLU  112*  103*  79   BUN  20  22*  24*   CREA  0.88  0.83  0.96   CA  8.6  8.1*  7.8*   ALB   --   2.5*  2.4*   TBILI   --   0.4  0.5   SGOT   --   26  55*   ALT   --   89*  120*       No results for input(s): TROIQ, CPK, CKMB in the last 72 hours. Intake/Output Summary (Last 24 hours) at 04/21/17 1157  Last data filed at 04/21/17 0403   Gross per 24 hour   Intake              240 ml   Output             1050 ml   Net             -810 ml        Telemetry: afib with RVR      Assessment:     Active Problems:    COPD exacerbation ([facility] (12/5/2016)      A-fib ([facility] (4/17/2017)      ARF (acute renal failure) ([facility] (4/17/2017)        Plan:     Afib with RVR, chronic:  Remains in Afib rate 110-160s  Diltiazem 080XR and Bystolic 5mg not controlling arrhythmia   Further discussion reference AV Gm Ablation with Pacemaker implant - patient and wife are in agreement.   Dr. Sary La and I discussed the risks/benefits/alternatives of the procedure with the patient. Dr. Kathi Ann had discussed the option of PPI/AV escobar ablation on admission. Risks include (but are not limited to) bleeding, infection, cva/mi/death. The patient understands and would like to proceed. CHADS2 vasc score: 3, continue Eliquis    Pt seen and examined. Agree with assessment and plan as documented above.     Courtney Rubio MD, [facility]

## 2017-04-21 NOTE — CARDIO/PULMONARY
C/P Rehab. Note:     Patient is a 66 y.o. male admitted for A-fib. He presented to ER on 4/17/17 with edema, lethargy and increased sob. After nebulizer tx. In ER he went into AF with RVR. PMH :   Adjustment disorder with mixed anxiety and depressed mood       Bronchiectasis (HCC)       Chronic obstructive pulmonary disease (HCC)       Diastolic CHF, chronic ([facility] 11/4/2015    Essential hypertension, benign 9/20/2009    GERD (gastroesophageal reflux disease) 9/20/2009    MI (myocardial infarction) ([facility]       Migraine headache 9/20/2009    Post-tuberculous bronchiectasis 10/17/2013    Pulmonary fibrosis (HCC)       Pulmonary tuberculosis       Thyroid disease         hypothyroidism    Urinary retention        He is a former smoker. Echo of 3/29/17 showed EF 55-60%. Attempted to meet with patient but he is currently out of room in cath lab. Will follow after procedure.

## 2017-04-21 NOTE — PROGRESS NOTES
Hospitalist Progress Note    NAME: Devendra Johnson   :  1945   MRN:  [id number]         Assessment / Plan:  New onset atrial fibrillation: Recent cardiac w/u including negative stress testing 3/17. Still with elevated HR this morning, plan for ablation today discussed with Pt/wife  - CXR on admission no acute findings  - off amiodarone. Con't diltiazem, home nadalol and lasix  - free T4 normal  - eliquis started, appreciate CM consult regarding cost  - cardiology consult appreciated, plan for AV node ablation and pacemaker today  Chronic hypoxic respiratory failure due to chronic bronchiectasis, chronic COPD and pulmonary fibrosis in setting of remote pulmonary TB:  4.5 LPM at baseline, able to wean below baseline (3.5 LPM) due to improving oxygenation. Some additional wheezing this morning.  - con't steroids at slightly reduced dose in case these are contributing to his edema (started long taper per Dr. Amanda Overton last week)  - con't ipratropium nebs scheduled QID with prn xopenex  - con't symbicort, recently started. Still holding spiriva while on ipratropium nebs. - con't robitussin AC as he does at home  Acute kidney injury (resolved) in setting of acute recurrent urinary retention: baseline Cr 0.7-0.9, had english removed ~1 1/2 wks ago in urology office  - con't lasix with caution  - still holding home lisinopril  - english replaced in ER with >700mL. Will need to keep in place until seen again by urology. Acute transaminitis: No clear etiology, LFTs now improving. Labs with probable chronic hep C.  - no acute liver findings on CT A/P 3/28/17  - hepatitis C Ab positive   - EBV IgM negative  - serial LFTs  Oral thrush: due to steroids.  Con't nystatin oral.  CAD s/p MI POA  Hypothyroidism: con't synthroid, checking thyroid function as above      Code Status: DNR  Surrogate Decision Maker: wife  DVT Prophylaxis: eliquis     Subjective:     Chief Complaint / Reason for Physician Visit  Ready for procedure, says he is feeling okay currently (had episode of dyspnea this morning because he couldn't get his neb per his report). Discussed with RN events overnight. Review of Systems:  Symptom Y/N Comments  Symptom Y/N Comments   Fever/Chills n   Chest Pain n    Poor Appetite n   Edema n    Cough n   Abdominal Pain n    Sputum n   Joint Pain     SOB/KOO y   Pruritis/Rash     Nausea/vomit    Tolerating PT/OT     Diarrhea    Tolerating Diet     Constipation    Other       Could NOT obtain due to:      Objective:     VITALS:   Last 24hrs VS reviewed since prior progress note. Most recent are:  Patient Vitals for the past 24 hrs:   Temp Pulse Resp BP SpO2   04/21/17 0753 - (!) 163 - - -   04/21/17 0725 97.6 °F (36.4 °C) (!) 118 22 (!) 154/98 96 %   04/21/17 0718 - - - - 100 %   04/21/17 0122 97.7 °F (36.5 °C) (!) 101 22 155/90 99 %   04/20/17 1929 97.9 °F (36.6 °C) 82 22 105/67 92 %   04/20/17 1737 - (!) 101 - 113/72 -   04/20/17 1533 98.2 °F (36.8 °C) (!) 101 22 113/72 100 %   04/20/17 1329 - - - - 98 %       Intake/Output Summary (Last 24 hours) at 04/21/17 1205  Last data filed at 04/21/17 0403   Gross per 24 hour   Intake              240 ml   Output             1050 ml   Net             -810 ml        PHYSICAL EXAM:  General: WD, WN. Alert, cooperative, no acute distress    EENT:  EOMI. Anicteric sclerae. MMM  Resp:  Wheezing L lower lung, no crackles. Fair air movement. No accessory muscle use  CV:  Tachycardic, irregular rhythm,  No edema  GI:  Soft, Non distended, Non tender.  +Bowel sounds  Neurologic:  Alert and oriented X 3, normal speech,   Psych:   Some insight. Not anxious nor agitated  Skin:  No rashes.   No jaundice    Reviewed most current lab test results and cultures  YES  Reviewed most current radiology test results   YES  Review and summation of old records today    NO  Reviewed patient's current orders and MAR    YES  PMH/SH reviewed - no change compared to H&P  ________________________________________________________________________  Care Plan discussed with:    Comments   Patient x    Family  x wife   RN x    Care Manager     Consultant  x cards                     Multidiciplinary team rounds were held today with , nursing, pharmacist and clinical coordinator. Patient's plan of care was discussed; medications were reviewed and discharge planning was addressed. ________________________________________________________________________  Total NON critical care TIME:  35 Minutes    Total CRITICAL CARE TIME Spent:   Minutes non procedure based      Comments   >50% of visit spent in counseling and coordination of care x    ________________________________________________________________________  Liv Sandoval MD     Procedures: see electronic medical records for all procedures/Xrays and details which were not copied into this note but were reviewed prior to creation of Plan. LABS:  I reviewed today's most current labs and imaging studies.   Pertinent labs include:  Recent Labs      04/21/17 0132 04/20/17 0340 04/19/17   0507   WBC  12.9*  10.7  9.0   HGB  11.2*  10.5*  10.0*   HCT  38.6  33.5*  31.9*   PLT  281  244  247     Recent Labs      04/21/17 0132 04/20/17 0340  04/19/17   0507   NA  142  142  142   K  4.0  4.0  3.3*   CL  99  99  98   CO2  36*  36*  35*   GLU  112*  103*  79   BUN  20  22*  24*   CREA  0.88  0.83  0.96   CA  8.6  8.1*  7.8*   ALB   --   2.5*  2.4*   TBILI   --   0.4  0.5   SGOT   --   26  55*   ALT   --   89*  120*       Signed: Liv Sandoval MD

## 2017-04-21 NOTE — PROGRESS NOTES
PCU SHIFT NURSING NOTE      Bedside and Verbal shift change report given to Ham Donaldson (oncoming nurse) by Torri RN (offgoing nurse). Report included the following information SBAR, Kardex, Recent Results and Med Rec Status. Shift Summary:   1930-Cecy RN advised to tube pt home med to pharmacy when wife returns with medication, already discussed with MD    2105-Wife gives RN home sleep aid medication to send to Pharmacy advising that he takes 20mg a night although they are 10mg tablets    2200-Tubed pt home medication to pharmacy    2228-Pharmacy advised that pt has no order to take home med will revisit in the morning     Admission Date 4/17/2017   Admission Diagnosis A-fib ([address]   Consults IP CONSULT TO CARDIOLOGY        Consults   []PT   []OT   []Speech   []Case Management      [] Palliative      Cardiac Monitoring Order   [x]Yes   []No     IV drips   []Yes    Drip:                            Dose:  Drip:                            Dose:  Drip:                            Dose:   []No     GI Prophylaxis   []Yes   []No         DVT Prophylaxis   SCDs:             Jm stockings:         [] Medication   []Contraindicated   []None      Activity Level Activity Level: Bed Rest     Activity Assistance: Partial (one person)   Purposeful Rounding every 1-2 hour? [x]Yes   Pelaez Score  Total Score: 3   Bed Alarm (If score 3 or >)   [x]Yes   [] Refused (See signed refusal form in chart)   Azael Score  Azael Score: 18   Azael Score (if score 14 or less)   []PMT consult   []Wound Care consult      [x]Specialty bed   [] Nutrition consult          Needs prior to discharge:   Home O2 required:    [x]Yes   []No    If yes, how much O2 required?     Other:    Last Bowel Movement: Last Bowel Movement Date: 04/20/17      Influenza Vaccine Received Flu Vaccine for Current Season (usually Sept-March): Not Flu Season        Pneumonia Vaccine           Diet Active Orders   Diet    DIET CARDIAC Regular      LDAs Peripheral IV 04/17/17 Left Wrist (Active)   Site Assessment Clean, dry, & intact 4/21/2017  4:00 PM   Phlebitis Assessment 0 4/21/2017  4:00 PM   Infiltration Assessment 0 4/21/2017  4:00 PM   Dressing Status Clean, dry, & intact 4/21/2017  4:00 PM   Dressing Type Tape;Transparent 4/21/2017  4:00 PM   Hub Color/Line Status Pink;Flushed;Patent 4/21/2017  4:00 PM   Alcohol Cap Used Yes 4/17/2017  8:40 PM       Peripheral IV 04/18/17 Right Forearm (Active)   Site Assessment Drainage (comment) 4/21/2017  4:00 PM   Phlebitis Assessment 0 4/21/2017  4:00 PM   Infiltration Assessment 0 4/21/2017  4:00 PM   Dressing Status Old drainage 4/21/2017  4:00 PM   Dressing Type Tape;Transparent 4/21/2017  4:00 PM   Hub Color/Line Status Blue;Flushed;Patent 4/21/2017  4:00 PM                      Urinary Catheter Urinary Catheter 04/17/17 Oneal-Criteria for Appropriate Use: Obstruction/retention    Intake & Output   Date 04/20/17 1900 - 04/21/17 0659 04/21/17 0700 - 04/22/17 0659   Shift 2257-6685 24 Hour Total 7383-7444 1023-8615 24 Hour Total   I  N  T  A  K  E   P.O.  480 180  180      P. O.  480 180  180    I.V.  (mL/kg/hr)   250  (0.3)  250      Volume (vancomycin (VANCOCIN) 1,000 mg in 0.9% sodium chloride (MBP/ADV) 250 mL)   250  250    Shift Total  (mL/kg)  480  (7.6) 430  (6.8)  430  (6.8)   O  U  T  P  U  T   Urine  (mL/kg/hr) 1050 1050 1350  (1.8)  1350      Urine Output (mL) (Urinary Catheter 04/17/17 Oneal) 1050 1050 1350  1350    Shift Total  (mL/kg) 1050  (16.5) 1050  (16.5) 1350  (21.3)  1350  (21.3)   NET -1050 -570 -920  -920   Weight (kg) 63.5 63.5 63.5 63.5 63.5         Readmission Risk Assessment Tool Score High Risk            27       Total Score        3 Relationship with PCP    2 Patient Living Status    9 More than 1 Admission in calendar year    5 Patient Insurance is Medicare, Medicaid or Self Pay    8 Charlson Comorbidity Score        Criteria that do not apply:    Patient Length of Stay > 5 Expected Length of Stay 2d 14h   Actual Length of Stay 4

## 2017-04-21 NOTE — PROCEDURES
[address]  [phone]    Indications and Pre-Procedure Diagnosis:  Julien Primrose is a 66 y.o. male with atrial fibrillation with rvr is referred for electr-physiologic evaluation and intervention. Post Procedure Diagnosis    Atrial fibrillation   tachycardia    AV Gm Ablation Procedure  After informed consent was obtained, the patient was brought back to the EP lab in fasting condition. The presenting rhythm was atrial fibrillation. The patient received Versed and Fentanyl for conscious sedation per nursing personnel. Venous sheaths were placed in the right femoral vein using sterile Seldinger technique. A quadrapolar catheter was placed in the His position for mapping. An Blazer 8F/10mm Large Curve ablation catheter was advanced to the AV junction and 1 RF lesions totaling 1 minutes were applied resulting in complete heart block. Dobutamine at 5 mcg/min was started and no increased ventricular rates were noted. A slow ventricular escape rhythm of 40 bpm was present. Rapid atrial pacing to 300 ms, on and off dobutamine, demonstrated antegrade AV block. Rapid ventricular pacing to 600 ms, on and off dobutamine, demonstrated retrograde VA block. At the end of the procedure, catheters and sheaths were removed and manual compression held for hemostasis. Fluoroscopy and total procedure times were 2 and 20 minutes respectively. Estimated blood loss: < 10 ml. Sharp counts: correct. Specimen (s) collected: none. The following procedure related complication occurred: none. The following problems were encountered: none. Conduction Intervals (ms)  H-V QRS Q-T   [phone]     AV Gm Conduction    VA Block when pacing at 600 ms    AV Wenckebach when pacing at 600 ms      Findings and Summary: This study demonstrates:  1. Successful AV node ablation    Recommendations:    1. VVIR 75 bpm      Thank you for allowing me to participate in this patients care.     Rudy Nguyen Bai MD, [facility], [facility]

## 2017-04-21 NOTE — DISCHARGE INSTRUCTIONS
HOSPITALIST DISCHARGE INSTRUCTIONS    NAME: Ewelina Meehan   :  1945   MRN:  [id number]     Date/Time:  2017 10:55 AM    ADMIT DATE: 2017   DISCHARGE DATE: 2017     Attending Physician: Hollie Galindo MD    DISCHARGE DIAGNOSIS:  New onset atrial fibrillation  Chronic hypoxic respiratory failure due to chronic bronchiectasis, chronic COPD and pulmonary fibrosis in setting of remote pulmonary TB  Acute kidney injury (resolved) in setting of acute recurrent urinary retention  Acute transaminitis, resolved with chronic Hepatitis C  Oral thrush  CAD s/p MI POA  Hypothyroidism    Medications: Per above medication reconciliation. Pain Management: per above medications    Recommended diet: Cardiac diet and ensure supplements TID with meals    Recommended activity: See surgical instructions below for precautions related to new pacemaker. Fall precautions. Wound care: None    Indwelling devices: English catheter, chronic with care per routine. Do not remove english catheter. Pt will see [facility] in ~1 week for follow up. Supplemental Oxygen: Chronic Oxygen,  4.5  LNC at baseline    Required Lab work: None    Glucose management:  None    Code status: DNR      Outside physician follow up: Follow-up Information     Follow up With Details Comments Contact Info    [facility]  [facility]  This is your Pulmonary Rehab Provider.   [facility] [id number]  Mauri Young DO In 2 weeks  [facility]  [address] [facility]  [address] [facility] Martha Nance MD In 1 week for follow up on your pacemaker [address] [facility]  [address] Mauri Ellis MD  as already scheduled in May [address] [facility]  [facility]  [address]  [address] [id number] [facility] In 7 days for follow up on your catheter [address]/ SNF MD responsible for above on discharge. Information obtained by :  I understand that if any problems occur once I am at home I am to contact my physician. I understand and acknowledge receipt of the instructions indicated above. Physician's or R.N.'s Signature                                                                  Date/Time                                                                                                                                              Patient or Representative                [address], [place], [address] [facility] [place]  [phone]        ICD/PACEMAKER DISCHARGE INSTRUCTIONS    Patient ID:  Bridget Boland  [id number]  89 y.o.  1945    Admit Date: 4/17/2017    Discharge Date: 4/21/2017     Admitting Physician: Shelley Le MD     Discharge Physician: Colleen Fierro MD    Admission Diagnoses:   A-fib [facility])    Discharge Diagnoses: Active Problems:    COPD exacerbation ([facility] (12/5/2016)      A-fib ([facility] (4/17/2017)      ARF (acute renal failure) ([facility] (4/17/2017)        Discharge Condition: Good    Cardiology Procedures this Admission:  av node ablation and pacemaker    Disposition: home    Reference discharge instructions provided by nursing for diet and activity. Follow-up with Dr. Justina Ramirez in 2 weeks. Please contact the office for an appointment at [phone]. Signed:  Colleen Fierro MD  4/21/2017  1:03 PM    DISCHARGE INSTRUCTIONS FOR PATIENTS WITH ICD'S AND PACEMAKERS    1. Carry you ID card for your ICD/Pacemaker with you at all times. This card will be given to you in the hospital or mailed to you.   2. Medic Alert Bracelets are available from your pharmacist to wear at all times.  3. Call for an appointment in 2 weeks [phone]. 4. The pacemaker will bulge slightly under your skin. An ICD bulges a little more because it is larger. The bulge will decrease in size over the next few weeks. Please notify the doctor's office if you notice any of the following around your ICD site:  A.  A bruise that does not go away  B. Soreness or yellow, green, or brown drainage from the site. C. Any swelling from the site. D. If you have a fever of 100 degrees or higher that lasts for a few days    INCISION CARE       1.  Leave dressing over your site until you see the doctor. 2.  Leave steri-strips over your site until they start to fall off.   3.   You may shower after as long as your incision isnt submerged or directly sprayed upon until well healed. 4.  For comfort, wear loose fitting clothing. 5.  Ice pack to affected shoulder for first 24 hours, wear your sling for 2 days. 6.  Report any signs of infection, fever, pain, swelling, redness, oozing, or heat at site especially if these symptoms increase after the first 3 to 4 days. ACTIVITY PRECAUTIONS     1. Avoid rough contact with the implant site. 2. No driving for 14 days. 3. Avoid lifting your arm over your head, carrying anything on the affected side, or lifting over 10 pounds for 30 days. For the first 2 days only bend your arm at the elbow. 4. Any extreme activity such as golf, weight lifting or exercise biking should be restricted for 60 days. 5. Do not carry objects by holding them against your implant site. 6.  No shooting rifles or any type of gun with the affected shoulder permanently. 7.  If you have an ICD, welding and chainsaws are prohibited. SPECIAL PRECAUTIONS     1. You should avoid all strong magnetic fields, such as arc welding, large transformers, large motors. Some ICD devices will beep if it detects a strong magnet. If this occurs, move out of the area. 2.  You may not have an MRI.   3. Treatments or surgery that requires diathermy or electrocautery should be discussed with your doctor before scheduled. 4. Avoid radio frequency transmitters, including radar. 5. Advise dentist or other medical personnel you see that you have a pacemaker or ICD. 6.  Cell phones and microwave oven use is okay. 7.  If you plan to move or take a trip to a new area, the doctor's office will give you a name of a doctor to contact for any problems. SPECIAL INSTRUCTIONS ON SHOCKS   1. Notify your doctor for any of the following:      A. Anytime a shock is received in a 24 hour period. An office visit is not usually required for a single shock. B.  Two or more shocks in a row. If you do not feel well, call the [facility], otherwise call your doctor. This may require an office visit. C. Two or more shocks spaced apart by several hours. This may require an office visit. 2.  Keep a record of events. Include date, time, symptoms and activity at that time. ANTIBIOTIC THERAPY    During the first 8 weeks after your pacemaker or ICD insertion, you may need antibiotics before any dental work or certain tests or operations. Let the dentist or doctor who is caring for you know that you have had an implanted device.

## 2017-04-21 NOTE — PROCEDURES
[address]  [phone]    Indications and Pre-Procedure Diagnosis:  Silvia Feliciano is a 66 y.o. male with atrial fibrillation with rvr is referred for single chamber pacemaker. Post Procedure Diagnosis:    Atrial fibrillation  tachycardia    Pacemaker Implant Procedure and Findings:  Informed consent was obtained and the patient was premedicated with vancomycin. The procedure was performed under local anesthesia. Continuous pulse oximetry and cuff pressure were monitored. During the procedure, the patient received Versed and Fentanyl for sedation per nursing personnel. The left deltopectoral area was prepped and draped in the usual sterile fashion and was liberally infiltrated with 1% lidocaine. An incision was made over the left subpectoral area and a generator pocket was manually dissected. Access was achieved in the left axillary vein under fluoroscopic guidance and using the seldinger technique. Through the left axillary vein, pacing leads were positioned in appropriate regions in the right heart chambers where satisfactory pacing and sensing parameters were measured. Stability of the leads was assessed with deep breathing and there was no diaphragmatic pacing at 10V output. The leads were anchored using the sleeves and a pulse generator pocket fashioned using blunt dissection. The leads were then connected to the pulse generator. The pulse generator pocket was then liberally infiltrated with bacitracin solution, and the device implanted with a single silk fixation suture in the header to prevent migration. The wound was closed in layers using continuous 2-0 Vicryl and 4-0 Vicryl ending with a sub-cuticular closure. Fluoroscopy and total procedure times were 2 and 20 minutes respectively. Estimated blood loss <10 ml. Sharp count: correct. Specimen(s) collected: none. The following procedure related complication occurred: none.  The following problems were encountered: none. Findings: successful pacemaker placement. Device Data Measurements:  Lead Sensing (mV) Threshold (V)Pulse Width (ms) Impedance (Ohms)    RV 6.1  1.3  0.5   728      Final Programmed Parameters  Bradycardia pacing rate  70 bpm  Pacing Mode    VVI  Pacing Output    3.5 V@ 0.5 ms    Supplies Summary available in the chart  [employer]    Thank you for allowing me to participate in this patients care.     Minda Enriquez MD, Ghazal Mak

## 2017-04-22 NOTE — PROGRESS NOTES
Met with pt and pt's wife and they are aware of discharge today to [facility]. Pt's wife will transport pt with pt's portable oxygen tank to [facility]. CM offered medical transport but pt and pt's wife wants to use their car for transport. CM instructed pt's wife to go inside [facility] when she arrives and ask for help to bring pt in from the car. CM informed Dr. Carri Perla pt is refusing medical transport and pt's wife will transport. CM contacted [facility] and informed them pt is discharged. CM provided pt's nurse with the contact number to give report and to send AVS and MARS with pt.      Catalina Perry, [id number]

## 2017-04-22 NOTE — PROGRESS NOTES
[facility] and they accepted pt and can take him today when discharged.      Leno Remy, [address]

## 2017-04-22 NOTE — ROUTINE PROCESS
Pt dc to [facility] by wife. Report called. Pt and pt's wife stated understanding of dc instructions. Pt dc with leg bag.

## 2017-04-22 NOTE — DISCHARGE SUMMARY
Hospitalist Discharge Summary     Patient ID:  Chelsea Hollingsworth  [id number]  56 y.o.  1945    PCP on record: Sebastián Cote DO    Admit date: 4/17/2017  Discharge date and time: 4/22/2017      DISCHARGE DIAGNOSIS:  New onset atrial fibrillation  Chronic hypoxic respiratory failure due to chronic bronchiectasis, chronic COPD and pulmonary fibrosis in setting of remote pulmonary TB  Acute kidney injury (resolved) in setting of acute recurrent urinary retention  Acute transaminitis, resolved with chronic Hepatitis C  Oral thrush  CAD s/p MI POA  Hypothyroidism    CONSULTATIONS:  IP CONSULT TO CARDIOLOGY    Excerpted HPI from H&P of Bonnie Bragg MD:  Rodríguez Lockett is a 66 y.o.  male who presents with above. Pt with recent admissions for respiratory failure. After last admission, he refused acute rehab referral and went home but was too weak to participate in any activity. His wife says that he has been very lethargic and sleeping most of the day. He has been walking very little and has been short of breath with ambulation. He has been coughing but it is nonproductive. Last week, he was started on a long prednisone taper and symbicort by pulmonology. He complains of dizziness when he tries to walk a lot. He denies chest pain. His appetite has been very poor but he denies nausea. No diarrhea - has been constipated since leaving the hospital. He denies being able to urinary for 2 days. He has noted new edema and 8 lbs weight gain. He denies focal weakness. ______________________________________________________________________  DISCHARGE SUMMARY/HOSPITAL COURSE:  for full details see H&P, daily progress notes, labs, consult notes. Hospital course:  New onset atrial fibrillation: Recent cardiac w/u including negative stress testing 3/17. Pt had CXR on admission no acute findings.   He was initially treated with amiodarone, but this was felt to be a poor choice due to his lung disease. He was switched to diltiazem per cardiology recommendations. Pt's free T4 normal, con't current synthroid dose. Pt was also started on eliquis. He continued to have peristent tachycardia despite these interventions, so underwent AV node ablation and pacemaker 4/21 with Dr. Nguyen Bai.  HR was normal after that time. Chronic hypoxic respiratory failure due to chronic bronchiectasis, chronic COPD and pulmonary fibrosis in setting of remote pulmonary TB:  4.5 LPM at baseline, able to wean below baseline (3.5 LPM) due to improving oxygenation. Pt should con't steroids at slow taper as written (started long taper per Dr. Sunny Boyd last week). He should also con't BID ipratropium with xopenex nebs. Con't spiriva, symbicort and robitussin AC as he does at home  Acute kidney injury (resolved) in setting of acute recurrent urinary retention: baseline Cr 0.7-0.9, had english removed ~1 1/2 wks ago in urology office. Pt should con't lasix, but lisinopril was stopped. He unfortunately needed english replaced in ER with >700mL. Will need to keep in place until seen again by urology. Acute transaminitis: No clear etiology, LFTs now improving. Labs consistent with chronic hep C. He had no acute liver findings on CT A/P 3/28/17. His hepatitis C Ab positive. EBV IgM negative. Oral thrush: due to steroids. Con't nystatin oral.  CAD s/p MI POA  Hypothyroidism: con't synthroid, checking thyroid function as above    _______________________________________________________________________  Patient seen and examined by me on discharge day. Pertinent Findings:  Gen: awake, appropriate, NAD  HEENT: cl justo, no lesions  Chest: CTA bilaterally overall with occassional scattered wheeze on L. Poor air movement.   Cv: RRR, no murmur, no edema  Abd: soft, NT, ND, BS+, no mass  Neuro: CN intact  _______________________________________________________________________  DISCHARGE MEDICATIONS:   Current Discharge Medication List START taking these medications    Details   apixaban (ELIQUIS) 5 mg tablet Take 1 Tab by mouth two (2) times a day for 30 days. Qty: 60 Tab, Refills: 0      dilTIAZem CD (CARDIZEM CD) 180 mg ER capsule Take 1 Cap by mouth daily for 30 days. Qty: 30 Cap, Refills: 0      docusate sodium (COLACE) 100 mg capsule Take 1 Cap by mouth two (2) times a day for 90 days. Qty: 180 Cap, Refills: 0      sorbitol 70 % solution Take 30 mL by mouth daily as needed for up to 30 days. For constipation  Qty: 454 mL, Refills: 0      oxyCODONE-acetaminophen (PERCOCET) 5-325 mg per tablet Take 1 Tab by mouth every eight (8) hours as needed for Pain (from pacemaker incision). Max Daily Amount: 3 Tabs. Qty: 10 Tab, Refills: 0      nystatin (MYCOSTATIN) 100,000 unit/mL suspension Take 5 mL by mouth two (2) times a day for 30 days. swish and spit  Qty: 300 mL, Refills: 0         CONTINUE these medications which have CHANGED    Details   guaiFENesin-codeine (ROBITUSSIN AC) 100-10 mg/5 mL solution Take 5 mL by mouth two (2) times daily as needed for Cough. Max Daily Amount: 10 mL. Qty: 463 mL, Refills: 0      !! levalbuterol (XOPENEX) 1.25 mg/3 mL nebu 3 mL by Nebulization route two (2) times a day for 30 days. Qty: 180 mL, Refills: 0      suvorexant (BELSOMRA) 10 mg tablet Take 2 Tabs by mouth nightly. Max Daily Amount: 20 mg.  Qty: 20 Tab, Refills: 0      predniSONE (DELTASONE) 10 mg tablet Take 3 tabs by mouth daily for 8 days, then 2 tabs by mouth daily for 7 days, then 1 tab by mouth daily for 7 days  Qty: 45 Tab, Refills: 0      !! levalbuterol (XOPENEX) 1.25 mg/3 mL nebu 3 mL by Nebulization route every four (4) hours as needed. For shortness of breath  Qty: 30 Nebule, Refills: 0       !! - Potential duplicate medications found. Please discuss with provider. CONTINUE these medications which have NOT CHANGED    Details   aspirin delayed-release 81 mg tablet Take 81 mg by mouth daily.     Associated Diagnoses: Ischemic heart disease, chronic      furosemide (LASIX) 20 mg tablet Take 2 Tabs by mouth daily. Administration Instructions: Patient is to take furosemide 20 mg tab, two tablets by month once daily. Patient is to weight himself daily and if there is a weight gain >2 lb, he is to take an additional 20 mg tablet for a maximum daily dose of 60 mg. Indications: Edema  Qty: 60 Tab, Refills: 6      ferrous sulfate 325 mg (65 mg iron) tablet Take 1 Tab by mouth daily (with breakfast). Qty: 30 Tab, Refills: 1      azithromycin (ZITHROMAX) 250 mg tablet Take 1 Tab by mouth every Monday, Wednesday, Friday. Qty: 39 Tab, Refills: 1      ipratropium (ATROVENT) 0.02 % nebulizer solution 2.5 mL by Nebulization route two (2) times a day. Qty: 7.5 mL, Refills: 3    Comments: Dispense 300 vials. LORazepam (ATIVAN) 1 mg tablet TAKE 1/2 TO 1 BY MOUTH UP TO 3 TIMES DAILY AS NEEDED FOR ANXIETY      melatonin tab tablet Take 10 mg by mouth nightly. OXYGEN-AIR DELIVERY SYSTEMS 4.5 L/min by Nasal route continuous. tiotropium bromide (SPIRIVA RESPIMAT) 2.5 mcg/actuation inhaler Take 2 Puffs by inhalation daily. Qty: 1 Inhaler, Refills: 1      L. gasseri-B. bifidum-B longum 1.5 billion cell cap Take 2 Caps by mouth daily. SUMATRIPTAN SUCCINATE (IMITREX PO) Take  by mouth as needed (migraine). raNITIdine (ZANTAC) 150 mg tablet Take 150 mg by mouth daily (with lunch). budesonide-formoterol (SYMBICORT) 160-4.5 mcg/actuation HFA inhaler Take 2 Puffs by inhalation two (2) times a day. Qty: 3 Inhaler, Refills: 4      XOPENEX HFA 45 mcg/actuation inhaler USE 2 INHALATIONS BY MOUTH EVERY 4 HOURS AS NEEDED FOR WHEEZING  Qty: 45 Inhaler, Refills: 6      potassium chloride SR (KLOR-CON 10) 10 mEq tablet Take 1 Tab by mouth two (2) times daily (with meals). Take 2nd dose when you take an extra Furosemide. Indications: HYPOKALEMIA  Qty: 180 Tab, Refills: 1      traZODone (DESYREL) 50 mg tablet Take 2 Tabs by mouth nightly.   Qty: 180 Tab, Refills: 3      sertraline (ZOLOFT) 50 mg tablet Take 1.5 Tabs by mouth daily. Qty: 135 Tab, Refills: 3    Associated Diagnoses: Adjustment disorder with mixed anxiety and depressed mood      levothyroxine (SYNTHROID) 50 mcg tablet TAKE 1 TABLET DAILY BEFORE BREAKFAST  Qty: 90 Tab, Refills: 3    Associated Diagnoses: Other specified hypothyroidism      polyethylene glycol (MIRALAX) 17 gram packet Take 17 g by mouth daily as needed. Associated Diagnoses: Chronic constipation         STOP taking these medications       lisinopril (PRINIVIL, ZESTRIL) 20 mg tablet Comments:   Reason for Stopping:         nebivolol (BYSTOLIC) 5 mg tablet Comments:   Reason for Stopping:         diphenhydrAMINE (BENADRYL) 25 mg capsule Comments:   Reason for Stopping:               My Recommended Diet, Activity, Wound Care, and follow-up labs are listed in the patient's Discharge Insturctions which I have personally completed and reviewed. _______________________________________________________________________  DISPOSITION:    Home with Family:    Home with HH/PT/OT/RN:    SNF/LTC: x   SOTO:    OTHER:        Condition at Discharge:  Stable  _______________________________________________________________________  Follow up with:   PCP : Huber Flroes III, DO  Follow-up Information     Follow up With Details Comments Contact Info    [facility] [address] [facility]  This is your Pulmonary Rehab Provider.   [address] [address]  Mauri Young DO In 2 weeks  [address] [facility]  [address] [facility]  [address] Sarah Garibay MD In 1 week for follow up on your pacemaker [address] [facility]  [address] Mauri Miller MD  as already scheduled in May [address]  [facility]  Neeraj Worthy [address] [place] [address]  [phone] [facility] In 7 days for follow up on your catheter [address]  [address] [facility] [phone]                Total time in minutes spent coordinating this discharge (includes going over instructions, follow-up, prescriptions, and preparing report for sign off to her PCP) :  45 minutes    Signed:  Liv Sandoval MD

## 2017-04-23 NOTE — PROGRESS NOTES
Ms. Carlos Reese called me stating that Mr. Carlos Reese got very upset for some reason at [facility] and decided to leave before initiating any therapy. She states that already filled his prescriptions and she was not sure what blood thinner he was supposed to be on. I told her it is Eliquis and I called into the [facility] of her choice. Also told her to stop taking aspirin. He had also been discharged with diltiazem. However since he has had an AV escobar ablation I do not think he needs that medicine anymore. Therefore I told him to go back to taking his 20 mg of lisinopril and do not think diltiazem or Bystolic. Follow-up with Dr. Maddie Eng as planned within a week. Updated [facility] med list and called the pharmacy to which diltiazem had been prescribed to cancel it.

## 2017-04-24 NOTE — PROGRESS NOTES
[address] [facility] Hospitalization           Referral from Natalie Henry. Patient re-admitted to ED [facility] on 4/17/17 and discharged on 4/22/17 with diagnosis of New onset a.fib; chronic hypoxic respiratory failure due to chronic bronchiectasis, chronic copd and pulmonary fibrosis in setting of remote pulmonary TB; Acute Kidney Injury in setting of Acute urinary retention; Acute Transaminitis; Oral Thrush. - patient s/p AV node ablation (Dr. Marichuy Nolen) on 4/211/7.  - patient s/p pacemaker placement (Dr. Marichuy Nolen) on 4/21/17. RRAT score:   High Risk            22       Total Score        3 Relationship with PCP    2 Patient Living Status    9 More than 1 Admission in calendar year    8 Charlson Comorbidity Score        Criteria that do not apply:    Patient Length of Stay > 5    Patient Insurance is Medicare, Medicaid or Self Pay                    Advance Medical Directive on file in EMR? Yes; DDNR AND has an advanced directive - a copy has been provided. Patient was evaluated by care management during hospitalization. Per notes, Discharge Disposition from [facility]: [address] [facility] (SNF). Recommended Post-Hospital Discharge follow-up (see below as listed on Hospital Discharge AVS/Instructions). Follow-up Information     Follow up With Details Comments Contact Info     [facility] [address] [facility]   This is your Pulmonary Rehab Provider.   [facility] [address]  [phone]     Rodger Irby III, DO In 2 weeks   [address]  [address] [facility]  [phone]     Nubia Cherry MD In 1 week for follow up on your pacemaker [address] [facility]  [facility]  [phone]     Nolvia Pope MD   as already scheduled in May [address] [facility]  [facility]  Neeraj Worthy [address]  [address] [phone] [facility] In 7 days for follow up on your catheter [address] [facility] [place] [phone]                 New medications at discharge include: Eliquis, Diltiazem, Docusate, Nystatin Suspension, Percocet, Sorbitol Solution  Medication(s) changed at hospital discharge include: Xopenex Nebulizer Solution, Potassium Chloride, Prednisone, Belsomra  Medication(s) discontinued at hospital discharge include: Benadryl, Lisinopril, Bystolic        PLAN  -Patient to be followed by Alexandrea HERRERA during SNF admission. NN will route this encounter to Dr. Padilla Jimenez for notification/review. NN will route this encounter to Ambulatory  NN for notification/review and continued follow-up during AMBER period. This note will not be viewable in [address]

## 2017-04-28 PROBLEM — J96.90 RESPIRATORY FAILURE (HCC): Status: ACTIVE | Noted: 2017-01-01

## 2017-04-28 NOTE — ED PROVIDER NOTES
HPI Comments: Belinda Rojas is a 66 y.o. male, pmhx significant for COPD, post-tuberculous bronchiectasis, pulmonary fibrosis, HTN, diastolic CHF, and MI, who presents via EMS with wife to the ED c/o worsening SOB with productive cough x 1 day. Per wife, pt has also been abnormally tremulous and anxious today. His wife was taking pt to his PCP, when she noticed that his Sp02 was 76% on 6L NC. They took his Sp02 while at the PCP's office, and it was 71% on 6L NC. Ambulance was called from PCP's office. Pt states that he's been taking cough medicine with codeine which significantly helps his cough. Wife additionally reports that pt had significant increase in leg swelling, and gave him and extra Lasix tab, and potassium which helped decrease his leg swelling. Pt is usually able to ambulate without much assistance, but x 2 days he's had to use a wheelchair secondary to his SOB. Of note, pt had a pacemaker inserted x 1 wk ago, and he's had stable blood pressure at home, per wife. Pt denies nausea, vomiting, fever, chills, CP, leg pain, or abdominal pain. PCP: Yuko Cho DO  Cardiology: Moy Iverson MD     Family Hx: Stroke (mother, father)  PSHx: Significant for pacemaker  Social Hx: - tobacco (former), - EtOH, - Illicit Drugs    There are no other complaints, changes, or physical findings at this time. The history is provided by the patient and the spouse. No  was used.         Past Medical History:   Diagnosis Date    Adjustment disorder with mixed anxiety and depressed mood     Bronchiectasis (HCC)     Chronic obstructive pulmonary disease (HCC)     Diastolic CHF, chronic ([address] 11/4/2015    Essential hypertension, benign 9/20/2009    GERD (gastroesophageal reflux disease) 9/20/2009    Hepatitis C antibody positive in blood     [facility] [address] [address]     Migraine headache 9/20/2009    Post-tuberculous bronchiectasis 10/17/2013    Pulmonary fibrosis [facility])     Pulmonary tuberculosis     Thyroid disease     hypothyroidism    Urinary retention        History reviewed. No pertinent surgical history. Family History:   Problem Relation Age of Onset    Heart Disease Mother     Stroke Mother     Cancer Father      throat       Social History     Social History    Marital status:      Spouse name: N/A    Number of children: N/A    Years of education: N/A     Occupational History    Not on file. Social History Main Topics    Smoking status: Former Smoker     Packs/day: 1.50     Years: 25.00     Types: Cigarettes     Quit date: 4/26/1979    Smokeless tobacco: Never Used    Alcohol use No    Drug use: No    Sexual activity: Not Currently     Other Topics Concern    Not on file     Social History Narrative         ALLERGIES: Cardizem [diltiazem hcl]    Review of Systems   Constitutional: Negative. Negative for chills and fever. HENT: Negative for congestion. Eyes: Negative. Respiratory: Positive for cough and shortness of breath. Cardiovascular: Positive for leg swelling. Negative for chest pain. Gastrointestinal: Negative for abdominal pain, nausea and vomiting. Endocrine: Negative. Negative for heat intolerance. Genitourinary: Negative. Musculoskeletal: Negative for back pain. Skin: Negative for rash. Allergic/Immunologic: Negative. Negative for immunocompromised state. Neurological: Positive for tremors. Hematological: Negative. Does not bruise/bleed easily. Psychiatric/Behavioral: The patient is nervous/anxious. All other systems reviewed and are negative. Patient Vitals for the past 12 hrs:   Temp Pulse Resp BP SpO2   04/28/17 1930 - 76 24 132/61 90 %   04/28/17 1900 - 75 23 136/83 95 %   04/28/17 1545 - 75 22 122/55 93 %   04/28/17 1543 - - - - 93 %   04/28/17 1531 98.4 °F (36.9 °C) 75 25 114/57 91 %     Physical Exam   Constitutional: He is oriented to person, place, and time.  He appears well-developed and well-nourished. Moderate distress   HENT:   Head: Normocephalic and atraumatic. Eyes: EOM are normal. Pupils are equal, round, and reactive to light. Neck: Normal range of motion. Neck supple. Cardiovascular: Normal rate, regular rhythm and normal heart sounds. Pulmonary/Chest: He has no wheezes. Increased respiratory effort  Bilateral basilar rales  Bandaged pacer site, L chest   Abdominal: Soft. Bowel sounds are normal. There is no tenderness. Musculoskeletal: Normal range of motion. He exhibits edema. He exhibits no tenderness. 1+ edema in bilateral feet and ankles  Intact distal pulses  Calves non-tender   Neurological: He is alert and oriented to person, place, and time. No cranial nerve deficit. Skin: Skin is warm and dry. Multiple bruises on arms and abdomen   Psychiatric: He has a normal mood and affect. Nursing note and vitals reviewed.      MDM  Number of Diagnoses or Management Options  Acute respiratory failure with hypoxia [facility]):   Chronic anemia:   Pleural effusion, left:   Pneumonia of both lungs due to infectious organism, unspecified part of lung:   Diagnosis management comments:   DDx: COPD exacerbation, respiratory failure, CHF, pneumonia, bronchitis       Amount and/or Complexity of Data Reviewed  Clinical lab tests: reviewed and ordered  Tests in the radiology section of CPT®: reviewed and ordered  Tests in the medicine section of CPT®: ordered and reviewed  Obtain history from someone other than the patient: yes (Wife)  Review and summarize past medical records: yes  Discuss the patient with other providers: yes (Hospitalist)  Independent visualization of images, tracings, or specimens: yes    Critical Care  Total time providing critical care: 30-74 minutes    Patient Progress  Patient progress: stable    ED Course       Procedures    EKG interpretation: (Preliminary) 3:36 PM  Rhythm: ventricular-paced rhythm; Rate (approx.): 75; Axis: normal; QRS interval: normal ; ST/T wave: normal  Written by Bryan Kelly ED Scribriki, as dictated by Jose Antonio Stallworth MD.     CONSULT NOTE:   5:10 PM  Jose Antonio Stallworth MD spoke with Juani Delatorre MD,   Specialty: Hospitalist  Discussed pt's hx, disposition, and available diagnostic and imaging results. Reviewed care plans. Consultant will evaluate pt for admission. Dr. Angeles Perez recommends postponing giving pt abx, unless pt meets sepsis criteria. Pt does not meet sepsis criteria at this time. Written by Bryan Kelly ED Scribriki, as dictated by Jose Antonio Stallworth MD.    PROGRESS NOTE   9:12 PM  Pt taken to CT without his O2, and came back from CT without his O2. Pt's stats dropped into the 80s on RA. Pt placed on non-rebreather, and placed back on O2. His stats improved to 90-95%. Pt re-examined. He has wheezes on the L side. Obtaining an ABG, and will order another nebulizer tx. Written by SULMA Birminghamibriki, as dictated by Jose Antonio Stallworth MD.     CRITICAL CARE NOTE:    9:14 PM    IMPENDING DETERIORATION -Respiratory and Metabolic    ASSOCIATED RISK FACTORS - Hypoxia, Metabolic changes and Dehydration    MANAGEMENT- Bedside Assessment and Supervision of Care    INTERPRETATION -  Xrays, CT Scan, Blood Gases, ECG and Blood Pressure    INTERVENTIONS - hemodynamic mngmt and Metobolic interventions    CASE REVIEW - Hospitalist, Nursing and Family    TREATMENT RESPONSE -Stable    PERFORMED BY - Self      NOTES   :    I have spent 70 minutes of critical care time involved in lab review, consultations with specialist, family decision- making, bedside attention and documentation. During this entire length of time I was immediately available to the patient .     Jose Antonio Stallworth MD    LABORATORY TESTS:  Recent Results (from the past 12 hour(s))   EKG, 12 LEAD, INITIAL    Collection Time: 04/28/17  3:36 PM   Result Value Ref Range    Ventricular Rate 75 BPM    Atrial Rate 81 BPM    QRS Duration 146 ms    Q-T Interval 510 ms    QTC Calculation (Bezet) 569 ms    Calculated R Axis -109 degrees    Calculated T Axis 68 degrees    Diagnosis       ** Poor data quality, interpretation may be adversely affected  Ventricular-paced rhythm  Abnormal ECG  When compared with ECG of 17-APR-2017 14:02,  Electronic ventricular pacemaker has replaced Atrial fibrillation  Vent. rate has decreased BY  66 BPM     CK    Collection Time: 04/28/17  4:02 PM   Result Value Ref Range    CK 19 (L) 39 - 308 U/L   TROPONIN I    Collection Time: 04/28/17  4:02 PM   Result Value Ref Range    Troponin-I, Qt. 0.07 (H) <0.05 ng/mL   CBC WITH AUTOMATED DIFF    Collection Time: 04/28/17  4:02 PM   Result Value Ref Range    WBC 5.7 4.1 - 11.1 K/uL    RBC 3.16 (L) 4.10 - 5.70 M/uL    HGB 8.6 (L) 12.1 - 17.0 g/dL    HCT 28.8 (L) 36.6 - 50.3 %    MCV 91.1 80.0 - 99.0 FL    MCH 27.2 26.0 - 34.0 PG    MCHC 29.9 (L) 30.0 - 36.5 g/dL    RDW 16.8 (H) 11.5 - 14.5 %    PLATELET 097 (L) 623 - 400 K/uL    NEUTROPHILS 87 (H) 32 - 75 %    LYMPHOCYTES 4 (L) 12 - 49 %    MONOCYTES 9 5 - 13 %    EOSINOPHILS 0 0 - 7 %    BASOPHILS 0 0 - 1 %    ABS. NEUTROPHILS 5.0 1.8 - 8.0 K/UL    ABS. LYMPHOCYTES 0.2 (L) 0.8 - 3.5 K/UL    ABS. MONOCYTES 0.5 0.0 - 1.0 K/UL    ABS. EOSINOPHILS 0.0 0.0 - 0.4 K/UL    ABS.  BASOPHILS 0.0 0.0 - 0.1 K/UL    RBC COMMENTS ANISOCYTOSIS  1+        DF SMEAR SCANNED     METABOLIC PANEL, COMPREHENSIVE    Collection Time: 04/28/17  4:02 PM   Result Value Ref Range    Sodium 142 136 - 145 mmol/L    Potassium 3.8 3.5 - 5.1 mmol/L    Chloride 96 (L) 97 - 108 mmol/L    CO2 38 (H) 21 - 32 mmol/L    Anion gap 8 5 - 15 mmol/L    Glucose 167 (H) 65 - 100 mg/dL    BUN 20 6 - 20 MG/DL    Creatinine 0.88 0.70 - 1.30 MG/DL    BUN/Creatinine ratio 23 (H) 12 - 20      GFR est AA >60 >60 ml/min/1.73m2    GFR est non-AA >60 >60 ml/min/1.73m2    Calcium 8.3 (L) 8.5 - 10.1 MG/DL    Bilirubin, total 0.4 0.2 - 1.0 MG/DL    ALT (SGPT) 21 12 - 78 U/L    AST (SGOT) 12 (L) 15 - 37 U/L    Alk. phosphatase 46 45 - 117 U/L    Protein, total 6.1 (L) 6.4 - 8.2 g/dL    Albumin 2.3 (L) 3.5 - 5.0 g/dL    Globulin 3.8 2.0 - 4.0 g/dL    A-G Ratio 0.6 (L) 1.1 - 2.2     MAGNESIUM    Collection Time: 04/28/17  4:02 PM   Result Value Ref Range    Magnesium 2.1 1.6 - 2.4 mg/dL   PRO-BNP    Collection Time: 04/28/17  4:02 PM   Result Value Ref Range    NT pro-BNP 82393 (H) 0 - 125 PG/ML   LACTIC ACID, PLASMA    Collection Time: 04/28/17  4:02 PM   Result Value Ref Range    Lactic acid 1.6 0.4 - 2.0 MMOL/L   TROPONIN I    Collection Time: 04/28/17  5:41 PM   Result Value Ref Range    Troponin-I, Qt. 0.08 (H) <0.05 ng/mL   LACTIC ACID, PLASMA    Collection Time: 04/28/17  5:41 PM   Result Value Ref Range    Lactic acid 0.9 0.4 - 2.0 MMOL/L       IMAGING RESULTS:  CXR Results  (Last 48 hours)               04/28/17 1656  XR CHEST PORT Final result    Impression:  IMPRESSION: Increased opacification of the left hemithorax likely representing a   combination of pleural effusion and airspace opacity. Narrative:  EXAM:  CR chest portable       INDICATION:  Shortness of breath today. COMPARISON: 4/21/2017. TECHNIQUE: Portable AP semiupright chest view at 1613 hours       FINDINGS: The left chest ICD and wire are stable. Cardiac monitoring wires   overlie the thorax. There is limited visualization of the cardiomediastinal   contours. There is increased opacification of the left hemithorax likely due to   a combination of pleural effusion and airspace opacity. There is stable right   apical pleural thickening/effusion and right apical opacity. There is no   pneumothorax. The bones and upper abdomen are stable. CT Results  (Last 48 hours)               04/28/17 2041  CT CHEST WO CONT Final result    Impression:  IMPRESSION:    There is increased airspace opacity throughout the left lung and right lower   lobe suggestive of infection. No lung mass is identified.  Chronic findings of   bronchiectasis with fibrosis and pleural thickening are again seen. Narrative:  EXAM:  CT chest without contrast       INDICATION:  Pneumonia versus mass. Respiratory failure. Increased left   hemithorax opacification on chest radiograph. COMPARISON: Chest radiograph 4/28/2016. CT 3/28/2017       TECHNIQUE: Helical CT of the chest is performed without intravenous contrast.   Coronal and sagittal reformatted images are obtained. CT dose reduction was   achieved through use of a standardized protocol tailored for this examination   and automatic exposure control for dose modulation. FINDINGS: Evaluation of the solid organs is limited without intravenous   contrast. A left chest ICD is again seen with a wire terminating in the right   ventricle. The aorta and main pulmonary artery are stable in caliber. There is   stable cardiac silhouette enlargement with coronary artery atherosclerosis. No   pericardial effusion. No lymphadenopathy by CT size criteria. There is increased airspace opacity throughout the left lung superimposed on   chronic opacity and mild bronchiectasis. There is a small left pleural   effusion/pleural thickening. There is increased patchy opacification in the   right lower lobe. Right lower lobe bronchiectasis is again seen. Right apical   pleural thickening and right upper lung scarring are unchanged. No lung mass is   identified. There is no pneumothorax. The central airways are unremarkable. In the limited images of the upper abdomen, the partially imaged solid organs   are unremarkable. There are degenerative changes in the spine without aggressive   blastic or lytic bony lesion.                US ABD LTD (Final result)    EXAM: US EXT NONVAS LIMITED LT     INDICATION: Left flank hematoma.     COMPARISON: None.     TECHNIQUE: Grayscale and color Doppler ultrasound is performed in the left flank  area of hematoma.     FINDINGS: Ultrasound demonstrates diffuse edema in the subcutaneous soft tissues  of the left flank. There is no soft tissue mass or drainable collection. Limited  images of the left kidney are grossly normal.     IMPRESSION  IMPRESSION: Diffuse edema in the left flank subcutaneous soft tissues. There is  no mass or drainable collection. Result time: 04/28/17 20:16:17         MEDICATIONS GIVEN:  Medications   apixaban (ELIQUIS) tablet 5 mg (0 mg Oral Held 4/28/17 2008)   azithromycin [facility]) tablet 250 mg (250 mg Oral Given 4/28/17 2102)   fluticasone-vilanterol (BREO ELLIPTA) 100mcg-25mcg/puff (not administered)   ferrous sulfate tablet 325 mg (not administered)   guaiFENesin-codeine (ROBITUSSIN AC) 100-10 mg/5 mL solution 5 mL (not administered)   ipratropium (ATROVENT) 0.02 % nebulizer solution 0.5 mg (0.5 mg Nebulization Given 4/28/17 2046)   lactobac ac& pc-s.therm-b.anim (CY Q/RISAQUAD) (not administered)   levalbuterol (XOPENEX) nebulizer soln 1.25 mg/3 mL (not administered)   levothyroxine (SYNTHROID) tablet 50 mcg (not administered)   LORazepam (ATIVAN) tablet 0.5 mg (not administered)   oxyCODONE-acetaminophen (PERCOCET) 5-325 mg per tablet 1 Tab (not administered)   polyethylene glycol (MIRALAX) packet 17 g (not administered)   predniSONE (DELTASONE) tablet 30 mg (not administered)   famotidine (PEPCID) tablet 20 mg (not administered)   sertraline (ZOLOFT) tablet 75 mg (not administered)   umeclidinium (INCRUSE ELLIPTA) 62.5 mcg/actuation (not administered)   sodium chloride (NS) flush 5-10 mL (not administered)   sodium chloride (NS) flush 5-10 mL (not administered)   acetaminophen (TYLENOL) tablet 650 mg (not administered)   ondansetron (ZOFRAN) injection 4 mg (not administered)   furosemide (LASIX) injection 40 mg (40 mg IntraVENous Given 4/28/17 2050)   levoFLOXacin (LEVAQUIN) 750 mg in D5W IVPB (750 mg IntraVENous New Bag 4/28/17 2053)       IMPRESSION:  1. Acute respiratory failure with hypoxia ([facility] [address]    2.  Pleural effusion, left 3. Pneumonia of both lungs due to infectious organism, unspecified part of lung    4. Chronic anemia        PLAN: Admit to hospitalist  Admission Note:  5:12 PM  Patient is being admitted to the hospital by Dr. Von Rodriguez. The results of their tests and reasons for their admission have been discussed with them and/or available family. They convey agreement and understanding for the need to be admitted and for their admission diagnosis. Written by Delmy De La Torre, ED Scribe, as dictated by Sav Lopez MD.    Attestation: This note is prepared by Delmy De La Torre, acting as Scribe for Sav Lopez MD.    Sav Lopez MD: The scribe's documentation has been prepared under my direction and personally reviewed by me in its entirety. I confirm that the note above accurately reflects all work, treatment, procedures, and medical decision making performed by me.

## 2017-04-28 NOTE — H&P
Hospitalist Admission Note    NAME: Mary Sharp   :  1945   MRN:  [id number]     Date/Time:  2017 7:05 PM    Patient PCP: Gilbert Jameson III, DO  ________________________________________________________________________    My assessment of this patient's clinical condition and my plan of care is as follows. Assessment / Plan:  Acute on chronic respiratory failure ( baseline 4.5 litres) sats down to 70%  Secondary to acute on chronic diastolic chf  With underlying pulmonary fibrosis, chronic bronchiectasis, remote history of pulmonary TB s/p treatment , severe COPD      Iv diuresis i/o monitoring  Empiric levo, cont bronchodilators, cont long predniosne taper per dr. Renee Ambrosio  Now on 30mg, decrease by 10mg by every week, by the time reaches to 10mg to see dr. Renee Ambrosio  Need to repeat chest xray in 2 days, if no improvement, needs thoracentesis  Check bnp  Will check CT chest: given concern with underlying opacity  Speech eval    Chest xray : Increased opacification of the left hemithorax likely representing a  combination of pleural effusion and airspace opacity  Last echo () Systolic function was normal. Ejection fraction was  estimated in the range of 55 % to 60 %.       afib s/p  Ablation and pacer on  dr. Shila Limon  Echo as above  Will ask cardiology opinion regarding the recommendations    History of chronic hep c    Bruise on the left flank   No falls  Being on eliquis will check sonogram      Recent admission with urinary retention: has english,  Followed with urology as out patient  Plan to cont english  As out patient    Hypothyroidism: synthyroid      Code Status: dnr  Surrogate Decision Maker: wife    DVT Prophylaxis:eliquis  GI Prophylaxis: not indicated    Baseline: recent discharge to rehab, pt did not like it, at home  Baseline 4.5 litres        Subjective:   CHIEF COMPLAINT: sob    HISTORY OF PRESENT ILLNESS:     Crystal Oviedo is a 66 y.o. male who with multiple comorbs as above just discharged last week to rehab, pt was not able to  Stay there, wife took him home, was not doing good, more with sob and leg swelling, was giving lasix, and had to give   Extra dose of lasix yesterday, was sob, and  When wife checked the sat in 79 took the pcp office, given low sat   Referred to the er  Also with some cough, nof ever, no chest pain or abdominal pain  Wife also ntoed the bruise on the left flank    We were asked to admit for work up and evaluation of the above problems. Past Medical History:   Diagnosis Date    Adjustment disorder with mixed anxiety and depressed mood     Bronchiectasis (HCC)     Chronic obstructive pulmonary disease (HCC)     Diastolic CHF, chronic ([facility] [address] 11/4/2015    Essential hypertension, benign 9/20/2009    GERD (gastroesophageal reflux disease) 9/20/2009    Hepatitis C antibody positive in blood     MI (myocardial infarction) ([facility] [address]     Migraine headache 9/20/2009    Post-tuberculous bronchiectasis 10/17/2013    Pulmonary fibrosis ([facility] [address]     Pulmonary tuberculosis     Thyroid disease     hypothyroidism    Urinary retention         History reviewed. No pertinent surgical history. Social History   Substance Use Topics    Smoking status: Former Smoker     Packs/day: 1.50     Years: 25.00     Types: Cigarettes     Quit date: 4/26/1979    Smokeless tobacco: Never Used    Alcohol use No        Family History   Problem Relation Age of Onset    Heart Disease Mother     Stroke Mother     Cancer Father      throat     Allergies   Allergen Reactions    Cardizem [Diltiazem Hcl] Other (comments)     Headache and constipation        Prior to Admission medications    Medication Sig Start Date End Date Taking? Authorizing Provider   sertraline (ZOLOFT) 50 mg tablet Take 1.5 Tabs by mouth daily.  4/26/17   Theodore Guerrero III, DO   guaiFENesin-codeine (ROBITUSSIN AC) 100-10 mg/5 mL solution Take 5 mL by mouth two (2) times daily as needed for Cough. Max Daily Amount: 10 mL. 4/22/17   Eliud Nicholas MD   levalbuterol (XOPENEX) 1.25 mg/3 mL nebu 3 mL by Nebulization route two (2) times a day for 30 days. 4/22/17 5/22/17  Eliud Nicholas MD   suvorexant (BELSOMRA) 10 mg tablet Take 2 Tabs by mouth nightly. Max Daily Amount: 20 mg. 4/22/17   Eliud Nicholas MD   docusate sodium (COLACE) 100 mg capsule Take 1 Cap by mouth two (2) times a day for 90 days. 4/22/17 7/21/17  Eliud Nicholas MD   sorbitol 70 % solution Take 30 mL by mouth daily as needed for up to 30 days. For constipation 4/22/17 5/22/17  Eliud Nicholas MD   oxyCODONE-acetaminophen (PERCOCET) 5-325 mg per tablet Take 1 Tab by mouth every eight (8) hours as needed for Pain (from pacemaker incision). Max Daily Amount: 3 Tabs. 4/22/17   Eliud Nicholas MD   predniSONE (DELTASONE) 10 mg tablet Take 3 tabs by mouth daily for 8 days, then 2 tabs by mouth daily for 7 days, then 1 tab by mouth daily for 7 days 4/22/17   Eliud Nicholas MD   levalbuterol (XOPENEX) 1.25 mg/3 mL nebu 3 mL by Nebulization route every four (4) hours as needed. For shortness of breath 4/22/17   Eliud Nicholas MD   nystatin (MYCOSTATIN) 100,000 unit/mL suspension Take 5 mL by mouth two (2) times a day for 30 days. swish and spit 4/22/17 5/22/17  Eliud Nicholas MD   apixaban (ELIQUIS) 5 mg tablet Take 1 Tab by mouth two (2) times a day. 4/22/17   Lore Contreras MD   furosemide (LASIX) 20 mg tablet Take 2 Tabs by mouth daily. Administration Instructions: Patient is to take furosemide 20 mg tab, two tablets by month once daily. Patient is to weight himself daily and if there is a weight gain >2 lb, he is to take an additional 20 mg tablet for a maximum daily dose of 60 mg. Indications: Edema 4/12/17   Geni Connies III, DO   ferrous sulfate 325 mg (65 mg iron) tablet Take 1 Tab by mouth daily (with breakfast). 4/12/17   Geni Boss III, DO   azithromycin (ZITHROMAX) 250 mg tablet Take 1 Tab by mouth every Monday, Wednesday, Friday. 4/12/17   Theodore Guerrero III, DO   ipratropium (ATROVENT) 0.02 % nebulizer solution 2.5 mL by Nebulization route two (2) times a day. 4/6/17   Theodore Guerrero III, DO   LORazepam (ATIVAN) 1 mg tablet TAKE 1/2 TO 1 BY MOUTH UP TO 3 TIMES DAILY AS NEEDED FOR ANXIETY    Historical Provider   melatonin tab tablet Take 10 mg by mouth nightly. Historical Provider   OXYGEN-AIR DELIVERY SYSTEMS 4.5 L/min by Nasal route continuous. Historical Provider   tiotropium bromide (SPIRIVA RESPIMAT) 2.5 mcg/actuation inhaler Take 2 Puffs by inhalation daily. 2/12/17   MD KRYSTYNA Asencio gasserBill. bifidum-B longum 1.5 billion cell cap Take 2 Caps by mouth daily. Historical Provider   SUMATRIPTAN SUCCINATE (IMITREX PO) Take  by mouth as needed (migraine). Historical Provider   raNITIdine (ZANTAC) 150 mg tablet Take 150 mg by mouth daily (with lunch). Historical Provider   budesonide-formoterol (SYMBICORT) 160-4.5 mcg/actuation HFA inhaler Take 2 Puffs by inhalation two (2) times a day. 10/21/16   Janet Collier III, DO   XOPENEX HFA 45 mcg/actuation inhaler USE 2 INHALATIONS BY MOUTH EVERY 4 HOURS AS NEEDED FOR WHEEZING 7/6/16   Janet Nando III, DO   potassium chloride SR (KLOR-CON 10) 10 mEq tablet Take 1 Tab by mouth two (2) times daily (with meals). Take 2nd dose when you take an extra Furosemide. Indications: HYPOKALEMIA  Patient taking differently: Take 20 mEq by mouth daily. Administration Instructions: Take two tabs by mouth daily, take a third dose of 10 mEq when a third dose of furosemide 20 mg is administered for weight gain of 2 lb  Indications: HYPOKALEMIA 5/10/16   Janet Collier III, DO   traZODone (DESYREL) 50 mg tablet Take 2 Tabs by mouth nightly.  5/10/16   Theodore Guerrero III, DO   levothyroxine (SYNTHROID) 50 mcg tablet TAKE 1 TABLET DAILY BEFORE BREAKFAST 2/17/16   Flower Hernandez MD   polyethylene glycol [facility]) 17 gram packet Take 17 g by mouth daily as needed. Historical Provider       REVIEW OF SYSTEMS:     I am not able to complete the review of systems because: The patient is intubated and sedated    The patient has altered mental status due to his acute medical problems    The patient has baseline aphasia from prior stroke(s)    The patient has baseline dementia and is not reliable historian   y The patient is in acute medical distress and unable to provide information               Objective:   VITALS:    Visit Vitals    /55    Pulse 75    Temp 98.4 °F (36.9 °C)    Resp 22    Ht 5' 10\" (1.778 m)    Wt 61.3 kg (135 lb 2.3 oz)    SpO2 93%    BMI 19.39 kg/m2       PHYSICAL EXAM:    General:    Alert, cooperative, no distress, appears stated age. HEENT: Atraumatic, anicteric sclerae, pink conjunctivae     No oral ulcers, mucosa moist, throat clear, dentition fair  Neck:  Supple, symmetrical,  thyroid: non tender  Lungs:   Decreased breath sounds on the left side No rales. Chest wall:  No tenderness  No Accessory muscle use. Heart:   Regular  rhythm,  No  murmur   +edema  Abdomen:   Soft, non-tender. Not distended. Bowel sounds normal  Extremities: No cyanosis. No clubbing,  Left flank bruise     Skin turgor normal, Capillary refill normal, Radial  pulse 2+  Skin:     Not pale. Not Jaundiced  No rashes   Psych:  Good insight. Not depressed. Not anxious or agitated. Neurologic: EOMs intact. No facial asymmetry. No aphasia or slurred speech. Symmetrical strength, Sensation grossly intact.  Alert and oriented X 4.     _______________________________________________________________________  Care Plan discussed with:    Comments   Patient y    Family      RN y    Care Manager                    Consultant:      _______________________________________________________________________  Expected  Disposition:   Home with Family    HH/PT/OT/RN y   SNF/LTC    SOTO    ________________________________________________________________________  TOTAL TIME:  65Minutes    Critical Care Provided     Minutes non procedure based      Comments    y Reviewed previous records   >50% of visit spent in counseling and coordination of care y Discussion with patient and/or family and questions answered       ________________________________________________________________________  Signed: Debbie Asencio MD    Procedures: see electronic medical records for all procedures/Xrays and details which were not copied into this note but were reviewed prior to creation of Plan. LAB DATA REVIEWED:    Recent Results (from the past 24 hour(s))   EKG, 12 LEAD, INITIAL    Collection Time: 04/28/17  3:36 PM   Result Value Ref Range    Ventricular Rate 75 BPM    Atrial Rate 81 BPM    QRS Duration 146 ms    Q-T Interval 510 ms    QTC Calculation (Bezet) 569 ms    Calculated R Axis -109 degrees    Calculated T Axis 68 degrees    Diagnosis       ** Poor data quality, interpretation may be adversely affected  Ventricular-paced rhythm  Abnormal ECG  When compared with ECG of 17-APR-2017 14:02,  Electronic ventricular pacemaker has replaced Atrial fibrillation  Vent. rate has decreased BY  66 BPM     CK    Collection Time: 04/28/17  4:02 PM   Result Value Ref Range    CK 19 (L) 39 - 308 U/L   TROPONIN I    Collection Time: 04/28/17  4:02 PM   Result Value Ref Range    Troponin-I, Qt. 0.07 (H) <0.05 ng/mL   CBC WITH AUTOMATED DIFF    Collection Time: 04/28/17  4:02 PM   Result Value Ref Range    WBC 5.7 4.1 - 11.1 K/uL    RBC 3.16 (L) 4.10 - 5.70 M/uL    HGB 8.6 (L) 12.1 - 17.0 g/dL    HCT 28.8 (L) 36.6 - 50.3 %    MCV 91.1 80.0 - 99.0 FL    MCH 27.2 26.0 - 34.0 PG    MCHC 29.9 (L) 30.0 - 36.5 g/dL    RDW 16.8 (H) 11.5 - 14.5 %    PLATELET 514 (L) 999 - 400 K/uL    NEUTROPHILS 87 (H) 32 - 75 %    LYMPHOCYTES 4 (L) 12 - 49 %    MONOCYTES 9 5 - 13 %    EOSINOPHILS 0 0 - 7 %    BASOPHILS 0 0 - 1 %    ABS. NEUTROPHILS 5.0 1.8 - 8.0 K/UL    ABS. LYMPHOCYTES 0.2 (L) 0.8 - 3.5 K/UL    ABS.  MONOCYTES 0.5 0.0 - 1.0 K/UL    ABS. EOSINOPHILS 0.0 0.0 - 0.4 K/UL    ABS. BASOPHILS 0.0 0.0 - 0.1 K/UL    RBC COMMENTS ANISOCYTOSIS  1+        DF SMEAR SCANNED     METABOLIC PANEL, COMPREHENSIVE    Collection Time: 04/28/17  4:02 PM   Result Value Ref Range    Sodium 142 136 - 145 mmol/L    Potassium 3.8 3.5 - 5.1 mmol/L    Chloride 96 (L) 97 - 108 mmol/L    CO2 38 (H) 21 - 32 mmol/L    Anion gap 8 5 - 15 mmol/L    Glucose 167 (H) 65 - 100 mg/dL    BUN 20 6 - 20 MG/DL    Creatinine 0.88 0.70 - 1.30 MG/DL    BUN/Creatinine ratio 23 (H) 12 - 20      GFR est AA >60 >60 ml/min/1.73m2    GFR est non-AA >60 >60 ml/min/1.73m2    Calcium 8.3 (L) 8.5 - 10.1 MG/DL    Bilirubin, total 0.4 0.2 - 1.0 MG/DL    ALT (SGPT) 21 12 - 78 U/L    AST (SGOT) 12 (L) 15 - 37 U/L    Alk.  phosphatase 46 45 - 117 U/L    Protein, total 6.1 (L) 6.4 - 8.2 g/dL    Albumin 2.3 (L) 3.5 - 5.0 g/dL    Globulin 3.8 2.0 - 4.0 g/dL    A-G Ratio 0.6 (L) 1.1 - 2.2     MAGNESIUM    Collection Time: 04/28/17  4:02 PM   Result Value Ref Range    Magnesium 2.1 1.6 - 2.4 mg/dL   PRO-BNP    Collection Time: 04/28/17  4:02 PM   Result Value Ref Range    NT pro-BNP 41949 (H) 0 - 125 PG/ML   LACTIC ACID, PLASMA    Collection Time: 04/28/17  4:02 PM   Result Value Ref Range    Lactic acid 1.6 0.4 - 2.0 MMOL/L   TROPONIN I    Collection Time: 04/28/17  5:41 PM   Result Value Ref Range    Troponin-I, Qt. 0.08 (H) <0.05 ng/mL   LACTIC ACID, PLASMA    Collection Time: 04/28/17  5:41 PM   Result Value Ref Range    Lactic acid 0.9 0.4 - 2.0 MMOL/L

## 2017-04-28 NOTE — PROGRESS NOTES
Pharmacy Automatic Renal Dosing Protocol - Antimicrobials      Indication for Antimicrobials: Upper respiratory infection  Current Regimen of Each Antimicrobial (Start Day & Day of Therapy):  Levofloxacin 750 mg IV every 24 hours ( Day #1)  Azithromycin 250 mg PO every // (PTA medication)    Significant cultures:    Blood: pending      CAPD, Intermittent Hemodialysis or Renal Replacement Therapy: None documented  Paralysis, amputations, malnutrition: None documented  Recent Labs      17   1602   CREA  0.88   BUN  20   WBC  5.7     Temp (24hrs), Av.4 °F (36.9 °C), Min:98.4 °F (36.9 °C), Max:98.4 °F (36.9 °C)    Creatinine Clearance (ml/min): 65 mL/min       Impression/Plan: Will continue current regimen as appropriate for indication and renal function. Pharmacy will follow daily and adjust doses of monitored medications as appropriate for renal function and/or serum levels.     Thank you,  Gary Martin, PHARMD

## 2017-04-29 NOTE — PROGRESS NOTES
Hospitalist Progress Note    NAME: Cole Conley   :  1945   MRN:  [id number]       Interim Hospital Summary: 66 y.o. male whom presented on 2017 with      Assessment / Plan:  Acute on chronic respiratory failure ( baseline 4.5 litres) sats down to 70% with complete opacification of left lung, c/w supplemental O2, BIPAP PRN. Bilateral Pneumonia: c/w LVQ, add Zosyn, c/w bronchodilators, steroids, check sputum. Left Side Opacity: hemothorax? Dense consolidation, will hold Eliquis today and try US thoracentesis possibly tomorrow, Pulmonology consult requested. Acute on chronic diastolic chf: c/w diuretics, monitor I/O and weight. Cardiology consult requested. Last echo () Systolic function was normal. Ejection fraction was estimated in the range of 55 % to 60 %. Underlying pulmonary fibrosis, chronic bronchiectasis, remote history of pulmonary TB s/p treatment , severe COPD , c/w treatment as above, Pulmonology input requested. afib s/p Ablation and pacer on  dr. Michelle Alberto  Has an extensive bruising in left flank, hold Eliquis for now  History of chronic hep c: patient did not knew about this will need Hepatology/ID follow up once acute problems are resolved. Recent admission with urinary retention: has english, Followed with urology as out patient  Plan to cont english As out patient  Hypothyroidism: synthyroid  Surrogate Decision Maker: wife  [phone]  Baseline: recent discharge to rehab, pt did not like it, at home  Baseline 4.5 litres  Code status: DNR  Prophylaxis: SCD's  Recommended Disposition: SNF/LTC     Subjective:     Chief Complaint / Reason for Physician Visit  \"I feel very SOB\". Discussed with RN events overnight.      Review of Systems:  Symptom Y/N Comments  Symptom Y/N Comments   Fever/Chills    Chest Pain     Poor Appetite    Edema     Cough y   Abdominal Pain     Sputum y   Joint Pain     SOB/KOO y   Pruritis/Rash     Nausea/vomit    Tolerating PT/OT     Diarrhea Tolerating Diet y    Constipation    Other       Could NOT obtain due to:      Objective:     VITALS:   Last 24hrs VS reviewed since prior progress note. Most recent are:  Patient Vitals for the past 24 hrs:   Temp Pulse Resp BP SpO2   04/29/17 0821 98 °F (36.7 °C) 76 24 157/89 96 %   04/29/17 0759 - - - - 98 %   04/29/17 0430 98.3 °F (36.8 °C) 75 22 156/80 94 %   04/29/17 0156 - - - - 94 %   04/28/17 2232 98.2 °F (36.8 °C) 75 24 162/75 90 %   04/28/17 1930 - 76 24 132/61 -   04/28/17 1900 - 75 23 136/83 95 %   04/28/17 1545 - 75 22 122/55 93 %   04/28/17 1543 - - - - 93 %   04/28/17 1531 98.4 °F (36.9 °C) 75 25 114/57 91 %       Intake/Output Summary (Last 24 hours) at 04/29/17 0923  Last data filed at 04/29/17 0454   Gross per 24 hour   Intake              150 ml   Output              900 ml   Net             -750 ml        PHYSICAL EXAM:  General: WD, WN. Alert, cooperative, SOB   EENT:  EOMI. Anicteric sclerae. MMM  Resp:  Coarse BS, rhonchi, mild crackles in both sides  CV:  Regular  rhythm,  No edema  GI:  Soft, Non distended, Non tender.  +Bowel sounds  Neurologic:  Alert and oriented X 3, normal speech,   Psych:   Good insight. Not anxious nor agitated  Skin:  No rashes. No jaundice    Reviewed most current lab test results and cultures  YES  Reviewed most current radiology test results   YES  Review and summation of old records today    NO  Reviewed patient's current orders and MAR    YES  PMH/SH reviewed - no change compared to H&P  ________________________________________________________________________  Care Plan discussed with:    Comments   Patient y    Family  y    RN y    Care Manager     Consultant  y Cardiology                     Multidiciplinary team rounds were held today with , nursing, pharmacist and clinical coordinator. Patient's plan of care was discussed; medications were reviewed and discharge planning was addressed. ________________________________________________________________________  Total NON critical care TIME:  40  Minutes    Total CRITICAL CARE TIME Spent:   Minutes non procedure based      Comments   >50% of visit spent in counseling and coordination of care     ________________________________________________________________________  Shyann Meléndez MD     Procedures: see electronic medical records for all procedures/Xrays and details which were not copied into this note but were reviewed prior to creation of Plan. LABS:  I reviewed today's most current labs and imaging studies.   Pertinent labs include:  Recent Labs      04/29/17 0159 04/28/17   1602   WBC  7.5  5.7   HGB  8.7*  8.6*   HCT  29.5*  28.8*   PLT  136*  117*     Recent Labs      04/29/17   0159  04/28/17   1602   NA  141  142   K  3.8  3.8   CL  94*  96*   CO2  40*  38*   GLU  102*  167*   BUN  19  20   CREA  0.79  0.88   CA  8.4*  8.3*   MG   --   2.1   ALB   --   2.3*   TBILI   --   0.4   SGOT   --   12*   ALT   --   21       Signed: Shyann Meléndez MD

## 2017-04-29 NOTE — ROUTINE PROCESS
TRANSFER - OUT REPORT:    Verbal report given to Jose Maria RN(name) on Belinda Rojas  being transferred to PCU(unit) for routine progression of care       Report consisted of patients Situation, Background, Assessment and   Recommendations(SBAR). Information from the following report(s) SBAR, ED Summary, [facility] and Recent Results was reviewed with the receiving nurse. Lines:   Peripheral IV 04/28/17 Right Forearm (Active)   Site Assessment Clean, dry, & intact 4/28/2017  4:02 PM   Phlebitis Assessment 0 4/28/2017  4:02 PM   Infiltration Assessment 0 4/28/2017  4:02 PM   Dressing Status Clean, dry, & intact 4/28/2017  4:02 PM   Dressing Type Transparent;Tape 4/28/2017  4:02 PM   Hub Color/Line Status Pink;Patent; Flushed 4/28/2017  4:02 PM   Action Taken Blood drawn 4/28/2017  4:02 PM        Opportunity for questions and clarification was provided.       Patient transported with:   O2 @ 4.5 liters   Nurse

## 2017-04-29 NOTE — PROGRESS NOTES
Physical Therapy  Consult received and chart reviewed. Attempting to see patient for PT this am. Wife adamantly refusing therapy for patient stating he is not getting up this weekend.   Will follow back tomorrow to initiate evaluation if patient able to participate  Magnolia Pederson, PT

## 2017-04-29 NOTE — CONSULTS
[address]   [address] [place] [address]       Name:  Niki Pak   MR#:  [id number]   :  1945   Account #:  [de-identified]    Date of Consultation:  2017   Date of Adm:  2017       CONSULTING PHYSICIAN: Dr. Jose Lambert. REASON FOR CONSULTATION: Respiratory failure. HISTORY OF PRESENT ILLNESS: This 68-year-old male who is   known to have severe pulmonary emphysema. He has a history of   tuberculosis with bronchiectasis and interstitial fibrosis. He was   recently hospitalized for pacemaker placement. He has been home for   about 1 week. During this time, he has developed progressively   worsening shortness of breath. He has developed a cough with   purulent green sputum production. Maximum temperature has been 99   degrees. He was brought to the emergency room yesterday with   extremely low oxygen saturations. Since admission, he has received   intravenous antibiotics, supplemental oxygen. He is fully alert and   oriented during my interview but he has demonstrated confusion at   other times. A lot of the history is obtained from discussion with his wife   at the bedtime and review of previous clinical records. PAST MEDICAL HISTORY   ILLNESSES   1. Severe pulmonary emphysema. 2. Chronic hypoxemic respiratory failure. 3. Bronchiectasis. 4. Previous history of tuberculosis. 5. History of pulmonary fibrosis. 6. Diastolic congestive heart failure. 7. Recent pacemaker placement. MEDICATIONS PRIOR TO ADMISSION   1. Zoloft 50 daily. 2. Xopenex 1.25 nebulizer b.i.d.   3. Belsomra 20 mg every night. 4. Colace 100 b.i.d.   5. Recent prednisone tapering course. 6. Eliquis 5 t.i.d.   7. Lasix 40 a.m.   8. Azithromycin 250 3 times a week. 9. Spiriva 5 mcg. 10. Symbicort 160/4.5 2 b.i.d.   11. Potassium 10 b.i.d.   12. Synthroid 50 daily. 13. MiraLax 17 daily.     HABITS: Tobacco 1-1/2 packs a day x25 years; none x27 years.   Ethanol: None current. SOCIAL HISTORY: He is . He is retired from painting and   wallpaper work. REVIEW OF SYSTEMS: He denies chest or abdominal pain, sudden   change in bowel habits. He has developed peripheral edema. The rest   of 10-point review of systems is negative. PHYSICAL EXAMINATION   VITAL SIGNS: Afebrile. Blood pressure 150/86, heart rate 75,   respirations 22, mildly labored. HEENT: Pupils reactive. Conjunctivae clear. Pharynx clear. NECK: Without masses. LUNGS: Tubular breath sounds both bases. Marked decreased breath   sounds throughout. HEART: Regular, without neck vein distention. ABDOMEN: Soft, nontender, nondistended. EXTREMITIES: 1+ bilateral edema. CHEST: Expansion equal. Trachea midline. SKIN: Warm and dry. NEUROLOGIC: He is alert and oriented. LABORATORY DATA: WBC 7.5, hemoglobin 8.7, platelets low at 459,   potassium 3.8, BUN 19, creatinine 0.8. CHEST X-RAY: There is dense airspace disease throughout the   hemithorax. Left hemithorax is significantly smaller in volume than the   right hemithorax. CT scanning of the chest - there is marked   consolidation throughout the left chest. There is consolidation in the   right lower lobe. No significant pleural fluid is identified on CT scan. IMPRESSION   1. Acute on chronic hypoxemic respiratory failure. 2. Severe bilateral pneumonia. 3. Acute exacerbation of severe chronic obstructive pulmonary   disease. 4. Acute exacerbation of bronchiectasis. 5. History of pulmonary tuberculosis. 6. Recent pacemaker placement. 7. Interstitial lung disease. 8. History of diastolic ventricular dysfunction. 9. Anemia. PLAN     1. Add vancomycin to current drug regimen pending culture data. 2. Supplemental oxygen. 3. Jet nebulizer bronchodilators. 4. Culture data. 5. Pulmonary toilet. 6. Deep venous thrombosis and gastrointestinal bleed prophylaxis. 7. Supplemental oxygen.    8. This patient is severely ill. He has severe pneumonia exacerbating   underlying severe respiratory disease. Prognosis is highly guarded. I   have discussed this at length with his wife and she confirms his DO-  NOT-RESUSCITATE/DO-NOT-INTUBATE status. Thank you for this consultation.         Karron Apley, MD      SR / DC   D:  04/29/2017   14:12   T:  04/29/2017   15:10   Job #:  [id number]

## 2017-04-29 NOTE — CARDIO/PULMONARY
C/P Rehab Note:    Chart Reviewed. Pt admitted wit acute on chronic respiratory failure secondary to acute on chronic diastolic HF. Last echo (8/77) Systolic function was normal. Ejection fraction was estimated in the range of 55 % to 60 %, wall thickness was normal.  Pt is a former smoker     PMH significant for   - A fib s/p ablation and pacemaker  - CAD  -Post-tuberculous bronchiectasis  -HTN    Pt last received teaching from our department on 4/17. Advised by Medical Assistant that pt is on bed pan. Will return later.

## 2017-04-29 NOTE — PROGRESS NOTES
Pharmacy Automatic Renal Dosing Protocol - Antimicrobials    Indication for Antimicrobials: CAP in setting of pulmonary fibrosis, chronic bronchiecttasis; remote hx TB; severe COPD    Current Regimen of Each Antimicrobial (Start Day & Day of Therapy):  Zosyn 3.375gm IV q8h; start ; Day #1  Levofloxacin 750mg IV q24h; start ; Day #2  Vancomycin; start ; Day #1    Goal Vancomycin Level (if needed): 15-20  Level(s) Ordered (if needed): n/a  Measured / Extrapolated Vancomycin Levels (if drawn): n/a   / n/a    Significant Cultures:   : Blood: (pending)  : Nares: (pending)  :CT Chest: There is increased airspace opacity throughout the left lung and right lower  lobe suggestive of infection. No lung mass is identified. Chronic findings of  bronchiectasis with fibrosis and pleural thickening are again seen. Left pleural effusion    CAPD, Hemodialysis or Renal Replacement Therapy: n/a   Paralysis, amputations, malnutrition: n/a    Recent Labs      17   0159  17   1602   CREA  0.79  0.88   BUN  19  20   WBC  7.5  5.7     Temp (24hrs), Av.1 °F (36.7 °C), Min:98 °F (36.7 °C), Max:98.3 °F (36.8 °C)    Creatinine Clearance (Creatinine Clearance (ml/min)): 72    Impression/Plan:   Vancomycin 1.5gm IV x1, then 1gm IV q12h for Trough Range 15-20  Continue Zosyn 3.375gm IV q8h  Continue Levofloxacin 750mg IV q24h  Daily [facility]       Pharmacy will follow daily and adjust medications as appropriate for renal function and/or serum levels.     Thank you,  Aubrey Taylor, [facility]     Renal Dosing Tables on [employer]

## 2017-04-29 NOTE — PROGRESS NOTES
Pt admitted to PCU room 2250. Wife at his side. Oriented to room, call light function, bed alarm and unit routines. Plan of care reviewed for the night. Admission data base completed, spouse providing most information. Pt is anxious, medicated with prn ativan per orders. Left chest pacemaker insertion site dressing intact. Pt denies pain or discomfort. . Paced on telemetry. 6L Oxygen via nc. Dual skin assessment completed by myself and Ilir Reyez. Large purplish bruise noted on pt's left side rib cage, side of abdomen, right inner thigh and bilateral upper arms. Pt denies pain in these areas. Pt and spouse denies any recent falls. Spouse states that the bruising started after taking Eliquis. 0230 Pt very anxious, calls out frequently, confused to time and place. Reoriented as needed. 0430 Labs drawn and sent. Pt continues to ask for his wife.   4530  Bedside shift change report given to [employer]. SBAR, MAR, VS reviewed.

## 2017-04-29 NOTE — PROGRESS NOTES
OT referral received, chart reviewed and spoke with PT who attempted to see patient for evaluation. Per PT wife adamantly refusing therapy for patient stating he is not getting up this weekend. Will defer evaluation and follow back Monday.

## 2017-04-29 NOTE — PROGRESS NOTES
Pulm consult note dictated. Severe bilat pna in the circumstance of severe chronic respir disease: prognosis is guarded. Discussed at length w/his wife.

## 2017-04-29 NOTE — PROGRESS NOTES
Telephone report received from Elidia Tinsley in ER, for pt is to be admitted to PCU room 2250 after testing is completed in ER. SBAR, MAR, VS, reviewed.

## 2017-04-30 PROBLEM — J96.00 ACUTE RESPIRATORY FAILURE (HCC): Status: ACTIVE | Noted: 2017-01-01

## 2017-04-30 NOTE — H&P
[address] [place] [address]   [facility]  [phone]    Patient Name: Dipika De Santiago  YOB: 1945    Date of Provider Hospice Visit: 04/30/17    Level of Care:   [] General Inpatient (GIP)    [x] Routine   [] Respite    Location of Care:  [] [facility] [] [facility] [x] [address] [] [facility] [] Hospice [facility]  [] Home [] Other:      Date of Original Hospice Admission: 4-30-17  Hospice Attending: Dr. Perfecto Brady Diagnosis: respiratory failure  Diagnoses RELATED to the terminal prognosis: acute and chronic diastolic heart failure  Other Diagnoses: Underlying pulmonary fibrosis, chronic bronchiectasis, remote history of pulmonary TB s/p treatment , severe COPD     HOSPICE NARRATIVE COMPOSED BY PHYSICIAN   Rationale for a prognosis of life expectancy of 6 months or less if the disease follows its normal course:       Dipika De Santiago is a 66 y.o. who was admitted to [address] [place] [address]. The patient's principle diagnosis of acute respiratory failure has resulted in his most recent decline and hospitalization. Pt went to MD for checkup due to increased shortness of breath for 1 week. In doctor's office the pt's oxygen level was in the 70's on 6L of oxygen. He was transported to the ER for eval. Ultimately admitted for pneumonia with a complete opacification of the left lung. Pt received IV antibiotics for 48 hours and despite the aggressive measures continued to decline. Wife discussed his condition with Dr Antoinette Gifford and has decided to place him under comfort care with hospice. Functionally, the patient's Palliative Performance Scale has declined over a period of one week and is estimated at 10. Objective information that support this patients limited prognosis includes: Lab work from admission; Hematology; WBC 5.7, RBC 3.16, Hgb 8.6, Hct 28.8, Plt 117. Chemistry; Chloride 96, CO2 38, Albumin 2.3, Troponin 0.07, BNP 51871. Chest xray;  Increased opacification of the left hemithorax likely representing a combination of pleural effusion and airspace opacity. The patient/family chose comfort measures with the support of Hospice. HOSPICE DIAGNOSES   Active Symptoms:  1. Agitation  2. shortness of breath  3. profound weakness  4. Acute respiratory failure     PLAN   1. Scheduled pain and symptom manage,ment medications  2. lorazepam 2 mg q 4hrs and 1mg q 1 hr as needed  3. Morphine 10mg q 1 hr  as needed and q 4hrs scheduled  4. Robinul prn  5. Nebulizer and oxygen for comfort    and SW to support family needs  Disposition: routine level of care      Prognosis estimated based on 04/30/17 clinical assessment is:   [x] Few to Many Hours  [] Few to Many Days    [] Few to Many Weeks    [] Few to Many Months    Communicated plan of care with: Hospice Case Manager;  Hospice IDT; Care Team     GOALS OF CARE     Resuscitation Status: DNR  Durable DNR: [x] Yes [] No    Advance Care Planning 4/30/2017   Patient's Healthcare Decision Maker is: Legal Next of Kin   Primary Decision Maker Name -   Primary Decision Maker Phone Number -   Primary Decision Maker Relationship to Patient -   Confirm Advance Directive Yes, on file   Does the patient have other document types Do Not Resuscitate        HISTORY     History obtained from: chart, family, CM    CHIEF COMPLAINT: shortness of breath and agitation   The patient is:   [x] Verbal  [] Nonverbal  [] Unresponsive    HPI/SUBJECTIVE:  66year old male with a hx of acute on chronic respiratory failure, with diastolic hear  failure and severe COPD       REVIEW OF SYSTEMS     The following systems were: [] reviewed  [x] unable to be reviewed    Positive ROS include:  Constitutional:  Ears/nose/mouth/throat:  Respiratory:  Gastrointestinal:  Musculoskeletal:  Neurologic:  Psychiatric:  Endocrine:               FUNCTIONAL ASSESSMENT     Palliative Performance Scale (PPS): 10%     PSYCHOSOCIAL/SPIRITUAL ASSESSMENT     Active Problems:    Acute respiratory failure ([facility] [address] (4/30/2017)      Past Medical History:   Diagnosis Date    Adjustment disorder with mixed anxiety and depressed mood     Bronchiectasis (HCC)     Chronic obstructive pulmonary disease (HCC)     Diastolic CHF, chronic ([facility] [address] 11/4/2015    Essential hypertension, benign 9/20/2009    GERD (gastroesophageal reflux disease) 9/20/2009    Hepatitis C antibody positive in blood     MI (myocardial infarction) ([facility] [address]     Migraine headache 9/20/2009    Post-tuberculous bronchiectasis 10/17/2013    Pulmonary fibrosis (HCC)     Pulmonary tuberculosis     Thyroid disease     hypothyroidism    Urinary retention       No past surgical history on file.    Social History   Substance Use Topics    Smoking status: Former Smoker     Packs/day: 1.50     Years: 25.00     Types: Cigarettes     Quit date: 4/26/1979    Smokeless tobacco: Never Used    Alcohol use No     Family History   Problem Relation Age of Onset    Heart Disease Mother     Stroke Mother     Cancer Father      throat      Allergies   Allergen Reactions    Cardizem [Diltiazem Hcl] Other (comments)     Headache and constipation      Current Facility-Administered Medications   Medication Dose Route Frequency    LORazepam (INTENSOL) 2 mg/mL oral concentrate 1 mg  1 mg Oral Q1H PRN    acetaminophen (TYLENOL) tablet 650 mg  650 mg Oral Q4H PRN    Or    acetaminophen (TYLENOL) solution 650 mg  650 mg Oral Q4H PRN    Or    acetaminophen (TYLENOL) suppository 650 mg  650 mg Rectal Q4H PRN    scopolamine (TRANSDERM-SCOP) 1.5 mg  1.5 mg TransDERmal Q72H PRN    glycopyrrolate (ROBINUL) injection 0.2 mg  0.2 mg IntraVENous Q4H PRN    bisacodyl (DULCOLAX) suppository 10 mg  10 mg Rectal DAILY PRN    morphine (ROXANOL) 100 mg/5 mL (20 mg/mL) concentrated solution 10 mg  10 mg Oral Q1H PRN    morphine (ROXANOL) 100 mg/5 mL (20 mg/mL) concentrated solution 10 mg  10 mg Oral Q4H    LORazepam (INTENSOL) 2 mg/mL oral concentrate 2 mg  2 mg Oral Q4H    morphine (ROXANOL) 100 mg/5 mL (20 mg/mL) concentrated solution 15 mg  15 mg Oral Q2H PRN    haloperidol lactate (HALDOL) injection 1 mg  1 mg IntraVENous Q6H            General: WD, WN. Alert, cooperative, SOB   EENT:  EOMI. Anicteric sclerae. MMM  Resp:  Coarse BS, rhonchi, mild crackles in both sides, using accessory muscles  CV:  Regular rhythm,  No edema  GI:  Soft, Non distended, Non tender.  +Bowel sounds  Neurologic:  Alert and oriented X 2, normal speech,   Psych:  anxious    Skin:  No rashes.  No jaundice    Pertinent Lab and or Imaging Tests:  Lab Results   Component Value Date/Time    Sodium 141 04/30/2017 02:58 AM    Potassium 3.3 04/30/2017 02:58 AM    Chloride 94 04/30/2017 02:58 AM    CO2 39 04/30/2017 02:58 AM    Anion gap 8 04/30/2017 02:58 AM    Glucose 113 04/30/2017 02:58 AM    Glucose 89 11/09/2009 07:45 AM    BUN 25 04/30/2017 02:58 AM    Creatinine 0.89 04/30/2017 02:58 AM    BUN/Creatinine ratio 28 04/30/2017 02:58 AM    GFR est AA >60 04/30/2017 02:58 AM    GFR est non-AA >60 04/30/2017 02:58 AM    Calcium 8.0 04/30/2017 02:58 AM     Lab Results   Component Value Date/Time    Protein, total 6.2 04/30/2017 02:58 AM    Albumin 2.2 04/30/2017 02:58 AM           Total time: 55  Counseling / coordination time: 45  > 50% counseling / coordination?:  y

## 2017-04-30 NOTE — PROGRESS NOTES
Call and spoke with Dr Krupa Rome concerning low potassium level of 3.3 with a new order to give Potassium 10 mEq IV now and Potassium 40 mEq PO now.

## 2017-04-30 NOTE — HOSPICE
[facility]  [phone]     Patient Name: Vladimir Stein  YOB: 1945  Age: 66 y.o. [facility] RN Note:  Hospice consult received, reviewing chart. Will follow up with Unit Nurse and Care Manager to discuss plan of care, patient status and discharge disposition within the hour. Meeting wife in approximately 45 min in the pt's room. Thank you for the opportunity to be of service to this patient.

## 2017-04-30 NOTE — DISCHARGE SUMMARY
Hospitalist Discharge Summary     Patient ID:  Julien Primrose  [id number]  32 y.o.  1945    PCP on record: Angélica Raza DO    Admit date: 4/28/2017  Discharge date and time: 4/30/2017      DISCHARGE DIAGNOSIS:    Acute on Chronic Respiratory Failure, Bilateral Pneumonia, Left lung Opacification, Pulmonary Fibrosis, Severe COPD, A. Fib S/p Ablation and PPM, Hypothyroidism, H/O Hep C,       CONSULTATIONS:  IP CONSULT TO CARDIOLOGY  IP CONSULT TO PULMONOLOGY    Excerpted HPI from H&P of Yasmeen Judge MD:  Bobbi Mahajan is a 66 y.o. male who with multiple comorbs as above just discharged last week to rehab, pt was not able to  Stay there, wife took him home, was not doing good, more with sob and leg swelling, was giving lasix, and had to give   Extra dose of lasix yesterday, was sob, and When wife checked the sat in 79 took the pcp office, given low sat   Referred to the er  Also with some cough, nof ever, no chest pain or abdominal pain  Wife also ntoed the bruise on the left flank  We were asked to admit for work up and evaluation of the above problems. ______________________________________________________________________  DISCHARGE SUMMARY/HOSPITAL COURSE:  for full details see H&P, daily progress notes, labs, consult notes. Acute on chronic respiratory failure ( baseline 4.5 litres) sats down to 70% with complete opacification of left lung, c/w supplemental O2, patient at this time using accessory muscles to breath, D/W wife declined offer for BiPAP she wants to offer comfort for him  Bilateral Pneumonia: D/C LVQ, and Zosyn, c/w bronchodilators, steroids, check sputum. As per wife will offer comfort  Left Side Opacity:  Dense consolidation, will hold Eliquis, US shows small effusion not amenable for tap, Pulmonology help appreciated  Acute on chronic diastolic chf: c/w diuretics, monitor I/O and weight. Cardiology consult requested.  Last echo (7/77) Systolic function was normal. Ejection fraction was estimated in the range of 55 % to 60 %. Underlying pulmonary fibrosis, chronic bronchiectasis, remote history of pulmonary TB s/p treatment , severe COPD , c/w treatment as above, Pulmonology input requested. afib s/p Ablation and pacer on 4/21 dr. Margarita Gee  Has an extensive bruising in left flank, hold Eliquis for now  History of chronic hep c: patient did not knew about this will need Hepatology/ID follow up once acute problems are resolved. Recent admission with urinary retention: has english, Followed with urology as out patient Plan to cont english As out patient  Hypothyroidism: synthyroid  Surrogate Decision Maker: wife KARIE Montelongo [phone]  Baseline: recent discharge to rehab, pt did not like it, at home  Baseline 4.5 litres  Code status: DNR  Prophylaxis: SCD's  Recommended Disposition: SNF/LTC      Patient with great deal of respiratory distress and using accessory muscles to breath, as per wife will concentrate in comfort, will contact Hospice for admission, prognosis is poor at short term    Patient will be admitted to inpatient Hospice      _______________________________________________________________________  Patient seen and examined by me on discharge day.   Pertinent Findings:  Gen:    SOB  Chest: Coarse BS, crackles , using accessory muscles  CVS:   Regular rhythm.  + edema  Abd:  Soft, not distended, not tender  Neuro:  Alert, GCS [id number]  _______________________________________________________________________  DISCHARGE MEDICATIONS:   Current Discharge Medication List      STOP taking these medications       sertraline (ZOLOFT) 50 mg tablet Comments:   Reason for Stopping:         guaiFENesin-codeine (ROBITUSSIN AC) 100-10 mg/5 mL solution Comments:   Reason for Stopping:         levalbuterol (XOPENEX) 1.25 mg/3 mL nebu Comments:   Reason for Stopping:         suvorexant (BELSOMRA) 10 mg tablet Comments:   Reason for Stopping:         sorbitol 70 % solution Comments: Reason for Stopping:         oxyCODONE-acetaminophen (PERCOCET) 5-325 mg per tablet Comments:   Reason for Stopping:         predniSONE (DELTASONE) 10 mg tablet Comments:   Reason for Stopping:         levalbuterol (XOPENEX) 1.25 mg/3 mL nebu Comments:   Reason for Stopping:         nystatin (MYCOSTATIN) 100,000 unit/mL suspension Comments:   Reason for Stopping:         apixaban (ELIQUIS) 5 mg tablet Comments:   Reason for Stopping:         furosemide (LASIX) 20 mg tablet Comments:   Reason for Stopping:         ferrous sulfate 325 mg (65 mg iron) tablet Comments:   Reason for Stopping:         azithromycin (ZITHROMAX) 250 mg tablet Comments:   Reason for Stopping:         ipratropium (ATROVENT) 0.02 % nebulizer solution Comments:   Reason for Stopping:         LORazepam (ATIVAN) 1 mg tablet Comments:   Reason for Stopping:         melatonin tab tablet Comments:   Reason for Stopping:         OXYGEN-AIR DELIVERY SYSTEMS Comments:   Reason for Stopping:         tiotropium bromide (SPIRIVA RESPIMAT) 2.5 mcg/actuation inhaler Comments:   Reason for Stopping:         L. gasseri-B. bifidum-B longum 1.5 billion cell cap Comments:   Reason for Stopping:         SUMATRIPTAN SUCCINATE (IMITREX PO) Comments:   Reason for Stopping:         raNITIdine (ZANTAC) 150 mg tablet Comments:   Reason for Stopping:         budesonide-formoterol (SYMBICORT) 160-4.5 mcg/actuation HFA inhaler Comments:   Reason for Stopping:         Whitney Bevel HFA 45 mcg/actuation inhaler Comments:   Reason for Stopping:         potassium chloride SR (KLOR-CON 10) 10 mEq tablet Comments:   Reason for Stopping:         traZODone (DESYREL) 50 mg tablet Comments:   Reason for Stopping:         levothyroxine (SYNTHROID) 50 mcg tablet Comments:   Reason for Stopping:         polyethylene glycol (MIRALAX) 17 gram packet Comments:   Reason for Stopping:         docusate sodium (COLACE) 100 mg capsule Comments:   Reason for Stopping:               My Recommended Diet, Activity, Wound Care, and follow-up labs are listed in the patient's Discharge Insturctions which I have personally completed and reviewed.     _______________________________________________________________________  DISPOSITION:    Home with Family:    Home with HH/PT/OT/RN:    SNF/LTC:    SOTO:    OTHER: y       Patient will be admitted to inpatient Hospice    Signed:  Nawaf Church MD

## 2017-04-30 NOTE — PROGRESS NOTES
1300- Dual skin assessment completed with Юлия JONES. Scattered bruising and swelling seen over right arm, bruising over right upper chest, petechia seen over mid abdomen, bruising seen in inner/upper left thigh and scrotal area, sacrum is red and blanchable to firm pressure, large dark bruise to left upper thigh and hip.

## 2017-04-30 NOTE — PROGRESS NOTES
Patient restless, anxious, attempting to get out of bed, calling out for wife. Patient SOB, O2 desaturation due to anxiety. Notifed MD on call. Order given for 2mg haldol IV.

## 2017-04-30 NOTE — PROGRESS NOTES
Pt is a READMIT: Pt has had two previous readmissions, the most recent was 4/17/17 to 4/22/17. Pt was discharged to [facility] for pulmonary rehab. Pt is a 67 y/o male that was admitted on 4/28/17 due to Dx of Acute on chronic respiratory failure sats down to 70% Secondary to acute on chronic diastolic CHF with underlying pulmonary firbrosis, chronic bronchiectasis, remote history of pulmonary TB s/p treatment, severe COPD. SW was consulted for a Hospice Evaluation today. SW met with Pt and spouse. FOC was offered and family chose [facility]. Referral was made via [facility]. SW talked to Alex Still at [facility] and they have a nurse at the hospital that can meet with the family this morning. SW met with the Pt and family to review demographic information. Pt lives with his spouse in a one story home with 3 JUAN. Pt's spouse works fulltime as a  with [employer], she is 70. Pt is alone during the day when she is at work 15 minutes away and she frequently checks on him. Pt was open to [facility] in the past.  Pt has the following DME: cane, walker, rollator, WC and Oxygen is provided by [employer], and grab bars in the home. Pt has recently been to [facility] but Pt was not admitted from [facility]. Sarina Rubio He was admitted from home. 12:02 PM  SW noted that Pt was admitted to [facility] per the chart and they are going to do Inpatient Hospice for this Pt/family. CM will continue to monitor discharge plans and assist Pt and family as needed.      Hermes, Tobin Danielle

## 2017-04-30 NOTE — HOSPICE
[facility]  [phone]    Inpatient Nursing Admission   Patient Name: Devendra Johnson  YOB: 1945  Age: 66 y.o. Date of Hospice Admission: 4/30/2017  Hospice Attending Elected by Patient: Michelle Reece MD  Primary Care Physician: Conner Cantor DO  Admitting RN: Glenn Sanders RN   :      Level of Care (GIP/Routine/Respite): Routine   Facility of Care: [address] [place] [address]  Patient Room: [address] [place] [address]     HOSPICE SUMMARY   ER Visits/ Hospitalizations in past year:   05/06/2016; ER for shortness of breath  07/07/2016; Admitted for sepsis  02/09/2017; Admitted for acute on chronic respiratory failure  03/28/2017; Sepsis secondary to pneumonia  04/17/2017; New onset A-fib, pacemaker placement   04/28/2017; Acute respiratory failure, pneumonia    Hospice Diagnosis: Acute Respiratory Failure  Acute respiratory failure ([facility] [address]  Onset Date of Hospice Diagnosis: 4/28/17  Summary of Disease Progression Leading to Hospice Diagnosis: Pt has a significant cardiac and respiratory medical history. He has been hospitalized several times in the past 6 months. This time her came in with a complete white out of his left lung due to pneumonia. He was placed on antibiotics for 48 hours and there has been no improvement.  Wife asked to placed him in the care of hospice     Co-Morbidities:   Patient Active Problem List   Diagnosis Code    Pulmonary emphysema with fibrosis of lung ([facility] [address] J43.9, J84.10    GERD (gastroesophageal reflux disease) K21.9    Migraine headache G43.909    ED (erectile dysfunction) N52.9    Hypokalemia E87.6    Post-tuberculous bronchiectasis A15.0    Hypoxemic respiratory failure, chronic (HCC) J96.11    Sepsis (HCC) A41.9    Adjustment disorder with mixed anxiety and depressed mood F43.23    Ischemic heart disease, chronic I25.9    Vasomotor rhinitis J30.0    Persistent disorder of initiating or maintaining sleep G47.00    Hypothyroidism due to acquired atrophy of thyroid E03.4    Chronic constipation K59.09    Chronic systolic congestive heart failure ([facility]) I50.22    DNR no code (do not resuscitate) Z66    Diarrhea R19.7    Essential hypertension I10    COPD exacerbation ([facility]) J44.1    Primary insomnia F51.01    Pulmonary emphysema ([facility]) J43.9    Atypical chest pain R07.89    Cardiomyopathy ([facility]) I42.9    A-fib ([facility]) I48.91    ARF (acute renal failure) ([facility]) N17.9    Respiratory failure ([facility]) J96.90    Acute respiratory failure ([facility]) J96.00     Diagnoses RELATED to the terminal prognosis: Acute on chronic respiratory failure, COPD, pneumonia, a-fib, cardiomyopathy, pulmonary emphysema, CHF  Other Diagnoses:     Rationale for a prognosis of life expectancy of 6 months or less if the disease follows its normal course (Disease Specific History):     Sharyle Conception is a 66 y.o. who was admitted to [facility]. The patient's principle diagnosis of acute respiratory failure has resulted in his most recent decline and hospitalization. Pt went to MD for checkup due to increased shortness of breath for 1 week. In doctor's office the pt's oxygen level was in the 70's on 6L of oxygen. He was transported to the ER for eval. Ultimately admitted for pneumonia with a complete opacification of the left lung. Pt received IV antibiotics for 48 hours and despite the aggressive measures continued to decline. Wife discussed his condition with Dr Luma Augustine and has decided to place him under comfort care with hospice. Functionally, the patient's Palliative Performance Scale has declined over a period of one week and is estimated at 10. Objective information that support this patients limited prognosis includes: Lab work from admission; Hematology;  WBC 5.7, RBC 3.16, Hgb 8.6, Hct 28.8, Plt 117. Chemistry; Chloride 96, CO2 38, Albumin 2.3, Troponin 0.07, BNP 36269. Chest xray;   Increased opacification of the left hemithorax likely representing a combination of pleural effusion and airspace opacity. The patient/family chose comfort measures with the support of Hospice. Patient meets for routine LOC as evidenced by pt is hospice appropriate, however no scheduled medications have been initiated to combat his symptoms. Pt admitted to routine with scheduled medications     Prognosis estimated based on 04/30/17 clinical assessment is:   [x] Few to Many Hours  [] Hours to Days   [] Few to Many Days   [] Days to Weeks   [] Few to Many Weeks   [] Weeks to Months   [] Few to Many Months    ASSESSMENT    Patient self-reports:  []  Yes    [x] No    SYMPTOMS: pale, increased respirations at 32/min. Increased secretions. Use of accessory muscles and abdominal tugging to breath. Tachycardia. Periods of apnea    SIGNS/PHYSICAL FINDINGS: see above     KARNOFSKY: 10    FAST for all dementia:     CLINICAL INFORMATION     Wt Readings from Last 3 Encounters:   04/29/17 61.2 kg (135 lb)   04/22/17 61.3 kg (135 lb 3.2 oz)   04/07/17 62.6 kg (138 lb)     Ht Readings from Last 3 Encounters:   04/28/17 5' 10\" (1.778 m)   04/21/17 5' 10\" (1.778 m)   04/07/17 5' 10.98\" (1.803 m)     There is no height or weight on file to calculate BMI. There were no vitals taken for this visit. LAB VALUES  No results found for this visit on 04/30/17 (from the past 12 hour(s)). No results found for this visit on 04/30/17 (from the past 6 hour(s)). Lab Results   Component Value Date/Time    Protein, total 6.2 04/30/2017 02:58 AM    Albumin 2.2 04/30/2017 02:58 AM       Currently this patient has:  [x] Supplemental O2 [x] Peripheral IV  [] PICC    [] PORT   [x] Oneal Catheter [] NG Tube   [] PEG Tube [] Ostomy    [] AICD: Has ICD been deactivated? [] Yes [] No:______    PLAN     1. Admit to hospice routine home care. Initiate scheduled medications to control symptoms. If unable to control symptoms pt will be admitted GIP for symptom control.      Hospice Team Frequency Orders:  Skilled Nurse -  Daily x 7 days /every other day x 7 days with 5 PRN visits for symptom control. MSW  1 visit for initial assessment/evaluation for family support and need for volunteer services. Pardeep Raines  1 visit for initial assessment/evaluation for spiritual support. ADVANCE CARE PLANNING (Complete in ACP Flow Sheet)   Code Status: DNR  Durable DNR: [x]  Yes  []  No  Advance Care Planning 2017   Patient's Healthcare Decision Maker is: Legal Next of Stefano Manning   Primary Decision Maker Name Suzanne Benitez   Primary Decision Maker Phone Number [phone]   Primary Decision Maker Relationship to Patient Spouse   Confirm Advance Directive Yes, on file        Service: [] Yes  []  No      [x] Unknown  Appropriate for Pinning Ceremony:  [] Yes     [] No  Baptism: NO Christian   Home: [facility]  [address]     1. Discharge Plan: Home with hospice if symptoms are managed and he is not imminent   2. Patient/Family teaching: medications, hospice services  3. Response to patient/family teaching: verbalized a clear understanding     SOCIAL/EMOTIONAL/SPIRITUAL NEEDS     Spiritual Issues Identified: none    Psych/ Social/ Emotional Issues Identified: none    Caregiver Support:  [x] Provided information on End of Life Care   [x] Material Provided: Gone From My Sight or Leopoldo Junes   Dr. Licea Fears contacted, discharge to hospice order received  Dr. Lizzie Mancilla agrees to serve as attending provider for hospice and provided verbal certification of terminal illness. Viktor Santa NP contacted for admission to hospice. Orders for hospice admission, medications and plan of treatment received. Medication reconciliation completed. MEDS: See medication list below  DME: Per hospital  Supplies: Per hospital  IDT communication to include MD, SN, SW, CH and support team    ALLERGIES AND MEDICATIONS     Allergies:    Allergies   Allergen Reactions    Cardizem [Diltiazem Hcl] Other (comments) Headache and constipation     Current Facility-Administered Medications   Medication Dose Route Frequency    LORazepam (INTENSOL) 2 mg/mL oral concentrate 1 mg  1 mg Oral Q1H PRN    acetaminophen (TYLENOL) tablet 650 mg  650 mg Oral Q4H PRN    Or    acetaminophen (TYLENOL) solution 650 mg  650 mg Oral Q4H PRN    Or    acetaminophen (TYLENOL) suppository 650 mg  650 mg Rectal Q4H PRN    scopolamine (TRANSDERM-SCOP) 1.5 mg  1.5 mg TransDERmal Q72H PRN    glycopyrrolate (ROBINUL) injection 0.2 mg  0.2 mg IntraVENous Q4H PRN    bisacodyl (DULCOLAX) suppository 10 mg  10 mg Rectal DAILY PRN    morphine (ROXANOL) 100 mg/5 mL (20 mg/mL) concentrated solution 10 mg  10 mg Oral Q1H PRN    morphine (ROXANOL) 100 mg/5 mL (20 mg/mL) concentrated solution 10 mg  10 mg Oral Q4H    LORazepam (INTENSOL) 2 mg/mL oral concentrate 2 mg  2 mg Oral Q4H

## 2017-04-30 NOTE — DISCHARGE INSTRUCTIONS
HOSPITALIST DISCHARGE INSTRUCTIONS  NAME: Obed Mckinley   :  1945   MRN:  [id number]     Date/Time:  2017 11:24 AM    ADMIT DATE: 2017     DISCHARGE DATE: 2017     DIAGNOSIS:  Acute on Chronic Respiratory Failure, Bilateral Pneumonia, Left lung Opacification, Pulmonary Fibrosis, Severe COPD, A.  Fib S/p Ablation and PPM, Hypothyroidism, H/O Hep C,       Patient will be admitted to inpatient Hospice                                                                                                                                          Physician's or R.N.'s Signature                                                                  Date/Time                                                                                                                                              Patient or Representative Signature                                                          Date/Time

## 2017-04-30 NOTE — PROGRESS NOTES
Hospitalist Progress Note    NAME: Ernst Hart   :  1945   MRN:  [id number]       Interim Hospital Summary: 66 y.o. male whom presented on 2017 with      Assessment / Plan:  Acute on chronic respiratory failure ( baseline 4.5 litres) sats down to 70% with complete opacification of left lung, c/w supplemental O2, patient at this time using accessory muscles to breath, D/W wife declined offer for BiPAP she wants to offer comfort for him  Bilateral Pneumonia: D/C LVQ, and Zosyn, c/w bronchodilators, steroids, check sputum. As per wife will offer comfort  Left Side Opacity:  Dense consolidation, will hold Eliquis, US shows small effusion not amenable for tap, Pulmonology help appreciated  Acute on chronic diastolic chf: c/w diuretics, monitor I/O and weight. Cardiology consult requested. Last echo () Systolic function was normal. Ejection fraction was estimated in the range of 55 % to 60 %. Underlying pulmonary fibrosis, chronic bronchiectasis, remote history of pulmonary TB s/p treatment , severe COPD , c/w treatment as above, Pulmonology input requested. afib s/p Ablation and pacer on  dr. Georgie Quintero  Has an extensive bruising in left flank, hold Eliquis for now  History of chronic hep c: patient did not knew about this will need Hepatology/ID follow up once acute problems are resolved.   Recent admission with urinary retention: has english, Followed with urology as out patient  Plan to cont english As out patient  Hypothyroidism: synthyroid  Surrogate Decision Maker: wife  [id number]  Baseline: recent discharge to rehab, pt did not like it, at home  Baseline 4.5 litres  Code status: DNR  Prophylaxis: SCD's  Recommended Disposition: SNF/LTC     Patient with great deal of respiratory distress and using accessory muscles to breath, as per wife will concentrate in comfort, will contact Hospice for admission, prognosis is poor at short term     Subjective:     Chief Complaint / Reason for Physician Visit  \"I feel very SOB, I do not want that mask\". Discussed with RN events overnight. Review of Systems:  Symptom Y/N Comments  Symptom Y/N Comments   Fever/Chills    Chest Pain     Poor Appetite    Edema     Cough y   Abdominal Pain     Sputum y   Joint Pain     SOB/KOO y   Pruritis/Rash     Nausea/vomit    Tolerating PT/OT     Diarrhea    Tolerating Diet y    Constipation    Other       Could NOT obtain due to:      Objective:     VITALS:   Last 24hrs VS reviewed since prior progress note. Most recent are:  Patient Vitals for the past 24 hrs:   Temp Pulse Resp BP SpO2   04/30/17 0808 98.1 °F (36.7 °C) 75 30 178/90 98 %   04/30/17 0255 98.2 °F (36.8 °C) 75 19 152/89 100 %   04/30/17 0132 - - - 143/63 -   04/29/17 2333 98.1 °F (36.7 °C) 75 22 182/80 93 %   04/29/17 2054 - - - - 94 %   04/29/17 1936 - 75 18 (!) 147/92 94 %   04/29/17 1540 - 74 - 176/90 96 %   04/29/17 1516 98 °F (36.7 °C) 82 20 180/85 96 %   04/29/17 1500 - - - - 100 %   04/29/17 1208 98.1 °F (36.7 °C) 75 22 151/86 100 %       Intake/Output Summary (Last 24 hours) at 04/30/17 0842  Last data filed at 04/30/17 0730   Gross per 24 hour   Intake             1100 ml   Output             1400 ml   Net             -300 ml        PHYSICAL EXAM:  General: WD, WN. Alert, cooperative, SOB   EENT:  EOMI. Anicteric sclerae. MMM  Resp:  Coarse BS, rhonchi, mild crackles in both sides, using accessory muscles  CV:  Regular  rhythm,  No edema  GI:  Soft, Non distended, Non tender.  +Bowel sounds  Neurologic:  Alert and oriented X 2, normal speech,   Psych:   Fair  insight. Not anxious nor agitated  Skin:  No rashes.   No jaundice    Reviewed most current lab test results and cultures  YES  Reviewed most current radiology test results   YES  Review and summation of old records today    NO  Reviewed patient's current orders and MAR    YES  PMH/SH reviewed - no change compared to H&P  ________________________________________________________________________  Care Plan discussed with:    Comments   Patient y    Family  y    RN y    Care Manager     Consultant  y Cardiology                     Multidiciplinary team rounds were held today with , nursing, pharmacist and clinical coordinator. Patient's plan of care was discussed; medications were reviewed and discharge planning was addressed. ________________________________________________________________________  Total NON critical care TIME:  30  Minutes    Total CRITICAL CARE TIME Spent:   Minutes non procedure based      Comments   >50% of visit spent in counseling and coordination of care y    ________________________________________________________________________  Lito Yeboah MD     Procedures: see electronic medical records for all procedures/Xrays and details which were not copied into this note but were reviewed prior to creation of Plan. LABS:  I reviewed today's most current labs and imaging studies.   Pertinent labs include:  Recent Labs      04/30/17 0258 04/29/17 0159 04/28/17   1602   WBC  5.7  7.5  5.7   HGB  8.5*  8.7*  8.6*   HCT  28.6*  29.5*  28.8*   PLT  124*  136*  117*     Recent Labs      04/30/17 0258 04/29/17 0159 04/28/17   1602   NA  141  141  142   K  3.3*  3.8  3.8   CL  94*  94*  96*   CO2  39*  40*  38*   GLU  113*  102*  167*   BUN  25*  19  20   CREA  0.89  0.79  0.88   CA  8.0*  8.4*  8.3*   MG  1.9   --   2.1   ALB  2.2*   --   2.3*   TBILI  0.7   --   0.4   SGOT  14*   --   12*   ALT  17   --   21       Signed: Lito Yeboah MD

## 2017-04-30 NOTE — PROGRESS NOTES
Oncology Nursing Communication Tool      7:23 PM  4/30/2017     Bedside and Verbal shift change report given to ROBERT Head (incoming nurse) by Scarlett Graham (outgoing nurse) on Dayanna Chapin a 66 y.o. male who was admitted on 4/30/2017  1:00 PM. Report included the following information SBAR, Kardex, Intake/Output, MAR and Accordion. Significant changes during shift: none      Issues for physician to address: none           Code Status: DNR     Infections: No current active infections     Allergies: Cardizem [diltiazem hcl]     Current diet: DIET REGULAR       Pain Controlled [x] yes [] no   Bowel Movement [x] yes [] no   Last Bowel Movement (date)     4/30/17            Vital Signs:   Patient Vitals for the past 12 hrs:   Temp Pulse Resp BP SpO2   04/30/17 1300 98.7 °F (37.1 °C) 71 25 159/59 97 %      Intake & Output:     Intake/Output Summary (Last 24 hours) at 04/30/17 1923  Last data filed at 04/30/17 1830   Gross per 24 hour   Intake                0 ml   Output              250 ml   Net             -250 ml      Laboratory Results:   No results found for this or any previous visit (from the past 12 hour(s)). Opportunity for questions and clarifications were given to the incoming nurse. Patient's bed is in low position, side rails x2, door open PRN, call bell within reach and patient not in distress.       Scarlett Graham

## 2017-05-01 NOTE — PROGRESS NOTES
Received phone call from pt's wife (transferred from the phone team) stating someone from this office, [phone], called her this morning. Informed pt's wife that there is no outreach phone call noted in chart. She stated whomever it was will most likely call her back. Dr. Paz Mckay saw pt in the hospital this morning.

## 2017-05-01 NOTE — HOSPICE
[facility] LCSW Admission Note:  66year old   man, recently diagnosed with pneumonia , history of COPD, recent multiple hospitalizations was admitted to hospice services on 17 due to respiratory distress. He is currently unresponsive and appears to be imminent. His breaths are shallow and quick. With him are wife, Riya El of 32 years and her adult son, Junior Lebron. Riya El shared that patient has been sick with COPD for some years and that he \"knew he was at the end\". She felt that \"he was ready\" and she is ready for him to \"be at peace\". Riya El still works full time for [employer], has worked there for 20 years and feels her job is very supportive. She also feels that this will help her cope with her grief and loss. Riya El notes she has good support of her son and has some distant relatives.  arrangements are made via [facility]. There does not appear to be any Yarsanism that they belong to. Family is keeping enciso, no current psychosocial needs. Made family aware of bereavement support available via hospice. Also ordered a comfort cart for them. [facility] Help to Those in Need  [phone]    Social Work Admission Note  Patient Name: Kimberly Pickering  YOB: 1945  Age: 66 y.o.     Date of Visit: 17  Facility of Care: [facility]  Patient Room: [address] [place] [address]     Hospice Attending: Merly Johnson MD  Hospice Diagnosis: Acute Respiratory Failure  Acute respiratory failure ([address]    Level of Care:    []  GIP    []  Respite   [x]  Routine    NARRATIVE   See above    ADVANCE CARE PLANNING    Code Status: DNR  Durable DNR: X_ Yes  _ No  Advance Care Planning 2017   Patient's Healthcare Decision Maker is: Legal Next of Kin   Primary Decision Maker Name -   Primary Decision Maker Phone Number -   Primary Decision Maker Relationship to Patient -   Confirm Advance Directive Yes, on file   Does the patient have other document types Do Not Resuscitate Relationship Status:  []  Single     [x]        []      []  Domestic Partner     []  /  []  Common Law  []    []  Unknown    If in a relationship, name of partner/spouse: Melani Earl  Duration of relationship: 32 years, known for 30 years (Anniversary coming up this May)    Rastafari: [address]: [employer]   Resources Provided: None needed at this time     Social Work Initial Assessment     Gender:  male    Race/Ethnicity: (oswaldo all that apply)  []  American [place] ([facility]) or [place] Native  []  [address]  []  Black or [place] American  []   or   []  Native [address]  14. or Shirley  [x]  Casi  []  Unknown      Service:    []  Yes   []  No       [x]  Unknown  Appropriate for Pinning Ceremony:   []  Yes      []  No  Is patient using VA benefits?    []  Yes      []  No     Primary Language: English  []   Needed  []   utilized during visit    Ability to express thoughts/needs/feelings  []  Expressed thoughts/feelings/needs without difficulty  []  Requires extra time and cuing  []  Speech limited single words  []  Uses only gestures (eye, blinking eye or head movement/pointing)  []  Unable to express thoughts/feelings/needs (speech unintelligible or inappropriate)  [x]  Unresponsive  Notes:      Mental Status:  []  Alert-oriented to:     []  Person     []  Place     []  Time  [x]  Comatose-responds to:    []   Verbal stimuli    []  Tactile stimuli    []  Painful stimuli  []  Forgetful  []  Disoriented/Confused  []  Lethargic  []  Agitated  []  Other (specify):    Notes:      Patients description of Illness/Current Health Status:    [x]  Patient unable to discuss  []  Patient unwilling to discuss  []  (Specify)        Knowledge/Understanding of Disease Process  Patient:    []  Demonstrates knowledge/understanding of disease process   []  Demonstrates knowledge/understanding of treatment plan   []  Demonstrates knowledge/understanding of prognosis   []  Demonstrates acceptance of prognosis   []  Demonstrates knowledge/understanding of resuscitation status   []  Other (specify)  Caregiver:   [x]  Demonstrates knowledge/understanding of disease process   [x]  Demonstrates knowledge/understanding of treatment plan   [x]  Demonstrates knowledge/understanding of prognosis   [x]  Demonstrates acceptance of prognosis   [x]  Demonstrates knowledge/understanding of resuscitation status   []  Other (specify)  Notes:      Patients living arrangement/care setting:  Use the PRIOR COLUMN when the PATIENTS current health status necessitated a change in his/her primary residence. Prior Current Response              [x]             []    Patients own home/residence              []             []    Home of family member/friend              []             []    Boarding home              []             []    Assisted living facility/correction center              []             []    Hospital/Acute care facility              []             []    Skilled nursing facility              []             []    Long term care facility/Nursing home              []             [x]    Hospice in Patient      Primary Caregiver:   []  No Primary Caregiver  Name of Primary Caregiver: Michael Steve  Relationship or Primary Caregiver:    [x]  Spouse/Significant other       []  Natural Child        []  Step child       []  Sibling   []  Parent   []  Friend/Neighbor   []  Community/Christian Volunteer   []  Paid help   []  Other (specify):___________  Notes:  She has been CG for some time due to his COPD. She has managed working F/T and assisting patient as needed.      Family members/Significant others:  Name: Michael Chikanelly  Relationship: Wife  Phone Number: [phone] H / [phone] C  Actively involved in care? [x]  Yes  []  No    Name: Kwabena Linea  Relationship: Step son  Phone Number: [phone]  Actively involved in care?   [x]  Yes  [] No    Social support systems: (select ONE best description)  []  Excellent social support system which includes three or more family members or friends  [x]  Good social support system which includes two or less members or friends  []  [address] support which includes one family member or friend  []  Poor social support; no family members or friends; basically ALONE  Notes:      Emotional Status: (oswaldo all that apply)    Patient Caregiver Response                 []                [x]    Mood/Affect stable and appropriate                   []                []    Angry                 []                []    Anxious                 []                []    Apprehensive                 []                []    Avoidant                 []                []    Clinging                 []                []    Depressed                 []                []    Distraught                 []                []    Elated                 []                []    Euphoric                 []                []    Fearful                 []                []    Flat Affect                 []                []    Helpless                 []                []    Hostile                 []                []    Impulsive                 []                []    Irritable                 []                []    Labile                 []                []    Manic                 []                []    Restlessness                 []                []    Sad                 []                []    Suspicious                 []                []    Tearful                 []                []    Withdrawn     Notes: [facility] is realistic and coping well. Coping Skills (strengths/weakness):    Patient: Coping Skills (strength/weakness): Not responsive.     Family/caregiver (strength/weakness): Realistic and accepting.      [place] of care (oswaldo all that apply):     [x]  No burden evident   []  Family must administer medications   [] Illness causing financial strain   []  Family/Support feels overwhelmed   []  Family/Support sleep disturbed with patients care   []  Patients care causes extra physical stress  of death  []  Illness causes changes in family lifestyle  []  Illness impacting family/support employment  []  Family experiencing increased time demands  []  Patients behavior endangers family  []  Denial of patients illness  []  Concern over outcome of illness/fear  []  Patients behavior embarrassing to family   Notes:      Risk Factors: (oswaldo all that apply):    [x]  No burden evident   []  Alcohol abuse  []  Financial resources inadequate to meet basic needs (food/house/etc)  []  Financial resources inadequate to meet health care needs (supplies/equipment/medications)  []  Food/nutrition resources inadequate  []  Home environment unsafe/inadequate for home care  []  Homicidal risk  []  Lives alone or without concerned relatives  []  Multiple medications/complex schedule  []  Physical limitations increase likelihood of falls  []  Plan of care/treatments complicated  []  Substance use/abuse  []  Suicidal risk  []  Visual impairment threatens safety/ability to perform self-care  []  Other (specify):     Abuse/Neglect (actual/potential risks):  [x]  No signs of abuse/neglect  []  History of abuse/neglect                 []  [id number]          []  Sexual  []  History of domestic violence  []  Lacks adequate physical care  []  Lacks emotional nurturing/support  []  Lacks appropriate stimulation/cognitive experiences  []  Left alone inappropriately  []  Lacks necessary supervision  []  Inadequate or delayed medical care  []  Unsafe environment (i.e guns/drug use/history of violence in the home/etc.)  []  Bruising or other physical signs of injury present  []  Other (specify):  Notes:   []  Refer to child/adult protective services      Current Sources of Stress (in Addition to Current Illness):   [x]  None reported  []  Bills/Debt    [] Career/Job change    []   (short term)    []   (long term)    []  Death of a child (recent)    []  Death of a parent (recent)   []  Death of a spouse (recent)   []  Employment status changed   []  Family discord    []  Financial loss/Inadequate inther (specify):come  []  Job loss  []  Legal issues unresolved  []  Lifestyle change  []  Marital discord  []  Marriage within the last year  []  Paperwork (insurance/legal/etc) overwhelming  []  Separation/Divorce  []  Other (specify):  Notes:      Current [employer] Being Utilized     1. None at this time. Weekend hospice RN gave family information. Today gave information about bereavement support. Interventions/Plan of Care     1. Assess social and emotional factors related to coping with end of life issues  2. Community resource planning/referral   3. Relocation to different care setting if/when symptoms stabilize  4. Discharge Planning     1. Not needed. Patient is imminent.      Hospice Bereavement Assessment     4/30/2017    Bereaved Name: Rashida Au   Address: [facility] [address]  Phone: [phone] (Home)  [phone] (Work)  [phone] (Mobile  Bereaved Date of Birth   Bereaved Gender:  [x]  Female  []  Male  Date Assessment Completed: 05/01/17    Relationship to the Patient:  [x]  Spouse/Significant other    []  Parent  []  Natural child      []  Step child   []  friend/Neighbor  []  Sibling  []  Other (specify):     Primary Caregiver  [x]  Yes    []  No     Social Support Systems  [x]  Excellent social support system which includes three or more willing family members or friends  []  Good social support system which includes two or less willing family members or friends  []  [address] support which includes one willing family member or friend  []  Poor social support; no willing family members or friends; basically ALONE     Frequency of Contact/Communication with Family and Friends  [x] Daily - step son lives with her it seems  []  Three or more times a week  []  One to two times a week  []  Two to three times per month  []  At least monthly  []  Less often than monthly     Community Support Groups/Counseling Services Currently Used:   [x]  None  []  Bereavement support group   Specify support group:   []  Behavioral Support group   Specify support group:   []  Cancer support group    Specify support group:   []  Mental health support/counseling  Specify support group:   []  Other (specify)     Sources of Stress/Grief in Addition to Patients Current illness or Death:    [x]  None reported  []  Bills/Debt    []  Career/Job change     []   (short term)  []   (long term)     []  Death of child      []  Death of parent    []  Death of spouse   []  Employment status changed     []  Family discord     []  Financial      []  Financial loss/Inadequate income  []  Job loss  []  Lifestyle change  []  Marital discord  []  Marriage within the last year  []  Separation/Divorce.   []  Legal issues unresolved  []  Paperwork (insurance/legal/etc.) overwhelming  []  Personal illness/injury  []  Other (specify):    Stress Level Reported   [] 1      [x] 2      [] 3      [] 4      [] 5     [] 6      [] 7      [] 8      [] 9      [] 10  []  No Stress         []  Maximum Stress     Support Notes:  Carolann Allison noted that she has good support.      Emotional Behavior  Emotional Status:    [x]  NO SIGNIFICANT FINDINGS  []  Agitation/Restless      []  Angry       []  Anxious     []  Avoidant       []  Bereavement /loss issues     []  Clinging     []  Depressed       []  Distraught  []  Euphoric   []  Fearful   []  Flat affect  []  Helpless  []  Hostile   []  Irritable  []  Labile  []  Potential suicide risk  []  Sad  []  Strained family/social relationships  []  Suspicious  []  Tearful  []  Withdrawn         Coping of Caregiver: Check coping mechanisms observed during assessment  []  Confrontive coping (describes aggressive efforts to alter the situation and suggests some degree of hostility and risk taking)  []  Distancing (describes cognitive efforts to detach oneself and to minimize the significance of the situation)  []  Self-Controlling 9describes efforts to regulate ones own feelings)  []  Social Support (describes efforts to seek informational support, tangible support and emotional support)  [x]  Accepting (acknowledges ones own role an doing inner work that promotes personal peace)  []  Resignation  surrender (to accept no longer fight, to make peace w/circumstances)  []  Escape - Avoidance- Denial (describes wishful thinking and behavioral efforts to escape or avoid)  []  Planful Problem Solving (describes deliberates [id number] efforts to alter the situation)  []  Positive Reappraisal (describes efforts to create positive meaning by focusing on personal growth.  It also has a Sikhism dimension)  []  Other:     Significant behavior changed noted:   [x]  None  []  Alcohol use/abuse  []  Arrest or problems with law enforcement  []  Emotional lability  []  Increased incidence of physical illness/symptoms  []  Loss of interest in activities  []  School performance affected   []  Sleeping patters/habits altered  []  Substance/Drug use and/or abuse  []  Smoking (underage or excessive)  []  Truancy  []  Other:      Emotional/Behavior Notes: None to note    []  Suicidal Ideation Expressed/Discussed : None to note  []  Homicidal Ideation Expressed/Discussed : None to note           Coping skills (strengths/weaknesses): Friends, family, work     Support Services Needed/Referrals Required: (Gera Tho all that apply)     Suggested Resources  []         []  Financial management/counseling  []  Final arrangements/ planning  []  Food/Nutrition resources  []  Home maintenance/repairs/ services  []  Homemaker services  []  Income/Medical coverage assistance  []  Legal Assistance   []  Mental health referral   []  Protective services  []  Relocation to different care setting  []  Transportation   []  Other:             Bereavement Follow-Up Plan: Regular mailings         Financial  [x]  Independent: Manages financial affairs without assistance  []  Minimal Assistance: Needs prompting/reminders to pay bills/make deposits/cash checks or manage accounts  []  Moderate Assistance: Needs supervision of all financial tasks  []  Total Assistance: Unable to manage her/his own financial affairs  []  Unknown  Notes     Survivor Risk Assessment Summary (Actual or Potential Risks to the Bereavement Process):     []  Abuse or history of abuse observed or reported within family system  []  Concurrent stressful events or situations observed or reported  []  Dependent children reside within household  []  Family discord exhibited/described  []  Feelings of guilt expressed by family members  []  Financial status significantly affected by illness/death  []  Marital discord exhibited/described  []  Neglect observed or reported within the family system  []  Patient is a single-parent with dependent children  []  Psychiatric illness (or history) reported  []  Social Support systems limited  []  Spiritual distress observed or reported  []  Survivor frail/elderly/dependent  []  Survivor showing emotional and/or physical signs of stress/distress  []  Other (specify):  Notes:     Cultural Perspective Regarding Death:    []  Yes    [x]  No  Cultural Notes     Bereavement Assessment:     [x]  LOW RISK Indications:    [x]  normal grief   [x]  good support    [x]  open expression      []  MODERATE RISK Indications:    []  grief normal but severe    []  depression (taking meds. professional help)   []  somatic distress   []  low support    []  Hx of difficulty w/ loss    []  unresolved conflict w/ the .  Issa Lower  []  HIGH RISK Indications:    []  Hx mental illness    []  Suicidal ideation    []  Depression (no meds.  or prof. help)   []  Somatic distress   []  Excessive guilt or anger    []  Multiple losses    []  unresolved losses , other life crises.        MSW Assessment Completed by: Trevor Raya LCSW  05/01/17    Time In:10 :00 am       Time Out: 10:35 am

## 2017-05-01 NOTE — PROGRESS NOTES
Oncology Nursing Communication Tool      7:23 PM  5/1/2017     Bedside and Verbal shift change report given to ROBERT Wild (incoming nurse) by Nupur Lucero RN (outgoing nurse) on Devendra Johnson a 66 y.o. male who was admitted on 4/30/2017  1:00 PM. Report included the following information SBAR, Kardex, Intake/Output, MAR and Accordion. Significant changes during shift: none      Issues for physician to address: none           Code Status: DNR     Infections: No current active infections     Allergies: Cardizem [diltiazem hcl]     Current diet: DIET REGULAR       Pain Controlled [x] yes [] no   Bowel Movement [] yes [x] no   Last Bowel Movement (date)     4/30/17            Vital Signs:     Patient Vitals for the past 12 hrs:   Temp Pulse Resp BP SpO2   05/01/17 0444 97.9 °F (36.6 °C) 76 26 140/60 98 %   05/01/17 0311 - - - - 96 %      Intake & Output:       Intake/Output Summary (Last 24 hours) at 05/01/17 0845  Last data filed at 05/01/17 2183   Gross per 24 hour   Intake              150 ml   Output              550 ml   Net             -400 ml      Laboratory Results:   No results found for this or any previous visit (from the past 12 hour(s)). Opportunity for questions and clarifications were given to the incoming nurse. Patient's bed is in low position, side rails x2, door open PRN, call bell within reach and patient not in distress.       Nupur Lucero RN

## 2017-05-01 NOTE — HOSPICE
The  found the patient lying in bed unresponsive alone. The  talked to the patient about his day and his family. The  offered a blessing as the  does not know the patient's spiritual beliefs. The  will contact the patients wife who is at the hospital but was not in the room. SPOC: Unable to assess spiritual acuity. The  will offer spiritual support through pastoral presence and end-of-life support.     Frequency: 1 wk 2; 5 PRN 14

## 2017-05-01 NOTE — PROGRESS NOTES
Pressure Ulcer Prevention In basket Alert Received for Azael < 14 (moderate risk).      Suggested Care Plan/Interventions for Nursing  1. Complete Azael Pressure Ulcer Risk Scale and use sub scores to identify appropriate interventions. 2. Perform Assessment: skin, changes in LOC, visual cues for pain, monitor skin under medical devices  3. Respond to Reduced Sensory Perception: changes in LOC, check visual cues for pain, float heels, suspension boots, pressure redistribution bed/mattress/chair cushion, turning and reposition approximately every 2 hours (pillows & wedges), pad between skin to skin, turn & reposition  4. Manage Moisture: absorbent under pads, internal / external urinary device, internal /  external fecal device, minimize layers, contain wound drainage, access need for specialty bed, limit adult briefs, maintain skin hydration (lotion/cream), moisture barrier, offer toileting every hour  5. Promote Activity: increase time out of bed, chair cushion, PT/OT evaluation, trapeze to reposition, pressure redistribution bed/mattress/chair  6. Address Reduced Mobility: float heels / suspension boot, HOB 30 degrees or less, pressure redistribution bed/mattress/cushion, PT / OT evaluation, turn and reposition approximately every 2 hours (pillows & wedges)  7. Promote Nutrition: document food / fluid / supplement intake, encourage/assist with meals as needed  8. Reduce Friction and Shear: transferring/repositioning devices (lift/draw sheet), lift team/ patient mobility team, feet elevated on foot rest, minimize layers, foam dressing / transparent film / skin sealants, protective barrier creams and emollients, transfer aides (board, Danna lift, ceiling lift, stand assist), HOB 30 degrees or less, trapeze to reposition.   Wound Care Team

## 2017-05-02 NOTE — HOSPICE
[facility]  Good Help to Those in Need  [phone]    Discharge/Death Nursing Note   Patient Name: Sharyle Conception  YOB: 1945  Age: 66 y.o.     Date of Death: 17  Admitted Date: 2017  Time of Death: 8:50pm    Facility of Care: [facility]  Level of Care: routine  Patient Room: [address] [place] [address]     Hospice Attending: Kyle Vaughn MD  Hospice Diagnosis: Acute Respiratory Failure  Acute respiratory failure ([address]    Death Pronouncement   Pronouncement of death completed by: Dr Joie Rasheed staff  Was not present at the time of death    At the time of death the patient was documented as no signs of life  The pt  within [facility]  The following were notified of the patient's death:hospice staff, hospitalist, , nursing supervisor, pt's wife  Medications were disposed of per facility protocol     Discharge Summary   Discharge Reason: Death    Summary of Care Provided:    [x] Post mortem care provided by TBD  [x] Notification of  home by nursing supervisor  [x] Referrals/Community resources provided:   [x] Goals completed  [] Durable Medical Equipment vendor notified     Disciplines involved: [x] RN [] SW []  [x] HA [] Vol [] PT [] OT [] ST [] [facility]    [] IDT communication/notification    Attending Physician, Dr. Sandra Hatch, notified of death    Bereaved   Verify bereaved identified with name, address, telephone number and risk level      Advance Care Planning 2017   Patient's Healthcare Decision Maker is: Legal Next of Stefano 67   Primary Decision Maker Name -   Primary Decision Maker Phone Number -   Primary Decision Maker Relationship to Patient -   Confirm Advance Directive Yes, on file   Does the patient have other document types Do Not Resuscitate

## 2017-05-02 NOTE — PROGRESS NOTES
Oncology Nursing Communication Tool      8:05 PM  5/1/2017     Bedside shift change report given to Dennis Sebastian RN (incoming nurse) by Bruce Ruiz RN (outgoing nurse) on Audrey Morgan a 66 y.o. male who was admitted on 4/30/2017  1:00 PM. Report included the following information SBAR, Kardex, Intake/Output, MAR, Accordion and Recent Results. Significant changes during shift: Dressing changed on Left Chest site; Pain controlled with scheduled Morphine; Nasal Cannula 02 @ 4.5L      Issues for physician to address: Patient would benefit with IV Morphine and IV Ativan ordered rather than sublingual if possible please. Thank you             Code Status: DNR     Infections: No current active infections     Allergies: Cardizem [diltiazem hcl]     Current diet: DIET REGULAR       Pain Controlled [x] yes [] no   Bowel Movement [] yes [x] no   Last Bowel Movement (date)             Vital Signs:   Patient Vitals for the past 12 hrs:   Resp   05/01/17 1705 18      Intake & Output:     Intake/Output Summary (Last 24 hours) at 05/01/17 2005  Last data filed at 05/01/17 1430   Gross per 24 hour   Intake              150 ml   Output              550 ml   Net             -400 ml      Laboratory Results:   No results found for this or any previous visit (from the past 12 hour(s)). Opportunity for questions and clarifications were given to the incoming nurse. Patient's bed is in low position, side rails x2, door open PRN, call bell within reach and patient not in distress.       Bruce Ruiz RN

## 2017-05-02 NOTE — PROGRESS NOTES
Called by the nurse, pt with no signs of life  Seen at the bedside  . No spontaneous breath sounds , no heart beat,  Pupils fixed and dilated.  TOD: 8.50pm  D/C Summary and death note to be dictated and signed by Attending MD.

## 2017-05-02 NOTE — H&P
[address] [place] [address]   [facility]  [phone]    Patient Name: Suha Mckeon  YOB: 1945    Date of Provider Hospice Visit: 05/01/17    Level of Care:   [] General Inpatient (GIP)    [x] Routine   [] Respite    Location of Care:  [] [facility] [] [facility] [x] [facility] [] [facility] [] [facility]  [] Home [] Other:      Date of Original Hospice Admission: 4-30-17  Hospice Attending: Dr. Fiona Rivera Diagnosis: respiratory failure  Diagnoses RELATED to the terminal prognosis: acute and chronic diastolic heart failure  Other Diagnoses: Underlying pulmonary fibrosis, chronic bronchiectasis, remote history of pulmonary TB s/p treatment , severe COPD     HOSPICE NARRATIVE COMPOSED BY PHYSICIAN   Rationale for a prognosis of life expectancy of 6 months or less if the disease follows its normal course:       Suha Mckeon is a 66 y.o. who was admitted to [address] [place] [address]. The patient's principle diagnosis of acute respiratory failure has resulted in his most recent decline and hospitalization. Pt went to MD for checkup due to increased shortness of breath for 1 week. In doctor's office the pt's oxygen level was in the 70's on 6L of oxygen. He was transported to the ER for eval. Ultimately admitted for pneumonia with a complete opacification of the left lung. Pt received IV antibiotics for 48 hours and despite the aggressive measures continued to decline. Wife discussed his condition with Dr Alexandra Lofton and has decided to place him under comfort care with hospice. Functionally, the patient's Palliative Performance Scale has declined over a period of one week and is estimated at 10. Objective information that support this patients limited prognosis includes: Lab work from admission; Hematology; WBC 5.7, RBC 3.16, Hgb 8.6, Hct 28.8, Plt 117. Chemistry; Chloride 96, CO2 38, Albumin 2.3, Troponin 0.07, BNP 49867. Chest xray;  Increased opacification of the left hemithorax likely representing a combination of pleural effusion and airspace opacity. The patient/family chose comfort measures with the support of Hospice. HOSPICE DIAGNOSES   Active Symptoms:  1. Agitation  2. shortness of breath  3. profound weakness  4. Acute respiratory failure     PLAN   1. Scheduled pain and symptom manage,ment medications  2. Continue lorazepam 2 mg q 4hrs and 1mg q 1 hr as needed  3. Continue Morphine 10mg q 1 hr  as needed and q 4hrs scheduled  4. Robinul prn  5. Nebulizer and oxygen for comfort    and SW to support family needs  Disposition: routine level of care  . Short prognosis. Met with pt's wife today at 7:45 am today  and she was satisfied with his care and comfort level. Prognosis estimated based on 05/01/17 clinical assessment is:   [x] Few to Many Hours  [] Few to Many Days    [] Few to Many Weeks    [] Few to Many Months    Communicated plan of care with: Hospice Case Manager;  Hospice IDT; Care Team     GOALS OF CARE     Resuscitation Status: DNR  Durable DNR: [x] Yes [] No    Advance Care Planning 4/30/2017   Patient's Healthcare Decision Maker is: Legal Next of Kin   Primary Decision Maker Name -   Primary Decision Maker Phone Number -   Primary Decision Maker Relationship to Patient -   Confirm Advance Directive Yes, on file   Does the patient have other document types Do Not Resuscitate        HISTORY     History obtained from: chart, family, CM    CHIEF COMPLAINT: shortness of breath and agitation   The patient is:   [x] Verbal  [] Nonverbal  [] Unresponsive    HPI/SUBJECTIVE:  66year old male with a hx of acute on chronic respiratory failure, with diastolic hear  failure and severe COPD       REVIEW OF SYSTEMS     The following systems were: [] reviewed  [x] unable to be reviewed    Positive ROS include:  Constitutional:  Ears/nose/mouth/throat:  Respiratory:  Gastrointestinal:  Musculoskeletal:  Neurologic:  Psychiatric:  Endocrine:               FUNCTIONAL ASSESSMENT     Palliative Performance Scale (PPS): 10%     PSYCHOSOCIAL/SPIRITUAL ASSESSMENT     Active Problems:    Acute respiratory failure (HCC) (4/30/2017)      Past Medical History:   Diagnosis Date    Adjustment disorder with mixed anxiety and depressed mood     Bronchiectasis (HCC)     Chronic obstructive pulmonary disease (HCC)     Diastolic CHF, chronic ([address] 11/4/2015    Essential hypertension, benign 9/20/2009    GERD (gastroesophageal reflux disease) 9/20/2009    Hepatitis C antibody positive in blood     MI [facility] infarction[facility] ([facility]     Migraine headache 9/20/2009    Post-tuberculous bronchiectasis 10/17/2013    Pulmonary fibrosis (HCC)     Pulmonary tuberculosis     Thyroid disease     hypothyroidism    Urinary retention       No past surgical history on file.    Social History   Substance Use Topics    Smoking status: Former Smoker     Packs/day: 1.50     Years: 25.00     Types: Cigarettes     Quit date: 4/26/1979    Smokeless tobacco: Never Used    Alcohol use No     Family History   Problem Relation Age of Onset    Heart Disease Mother     Stroke Mother     Cancer Father      throat      Allergies   Allergen Reactions    Cardizem [Diltiazem Hcl] Other (comments)     Headache and constipation      Current Facility-Administered Medications   Medication Dose Route Frequency    LORazepam (INTENSOL) 2 mg/mL oral concentrate 1 mg  1 mg Oral Q1H PRN    acetaminophen (TYLENOL) tablet 650 mg  650 mg Oral Q4H PRN    Or    acetaminophen (TYLENOL) solution 650 mg  650 mg Oral Q4H PRN    Or    acetaminophen (TYLENOL) suppository 650 mg  650 mg Rectal Q4H PRN    scopolamine (TRANSDERM-SCOP) 1.5 mg  1.5 mg TransDERmal Q72H PRN    glycopyrrolate (ROBINUL) injection 0.2 mg  0.2 mg IntraVENous Q4H PRN    bisacodyl (DULCOLAX) suppository 10 mg  10 mg Rectal DAILY PRN    morphine (ROXANOL) 100 mg/5 mL (20 mg/mL) concentrated solution 10 mg  10 mg Oral Q1H PRN    morphine (ROXANOL) 100 mg/5 mL (20 mg/mL) concentrated solution 10 mg  10 mg Oral Q4H    LORazepam (INTENSOL) 2 mg/mL oral concentrate 2 mg  2 mg Oral Q4H    morphine (ROXANOL) 100 mg/5 mL (20 mg/mL) concentrated solution 15 mg  15 mg Oral Q2H PRN    haloperidol lactate (HALDOL) injection 1 mg  1 mg IntraVENous Q6H    saline peripheral flush soln 10 mL  10 mL InterCATHeter PRN            General: WD, WN. nonresponsive , wearing nasal 02  EENT:  EOMI. Anicteric sclerae. MMM  Resp:  Coarse BS, rhonchi, mild crackles in both sides, using accessory muscles  CV:  Regular rhythm,  No edema  GI:  Soft, Non distended, Non tender.  +Bowel sounds  Neurologic:  nonresponsive to verbal and tactile stimuli  Psych:  anxious    Skin:  No rashes.  No jaundice    Pertinent Lab and or Imaging Tests:  Lab Results   Component Value Date/Time    Sodium 141 04/30/2017 02:58 AM    Potassium 3.3 04/30/2017 02:58 AM    Chloride 94 04/30/2017 02:58 AM    CO2 39 04/30/2017 02:58 AM    Anion gap 8 04/30/2017 02:58 AM    Glucose 113 04/30/2017 02:58 AM    Glucose 89 11/09/2009 07:45 AM    BUN 25 04/30/2017 02:58 AM    Creatinine 0.89 04/30/2017 02:58 AM    BUN/Creatinine ratio 28 04/30/2017 02:58 AM    GFR est AA >60 04/30/2017 02:58 AM    GFR est non-AA >60 04/30/2017 02:58 AM    Calcium 8.0 04/30/2017 02:58 AM     Lab Results   Component Value Date/Time    Protein, total 6.2 04/30/2017 02:58 AM    Albumin 2.2 04/30/2017 02:58 AM           Total time: 20  Counseling / coordination time: 10  > 50% counseling / coordination?:  y

## 2017-05-02 NOTE — PROGRESS NOTES
2040: Pt in bed with absent breath and heart sounds. Pt's wife, Markel Lindsey, called and notified of pt's passing; condolences offered. Mrs. Layton Dennison will be coming to bedside. Nursing supervisor notified, will page hospitalist to pronounce. On-call  and hospice NP paged  [phone]: Family to bedside. 2330: D/c english and CIERA. Post-mortem care provided. 0240: Pt transported to [facility] by nurse.

## 2017-05-03 LAB
BACTERIA SPEC CULT: ABNORMAL
BACTERIA SPEC CULT: ABNORMAL
BACTERIA SPEC CULT: NORMAL
GRAM STN SPEC: ABNORMAL
SERVICE CMNT-IMP: ABNORMAL
SERVICE CMNT-IMP: NORMAL

## 2017-05-11 NOTE — DISCHARGE SUMMARY
Discharge Summary    [address] [place] [address]  [facility]        Date of Admission: 4/30/2017  Date of Discharge: 5/1/2017    Ewelina Meehan is a 74y.o. year old who was admitted to [address] [place] [address] at [facility] with a Hospice diagnosis of Acute Respiratory Failure; Acute respiratory failure ([address] The patient's care was focused on comfort and the patient passed away on 5/1/2017.
